# Patient Record
Sex: MALE | Race: WHITE | NOT HISPANIC OR LATINO | Employment: OTHER | URBAN - METROPOLITAN AREA
[De-identification: names, ages, dates, MRNs, and addresses within clinical notes are randomized per-mention and may not be internally consistent; named-entity substitution may affect disease eponyms.]

---

## 2018-03-28 ENCOUNTER — TELEPHONE (OUTPATIENT)
Dept: FAMILY MEDICINE | Facility: CLINIC | Age: 68
End: 2018-03-28

## 2018-03-28 ENCOUNTER — OFFICE VISIT (OUTPATIENT)
Dept: INTERNAL MEDICINE | Facility: CLINIC | Age: 68
End: 2018-03-28
Payer: COMMERCIAL

## 2018-03-28 VITALS
DIASTOLIC BLOOD PRESSURE: 78 MMHG | BODY MASS INDEX: 28.14 KG/M2 | HEART RATE: 74 BPM | RESPIRATION RATE: 16 BRPM | TEMPERATURE: 97.2 F | WEIGHT: 201 LBS | HEIGHT: 71 IN | OXYGEN SATURATION: 100 % | SYSTOLIC BLOOD PRESSURE: 130 MMHG

## 2018-03-28 DIAGNOSIS — Z13.6 CARDIOVASCULAR SCREENING; LDL GOAL LESS THAN 130: ICD-10-CM

## 2018-03-28 DIAGNOSIS — Z00.00 ENCOUNTER FOR ROUTINE ADULT HEALTH EXAMINATION WITHOUT ABNORMAL FINDINGS: Primary | ICD-10-CM

## 2018-03-28 DIAGNOSIS — Z80.42 FAMILY HISTORY OF MALIGNANT NEOPLASM OF PROSTATE: ICD-10-CM

## 2018-03-28 LAB
ALBUMIN SERPL-MCNC: 3.9 G/DL (ref 3.4–5)
ALP SERPL-CCNC: 77 U/L (ref 40–150)
ALT SERPL W P-5'-P-CCNC: 16 U/L (ref 0–70)
ANION GAP SERPL CALCULATED.3IONS-SCNC: 10 MMOL/L (ref 3–14)
AST SERPL W P-5'-P-CCNC: 11 U/L (ref 0–45)
BILIRUB SERPL-MCNC: 0.5 MG/DL (ref 0.2–1.3)
BUN SERPL-MCNC: 15 MG/DL (ref 7–30)
CALCIUM SERPL-MCNC: 8.6 MG/DL (ref 8.5–10.1)
CHLORIDE SERPL-SCNC: 106 MMOL/L (ref 94–109)
CHOLEST SERPL-MCNC: 135 MG/DL
CO2 SERPL-SCNC: 26 MMOL/L (ref 20–32)
CREAT SERPL-MCNC: 0.66 MG/DL (ref 0.66–1.25)
GFR SERPL CREATININE-BSD FRML MDRD: >90 ML/MIN/1.7M2
GLUCOSE SERPL-MCNC: 97 MG/DL (ref 70–99)
HDLC SERPL-MCNC: 43 MG/DL
LDLC SERPL CALC-MCNC: 80 MG/DL
NONHDLC SERPL-MCNC: 92 MG/DL
POTASSIUM SERPL-SCNC: 4.3 MMOL/L (ref 3.4–5.3)
PROT SERPL-MCNC: 8.6 G/DL (ref 6.8–8.8)
PSA SERPL-ACNC: 4.14 UG/L (ref 0–4)
SODIUM SERPL-SCNC: 142 MMOL/L (ref 133–144)
TRIGL SERPL-MCNC: 62 MG/DL

## 2018-03-28 PROCEDURE — 80053 COMPREHEN METABOLIC PANEL: CPT | Performed by: INTERNAL MEDICINE

## 2018-03-28 PROCEDURE — 36415 COLL VENOUS BLD VENIPUNCTURE: CPT | Performed by: INTERNAL MEDICINE

## 2018-03-28 PROCEDURE — 80061 LIPID PANEL: CPT | Performed by: INTERNAL MEDICINE

## 2018-03-28 PROCEDURE — 99397 PER PM REEVAL EST PAT 65+ YR: CPT | Performed by: INTERNAL MEDICINE

## 2018-03-28 PROCEDURE — G0103 PSA SCREENING: HCPCS | Performed by: INTERNAL MEDICINE

## 2018-03-28 RX ORDER — CETIRIZINE HYDROCHLORIDE 10 MG/1
10 TABLET ORAL DAILY
COMMUNITY
End: 2019-04-17

## 2018-03-28 RX ORDER — AMPICILLIN TRIHYDRATE 250 MG
CAPSULE ORAL DAILY
COMMUNITY
End: 2019-04-17

## 2018-03-28 RX ORDER — FLUTICASONE PROPIONATE 50 MCG
1 SPRAY, SUSPENSION (ML) NASAL DAILY PRN
COMMUNITY
End: 2022-03-21

## 2018-03-28 RX ORDER — MAGNESIUM OXIDE/MAG AA CHELATE 300 MG
CAPSULE ORAL DAILY
COMMUNITY
End: 2021-04-13

## 2018-03-28 RX ORDER — ASCORBIC ACID 1000 MG
TABLET ORAL DAILY
COMMUNITY
End: 2019-11-12

## 2018-03-28 ASSESSMENT — ACTIVITIES OF DAILY LIVING (ADL)
CURRENT_FUNCTION: NO ASSISTANCE NEEDED
I_NEED_ASSISTANCE_FOR_THE_FOLLOWING_DAILY_ACTIVITIES:: NO ASSISTANCE IS NEEDED

## 2018-03-28 ASSESSMENT — PAIN SCALES - GENERAL: PAINLEVEL: NO PAIN (0)

## 2018-03-28 NOTE — PROGRESS NOTES
SUBJECTIVE:   Ag Evans is a 67 year old male who presents for Preventive Visit.    Are you in the first 12 months of your Medicare coverage?  No    Physical   Annual:     Getting at least 3 servings of Calcium per day::  Yes    Bi-annual eye exam::  Yes    Dental care twice a year::  Yes    Sleep apnea or symptoms of sleep apnea::  None    Taking medications regularly::  Yes    Medication side effects::  None    Additional concerns today::  No    Ability to successfully perform activities of daily living: no assistance needed  Home Safety:  No safety concerns identified  Hearing Impairment: difficulty following a conversation in a noisy restaurant or crowded room    , retired.  Takes some supplement.  Volunteers with his [employer].  30-40 minutes riding bike outside, some exercise bands.     Fall risk:  Fallen 2 or more times in the past year?: No  Any fall with injury in the past year?: No    COGNITIVE SCREEN  1) Repeat 3 items (Banana, Sunrise, Chair)    2) Clock draw: NORMAL  3) 3 item recall: Recalls 1 object   Results: NORMAL clock, 1-2 items recalled: COGNITIVE IMPAIRMENT LESS LIKELY    Mini-CogTM Copyright HARIS Woo. Licensed by the author for use in [facility]; reprinted with permission ([email or web address]). All rights reserved.        Reviewed and updated as needed this visit by clinical staff  Allergies         Reviewed and updated as needed this visit by Provider        Social History   Substance Use Topics     Smoking status: Never Smoker     Smokeless tobacco: Never Used     Alcohol use Yes      Comment: Moderate       Alcohol Use 3/28/2018   If you drink alcohol do you typically have greater than 3 drinks per day OR greater than 7 drinks per week? No               Today's PHQ-2 Score:   PHQ-2 ( 1999 [employer]izer) 3/28/2018   Q1: Little interest or pleasure in doing things 0   Q2: Feeling down, depressed or hopeless 0   PHQ-2 Score 0   Q1: Little interest or pleasure in doing things Not at all  "  Q2: Feeling down, depressed or hopeless Not at all   PHQ-2 Score 0       Do you feel safe in your environment - Yes    Do you have a Health Care Directive?: Yes: Patient states has Advance Directive and will bring in a copy to clinic.    Current providers sharing in care for this patient include:   Patient Care Team:  No Ref-Primary, Physician as PCP - General    The following health maintenance items are reviewed in Epic and correct as of today:  Health Maintenance   Topic Date Due     HEPATITIS C SCREENING  08/02/1967     AORTIC ANEURYSM SCREENING (SYSTEM ASSIGNED)  08/02/2014     FALL RISK ASSESSMENT  07/30/2016     LIPID MONITORING Q1 YEAR  08/13/2016     INFLUENZA VACCINE (SYSTEM ASSIGNED)  09/01/2018     COLON CANCER SCREEN (SYSTEM ASSIGNED)  06/07/2020     ADVANCE DIRECTIVE PLANNING Q5 YRS  07/30/2020     TETANUS IMMUNIZATION (SYSTEM ASSIGNED)  01/27/2021     PNEUMOCOCCAL  Completed     Pneumonia Vaccine:up to date    Review of Systems  CONSTITUTIONAL: NEGATIVE for fever, chills, change in weight  INTEGUMENTARY/SKIN: NEGATIVE for worrisome rashes, moles or lesions  EYES: NEGATIVE for vision changes or irritation  ENT/MOUTH: NEGATIVE for ear, mouth and throat problems  RESP: NEGATIVE for significant cough or SOB  CV: NEGATIVE for chest pain, palpitations or peripheral edema  GI: NEGATIVE for nausea, abdominal pain, heartburn, or change in bowel habits  : NEGATIVE for frequency, dysuria, or hematuria  MUSCULOSKELETAL: NEGATIVE for significant arthralgias or myalgia  NEURO: NEGATIVE for weakness, dizziness or paresthesias  ENDOCRINE: NEGATIVE for temperature intolerance, skin/hair changes  HEME: NEGATIVE for bleeding problems  PSYCHIATRIC: NEGATIVE for changes in mood or affect    OBJECTIVE:   /78  Pulse 74  Temp 97.2  F (36.2  C) (Temporal)  Resp 16  Ht 5' 10.75\" (1.797 m)  Wt 201 lb (91.2 kg)  SpO2 100%  BMI 28.23 kg/m2 Estimated body mass index is 27.16 kg/(m^2) as calculated from the " "following:    Height as of 5/6/16: 5' 10.87\" (1.8 m).    Weight as of 7/12/16: 194 lb (88 kg).  Physical Exam  GENERAL: healthy, alert and no distress  EYES: Eyes grossly normal to inspection, PERRL and conjunctivae and sclerae normal  HENT: ear canals and TM's normal, nose and mouth without ulcers or lesions  NECK: no adenopathy, no asymmetry, masses, or scars and thyroid normal to palpation  RESP: lungs clear to auscultation - no rales, rhonchi or wheezes  CV: regular rate and rhythm, normal S1 S2, no S3 or S4, no murmur, click or rub, no peripheral edema and peripheral pulses strong  ABDOMEN: soft, nontender, no hepatosplenomegaly, no masses and bowel sounds normal  RECTAL: normal sphincter tone, no rectal masses, prostate normal size, smooth, nontender without nodules or masses  MS: no gross musculoskeletal defects noted, no edema  SKIN: no suspicious lesions or rashes  NEURO: Normal strength and tone, mentation intact and speech normal  PSYCH: mentation appears normal, affect normal/bright    ASSESSMENT / PLAN:       ICD-10-CM    1. Encounter for routine adult health examination without abnormal findings Z00.00 Comprehensive metabolic panel   2. Family history of malignant neoplasm of prostate Z80.42 PSA, screen   3. CARDIOVASCULAR SCREENING; LDL GOAL LESS THAN 130 Z13.6 Lipid Profile     Comprehensive metabolic panel       End of Life Planning:  Patient currently has an advanced directive: Yes.  Practitioner is supportive of decision.    COUNSELING:  Reviewed preventive health counseling, as reflected in patient instructions       Regular exercise       Healthy diet/nutrition       Prostate cancer screening        Estimated body mass index is 27.16 kg/(m^2) as calculated from the following:    Height as of 5/6/16: 5' 10.87\" (1.8 m).    Weight as of 7/12/16: 194 lb (88 kg).     reports that he has never smoked. He has never used smokeless tobacco.      Appropriate preventive services were discussed with this " patient, including applicable screening as appropriate for cardiovascular disease, diabetes, osteopenia/osteoporosis, and glaucoma.  As appropriate for age/gender, discussed screening for colorectal cancer, prostate cancer, breast cancer, and cervical cancer. Checklist reviewing preventive services available has been given to the patient.    Reviewed patients plan of care and provided an AVS. The Basic Care Plan (routine screening as documented in Health Maintenance) for Ag meets the Care Plan requirement. This Care Plan has been established and reviewed with the Patient.    Counseling Resources:  ATP IV Guidelines  Pooled Cohorts Equation Calculator  Breast Cancer Risk Calculator  FRAX Risk Assessment  ICSI Preventive Guidelines  Dietary Guidelines for Americans, 2010  USDA's MyPlate  ASA Prophylaxis  Lung CA Screening    Jose Antonio MD  [facility]

## 2018-03-28 NOTE — TELEPHONE ENCOUNTER
----- Message from Jose Antonio MD sent at 3/28/2018  1:38 PM CDT -----  Labs are good but his psa is up slightly. I would recommend a repeat psa in 2 months

## 2018-03-28 NOTE — NURSING NOTE
"Chief Complaint   Patient presents with     Wellness Visit       Initial /78  Pulse 74  Temp 97.2  F (36.2  C) (Temporal)  Resp 16  Ht 5' 10.75\" (1.797 m)  Wt 201 lb (91.2 kg)  SpO2 100%  BMI 28.23 kg/m2 Estimated body mass index is 28.23 kg/(m^2) as calculated from the following:    Height as of this encounter: 5' 10.75\" (1.797 m).    Weight as of this encounter: 201 lb (91.2 kg).  Medication Reconciliation: complete    "

## 2018-03-28 NOTE — MR AVS SNAPSHOT
"              After Visit Summary   3/28/2018    Ag Evans    MRN: [id number]           Patient Information     Date Of Birth          1950        Visit Information        Provider Department      3/28/2018 11:00 AM Jose Antonio MD [facility]         Follow-ups after your visit        Who to contact     If you have questions or need follow up information about today's clinic visit or your schedule please contact [facility] directly at [phone].  Normal or non-critical lab and imaging results will be communicated to you by MyChart, letter or phone within 4 business days after the clinic has received the results. If you do not hear from us within 7 days, please contact the clinic through [facility] or phone. If you have a critical or abnormal lab result, we will notify you by phone as soon as possible.  Submit refill requests through [employer] or call your pharmacy and they will forward the refill request to us. Please allow 3 business days for your refill to be completed.          Additional Information About Your Visit        MyChart Information     [employer] gives you secure access to your electronic health record. If you see a primary care provider, you can also send messages to your care team and make appointments. If you have questions, please call your primary care clinic.  If you do not have a primary care provider, please call [phone] and they will assist you.        Care EveryWhere ID     This is your Care EveryWhere ID. This could be used by other organizations to access your [facility] medical records  [id number]        Your Vitals Were     Pulse Temperature Respirations Height Pulse Oximetry BMI (Body Mass Index)    74 97.2  F (36.2  C) (Temporal) 16 5' 10.75\" (1.797 m) 100% 28.23 kg/m2       Blood Pressure from Last 3 Encounters:   03/28/18 130/78   07/12/16 120/62   05/06/16 122/72    Weight from Last 3 Encounters:   03/28/18 201 lb (91.2 kg)   07/12/16 194 lb " (88 kg)   05/06/16 192 lb 9.6 oz (87.4 kg)              Today, you had the following     No orders found for display       Primary Care Provider Fax #    Physician No Ref-Primary [phone]       No address on file        Equal Access to Services     SALOMÓN JIM : Galina palma hannah kwan [place], watimurda luqadaha, qaybta kaalmada adeanneliese, sinan osullivan larusselmilagros . So [facility] [phone].    ATENCIÓN: Si habla español, tiene a cho disposición servicios gratuitos de asistencia lingüística. Llame al [phone].    We comply with applicable federal civil rights laws and [place] laws. We do not discriminate on the basis of race, color, national origin, age, disability, sex, sexual orientation, or gender identity.            Thank you!     Thank you for choosing [facility]  for your care. Our goal is always to provide you with excellent care. Hearing back from our patients is one way we can continue to improve our services. Please take a few minutes to complete the written survey that you may receive in the mail after your visit with us. Thank you!             Your Updated Medication List - Protect others around you: Learn how to safely use, store and throw away your medicines at www.disposemymeds.org.          This list is accurate as of 3/28/18 11:18 AM.  Always use your most recent med list.                   Brand Name Dispense Instructions for use Diagnosis    aspirin 81 MG EC tablet      Take 81 mg by mouth daily        cetirizine 10 MG tablet    zyrTEC     Take 10 mg by mouth daily        CoQ10 200 MG Caps      Take by mouth daily        fluticasone 50 MCG/ACT spray    FLONASE     Spray 1 spray into both nostrils daily        Ginkgo Biloba 40 MG Tabs      Take by mouth daily        Magnesium 300 MG Caps      Take by mouth daily        MULTIVITAMIN PO           Saw Palmetto (Serenoa repens) 540 MG Caps      Take by mouth daily

## 2018-06-15 ENCOUNTER — MYC MEDICAL ADVICE (OUTPATIENT)
Dept: INTERNAL MEDICINE | Facility: CLINIC | Age: 68
End: 2018-06-15

## 2018-06-15 DIAGNOSIS — Z12.5 ENCOUNTER FOR SCREENING FOR MALIGNANT NEOPLASM OF PROSTATE: ICD-10-CM

## 2018-06-15 DIAGNOSIS — R97.20 ELEVATED PSA: Primary | ICD-10-CM

## 2018-06-20 DIAGNOSIS — R97.20 ELEVATED PSA: ICD-10-CM

## 2018-06-20 DIAGNOSIS — Z12.5 ENCOUNTER FOR SCREENING FOR MALIGNANT NEOPLASM OF PROSTATE: ICD-10-CM

## 2018-06-20 LAB — PSA SERPL-ACNC: 2.97 UG/L (ref 0–4)

## 2018-06-20 PROCEDURE — G0103 PSA SCREENING: HCPCS | Performed by: INTERNAL MEDICINE

## 2019-04-14 ASSESSMENT — ENCOUNTER SYMPTOMS
WEAKNESS: 0
PALPITATIONS: 0
JOINT SWELLING: 0
CONSTIPATION: 0
HEMATURIA: 0
ARTHRALGIAS: 0
MYALGIAS: 0
DIZZINESS: 0
DYSURIA: 0
PARESTHESIAS: 0
SORE THROAT: 0
FREQUENCY: 1
ABDOMINAL PAIN: 0
COUGH: 0
SHORTNESS OF BREATH: 0
CHILLS: 0
HEARTBURN: 0
EYE PAIN: 0
NAUSEA: 0
HEADACHES: 0
HEMATOCHEZIA: 0
FEVER: 0
DIARRHEA: 0
NERVOUS/ANXIOUS: 0

## 2019-04-14 ASSESSMENT — ACTIVITIES OF DAILY LIVING (ADL): CURRENT_FUNCTION: NO ASSISTANCE NEEDED

## 2019-04-17 ENCOUNTER — TELEPHONE (OUTPATIENT)
Dept: INTERNAL MEDICINE | Facility: CLINIC | Age: 69
End: 2019-04-17

## 2019-04-17 ENCOUNTER — OFFICE VISIT (OUTPATIENT)
Dept: INTERNAL MEDICINE | Facility: CLINIC | Age: 69
End: 2019-04-17
Payer: COMMERCIAL

## 2019-04-17 VITALS
HEART RATE: 65 BPM | WEIGHT: 202.19 LBS | OXYGEN SATURATION: 100 % | DIASTOLIC BLOOD PRESSURE: 72 MMHG | TEMPERATURE: 97.2 F | RESPIRATION RATE: 12 BRPM | BODY MASS INDEX: 28.31 KG/M2 | SYSTOLIC BLOOD PRESSURE: 140 MMHG | HEIGHT: 71 IN

## 2019-04-17 DIAGNOSIS — Z12.5 PROSTATE CANCER SCREENING: ICD-10-CM

## 2019-04-17 DIAGNOSIS — Z11.59 NEED FOR HEPATITIS C SCREENING TEST: ICD-10-CM

## 2019-04-17 DIAGNOSIS — Z13.6 CARDIOVASCULAR SCREENING; LDL GOAL LESS THAN 130: ICD-10-CM

## 2019-04-17 DIAGNOSIS — Z00.00 ENCOUNTER FOR MEDICARE ANNUAL WELLNESS EXAM: Primary | ICD-10-CM

## 2019-04-17 LAB
ALBUMIN SERPL-MCNC: 3.9 G/DL (ref 3.4–5)
ALP SERPL-CCNC: 75 U/L (ref 40–150)
ALT SERPL W P-5'-P-CCNC: 16 U/L (ref 0–70)
ANION GAP SERPL CALCULATED.3IONS-SCNC: 7 MMOL/L (ref 3–14)
AST SERPL W P-5'-P-CCNC: 9 U/L (ref 0–45)
BILIRUB SERPL-MCNC: 0.5 MG/DL (ref 0.2–1.3)
BUN SERPL-MCNC: 16 MG/DL (ref 7–30)
CALCIUM SERPL-MCNC: 8.8 MG/DL (ref 8.5–10.1)
CHLORIDE SERPL-SCNC: 107 MMOL/L (ref 94–109)
CHOLEST SERPL-MCNC: 144 MG/DL
CO2 SERPL-SCNC: 26 MMOL/L (ref 20–32)
CREAT SERPL-MCNC: 0.74 MG/DL (ref 0.66–1.25)
GFR SERPL CREATININE-BSD FRML MDRD: >90 ML/MIN/{1.73_M2}
GLUCOSE SERPL-MCNC: 101 MG/DL (ref 70–99)
HDLC SERPL-MCNC: 43 MG/DL
LDLC SERPL CALC-MCNC: 85 MG/DL
NONHDLC SERPL-MCNC: 101 MG/DL
POTASSIUM SERPL-SCNC: 4.3 MMOL/L (ref 3.4–5.3)
PROT SERPL-MCNC: 8.2 G/DL (ref 6.8–8.8)
PSA SERPL-ACNC: 3.39 UG/L (ref 0–4)
SODIUM SERPL-SCNC: 140 MMOL/L (ref 133–144)
TRIGL SERPL-MCNC: 81 MG/DL

## 2019-04-17 PROCEDURE — 36415 COLL VENOUS BLD VENIPUNCTURE: CPT | Performed by: INTERNAL MEDICINE

## 2019-04-17 PROCEDURE — G0103 PSA SCREENING: HCPCS | Performed by: INTERNAL MEDICINE

## 2019-04-17 PROCEDURE — G0472 HEP C SCREEN HIGH RISK/OTHER: HCPCS | Performed by: INTERNAL MEDICINE

## 2019-04-17 PROCEDURE — G0439 PPPS, SUBSEQ VISIT: HCPCS | Performed by: INTERNAL MEDICINE

## 2019-04-17 PROCEDURE — 80053 COMPREHEN METABOLIC PANEL: CPT | Performed by: INTERNAL MEDICINE

## 2019-04-17 PROCEDURE — 80061 LIPID PANEL: CPT | Performed by: INTERNAL MEDICINE

## 2019-04-17 SDOH — HEALTH STABILITY: MENTAL HEALTH: HOW OFTEN DO YOU HAVE A DRINK CONTAINING ALCOHOL?: MONTHLY OR LESS

## 2019-04-17 ASSESSMENT — ENCOUNTER SYMPTOMS
HEMATOCHEZIA: 0
HEARTBURN: 0
HEMATURIA: 0
ARTHRALGIAS: 0
PALPITATIONS: 0
EYE PAIN: 0
NERVOUS/ANXIOUS: 0
HEADACHES: 0
SHORTNESS OF BREATH: 0
DIARRHEA: 0
NAUSEA: 0
DYSURIA: 0
JOINT SWELLING: 0
FEVER: 0
CONSTIPATION: 0
PARESTHESIAS: 0
FREQUENCY: 1
SORE THROAT: 0
COUGH: 0
WEAKNESS: 0
MYALGIAS: 0
CHILLS: 0
ABDOMINAL PAIN: 0
DIZZINESS: 0

## 2019-04-17 ASSESSMENT — MIFFLIN-ST. JEOR: SCORE: 1696.31

## 2019-04-17 ASSESSMENT — PAIN SCALES - GENERAL: PAINLEVEL: NO PAIN (0)

## 2019-04-17 ASSESSMENT — ACTIVITIES OF DAILY LIVING (ADL): CURRENT_FUNCTION: NO ASSISTANCE NEEDED

## 2019-04-17 NOTE — TELEPHONE ENCOUNTER
Per Dr. Antonio:  PSA okay at 3.39, Kidney/liver okay and cholesterol is good.     Left message to call back. Please relay the results to pt when calls back.

## 2019-04-17 NOTE — PROGRESS NOTES
"SUBJECTIVE:   Ag Evans is a 68 year old male who presents for Preventive Visit.    Are you in the first 12 months of your Medicare coverage?  No    Healthy Habits:     In general, how would you rate your overall health?  Excellent    Frequency of exercise:  6-7 days/week    Duration of exercise:  30-45 minutes    Do you usually eat at least 4 servings of fruit and vegetables a day, include whole grains    & fiber and avoid regularly eating high fat or \"junk\" foods?  Yes    Taking medications regularly:  Yes    Medication side effects:  None    Ability to successfully perform activities of daily living:  No assistance needed    Home Safety:  No safety concerns identified    Hearing Impairment:  Difficulty following a conversation in a noisy restaurant or crowded room, feel that people are mumbling or not speaking clearly and difficulty understanding soft or whispered speech    In the past 6 months, have you been bothered by leaking of urine?  No    In general, how would you rate your overall mental or emotional health?  Excellent      PHQ-2 Total Score: 0    Additional concerns today:  Yes    He is active and does exercise.  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 Done at [facility].      Feels good, exercising 6 times a week.      Do you feel safe in your environment? Yes    Do you have a Health Care Directive? Yes: Patient states has Advance Directive and will bring in a copy to clinic.      Fall risk  Fallen 2 or more times in the past year?: No  Any fall with injury in the past year?: No    Cognitive Screening   1) Repeat 3 items (Leader, Season, Table)      2) Clock draw:   NORMAL  3) 3 item recall:   Recalls 1 object   Results: NORMAL clock, 1-2 items recalled: COGNITIVE IMPAIRMENT LESS LIKELY    Mini-CogTM Copyright S Chilo. Licensed by the author for use in [facility]; reprinted with permission ([email or web address]). All rights reserved.  "     Do you have sleep apnea, excessive snoring or daytime drowsiness?: no    Reviewed and updated as needed this visit by clinical staff  Tobacco  Allergies  Meds  Med Hx  Surg Hx  Fam Hx  Soc Hx        Reviewed and updated as needed this visit by Provider        Social History     Tobacco Use     Smoking status: Never Smoker     Smokeless tobacco: Never Used   Substance Use Topics     Alcohol use: Yes     Frequency: Monthly or less         Alcohol Use 4/14/2019   Prescreen: >3 drinks/day or >7 drinks/week? No   Prescreen: >3 drinks/day or >7 drinks/week? -       Current providers sharing in care for this patient include:   Patient Care Team:  No Ref-Primary, Physician as PCP - Jose Carney MD as Assigned PCP    The following health maintenance items are reviewed in Epic and correct as of today:  Health Maintenance   Topic Date Due     HEPATITIS C SCREENING  08/02/1967     AORTIC ANEURYSM SCREENING (SYSTEM ASSIGNED)  08/02/2014     PHQ-2  01/01/2019     LIPID MONITORING Q1 YEAR  03/28/2019     MEDICARE ANNUAL WELLNESS VISIT  03/28/2019     FALL RISK ASSESSMENT  03/28/2019     COLON CANCER SCREEN (SYSTEM ASSIGNED)  06/07/2020     ADVANCE DIRECTIVE PLANNING Q5 YRS  07/30/2020     DTAP/TDAP/TD IMMUNIZATION (4 - Td) 01/27/2021     INFLUENZA VACCINE  Completed     PNEUMOCOCCAL IMMUNIZATION 65+ LOW/MEDIUM RISK  Completed     ZOSTER IMMUNIZATION  Completed     IPV IMMUNIZATION  Aged Out     MENINGITIS IMMUNIZATION  Aged Out     {[place] of Systems   Constitutional: Negative for chills and fever.   HENT: Negative for congestion, ear pain, hearing loss and sore throat.    Eyes: Negative for pain and visual disturbance.   Respiratory: Negative for cough and shortness of breath.    Cardiovascular: Negative for chest pain, palpitations and peripheral edema.   Gastrointestinal: Negative for abdominal pain, constipation, diarrhea, heartburn, hematochezia and nausea.   Genitourinary: Positive for frequency.  "Negative for discharge, dysuria, genital sores, hematuria, impotence and urgency.   Musculoskeletal: Negative for arthralgias, joint swelling and myalgias.   Skin: Negative for rash.   Neurological: Negative for dizziness, weakness, headaches and paresthesias.   Psychiatric/Behavioral: Negative for mood changes. The patient is not nervous/anxious.      OBJECTIVE:   /72   Pulse 65   Temp 97.2  F (36.2  C) (Tympanic)   Resp 12   Ht 1.791 m (5' 10.5\")   Wt 91.7 kg (202 lb 3 oz)   SpO2 100%   BMI 28.60 kg/m   Estimated body mass index is 28.6 kg/m  as calculated from the following:    Height as of this encounter: 1.791 m (5' 10.5\").    Weight as of this encounter: 91.7 kg (202 lb 3 oz).  Physical Exam  GENERAL: healthy, alert and no distress  EYES: Eyes grossly normal to inspection, PERRL and conjunctivae and sclerae normal  HENT: ear canals and TM's normal, nose and mouth without ulcers or lesions  NECK: no adenopathy, no asymmetry, masses, or scars and thyroid normal to palpation  RESP: lungs clear to auscultation - no rales, rhonchi or wheezes  CV: regular rate and rhythm, normal S1 S2, no S3 or S4, no murmur, click or rub, no peripheral edema and peripheral pulses strong  ABDOMEN: soft, nontender, no hepatosplenomegaly, no masses and bowel sounds normal   (male): rectal exam deferrred   MS: no gross musculoskeletal defects noted, no edema  SKIN: no suspicious lesions or rashes  NEURO: Normal strength and tone, mentation intact and speech normal  PSYCH: mentation appears normal, affect normal/bright    ASSESSMENT / PLAN:       ICD-10-CM    1. Encounter for Medicare annual wellness exam Z00.00 Lipid panel reflex to direct LDL Fasting     Comprehensive metabolic panel   2. Need for hepatitis C screening test Z11.59 Hepatitis C Screen Reflex to HCV RNA Quant and Genotype   3. Prostate cancer screening Z12.5 PSA, screen   4. CARDIOVASCULAR SCREENING; LDL GOAL LESS THAN 130 Z13.6      Doing very well, " "continue exercise, check labs. Discussed bp being a little high, needs to check at home if continue to be over 140/90 then should come back for medication, may happen just with age.         End of Life Planning:  Patient currently has an advanced directive: Yes.  Practitioner is supportive of decision.    COUNSELING:  Reviewed preventive health counseling, as reflected in patient instructions       Regular exercise       Healthy diet/nutrition       Hepatitis C screening       Prostate cancer screening    BP Readings from Last 1 Encounters:   04/17/19 140/72     Estimated body mass index is 28.6 kg/m  as calculated from the following:    Height as of this encounter: 1.791 m (5' 10.5\").    Weight as of this encounter: 91.7 kg (202 lb 3 oz).           reports that he has never smoked. He has never used smokeless tobacco.      Appropriate preventive services were discussed with this patient, including applicable screening as appropriate for cardiovascular disease, diabetes, osteopenia/osteoporosis, and glaucoma.  As appropriate for age/gender, discussed screening for colorectal cancer, prostate cancer, breast cancer, and cervical cancer. Checklist reviewing preventive services available has been given to the patient.    Reviewed patients plan of care and provided an AVS. The Basic Care Plan (routine screening as documented in Health Maintenance) for Ag meets the Care Plan requirement. This Care Plan has been established and reviewed with the Patient.    Counseling Resources:  ATP IV Guidelines  Pooled Cohorts Equation Calculator  Breast Cancer Risk Calculator  FRAX Risk Assessment  ICSI Preventive Guidelines  Dietary Guidelines for Americans, 2010  USDA's MyPlate  ASA Prophylaxis  Lung CA Screening    Jose Antonio MD  [facility]    Identified Health Risks:    The patient was provided with written information regarding signs of hearing loss.  "

## 2019-04-17 NOTE — LETTER
April 18, 2019      Ag Evans  [address] [address] [place] [id number]        Dear ,    We are writing to inform you of your test results.    PSA okay at 3.39, Kidney/liver okay and cholesterol is good.     If you have any questions or concerns, please call the clinic at the number listed above.       Sincerely,        Jose Antonio MD

## 2019-04-17 NOTE — PATIENT INSTRUCTIONS
Patient Education   Personalized Prevention Plan  You are due for the preventive services outlined below.  Your care team is available to assist you in scheduling these services.  If you have already completed any of these items, please share that information with your care team to update in your medical record.  Health Maintenance Due   Topic Date Due     Hepatitis C Screening  [birth year]02/1968     AORTIC ANEURYSM SCREENING (SYSTEM ASSIGNED)  08/02/2014     PHQ-2  01/01/2019     Cholesterol Lab - yearly  03/28/2019     Annual Wellness Visit  03/28/2019     FALL RISK ASSESSMENT  03/28/2019        Patient Education   Personalized Prevention Plan  You are due for the preventive services outlined below.  Your care team is available to assist you in scheduling these services.  If you have already completed any of these items, please share that information with your care team to update in your medical record.  Health Maintenance Due   Topic Date Due     Hepatitis C Screening  08/02/1968     AORTIC ANEURYSM SCREENING (SYSTEM ASSIGNED)  08/02/2014     PHQ-2  01/01/2019     Cholesterol Lab - yearly  03/28/2019     Annual Wellness Visit  03/28/2019     FALL RISK ASSESSMENT  03/28/2019       Signs of Hearing Loss     Hearing much better with one ear can be a sign of hearing loss.     Hearing loss is a problem shared by many people. In fact, it is one of the most common health conditions, particularly as people age. Most people over age 65 have some hearing loss, and by age 80, almost everyone does. Because hearing loss usually occurs slowly over the years, you may not realize your hearing ability has gotten worse.  Have your hearing checked  Contact your healthcare provider if you:    Have to strain to hear normal conversation    Have to watch other people s faces very carefully to follow what they re saying    Need to ask people to repeat what they ve said    Often misunderstand what people are saying    Turn the volume of the  television or radio up so high that others complain    Feel that people are mumbling when they re talking to you    Find that the effort to hear leaves you feeling tired and irritated    Notice, when using the phone, that you hear better with one ear than the other  Date Last Reviewed: 12/1/2016 2000-2018 [employer]. [address] [place] [address], [place] [address]. All rights reserved. This information is not intended as a substitute for professional medical care. Always follow your healthcare professional's instructions.

## 2019-04-18 LAB — HCV AB SERPL QL IA: NONREACTIVE

## 2019-04-24 ENCOUNTER — MYC MEDICAL ADVICE (OUTPATIENT)
Dept: INTERNAL MEDICINE | Facility: CLINIC | Age: 69
End: 2019-04-24

## 2019-08-08 ENCOUNTER — OFFICE VISIT (OUTPATIENT)
Dept: FAMILY MEDICINE | Facility: CLINIC | Age: 69
End: 2019-08-08
Payer: COMMERCIAL

## 2019-08-08 VITALS
RESPIRATION RATE: 14 BRPM | BODY MASS INDEX: 27.86 KG/M2 | TEMPERATURE: 98.4 F | WEIGHT: 199 LBS | DIASTOLIC BLOOD PRESSURE: 78 MMHG | SYSTOLIC BLOOD PRESSURE: 124 MMHG | HEART RATE: 58 BPM | OXYGEN SATURATION: 97 % | HEIGHT: 71 IN

## 2019-08-08 DIAGNOSIS — S20.221A BACK CONTUSION, RIGHT, INITIAL ENCOUNTER: Primary | ICD-10-CM

## 2019-08-08 PROCEDURE — 99213 OFFICE O/P EST LOW 20 MIN: CPT | Performed by: NURSE PRACTITIONER

## 2019-08-08 ASSESSMENT — PAIN SCALES - GENERAL: PAINLEVEL: EXTREME PAIN (8)

## 2019-08-08 ASSESSMENT — MIFFLIN-ST. JEOR: SCORE: 1676.85

## 2019-08-08 NOTE — PATIENT INSTRUCTIONS
Continue to alternate Tylenol and Motrin as needed for pain.  I recommend Aleve (Naproxen Sodium).  Ice, rest, and avoid any aggravating movements that may delay healing.    Please call or return to clinic for any questions, concerns, or symptoms that are not improving or becoming worse.    Klarissa Land, CNP    Patient Education     Soft Tissue Contusion  You have a contusion. This is also called a bruise. There is swelling and some bleeding under the skin. This injury generally takes a few days to a few weeks to heal.  During that time, the bruise will typically change in color from reddish, to purple-blue, to greenish-yellow, then to yellow-brown.  Home care    Elevate the injured area to reduce pain and swelling. As much as possible, sit or lie down with the injured area raised about the level of your heart. This is especially important during the first 48 hours.    Ice the injured area to help reduce pain and swelling. Wrap a cold source (ice pack or ice cubes in a plastic bag) in a thin towel. Apply to the bruised area for 20 minutes every 1 to 2 hours the first day. Continue this 3 to 4 times a day until the pain and swelling goes away.    Unless another medicine was prescribed, you can take acetaminophen, ibuprofen, or naproxen to control pain. (If you have chronic liver or kidney disease or ever had a stomach ulcer or gastrointestinal bleeding, talk with your doctor before using these medicines.)  Follow-up care  Follow up with your healthcare provider or our staff as advised. Call if you are not better in 1 to 2 weeks.  When to seek medical advice   Call your healthcare provider right away if you have any of the following:    Increased pain or swelling    Bruise is on an arm or leg and arm or leg becomes cold, blue, numb or tingly    Signs of infection: Warmth, drainage, or increased redness or pain around the contusion    Inability to move the injured area or body part     Bruise is near your eye and  you have problems with your eyesight or eye     Frequent bruising for unknown reasons  Date Last Reviewed: 5/1/2017 2000-2018 [employer]. [address] [place] [address], [place] [address]. All rights reserved. This information is not intended as a substitute for professional medical care. Always follow your healthcare professional's instructions.           Patient Education     Back Contusion  You have a contusion to your back. A contusion is also called a bruise. There is swelling and some bleeding under the skin. The skin may be purplish. You may have muscle aching and stiffness in the area of the bruise. There are no broken bones.  Contusions heal on their own, without further treatment. However, pain and skin discoloration may take weeks to months to go away.   Home care    Rest. Avoid heavy lifting, strenuous exertion, or any activity that causes pain.    Ice the area to reduce pain and swelling. Put ice cubes in a plastic bag or use a cold pack. (Wrap the cold source in a thin towel. Don't place it directly on your skin.) Ice the injured area for 20 minutes every 1 to 2 hours the first day. Continue with ice packs 3 to 4 times a day for the next 2 days, then as needed for the relief of pain and swelling.    Take any prescribed pain medicine. If none was prescribed, take acetaminophen, ibuprofen, or naproxen to control pain, unless you have other medical conditions that prevent taking these medicines. If you are unsure about medicines, ask your healthcare provider before you leave the hospital.  Follow-up care  Follow up with your healthcare provider, or as directed. Call if you are not better in 1 to 2 weeks.  When to seek medical advice  Call your healthcare provider for any of the following:    New or worsening pain    Increased swelling around the bruise    Pain spreads to one or both legs    Weakness or numbness in one or both legs     Loss of bowel or bladder control    Numbness in the groin or  genital area    Fever of 100.4 F (38 C) or higher, or as directed by your healthcare provider  Date Last Reviewed: 7/1/2017 2000-2018 [employer]. [address] [place] [address], [place] [address]. All rights reserved. This information is not intended as a substitute for professional medical care. Always follow your healthcare professional's instructions.

## 2019-08-08 NOTE — PROGRESS NOTES
Subjective     Ag Evans is a 69 year old male who presents to clinic today for the following health issues:    Shoulder Pain     History of Present Illness        He eats 2-3 servings of fruits and vegetables daily.He consumes 0 sweetened beverage(s) daily.  He is taking medications regularly.     Right Shoulder Pain    Onset: 2 weeks     Description: Patient was playing tennis and injured right shoulder. He fell back diving for the  tennis ball.   Location: Right Shoulder.   Character: Sharp pains while flexing or moving right arm.     Intensity: 8/10    Progression of Symptoms: same    Accompanying Signs & Symptoms:  Other symptoms: No numbness, No tingling and No swelling.     History:   Previous similar pain: no       Precipitating factors:   Trauma or overuse: YES.    Alleviating factors:  Improved by: Heat helps.     Therapies Tried and outcome: Heat, icing , and Ibuprofen.     Additional HPI:  Initial injury occurred while he was playing tennis 2 weeks ago.  He was reaching for a ball, and fell onto his right side and back.  He has been using heat and Ibuprofen which help relieve pain.  About 5 days ago, he was practicing his tennis serve and felt more discomfort.  He states he was able to play golf on Monday without problem, but since he is still experiencing intermittent pain with movement, decided to come in for evaluation.      Patient Active Problem List   Diagnosis     Family history of malignant neoplasm of prostate     Incomplete right bundle branch block (RBBB)     History of repair of hip joint     Sinus bradycardia     Hyperlipidemia LDL goal <130     CARDIOVASCULAR SCREENING; LDL GOAL LESS THAN 130     Advanced directives, counseling/discussion     DJD (degenerative joint disease), lumbar     Past Surgical History:   Procedure Laterality Date     APPENDECTOMY       COLONOSCOPY  2010    benign with hyperplastic polyps     SURGICAL HISTORY OF -       Removal of a benign soft tissue mass right  abdominal wall     SURGICAL HISTORY OF -   2006    Total hip replacement     SURGICAL HISTORY OF -   2015    Total hip replacement     TONSILLECTOMY         Social History     Tobacco Use     Smoking status: Never Smoker     Smokeless tobacco: Never Used   Substance Use Topics     Alcohol use: Not Currently     Frequency: Monthly or less     Family History   Problem Relation Age of Onset     Prostate Cancer Father      Other Cancer Mother      Other Cancer Sister         Pancreatic cancer         Current Outpatient Medications   Medication Sig Dispense Refill     diphenhydrAMINE HCl (BENADRYL ALLERGY PO)        fluticasone (FLONASE) 50 MCG/ACT spray Spray 1 spray into both nostrils daily       Ginkgo Biloba 40 MG TABS Take by mouth daily       Magnesium 300 MG CAPS Take by mouth daily       melatonin 5 MG tablet        Multiple Vitamins-Minerals (MULTIVITAMIN PO)        Saw Palmetto, Serenoa repens, 540 MG CAPS Take by mouth daily       Allergies   Allergen Reactions     Seasonal Allergies      Recent Labs   Lab Test 04/17/19  0913 03/28/18  1142 08/13/15  0855 03/04/14 04/03/12 04/02/12   A1C  --   --   --   --   --  5.3  --    LDL 85 80 95 119   < >  --   --    HDL 43 43 44 40   < >  --   --    TRIG 81 62 81 107   < >  --   --    ALT 16 16  --   --   --   --   --    CR 0.74 0.66 0.72 0.82   < >  --   --    GFRESTIMATED >90 >90 >90  Non African American GFR Calc   >60.0   < >  --   --    GFRESTBLACK >90 >90 >90  African American GFR Calc   >60.0   < >  --   --    POTASSIUM 4.3 4.3 4.5 4.9   < >  --   --    TSH  --   --   --  1.546  --   --  0.981    < > = values in this interval not displayed.      BP Readings from Last 3 Encounters:   08/08/19 124/78   04/17/19 140/72   03/28/18 130/78    Wt Readings from Last 3 Encounters:   08/08/19 90.3 kg (199 lb)   04/17/19 91.7 kg (202 lb 3 oz)   03/28/18 91.2 kg (201 lb)                    Reviewed and updated as needed this visit by Provider  Tobacco  Allergies  Meds   "Problems  Med Hx  Surg Hx  Fam Hx         Review of Systems   ROS COMP: Constitutional, HEENT, cardiovascular, pulmonary, gi and gu systems are negative, except as otherwise noted.      Objective    /78   Pulse 58   Temp 98.4  F (36.9  C) (Temporal)   Resp 14   Ht 1.791 m (5' 10.5\")   Wt 90.3 kg (199 lb)   SpO2 97%   BMI 28.15 kg/m    Body mass index is 28.15 kg/m .  Physical Exam   GENERAL: healthy, alert and no distress  NECK: no adenopathy, no asymmetry, masses, or scars and thyroid normal to palpation  RESP: lungs clear to auscultation - no rales, rhonchi or wheezes  CV: regular rate and rhythm, normal S1 S2, no S3 or S4, no murmur, click or rub, no peripheral edema and peripheral pulses strong  ABDOMEN: soft, nontender, no hepatosplenomegaly, no masses and bowel sounds normal  MS: no gross musculoskeletal defects noted, no edema  SKIN: no suspicious lesions or rashes, no ecchymosis   NEURO: Normal strength and tone, mentation intact and speech normal  BACK: no CVA tenderness, no paralumbar tenderness  Comprehensive back pain exam:  Tenderness of right mid-back with palpation, Range of motion not limited by pain, Lower extremity strength functional and equal on both sides, Lower extremity reflexes within normal limits bilaterally, Lower extremity sensation normal and equal on both sides and Straight leg raise negative bilaterally  PSYCH: mentation appears normal, affect normal/bright    Diagnostic Test Results:  Labs reviewed in Epic  none         Assessment & Plan     1. Back contusion, right, initial encounter  Patient has full range of motion without pain in office today.  On exam, he has back tenderness over right thoracic area with palpation.  No bruising or skin discoloration noted.  Tenderness consistent with a mild back contusion from his fall 2 weeks ago.  It is aggravated with certain shoulder movements.  I advised to try and avoid activities which aggravate pain, continue alternating " "Tylenol and Motrin as needed, and ice the area.  Also advised to follow-up in 1-2 weeks if pain is not improving or becoming worse.    BMI:   Estimated body mass index is 28.15 kg/m  as calculated from the following:    Height as of this encounter: 1.791 m (5' 10.5\").    Weight as of this encounter: 90.3 kg (199 lb).     The patient was instructed to contact clinic for non-improvement as expected/discussed, worsening symptoms, and for questions regarding medications or treatment plan. All questions were answered to the best of my ability and the patient's satisfaction.         Patient Instructions   Continue to alternate Tylenol and Motrin as needed for pain.  I recommend Aleve (Naproxen Sodium).  Ice, rest, and avoid any aggravating movements that may delay healing.    Please call or return to clinic for any questions, concerns, or symptoms that are not improving or becoming worse.    Klarissa Land, CNP    Patient Education     Soft Tissue Contusion  You have a contusion. This is also called a bruise. There is swelling and some bleeding under the skin. This injury generally takes a few days to a few weeks to heal.  During that time, the bruise will typically change in color from reddish, to purple-blue, to greenish-yellow, then to yellow-brown.  Home care    Elevate the injured area to reduce pain and swelling. As much as possible, sit or lie down with the injured area raised about the level of your heart. This is especially important during the first 48 hours.    Ice the injured area to help reduce pain and swelling. Wrap a cold source (ice pack or ice cubes in a plastic bag) in a thin towel. Apply to the bruised area for 20 minutes every 1 to 2 hours the first day. Continue this 3 to 4 times a day until the pain and swelling goes away.    Unless another medicine was prescribed, you can take acetaminophen, ibuprofen, or naproxen to control pain. (If you have chronic liver or kidney disease or ever had a " stomach ulcer or gastrointestinal bleeding, talk with your doctor before using these medicines.)  Follow-up care  Follow up with your healthcare provider or our staff as advised. Call if you are not better in 1 to 2 weeks.  When to seek medical advice   Call your healthcare provider right away if you have any of the following:    Increased pain or swelling    Bruise is on an arm or leg and arm or leg becomes cold, blue, numb or tingly    Signs of infection: Warmth, drainage, or increased redness or pain around the contusion    Inability to move the injured area or body part     Bruise is near your eye and you have problems with your eyesight or eye     Frequent bruising for unknown reasons  Date Last Reviewed: 5/1/2017 2000-2018 [employer]. [address] [place] [address], [place] [address]. All rights reserved. This information is not intended as a substitute for professional medical care. Always follow your healthcare professional's instructions.           Patient Education     Back Contusion  You have a contusion to your back. A contusion is also called a bruise. There is swelling and some bleeding under the skin. The skin may be purplish. You may have muscle aching and stiffness in the area of the bruise. There are no broken bones.  Contusions heal on their own, without further treatment. However, pain and skin discoloration may take weeks to months to go away.   Home care    Rest. Avoid heavy lifting, strenuous exertion, or any activity that causes pain.    Ice the area to reduce pain and swelling. Put ice cubes in a plastic bag or use a cold pack. (Wrap the cold source in a thin towel. Don't place it directly on your skin.) Ice the injured area for 20 minutes every 1 to 2 hours the first day. Continue with ice packs 3 to 4 times a day for the next 2 days, then as needed for the relief of pain and swelling.    Take any prescribed pain medicine. If none was prescribed, take acetaminophen, ibuprofen, or  naproxen to control pain, unless you have other medical conditions that prevent taking these medicines. If you are unsure about medicines, ask your healthcare provider before you leave the hospital.  Follow-up care  Follow up with your healthcare provider, or as directed. Call if you are not better in 1 to 2 weeks.  When to seek medical advice  Call your healthcare provider for any of the following:    New or worsening pain    Increased swelling around the bruise    Pain spreads to one or both legs    Weakness or numbness in one or both legs     Loss of bowel or bladder control    Numbness in the groin or genital area    Fever of 100.4 F (38 C) or higher, or as directed by your healthcare provider  Date Last Reviewed: 7/1/2017 2000-2018 [employer]. [address] [place] [address], [place] [address]. All rights reserved. This information is not intended as a substitute for professional medical care. Always follow your healthcare professional's instructions.               Return in 2 weeks (on 8/22/2019) for Symptoms Not Improving/Getting Worse.    Klarissa Land, CNP  [facility]

## 2019-11-12 ENCOUNTER — OFFICE VISIT (OUTPATIENT)
Dept: FAMILY MEDICINE | Facility: CLINIC | Age: 69
End: 2019-11-12
Payer: COMMERCIAL

## 2019-11-12 VITALS
OXYGEN SATURATION: 98 % | TEMPERATURE: 98.4 F | RESPIRATION RATE: 16 BRPM | BODY MASS INDEX: 27.95 KG/M2 | SYSTOLIC BLOOD PRESSURE: 126 MMHG | WEIGHT: 197.6 LBS | DIASTOLIC BLOOD PRESSURE: 74 MMHG | HEART RATE: 61 BPM

## 2019-11-12 DIAGNOSIS — H10.33 ACUTE CONJUNCTIVITIS OF BOTH EYES, UNSPECIFIED ACUTE CONJUNCTIVITIS TYPE: Primary | ICD-10-CM

## 2019-11-12 PROCEDURE — 99203 OFFICE O/P NEW LOW 30 MIN: CPT | Performed by: FAMILY MEDICINE

## 2019-11-12 RX ORDER — OFLOXACIN 3 MG/ML
2 SOLUTION/ DROPS OPHTHALMIC
Qty: 7 ML | Refills: 0 | Status: SHIPPED | OUTPATIENT
Start: 2019-11-12 | End: 2019-11-14

## 2019-11-12 ASSESSMENT — PAIN SCALES - GENERAL: PAINLEVEL: NO PAIN (0)

## 2019-11-12 NOTE — PROGRESS NOTES
Subjective     Ag Evans is a 69 year old male who presents to clinic today for the following health issues:    HPI   Chief Complaint   Patient presents with     Eye Problem     both eyes have watery goopy.  States that it feels like there is an eyelash in his eye.        Ag is here today with his wife for mattering eyes.  Stated that he is in [place] [facility] for couple weeks; on his last day there which was about a week ago, he started developed a cold symptoms with runny nose, nasal congestion and scratchy throat.  He was doing better in fact.  This morning, however he woke up with watery eye and mattering and since then, he has been having a lot thick milk drainage or matter coming out of his eyes bilaterally.  No pain with eyes movement but feels scratchy.  No change in his vision - no blurry or double vision.  Does not wear contact lenses.  No fever chills headache.  No coughing, CP or SOB. No nausea or vomiting.  Nasal congestion is minimal and no runny nose or sinus pain/pressure.  Never had this before.  No other concerns.    Current Outpatient Medications   Medication Sig Dispense Refill     melatonin 5 MG tablet        diphenhydrAMINE HCl (BENADRYL ALLERGY PO)        fluticasone (FLONASE) 50 MCG/ACT spray Spray 1 spray into both nostrils daily       Magnesium 300 MG CAPS Take by mouth daily       Multiple Vitamins-Minerals (MULTIVITAMIN PO)        ofloxacin (OCUFLOX) 0.3 % ophthalmic solution Place 2 drops into both eyes every 2 hours (while awake) 7 mL 0     Allergies   Allergen Reactions     Seasonal Allergies          Reviewed and updated as needed this visit by Provider         Review of Systems   ROS COMP: Constitutional, HEENT, cardiovascular, pulmonary, gi and gu systems are negative, except as otherwise noted.      Objective    /74   Pulse 61   Temp 98.4  F (36.9  C) (Temporal)   Resp 16   Wt 89.6 kg (197 lb 9.6 oz)   SpO2 98%   BMI 27.95 kg/m    Body mass index is 27.95  "kg/m .  Physical Exam   GENERAL: healthy, alert and no distress  EYES: Eyes grossly normal to inspection, PERRL.  Both eyes with injected conjunctiva and thin milky mattering.  No redness  No pain with eye movement and four visual fields are intact.  No nystagmus.  No eyelid swelling or periorbital redness/swelling.  HENT: ear canals and TM's normal, Nares are non-congested..  No tender with palpation to the sinuses.  Oropharynx is pink and moist.  No tonsillar hypertrophy, exudate or erythema.  NECK: no adenopathyand thyroid normal to palpation  RESP: lungs clear to auscultation - no rales, rhonchi or wheezes  CV: regular rate and rhythm, no murmur.      Diagnostic Test Results:  Labs reviewed in Epic  No results found for any visits on 11/12/19.        Assessment & Plan       ICD-10-CM    1. Acute conjunctivitis of both eyes, unspecified acute conjunctivitis type H10.33 DISCONTINUED: ofloxacin (OCUFLOX) 0.3 % ophthalmic solution     Bilateral conjunctivitis with mattering that came in abruptly this morning.  [place] - [place] home a week ago. Was having a cold in the last week and it is getting better.  Physical exam did not suggest or orbital shantelle-orbital cellulitis. Treated him with the Ofloxacin eye drops.  Recommended OTC med as needed for nasal congestion.  Avoid rubbing on his eyes. Warmth moist towel to clean the mattering as needed. Educate him symptoms to call in or be seen, especially if develop double vision, eye pain/pressure or pain with eye movement or if has any concern.  He feels comfortable with the plan.      BMI:   Estimated body mass index is 27.95 kg/m  as calculated from the following:    Height as of 8/8/19: 1.791 m (5' 10.5\").    Weight as of this encounter: 89.6 kg (197 lb 9.6 oz).   Weight management plan: Patient was referred to their PCP to discuss a diet and exercise plan.      Return if symptoms worsen or fail to improve.    Chris Lu Mai, MD  [facility]

## 2019-11-13 ENCOUNTER — TELEPHONE (OUTPATIENT)
Dept: FAMILY MEDICINE | Facility: CLINIC | Age: 69
End: 2019-11-13

## 2019-11-13 DIAGNOSIS — H10.33 ACUTE CONJUNCTIVITIS OF BOTH EYES, UNSPECIFIED ACUTE CONJUNCTIVITIS TYPE: ICD-10-CM

## 2019-11-13 NOTE — TELEPHONE ENCOUNTER
Reason for Call:  Other prescription    Detailed comments: wife calls stating that he wasted a lot of the eye drops getting used to putting them in and she is worried he won't have enough to do the full 7 days.  She would like a refill put in to the pharmacy in case he runs out.  [facility] in [place].    Phone Number Patient can be reached at: Home number on file [phone] (home)    Best Time: any    Can we leave a detailed message on this number? YES    Call taken on 11/13/2019 at 9:40 AM by Master Jacobo

## 2019-11-14 DIAGNOSIS — H10.33 ACUTE CONJUNCTIVITIS OF BOTH EYES, UNSPECIFIED ACUTE CONJUNCTIVITIS TYPE: ICD-10-CM

## 2019-11-14 RX ORDER — OFLOXACIN 3 MG/ML
SOLUTION/ DROPS OPHTHALMIC
Refills: 0 | OUTPATIENT
Start: 2019-11-14

## 2019-11-14 RX ORDER — OFLOXACIN 3 MG/ML
2 SOLUTION/ DROPS OPHTHALMIC
Qty: 7 ML | Refills: 0 | Status: SHIPPED | OUTPATIENT
Start: 2019-11-14 | End: 2020-10-02

## 2019-11-14 NOTE — TELEPHONE ENCOUNTER
Rx was sent 11/12/2019 for 7 ml and 0 refills. Patient should have medication available at pharmacy.   Pharmacy notified via E-prescribe refusal    Ofloxacin  Last Written Prescription Date:  11/12/2019  Last Fill Quantity: 7 ml,  # refills: 0   Last office visit: 11/12/2019 with prescribing provider:  Leyda   Future Office Visit:      Requested Prescriptions   Pending Prescriptions Disp Refills     ofloxacin (OCUFLOX) 0.3 % ophthalmic solution [Pharmacy Med Name: OFLOXACIN OP 0.3% JARAD]  0     Sig: INSTILL 2 DROPS INTO EACH EYE EVERY 2 HOURS WHILE AWAKE FOR 7 DAYS       There is no refill protocol information for this order        Sue Gutierrez RN on 11/14/2019 at 12:37 PM

## 2019-11-14 NOTE — TELEPHONE ENCOUNTER
Patient stated yes he is feeling better the first night was the worst but defiantly better.  Mercedes Go MA 11/14/2019

## 2019-12-30 ENCOUNTER — NURSE TRIAGE (OUTPATIENT)
Dept: INTERNAL MEDICINE | Facility: CLINIC | Age: 69
End: 2019-12-30

## 2019-12-30 NOTE — TELEPHONE ENCOUNTER
Rn tried calling pt, but NA. Left message to please call us back.[phone]  He may speak with any RN.......................MARSHALL Cano

## 2019-12-30 NOTE — TELEPHONE ENCOUNTER
"  Additional Information    Mild abdominal pain    Answer Assessment - Initial Assessment Questions  1. LOCATION: \"Where does it hurt?\"       Right abdomen aross from hip joint, groin  2. RADIATION: \"Does the pain shoot anywhere else?\" (e.g., chest, back)      no  3. ONSET: \"When did the pain begin?\" (Minutes, hours or days ago)       Couple weeks,   4. SUDDEN: \"Gradual or sudden onset?\"      gradual  5. PATTERN \"Does the pain come and go, or is it constant?\"     - If constant: \"Is it getting better, staying the same, or worsening?\"       (Note: Constant means the pain never goes away completely; most serious pain is constant and it progresses)      - If intermittent: \"How long does it last?\" \"Do you have pain now?\"      (Note: Intermittent means the pain goes away completely between bouts)      Intermittent morning hour or so  6. SEVERITY: \"How bad is the pain?\"  (e.g., Scale 1-10; mild, moderate, or severe)     - MILD (1-3): doesn't interfere with normal activities, abdomen soft and not tender to touch      - MODERATE (4-7): interferes with normal activities or awakens from sleep, tender to touch      - SEVERE (8-10): excruciating pain, doubled over, unable to do any normal activities        Mild nusience  7. RECURRENT SYMPTOM: \"Have you ever had this type of abdominal pain before?\" If so, ask: \"When was the last time?\" and \"What happened that time?\"       no  8. CAUSE: \"What do you think is causing the abdominal pain?\"      unsure  9. RELIEVING/AGGRAVATING FACTORS: \"What makes it better or worse?\" (e.g., movement, antacids, bowel movement)      walking  10. OTHER SYMPTOMS: \"Has there been any vomiting, diarrhea, constipation, or urine problems?\"        Constipation, BM this am regular    Protocols used: ABDOMINAL PAIN - MALE-A-OH    "

## 2019-12-30 NOTE — TELEPHONE ENCOUNTER
Triage RN see my chart message    Message     Appointment Request From: Ag Evans      With Provider: Jose Antonio MD [[facility]]      Preferred Date Range: Any      Preferred Times: Any time      Reason for visit: Request an Appointment      Comments:   Pain lower right side

## 2019-12-30 NOTE — TELEPHONE ENCOUNTER
Patient reports having a mild intermittent discomfort in lower right abdominal area for the past 4 weeks. He is regular with bowel movements and denies any urinary symptoms. He is afebrile. He was transferred to scheduling to make an appointment.    Brenda Chao RN BSN

## 2020-03-22 ENCOUNTER — MYC MEDICAL ADVICE (OUTPATIENT)
Dept: INTERNAL MEDICINE | Facility: CLINIC | Age: 70
End: 2020-03-22

## 2020-07-03 ENCOUNTER — TRANSFERRED RECORDS (OUTPATIENT)
Dept: HEALTH INFORMATION MANAGEMENT | Facility: CLINIC | Age: 70
End: 2020-07-03

## 2020-07-03 LAB — HEMOCCULT STL QL IA: NEGATIVE

## 2020-10-02 ENCOUNTER — OFFICE VISIT (OUTPATIENT)
Dept: INTERNAL MEDICINE | Facility: CLINIC | Age: 70
End: 2020-10-02
Payer: COMMERCIAL

## 2020-10-02 VITALS
DIASTOLIC BLOOD PRESSURE: 74 MMHG | HEIGHT: 71 IN | SYSTOLIC BLOOD PRESSURE: 138 MMHG | HEART RATE: 56 BPM | BODY MASS INDEX: 27.77 KG/M2 | RESPIRATION RATE: 22 BRPM | OXYGEN SATURATION: 98 % | TEMPERATURE: 97.7 F | WEIGHT: 198.4 LBS

## 2020-10-02 DIAGNOSIS — Z13.6 CARDIOVASCULAR SCREENING; LDL GOAL LESS THAN 130: ICD-10-CM

## 2020-10-02 DIAGNOSIS — Z00.00 ENCOUNTER FOR MEDICARE ANNUAL WELLNESS EXAM: Primary | ICD-10-CM

## 2020-10-02 DIAGNOSIS — Z12.5 PROSTATE CANCER SCREENING: ICD-10-CM

## 2020-10-02 DIAGNOSIS — G62.9 NEUROPATHY: ICD-10-CM

## 2020-10-02 DIAGNOSIS — Z96.643 HISTORY OF TOTAL REPLACEMENT OF BOTH HIP JOINTS: ICD-10-CM

## 2020-10-02 LAB
ALBUMIN SERPL-MCNC: 4.1 G/DL (ref 3.4–5)
ALP SERPL-CCNC: 75 U/L (ref 40–150)
ALT SERPL W P-5'-P-CCNC: 17 U/L (ref 0–70)
ANION GAP SERPL CALCULATED.3IONS-SCNC: 6 MMOL/L (ref 3–14)
AST SERPL W P-5'-P-CCNC: 10 U/L (ref 0–45)
BILIRUB SERPL-MCNC: 0.5 MG/DL (ref 0.2–1.3)
BUN SERPL-MCNC: 15 MG/DL (ref 7–30)
CALCIUM SERPL-MCNC: 9.5 MG/DL (ref 8.5–10.1)
CHLORIDE SERPL-SCNC: 108 MMOL/L (ref 94–109)
CHOLEST SERPL-MCNC: 154 MG/DL
CO2 SERPL-SCNC: 27 MMOL/L (ref 20–32)
CREAT SERPL-MCNC: 0.76 MG/DL (ref 0.66–1.25)
GFR SERPL CREATININE-BSD FRML MDRD: >90 ML/MIN/{1.73_M2}
GLUCOSE SERPL-MCNC: 98 MG/DL (ref 70–99)
HDLC SERPL-MCNC: 47 MG/DL
LDLC SERPL CALC-MCNC: 93 MG/DL
NONHDLC SERPL-MCNC: 107 MG/DL
POTASSIUM SERPL-SCNC: 4.3 MMOL/L (ref 3.4–5.3)
PROT SERPL-MCNC: 8.8 G/DL (ref 6.8–8.8)
PSA SERPL-ACNC: 3.74 UG/L (ref 0–4)
SODIUM SERPL-SCNC: 141 MMOL/L (ref 133–144)
TRIGL SERPL-MCNC: 70 MG/DL
VIT B12 SERPL-MCNC: 608 PG/ML (ref 193–986)

## 2020-10-02 PROCEDURE — G0103 PSA SCREENING: HCPCS | Performed by: INTERNAL MEDICINE

## 2020-10-02 PROCEDURE — 80061 LIPID PANEL: CPT | Performed by: INTERNAL MEDICINE

## 2020-10-02 PROCEDURE — 82607 VITAMIN B-12: CPT | Performed by: INTERNAL MEDICINE

## 2020-10-02 PROCEDURE — 36415 COLL VENOUS BLD VENIPUNCTURE: CPT | Performed by: INTERNAL MEDICINE

## 2020-10-02 PROCEDURE — 80053 COMPREHEN METABOLIC PANEL: CPT | Performed by: INTERNAL MEDICINE

## 2020-10-02 PROCEDURE — 99397 PER PM REEVAL EST PAT 65+ YR: CPT | Performed by: INTERNAL MEDICINE

## 2020-10-02 RX ORDER — VIT C/B6/B5/MAGNESIUM/HERB 173 50-5-6-5MG
CAPSULE ORAL
COMMUNITY
End: 2021-08-26

## 2020-10-02 RX ORDER — 1.1% SODIUM FLUORIDE PRESCRIPTION DENTAL CREAM 5 MG/G
CREAM DENTAL
COMMUNITY
Start: 2020-07-18 | End: 2022-05-25

## 2020-10-02 ASSESSMENT — PAIN SCALES - GENERAL: PAINLEVEL: NO PAIN (0)

## 2020-10-02 ASSESSMENT — MIFFLIN-ST. JEOR: SCORE: 1669.13

## 2020-10-02 NOTE — PATIENT INSTRUCTIONS
Patient Education   Personalized Prevention Plan  You are due for the preventive services outlined below.  Your care team is available to assist you in scheduling these services.  If you have already completed any of these items, please share that information with your care team to update in your medical record.  Health Maintenance Due   Topic Date Due     AORTIC ANEURYSM SCREENING (SYSTEM ASSIGNED)  08/02/2014     Pneumococcal Vaccine (1 of 1 - PPSV23) 10/15/2019     Cholesterol Lab  04/17/2020     FALL RISK ASSESSMENT  04/17/2020     Colorectal Cancer Screening  06/07/2020     Discuss Advance Care Planning  07/30/2020

## 2020-10-02 NOTE — PROGRESS NOTES
"  SUBJECTIVE:   Ag Evans is a 70 year old male who presents for Preventive Visit.      Patient has been advised of split billing requirements and indicates understanding: Yes  Are you in the first 12 months of your Medicare Part B coverage?  No    Physical Health:    In general, how would you rate your overall physical health? excellent    Outside of work, how many days during the week do you exercise? 6-7 days/week    Outside of work, approximately how many minutes a day do you exercise?30-45 minutes    If you drink alcohol do you typically have >3 drinks per day or >7 drinks per week? No    Do you usually eat at least 4 servings of fruit and vegetables a day, include whole grains & fiber and avoid regularly eating high fat or \"junk\" foods? Yes    Do you have any problems taking medications regularly?  No    Do you have any side effects from medications? not applicable    Needs assistance for the following daily activities: no assistance needed    Which of the following safety concerns are present in your home?  none identified     Hearing impairment: Yes, bilateral minor hearing loss, use of aide     In the past 6 months, have you been bothered by leaking of urine? no    Mental Health:    In general, how would you rate your overall mental or emotional health? excellent  PHQ-2 Score: 0      A couple moles on his back    Possible neuropathy in his toes, feels something there.    Hips were replaced, left 7 years ago, right 17 years ago.  Active and walks and golfs.  Uses some ibuprofen when golfs,doesn't wake him up at night.      Checks his bp on cuff at home, wrote them down for the last week. Good readings.     Exercising 6 days a week,     Do you feel safe in your environment? Yes    Have you ever done Advance Care Planning? (For example, a Health Directive, POLST, or a discussion with a medical provider or your loved ones about your wishes): Yes, patient states has an Advance Care Planning document and will " bring a copy to the clinic.    Additional concerns to address?  No    Fall risk:  Fallen 2 or more times in the past year?: No  Any fall with injury in the past year?: No  click delete button to remove this line now  Cognitive Screenin) Repeat 3 items (Leader, Season, Table)    2) Clock draw: NORMAL  3) 3 item recall: Recalls 2 objects   Results: NORMAL clock, 1-2 items recalled: COGNITIVE IMPAIRMENT LESS LIKELY    Mini-CogTM Copyright HARIS Woo. Licensed by the author for use in [facility]; reprinted with permission (jacey@[facility]). All rights reserved.      Do you have sleep apnea, excessive snoring or daytime drowsiness?: no    Stool sample collected through [employer]             Reviewed and updated as needed this visit by clinical staff  Tobacco  Allergies  Meds              Reviewed and updated as needed this visit by Provider                Social History     Tobacco Use     Smoking status: Never Smoker     Smokeless tobacco: Never Used   Substance Use Topics     Alcohol use: Not Currently     Frequency: Monthly or less                          Current providers sharing in care for this patient include:     Patient Care Team:  Jose Antonio MD as PCP - General (Internal Medicine)  Chris Crabtree MD as Assigned PCP    The following health maintenance items are reviewed in Epic and correct as of today:  Health Maintenance   Topic Date Due     AORTIC ANEURYSM SCREENING (SYSTEM ASSIGNED)  2014     Pneumococcal Vaccine: 65+ Years (1 of 1 - PPSV23) 10/15/2019     MEDICARE ANNUAL WELLNESS VISIT  2020     LIPID  2020     FALL RISK ASSESSMENT  2020     COLORECTAL CANCER SCREENING  2020     ADVANCE CARE PLANNING  2020     DTAP/TDAP/TD IMMUNIZATION (4 - Td) 2021     HEPATITIS C SCREENING  Completed     PHQ-2  Completed     INFLUENZA VACCINE  Completed     ZOSTER IMMUNIZATION  Completed     Pneumococcal Vaccine: Pediatrics (0 to 5 Years) and At-Risk Patients (6  "to 64 Years)  Aged Out     IPV IMMUNIZATION  Aged Out     MENINGITIS IMMUNIZATION  Aged Out     HEPATITIS B IMMUNIZATION  Aged Out     Lab work is in process  Pneumonia Vaccine:up to date     ROS:  CONSTITUTIONAL: NEGATIVE for fever, chills, change in weight  INTEGUMENTARY/SKIN: NEGATIVE for worrisome rashes, moles or lesions  EYES: NEGATIVE for vision changes or irritation  ENT/MOUTH: NEGATIVE for ear, mouth and throat problems  RESP: NEGATIVE for significant cough or SOB  BREAST: NEGATIVE for masses, tenderness or discharge  CV: NEGATIVE for chest pain, palpitations or peripheral edema  GI: NEGATIVE for nausea, abdominal pain, heartburn, or change in bowel habits   male :nocturia x 1  MUSCULOSKELETAL: NEGATIVE for significant arthralgias or myalgia  NEURO: POSITIVE for numbness or tingling feet  ENDOCRINE: NEGATIVE for temperature intolerance, skin/hair changes  HEME: NEGATIVE for bleeding problems  PSYCHIATRIC: NEGATIVE for changes in mood or affect    OBJECTIVE:   BP (!) 150/80   Pulse 56   Temp 97.7  F (36.5  C) (Tympanic)   Resp 22   Ht 1.791 m (5' 10.5\")   Wt 90 kg (198 lb 6.4 oz)   SpO2 98%   BMI 28.07 kg/m   Estimated body mass index is 28.07 kg/m  as calculated from the following:    Height as of this encounter: 1.791 m (5' 10.5\").    Weight as of this encounter: 90 kg (198 lb 6.4 oz).  EXAM:   GENERAL: healthy, alert and no distress  EYES: Eyes grossly normal to inspection, PERRL and conjunctivae and sclerae normal  HENT: ear canals and TM's normal, nose and mouth without ulcers or lesions  NECK: no adenopathy, no asymmetry, masses, or scars and thyroid normal to palpation  RESP: lungs clear to auscultation - no rales, rhonchi or wheezes  CV: regular rate and rhythm, normal S1 S2, no S3 or S4, no murmur, click or rub, no peripheral edema and peripheral pulses strong  ABDOMEN: soft, nontender, no hepatosplenomegaly, no masses and bowel sounds normal  MS: no gross musculoskeletal defects noted, no " "edema  SKIN: no suspicious lesions or rashes  NEURO: Normal strength and tone, mentation intact and speech normal  PSYCH: mentation appears normal, affect normal/bright    Diagnostic Test Results:  Labs reviewed in Epic    ASSESSMENT / PLAN:       ICD-10-CM    1. Encounter for Medicare annual wellness exam  Z00.00    2. Prostate cancer screening  Z12.5 PSA, screen   3. CARDIOVASCULAR SCREENING; LDL GOAL LESS THAN 130  Z13.6 Lipid Profile     Comprehensive metabolic panel   4. History of total replacement of both hip joints  Z96.643 Orthopedic & Spine  Referral   5. Neuropathy  G62.9 Vitamin B12     Overall he is quite healthy and active.  He is exercising.  He is got a history of both hips being replaced with the right one many years ago.  He would like to see orthopedics to consider knee replacement and we will make a referral today.    Some neuropathy symptoms we will check his labs including glucose and B12 today.  No pain discussed that this may progress but he will follow-up for now.    We will do screening PSA and cholesterol.      Patient has been advised of split billing requirements and indicates understanding: Yes    COUNSELING:  Reviewed preventive health counseling, as reflected in patient instructions       Regular exercise       Healthy diet/nutrition       Prostate cancer screening    Estimated body mass index is 28.07 kg/m  as calculated from the following:    Height as of this encounter: 1.791 m (5' 10.5\").    Weight as of this encounter: 90 kg (198 lb 6.4 oz).        He reports that he has never smoked. He has never used smokeless tobacco.    Appropriate preventive services were discussed with this patient, including applicable screening as appropriate for cardiovascular disease, diabetes, osteopenia/osteoporosis, and glaucoma.  As appropriate for age/gender, discussed screening for colorectal cancer, prostate cancer, breast cancer, and cervical cancer. Checklist reviewing preventive " services available has been given to the patient.    Reviewed patients plan of care and provided an AVS. The Basic Care Plan (routine screening as documented in Health Maintenance) for Ag meets the Care Plan requirement. This Care Plan has been established and reviewed with the Patient.    Counseling Resources:  ATP IV Guidelines  Pooled Cohorts Equation Calculator  Breast Cancer Risk Calculator  BRCA-Related Cancer Risk Assessment: FHS-7 Tool  FRAX Risk Assessment  ICSI Preventive Guidelines  Dietary Guidelines for Americans, 2010  USDA's MyPlate  ASA Prophylaxis  Lung CA Screening    Jose Antonio MD  [facility]

## 2020-10-08 ENCOUNTER — ANCILLARY PROCEDURE (OUTPATIENT)
Dept: GENERAL RADIOLOGY | Facility: CLINIC | Age: 70
End: 2020-10-08
Attending: PHYSICIAN ASSISTANT
Payer: COMMERCIAL

## 2020-10-08 ENCOUNTER — OFFICE VISIT (OUTPATIENT)
Dept: ORTHOPEDICS | Facility: CLINIC | Age: 70
End: 2020-10-08
Attending: INTERNAL MEDICINE
Payer: COMMERCIAL

## 2020-10-08 DIAGNOSIS — Z96.649: Primary | ICD-10-CM

## 2020-10-08 DIAGNOSIS — T84.069A: Primary | ICD-10-CM

## 2020-10-08 DIAGNOSIS — Z96.643 HISTORY OF TOTAL REPLACEMENT OF BOTH HIP JOINTS: ICD-10-CM

## 2020-10-08 PROCEDURE — 73522 X-RAY EXAM HIPS BI 3-4 VIEWS: CPT | Performed by: RADIOLOGY

## 2020-10-08 PROCEDURE — 99203 OFFICE O/P NEW LOW 30 MIN: CPT | Performed by: ORTHOPAEDIC SURGERY

## 2020-10-08 NOTE — LETTER
10/8/2020         RE: Ag Evans  [address]   [place] [address]        Dear Colleague,    Thank you for referring your patient, Ag Evans, to the [facility]. Please see a copy of my visit note below.    Ag Evans is a 70 year old male who is seen in consultation at the request of Dr. Jose Antonio  for history of bilateral total hip arthroplasty.  He had right total hip arthroplasty by Dr. Jose Martinez on 4/15/2014.  He had left total hip arthroplasty by Dr. Roderick Santos on 4/2/2012.  He has no problem either hip.  He does report a dull pain in the right buttock down the back of his thigh associated with numbness.  He describes a dull pain rated 1 out of 10.  He is quite active with exercises, golf, pickleball.  He takes occasional ibuprofen.  He uses ice at times.    X-rays of the pelvis and lateral both hips were obtained today.  Images were reviewed with the patient and his wife.  He has equal leg lengths.  The right is a 28 mm head, and the left has a 36 mm head.  Left total hip arthroplasty is in good position with no sign of loosening or wear.  Right total hip arthroplasty shows asymmetric femoral ball in the socket consistent with polyethylene wear superiorly.  There may be early lysis at the lateral shoulder of the right stem, but most of the stem is quite solid.    Past Medical History:   Diagnosis Date     DJD (degenerative joint disease), lumbar      Sinus bradycardia        Past Surgical History:   Procedure Laterality Date     APPENDECTOMY       COLONOSCOPY  2010    benign with hyperplastic polyps     SURGICAL HISTORY OF -       Removal of a benign soft tissue mass right abdominal wall     SURGICAL HISTORY OF -   2006    Total hip replacement     SURGICAL HISTORY OF -   2015    Total hip replacement     TONSILLECTOMY         Family History   Problem Relation Age of Onset     Prostate Cancer Father      Other Cancer Mother      Other Cancer Sister          Pancreatic cancer       Social History     Socioeconomic History     Marital status:      Spouse name: Not on file     Number of children: Not on file     Years of education: Not on file     Highest education level: Not on file   Occupational History     Not on file   Social Needs     Financial resource strain: Not on file     Food insecurity     Worry: Not on file     Inability: Not on file     Transportation needs     Medical: Not on file     Non-medical: Not on file   Tobacco Use     Smoking status: Never Smoker     Smokeless tobacco: Never Used   Substance and Sexual Activity     Alcohol use: Not Currently     Frequency: Monthly or less     Drug use: No     Sexual activity: Yes     Partners: Female   Lifestyle     Physical activity     Days per week: Not on file     Minutes per session: Not on file     Stress: Not on file   Relationships     Social connections     Talks on phone: Not on file     Gets together: Not on file     Attends [employer] service: Not on file     Active member of club or organization: Not on file     Attends meetings of clubs or organizations: Not on file     Relationship status: Not on file     Intimate partner violence     Fear of current or ex partner: Not on file     Emotionally abused: Not on file     Physically abused: Not on file     Forced sexual activity: Not on file   Other Topics Concern     Parent/sibling w/ CABG, MI or angioplasty before 65F 55M? No   Social History Narrative     Not on file       Current Outpatient Medications   Medication Sig Dispense Refill     diphenhydrAMINE HCl (BENADRYL ALLERGY PO)        fluticasone (FLONASE) 50 MCG/ACT spray Spray 1 spray into both nostrils daily       Magnesium 300 MG CAPS Take by mouth daily       melatonin 5 MG tablet        Multiple Vitamins-Minerals (MULTIVITAMIN PO)        SF 5000 PLUS 1.1 % CREA USE TOOTH PASTE TWICE DAILY AS DIRECTED. NOTHING BY MOUTH FOR 30 MINUTES AFTER USE       Turmeric (QC TUMERIC COMPLEX) 500 MG  CAPS          Allergies   Allergen Reactions     Seasonal Allergies        REVIEW OF SYSTEMS:  CONSTITUTIONAL:  NEGATIVE for fever, chills, change in weight, not feeling tired  SKIN:  NEGATIVE for worrisome rashes, no skin lumps, no skin ulcers and no non-healing wounds  EYES:  NEGATIVE for vision changes or irritation.  ENT/MOUTH:  NEGATIVE.  No hearing loss, no hoarseness, no difficulty swallowing.  RESP:  NEGATIVE. No cough or shortness of breath.  CV:  NEGATIVE for chest pain, palpitations or peripheral edema  GI:  NEGATIVE for nausea, abdominal pain, heartburn, or change in bowel habits  :  Negative. No dysuria, no hematuria  MUSCULOSKELETAL:  See HPI above  NEURO:  NEGATIVE . No headaches, no dizziness,  no numbness  ENDOCRINE:  NEGATIVE for temperature intolerance, skin/hair changes  HEME/ALLERGY/IMMUNE:  NEGATIVE for bleeding problems  PSYCHIATRIC:  NEGATIVE. no anxiety, no depression.     Exam:  Vitals: There were no vitals taken for this visit.  BMI= There is no height or weight on file to calculate BMI.  Constitutional:  healthy, alert and no distress  Neuro: Alert and Oriented x 3, Sensation grossly WNL.  Psych: Affect normal   Respiratory: Breathing not labored.  Cardiovascular: normal peripheral pulses  Lymph: no adenopathy  Skin: No rashes,worrisome lesions or skin problems  He has some tenderness in the right buttock consistent with sciatica.  No tenderness on the left.  He has about 50 degrees external rotation of each hip.  On the right his internal rotation is about 5 degrees, on the left it is about 20 to 30 degrees.  He walks without a limp.  He has good mobility of his knees.  Sensation, motor and circulation are intact.    Assessment:  1. Left total hip arthroplasty in good position with no sign of loosening or wear.  2. Right total hip arthroplasty is in good position but showing evidence of polyethylene wear with asymmetric positioning of the femoral head.  3.  Right sciatica    Plan:   Continue activity as tolerated.  Return to clinic if symptoms occur in right hip and repeat x-ray.  We would consider polyethylene exchange at that point.  If he has no symptoms we should see him back in approximately 2 years with x-ray of low AP pelvis and bilateral lateral hips.      Again, thank you for allowing me to participate in the care of your patient.        Sincerely,        Adam Riggs MD

## 2020-10-08 NOTE — PATIENT INSTRUCTIONS
Left total hip arthroplasty looks great.  Right total hip arthroplasty is starting to have poly wear.  If this starts to hurt, we should consider poly exchange on right.  Otherwise Return to clinic 2-4 years for xray.

## 2020-10-09 NOTE — PROGRESS NOTES
Ag Evans is a 70 year old male who is seen in consultation at the request of Dr. Jose Antonio  for history of bilateral total hip arthroplasty.  He had right total hip arthroplasty by Dr. Jose Martinez on 4/15/2014.  He had left total hip arthroplasty by Dr. Roderick Santos on 4/2/2012.  He has no problem either hip.  He does report a dull pain in the right buttock down the back of his thigh associated with numbness.  He describes a dull pain rated 1 out of 10.  He is quite active with exercises, golf, pickleball.  He takes occasional ibuprofen.  He uses ice at times.    X-rays of the pelvis and lateral both hips were obtained today.  Images were reviewed with the patient and his wife.  He has equal leg lengths.  The right is a 28 mm head, and the left has a 36 mm head.  Left total hip arthroplasty is in good position with no sign of loosening or wear.  Right total hip arthroplasty shows asymmetric femoral ball in the socket consistent with polyethylene wear superiorly.  There may be early lysis at the lateral shoulder of the right stem, but most of the stem is quite solid.    Past Medical History:   Diagnosis Date     DJD (degenerative joint disease), lumbar      Sinus bradycardia        Past Surgical History:   Procedure Laterality Date     APPENDECTOMY       COLONOSCOPY  2010    benign with hyperplastic polyps     SURGICAL HISTORY OF -       Removal of a benign soft tissue mass right abdominal wall     SURGICAL HISTORY OF -   2006    Total hip replacement     SURGICAL HISTORY OF -   2015    Total hip replacement     TONSILLECTOMY         Family History   Problem Relation Age of Onset     Prostate Cancer Father      Other Cancer Mother      Other Cancer Sister         Pancreatic cancer       Social History     Socioeconomic History     Marital status:      Spouse name: Not on file     Number of children: Not on file     Years of education: Not on file     Highest education level: Not on file   Occupational  History     Not on file   Social Needs     Financial resource strain: Not on file     Food insecurity     Worry: Not on file     Inability: Not on file     Transportation needs     Medical: Not on file     Non-medical: Not on file   Tobacco Use     Smoking status: Never Smoker     Smokeless tobacco: Never Used   Substance and Sexual Activity     Alcohol use: Not Currently     Frequency: Monthly or less     Drug use: No     Sexual activity: Yes     Partners: Female   Lifestyle     Physical activity     Days per week: Not on file     Minutes per session: Not on file     Stress: Not on file   Relationships     Social connections     Talks on phone: Not on file     Gets together: Not on file     Attends Synagogue service: Not on file     Active member of club or organization: Not on file     Attends meetings of clubs or organizations: Not on file     Relationship status: Not on file     Intimate partner violence     Fear of current or ex partner: Not on file     Emotionally abused: Not on file     Physically abused: Not on file     Forced sexual activity: Not on file   Other Topics Concern     Parent/sibling w/ CABG, MI or angioplasty before 65F 55M? No   Social History Narrative     Not on file       Current Outpatient Medications   Medication Sig Dispense Refill     diphenhydrAMINE HCl (BENADRYL ALLERGY PO)        fluticasone (FLONASE) 50 MCG/ACT spray Spray 1 spray into both nostrils daily       Magnesium 300 MG CAPS Take by mouth daily       melatonin 5 MG tablet        Multiple Vitamins-Minerals (MULTIVITAMIN PO)        SF 5000 PLUS 1.1 % CREA USE TOOTH PASTE TWICE DAILY AS DIRECTED. NOTHING BY MOUTH FOR 30 MINUTES AFTER USE       Turmeric (QC TUMERIC COMPLEX) 500 MG CAPS          Allergies   Allergen Reactions     Seasonal Allergies        REVIEW OF SYSTEMS:  CONSTITUTIONAL:  NEGATIVE for fever, chills, change in weight, not feeling tired  SKIN:  NEGATIVE for worrisome rashes, no skin lumps, no skin ulcers and no  non-healing wounds  EYES:  NEGATIVE for vision changes or irritation.  ENT/MOUTH:  NEGATIVE.  No hearing loss, no hoarseness, no difficulty swallowing.  RESP:  NEGATIVE. No cough or shortness of breath.  CV:  NEGATIVE for chest pain, palpitations or peripheral edema  GI:  NEGATIVE for nausea, abdominal pain, heartburn, or change in bowel habits  :  Negative. No dysuria, no hematuria  MUSCULOSKELETAL:  See HPI above  NEURO:  NEGATIVE . No headaches, no dizziness,  no numbness  ENDOCRINE:  NEGATIVE for temperature intolerance, skin/hair changes  HEME/ALLERGY/IMMUNE:  NEGATIVE for bleeding problems  PSYCHIATRIC:  NEGATIVE. no anxiety, no depression.     Exam:  Vitals: There were no vitals taken for this visit.  BMI= There is no height or weight on file to calculate BMI.  Constitutional:  healthy, alert and no distress  Neuro: Alert and Oriented x 3, Sensation grossly WNL.  Psych: Affect normal   Respiratory: Breathing not labored.  Cardiovascular: normal peripheral pulses  Lymph: no adenopathy  Skin: No rashes,worrisome lesions or skin problems  He has some tenderness in the right buttock consistent with sciatica.  No tenderness on the left.  He has about 50 degrees external rotation of each hip.  On the right his internal rotation is about 5 degrees, on the left it is about 20 to 30 degrees.  He walks without a limp.  He has good mobility of his knees.  Sensation, motor and circulation are intact.    Assessment:  1. Left total hip arthroplasty in good position with no sign of loosening or wear.  2. Right total hip arthroplasty is in good position but showing evidence of polyethylene wear with asymmetric positioning of the femoral head.  3.  Right sciatica    Plan:  Continue activity as tolerated.  Return to clinic if symptoms occur in right hip and repeat x-ray.  We would consider polyethylene exchange at that point.  If he has no symptoms we should see him back in approximately 2 years with x-ray of low AP pelvis  and bilateral lateral hips.

## 2020-11-30 ENCOUNTER — MYC MEDICAL ADVICE (OUTPATIENT)
Dept: INTERNAL MEDICINE | Facility: CLINIC | Age: 70
End: 2020-11-30

## 2020-12-01 NOTE — TELEPHONE ENCOUNTER
A Triage nurse did respond to Ag's My Chart message regarding COVID SX. Therefore will close this encounter..............MARSHALL Cano

## 2021-01-20 ENCOUNTER — OFFICE VISIT (OUTPATIENT)
Dept: FAMILY MEDICINE | Facility: CLINIC | Age: 71
End: 2021-01-20
Payer: COMMERCIAL

## 2021-01-20 VITALS
OXYGEN SATURATION: 99 % | BODY MASS INDEX: 27.92 KG/M2 | WEIGHT: 199.4 LBS | HEART RATE: 56 BPM | SYSTOLIC BLOOD PRESSURE: 158 MMHG | HEIGHT: 71 IN | DIASTOLIC BLOOD PRESSURE: 80 MMHG | RESPIRATION RATE: 12 BRPM | TEMPERATURE: 95.7 F

## 2021-01-20 DIAGNOSIS — M54.50 ACUTE BILATERAL LOW BACK PAIN WITHOUT SCIATICA: ICD-10-CM

## 2021-01-20 DIAGNOSIS — R03.0 ELEVATED BP WITHOUT DIAGNOSIS OF HYPERTENSION: ICD-10-CM
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
## 2021-01-20 PROCEDURE — 99213 OFFICE O/P EST LOW 20 MIN: CPT | Performed by: FAMILY MEDICINE

## 2021-01-20 ASSESSMENT — MIFFLIN-ST. JEOR: SCORE: 1681.6

## 2021-01-20 ASSESSMENT — PAIN SCALES - GENERAL: PAINLEVEL: NO PAIN (0)

## 2021-01-20 NOTE — PROGRESS NOTES
Assessment & Plan       ICD-10-CM    1. Fall due to slipping on ice or snow, initial encounter  W00.9XXA    2. Acute bilateral low back pain without sciatica  M54.5    3. Elevated BP without diagnosis of hypertension  R03.0      He fell on the ice about 4 days ago.  He was having little headache with a small lump on the back of his head - both were resolved.  He also had mild right shoulder pain which also resolved.  Physical exams on these areas today were normal.  He continues have the pain in tailbone area which most likely due to contusion.  Did not seem to bothered him much today.  I discussed about option of getting x-ray versus monitoring.  He thinks he doing okay with the Tylenol and wanted to hold off on the x-ray which I agreed.  Recommend ibuprofen or Aleve as needed as well.  Donut seat as needed.  Call in or follow-up if it persist or gets worse.    His blood pressure is slightly high today.  He has no history of high blood.  Been monitoring his blood pressure at home and they ahve been running about 130/80s.  Encouraged to continue monitoring his blood pressure, call in or follow-up if it is persistently above 150/90         Return if symptoms worsen or fail to improve.    Chris Lu Mai, MD  [facility]     Ag Evans is a 70 year old who presents to clinic today for the following health issues     HPI       Musculoskeletal problem/pain  Onset/Duration: 1/16/2021  Description  Location: tailbone- shooting pains that go down leg  Joint Swelling: no  Redness: no  Pain: YES  Warmth: no  Intensity:  moderate  Progression of Symptoms:  improving and waxing and waning  Accompanying signs and symptoms:   Fevers: no  Numbness/tingling/weakness: no  History  Trauma to the area: YES- fell on ice  Recent illness:  no  Previous similar problem: no  Previous evaluation:  no  Precipitating or alleviating factors:  Aggravating factors include: climbing stairs and  "lifting  Therapies tried and outcome: acetaminophen 650- taking 2 every 8 hours but not more than 3 times daily since Saturday      Ag is here today for the pain in his tailbone area that is precipitated after the fall.  Stated that 4 days ago, he slipped and fell on the ice, landed on his tailbone and the right shoulder.  It went very fas so he did not remember much of what was happening.  After the fall, he noted a small lump on the back of his head with minimal headache.  The headache lasted for a day the lump is resolving.  He has no concern about it.  No neck pain or stiffness.  The right shoulder was sore for couple days and also has resolved.  However the sore in the tailbone remained and overall is about the same.  It is worse with walking up and down the stair or being active as well as prolonged sitting.  Sitting still for short period of time seems to help.  Tylenol has helped.  No numbness or tingling sensation station.  No hip pain.  No problem with bowel movement or urination.  No other concern.    Review of Systems   Constitutional, HEENT, cardiovascular, pulmonary, gi and gu systems are negative, except as otherwise noted.      Objective    BP (!) 158/80   Pulse 56   Temp 95.7  F (35.4  C)   Resp 12   Ht 1.803 m (5' 11\")   Wt 90.4 kg (199 lb 6.4 oz)   SpO2 99%   BMI 27.81 kg/m    Body mass index is 27.81 kg/m .  Physical Exam   GENERAL: healthy, alert and no distress  HENT: Head is normocephalic atraumatic.  No tender with palpation the scalp.  The lump that he discribed was not appreciated today.  NECK: Supple, no lymphadenopathy or thyromegaly.  No tender with palpation to the cervical spine.  RESP: lungs clear to auscultation - no rales, rhonchi or wheezes  CV: regular rate and rhythm  ABDOMEN: soft, nontender, no palpable masses organomegaly with normal bowel sound.  No tender with palpation to the suprapelvic area.  No CVA tenderness.  MS: no gross musculoskeletal defects noted, no " edema.  Walk without limping.  Right shoulder exam was normal.  Both legs are equal in strength.  Hip exam was normal.  Minimal tenderness palpation to the coccyx area with no guarding or rebound.  SKIN: no suspicious lesions or rashes.  NEURO: Normal strength and tone, mentation intact and speech normal.  No focal neurological deficit.    No results found for any visits on 01/20/21.

## 2021-04-13 ENCOUNTER — OFFICE VISIT (OUTPATIENT)
Dept: INTERNAL MEDICINE | Facility: CLINIC | Age: 71
End: 2021-04-13
Payer: COMMERCIAL

## 2021-04-13 VITALS
HEART RATE: 64 BPM | DIASTOLIC BLOOD PRESSURE: 72 MMHG | TEMPERATURE: 97.7 F | OXYGEN SATURATION: 98 % | WEIGHT: 199 LBS | RESPIRATION RATE: 16 BRPM | SYSTOLIC BLOOD PRESSURE: 136 MMHG | BODY MASS INDEX: 27.75 KG/M2

## 2021-04-13 DIAGNOSIS — G62.9 NEUROPATHY: Primary | ICD-10-CM

## 2021-04-13 DIAGNOSIS — Z12.11 COLON CANCER SCREENING: ICD-10-CM

## 2021-04-13 DIAGNOSIS — M54.6 ACUTE BILATERAL THORACIC BACK PAIN: ICD-10-CM

## 2021-04-13 DIAGNOSIS — G56.01 CARPAL TUNNEL SYNDROME OF RIGHT WRIST: ICD-10-CM

## 2021-04-13 DIAGNOSIS — I10 WHITE COAT SYNDROME WITH DIAGNOSIS OF HYPERTENSION: ICD-10-CM

## 2021-04-13 DIAGNOSIS — R77.8 ABNORMAL SPEP: ICD-10-CM

## 2021-04-13 DIAGNOSIS — I10 ESSENTIAL HYPERTENSION: ICD-10-CM

## 2021-04-13 LAB
TSH SERPL DL<=0.005 MIU/L-ACNC: 2.01 MU/L (ref 0.4–4)
VIT B12 SERPL-MCNC: 678 PG/ML (ref 193–986)

## 2021-04-13 PROCEDURE — 82607 VITAMIN B-12: CPT | Performed by: INTERNAL MEDICINE

## 2021-04-13 PROCEDURE — 99N1036 PR STATISTIC TOTAL PROTEIN: Performed by: INTERNAL MEDICINE

## 2021-04-13 PROCEDURE — 99214 OFFICE O/P EST MOD 30 MIN: CPT | Performed by: INTERNAL MEDICINE

## 2021-04-13 PROCEDURE — 84165 PROTEIN E-PHORESIS SERUM: CPT | Performed by: PATHOLOGY

## 2021-04-13 PROCEDURE — 84443 ASSAY THYROID STIM HORMONE: CPT | Performed by: INTERNAL MEDICINE

## 2021-04-13 PROCEDURE — 36415 COLL VENOUS BLD VENIPUNCTURE: CPT | Performed by: INTERNAL MEDICINE

## 2021-04-13 RX ORDER — AMOXICILLIN 250 MG
1 CAPSULE ORAL DAILY
COMMUNITY
End: 2021-08-26

## 2021-04-13 RX ORDER — GARLIC 180 MG
30 TABLET, DELAYED RELEASE (ENTERIC COATED) ORAL DAILY
COMMUNITY
End: 2021-11-30

## 2021-04-13 RX ORDER — MAG HYDROX/ALUMINUM HYD/SIMETH 400-400-40
450 SUSPENSION, ORAL (FINAL DOSE FORM) ORAL DAILY
COMMUNITY
End: 2021-11-30

## 2021-04-13 RX ORDER — CYANOCOBALAMIN (VITAMIN B-12) 500 MCG
400 LOZENGE ORAL DAILY
COMMUNITY
End: 2021-11-10

## 2021-04-13 ASSESSMENT — PAIN SCALES - GENERAL: PAINLEVEL: NO PAIN (0)

## 2021-04-13 NOTE — PROGRESS NOTES
"    Assessment & Plan     Neuropathy  Neuropathy in his feet and now in his hands, no clear reason we will check his TSH B12 protein electrophoresis, unfortunately Medicare will not cover vitamin D recommended he take vitamin D 2000 international units daily.  Offered him an EMG if is not getting better.  Discussed gabapentin if he is having pain.  - TSH with free T4 reflex  - Vitamin B12  - Protein electrophoresis    Colon cancer screening  Colon cancer screening with a family history of cancers mostly pancreatic we would recommend he do a full colonoscopy not just a fit test.  - GASTROENTEROLOGY ADULT REF PROCEDURE ONLY; Future    Acute bilateral thoracic back pain  Back pain appears to be more muscular.  Nothing on the spine    Carpal tunnel syndrome of right wrist  Carpal tunnel he can continue to wear wrist braces and splints at night and do more activity and light movement during the day.    Essential hypertension  Blood pressure is controlled on his home readings at 136/72.  - TSH with free T4 reflex    White coat syndrome with diagnosis of hypertension  Whitecoat hypertension with blood pressure today at 168 but home readings are good.               BMI:   Estimated body mass index is 27.75 kg/m  as calculated from the following:    Height as of 1/20/21: 1.803 m (5' 11\").    Weight as of this encounter: 90.3 kg (199 lb).           No follow-ups on file.    Jose Antonio MD  [facility]   Ag Evans is a 70 year old who presents for the following health issues     HPI     Chief Complaint   Patient presents with     NEUROPATHY     follow up neuropathy, getting worse     Wrist Pain     right wrist pain, ? carpal tunnel     Back Pain     left mid back pain for about a month but is starting to improve     Pain in his lower thoracic on left side, gotten better over the last month. Seems to be in the muscle.     Neuropathy in his feet, started a year ago and progressing from " toes to his heels . Some in the tips of his fingers, some carpel tunnel symptoms, exercises and wrist splints at night. Asks about pain medications, can use nsaids and tylenol. Hold off on Gabapentin for now.     Was not a smoker, no other cancer screening needed    Past Medical History:   Diagnosis Date     DJD (degenerative joint disease), lumbar      Sinus bradycardia      Current Outpatient Medications   Medication     fluticasone (FLONASE) 50 MCG/ACT spray     ginkgo biloba 60 MG CAPS capsule     melatonin 5 MG tablet     Multiple Vitamins-Minerals (MULTIVITAMIN PO)     NEW MED     saw palmetto 450 MG CAPS capsule     selenium 50 MCG TABS tablet     senna-docusate (SENOKOT-S/PERICOLACE) 8.6-50 MG tablet     SF 5000 PLUS 1.1 % CREA     Turmeric (QC TUMERIC COMPLEX) 500 MG CAPS     vitamin E 400 units TABS     No current facility-administered medications for this visit.      Social History     Tobacco Use     Smoking status: Never Smoker     Smokeless tobacco: Never Used   Substance Use Topics     Alcohol use: Not Currently     Frequency: Monthly or less     Drug use: No         Review of Systems         Objective    BP (!) 168/94   Pulse 64   Temp 97.7  F (36.5  C) (Temporal)   Resp 16   Wt 90.3 kg (199 lb)   SpO2 98%   BMI 27.75 kg/m    Body mass index is 27.75 kg/m .  Physical Exam   No acute distress  Heart is regular  Lungs are clear  Spine is nontender, mild tenderness on the left paraspinous muscles at the T10-T12 level  Strength is 5 out of 5 throughout the hands and feet, mild tingling in the carpal tunnel pattern on the right

## 2021-04-14 ENCOUNTER — TELEPHONE (OUTPATIENT)
Dept: INTERNAL MEDICINE | Facility: CLINIC | Age: 71
End: 2021-04-14

## 2021-04-14 ENCOUNTER — TELEPHONE (OUTPATIENT)
Dept: SURGERY | Facility: CLINIC | Age: 71
End: 2021-04-14

## 2021-04-14 ENCOUNTER — HOSPITAL ENCOUNTER (OUTPATIENT)
Facility: AMBULATORY SURGERY CENTER | Age: 71
End: 2021-04-14
Attending: SURGERY | Admitting: SURGERY
Payer: COMMERCIAL

## 2021-04-14 DIAGNOSIS — Z12.11 SCREEN FOR COLON CANCER: Primary | ICD-10-CM

## 2021-04-14 LAB
ALBUMIN SERPL ELPH-MCNC: 4.4 G/DL (ref 3.7–5.1)
ALPHA1 GLOB SERPL ELPH-MCNC: 0.3 G/DL (ref 0.2–0.4)
ALPHA2 GLOB SERPL ELPH-MCNC: 0.8 G/DL (ref 0.5–0.9)
B-GLOBULIN SERPL ELPH-MCNC: 0.7 G/DL (ref 0.6–1)
GAMMA GLOB SERPL ELPH-MCNC: 2 G/DL (ref 0.7–1.6)
M PROTEIN SERPL ELPH-MCNC: 1.1 G/DL
PROT PATTERN SERPL ELPH-IMP: ABNORMAL

## 2021-04-14 NOTE — TELEPHONE ENCOUNTER
Please give Dr. Antonio's message per note and assist with scheduling a lab appt.     Monet Fraire MA 4/14/2021  5:16 PM

## 2021-04-14 NOTE — TELEPHONE ENCOUNTER
----- Message from Jose Antonio MD sent at 4/14/2021  4:48 PM CDT -----  Labs did show some abnormal proteins that could affect his nerves, needs to do additional blood and urine testing, orders placed.     Please let them know.

## 2021-04-14 NOTE — TELEPHONE ENCOUNTER
Location: [facility]  Is this a cancellation or reschedule?  no  The name of the procedure: Colonoscopy  Provider scheduled with: John Barajas  Date 6/4/21, Time 7:30A

## 2021-04-14 NOTE — TELEPHONE ENCOUNTER
Left message for patient to return call to schedule EGD/colonoscopy. If Rebecca or Ramona are not available, please transfer to same day surgery

## 2021-04-15 NOTE — TELEPHONE ENCOUNTER
I called pt and informed him of the results and below msg. I made him an apt for lab on Monday in [place].  Kkii Shrestha MA

## 2021-04-19 DIAGNOSIS — G62.9 NEUROPATHY: ICD-10-CM

## 2021-04-19 DIAGNOSIS — R77.8 ABNORMAL SPEP: ICD-10-CM

## 2021-04-19 PROCEDURE — 36415 COLL VENOUS BLD VENIPUNCTURE: CPT | Performed by: INTERNAL MEDICINE

## 2021-04-19 PROCEDURE — 86334 IMMUNOFIX E-PHORESIS SERUM: CPT | Performed by: PATHOLOGY

## 2021-04-19 PROCEDURE — 82784 ASSAY IGA/IGD/IGG/IGM EACH: CPT | Performed by: INTERNAL MEDICINE

## 2021-04-19 PROCEDURE — 86335 IMMUNFIX E-PHORSIS/URINE/CSF: CPT | Performed by: PATHOLOGY

## 2021-04-20 ENCOUNTER — TELEPHONE (OUTPATIENT)
Dept: INTERNAL MEDICINE | Facility: CLINIC | Age: 71
End: 2021-04-20

## 2021-04-20 LAB
IGA SERPL-MCNC: 69 MG/DL (ref 84–499)
IGG SERPL-MCNC: 764 MG/DL (ref 610–1616)
IGM SERPL-MCNC: 1785 MG/DL (ref 35–242)
PROT ELPH PNL UR ELPH: NORMAL
PROT PATTERN SERPL IFE-IMP: ABNORMAL

## 2021-04-21 ENCOUNTER — TELEPHONE (OUTPATIENT)
Dept: ONCOLOGY | Facility: CLINIC | Age: 71
End: 2021-04-21

## 2021-04-21 NOTE — TELEPHONE ENCOUNTER
Patient informed of results and that he will receive a phone call to schedule oncology appointment.

## 2021-04-23 NOTE — TELEPHONE ENCOUNTER
Patient is calling back as he stated that he did not receive a vm message to return call and there is no number for staff to give patient to call back.    Please call [phone] again and leave detailed message and place number to return call in chart so we can give patient number to call back.     Virgen DILL

## 2021-04-23 NOTE — TELEPHONE ENCOUNTER
Patient is calling back as he stated that he did not receive a vm message to return call and there is no number for staff to give patient to call back.    Please call [phone] again and leave detailed message and place number to return call in chart so we can give patient number to call back.     Virgen DILL

## 2021-04-26 ENCOUNTER — PRE VISIT (OUTPATIENT)
Dept: ONCOLOGY | Facility: CLINIC | Age: 71
End: 2021-04-26

## 2021-04-26 NOTE — TELEPHONE ENCOUNTER
RECORDS STATUS - ALL OTHER DIAGNOSIS      RECORDS RECEIVED FROM: [facility]   DATE RECEIVED: 6/3/2021   NOTES STATUS DETAILS   OFFICE NOTE from referring provider Complete Jose Antonio MD   OFFICE NOTE from medical oncologist N/A    DISCHARGE SUMMARY from hospital N/A    DISCHARGE REPORT from the ER     OPERATIVE REPORT N/A    MEDICATION LIST Complete [facility]   CLINICAL TRIAL TREATMENTS TO DATE     LABS     PATHOLOGY REPORTS N/A    ANYTHING RELATED TO DIAGNOSIS Complete Labs last updated on 4/19/2021   GENONOMIC TESTING     TYPE:     IMAGING (NEED IMAGES & REPORT)     CT SCANS     MRI     MAMMO     ULTRASOUND     PET

## 2021-05-06 ENCOUNTER — MYC MEDICAL ADVICE (OUTPATIENT)
Dept: INTERNAL MEDICINE | Facility: CLINIC | Age: 71
End: 2021-05-06

## 2021-05-08 ENCOUNTER — MYC MEDICAL ADVICE (OUTPATIENT)
Dept: INTERNAL MEDICINE | Facility: CLINIC | Age: 71
End: 2021-05-08

## 2021-06-03 ENCOUNTER — VIRTUAL VISIT (OUTPATIENT)
Dept: ONCOLOGY | Facility: CLINIC | Age: 71
End: 2021-06-03
Attending: INTERNAL MEDICINE
Payer: COMMERCIAL

## 2021-06-03 VITALS — HEIGHT: 71 IN | WEIGHT: 199 LBS | BODY MASS INDEX: 27.86 KG/M2

## 2021-06-03 DIAGNOSIS — R77.8 ABNORMAL SPEP: ICD-10-CM

## 2021-06-03 DIAGNOSIS — G62.9 PERIPHERAL POLYNEUROPATHY: ICD-10-CM

## 2021-06-03 DIAGNOSIS — Z80.3 FHX: BREAST CANCER IN FIRST DEGREE RELATIVE: ICD-10-CM

## 2021-06-03 DIAGNOSIS — G62.9 PERIPHERAL POLYNEUROPATHY: Primary | ICD-10-CM

## 2021-06-03 DIAGNOSIS — G62.9 NEUROPATHY: ICD-10-CM

## 2021-06-03 DIAGNOSIS — Z80.0 FHX: STOMACH CANCER: ICD-10-CM

## 2021-06-03 DIAGNOSIS — Z80.0 FHX: PANCREATIC CANCER: ICD-10-CM

## 2021-06-03 LAB
ALBUMIN SERPL-MCNC: 4.1 G/DL (ref 3.4–5)
ALP SERPL-CCNC: 77 U/L (ref 40–150)
ALT SERPL W P-5'-P-CCNC: 21 U/L (ref 0–70)
ANION GAP SERPL CALCULATED.3IONS-SCNC: 5 MMOL/L (ref 3–14)
AST SERPL W P-5'-P-CCNC: 18 U/L (ref 0–45)
BASOPHILS # BLD AUTO: 0.1 10E9/L (ref 0–0.2)
BASOPHILS NFR BLD AUTO: 0.6 %
BILIRUB SERPL-MCNC: 0.5 MG/DL (ref 0.2–1.3)
BUN SERPL-MCNC: 24 MG/DL (ref 7–30)
CALCIUM SERPL-MCNC: 9.4 MG/DL (ref 8.5–10.1)
CHLORIDE SERPL-SCNC: 106 MMOL/L (ref 94–109)
CO2 SERPL-SCNC: 29 MMOL/L (ref 20–32)
CREAT SERPL-MCNC: 0.9 MG/DL (ref 0.66–1.25)
DIFFERENTIAL METHOD BLD: ABNORMAL
EOSINOPHIL # BLD AUTO: 0.1 10E9/L (ref 0–0.7)
EOSINOPHIL NFR BLD AUTO: 0.7 %
ERYTHROCYTE [DISTWIDTH] IN BLOOD BY AUTOMATED COUNT: 12.7 % (ref 10–15)
GFR SERPL CREATININE-BSD FRML MDRD: 85 ML/MIN/{1.73_M2}
GLUCOSE SERPL-MCNC: 107 MG/DL (ref 70–99)
HCT VFR BLD AUTO: 39.7 % (ref 40–53)
HGB BLD-MCNC: 13.3 G/DL (ref 13.3–17.7)
LYMPHOCYTES # BLD AUTO: 1.8 10E9/L (ref 0.8–5.3)
LYMPHOCYTES NFR BLD AUTO: 17.3 %
MCH RBC QN AUTO: 33 PG (ref 26.5–33)
MCHC RBC AUTO-ENTMCNC: 33.5 G/DL (ref 31.5–36.5)
MCV RBC AUTO: 99 FL (ref 78–100)
MONOCYTES # BLD AUTO: 1.2 10E9/L (ref 0–1.3)
MONOCYTES NFR BLD AUTO: 11.9 %
NEUTROPHILS # BLD AUTO: 7.2 10E9/L (ref 1.6–8.3)
NEUTROPHILS NFR BLD AUTO: 69.5 %
PLATELET # BLD AUTO: 321 10E9/L (ref 150–450)
POTASSIUM SERPL-SCNC: 4.5 MMOL/L (ref 3.4–5.3)
PROT SERPL-MCNC: 8.3 G/DL (ref 6.8–8.8)
RBC # BLD AUTO: 4.03 10E12/L (ref 4.4–5.9)
SODIUM SERPL-SCNC: 140 MMOL/L (ref 133–144)
WBC # BLD AUTO: 10.4 10E9/L (ref 4–11)

## 2021-06-03 PROCEDURE — 36415 COLL VENOUS BLD VENIPUNCTURE: CPT | Performed by: INTERNAL MEDICINE

## 2021-06-03 PROCEDURE — 82784 ASSAY IGA/IGD/IGG/IGM EACH: CPT | Performed by: INTERNAL MEDICINE

## 2021-06-03 PROCEDURE — 84165 PROTEIN E-PHORESIS SERUM: CPT | Performed by: PATHOLOGY

## 2021-06-03 PROCEDURE — 86334 IMMUNOFIX E-PHORESIS SERUM: CPT | Performed by: PATHOLOGY

## 2021-06-03 PROCEDURE — 99N1036 PR STATISTIC TOTAL PROTEIN: Performed by: INTERNAL MEDICINE

## 2021-06-03 PROCEDURE — 83883 ASSAY NEPHELOMETRY NOT SPEC: CPT | Mod: 59 | Performed by: INTERNAL MEDICINE

## 2021-06-03 PROCEDURE — 82232 ASSAY OF BETA-2 PROTEIN: CPT | Performed by: INTERNAL MEDICINE

## 2021-06-03 PROCEDURE — 85810 BLOOD VISCOSITY EXAMINATION: CPT | Performed by: INTERNAL MEDICINE

## 2021-06-03 PROCEDURE — 85025 COMPLETE CBC W/AUTO DIFF WBC: CPT | Performed by: INTERNAL MEDICINE

## 2021-06-03 PROCEDURE — 99204 OFFICE O/P NEW MOD 45 MIN: CPT | Mod: 95 | Performed by: INTERNAL MEDICINE

## 2021-06-03 PROCEDURE — 83883 ASSAY NEPHELOMETRY NOT SPEC: CPT | Performed by: INTERNAL MEDICINE

## 2021-06-03 PROCEDURE — 80053 COMPREHEN METABOLIC PANEL: CPT | Performed by: INTERNAL MEDICINE

## 2021-06-03 ASSESSMENT — MIFFLIN-ST. JEOR: SCORE: 1679.79

## 2021-06-03 NOTE — PROGRESS NOTES
"This patient  is being evaluated via a billable video visit.      The patient has been notified of following:     \"This video visit will be conducted via a call between you and your physician/provider. We have found that certain health care needs can be provided without the need for an in-person physical exam.  This service lets us provide the care you need with a video conversation.  If a prescription is necessary we can send it directly to your pharmacy.  If lab work is needed we can place an order for that and you can then stop by our lab to have the test done at a later time.    Video visits are billed at different rates depending on your insurance coverage.  Please reach out to your insurance provider with any questions.    If during the course of the call the physician/provider feels a video visit is not appropriate, you will not be charged for this service.\"    Patient has given verbal consent for Video visit? yes  Video-Visit Details    Type of service:  Video Visit    Video visit duration:  18 min  Originating Location (pt. Location): home    Distant Location (provider location):  [facility]     Platform used for Video Visit: Candido Hernandez MD          Hematology Consultation:  Date on this visit: 6/3/2021    Ag Evans  is referred by Dr.Paul JASMEET Antonio for a hematology consultation. He requires evaluation for new diagnosis of     Primary Physician: Jose Antonio     History Of Present Illness:  Mr. Evans is a 70 year old male who presents with new diagnosis of monoclonal gammopathy.  He was found to have an M spike of 1.1 g/dL, and serum immunofixation electrophoresis this was IgM kappa.  Serum IgM level was elevated at 1075 mg/dL.  He started to feel numbness in the toes 2 years ago, worsening in the last two months in his feet, and pins and needle in his heels. Fingertips also feeling numb. Also tingling in the right palm.  He has had no new lumps or bumps, " no constitutional symptoms and no bone pain.  He has had no vision changes.  He will be seeing an ophthalmologist in the near future.  He also has a colonoscopy scheduled in a couple of weeks.   Exercises daily on his bike. He has been keeping a healthy diet.   There is a FHX of malignancy- metastatic breast cancer at the age of 56. Sister had pancreatic cancer at 52, . Father  of stomach cancer at the age of 71. Another sister had breast cancer at the age of 81. He is of [place] descent on dad's side.  He is seen with his wife for this visit.  In addition, a complete 12 point  review of systems is negative.    Past Medical/Surgical History:  Past Medical History:   Diagnosis Date     DJD (degenerative joint disease), lumbar      Sinus bradycardia      Past Surgical History:   Procedure Laterality Date     APPENDECTOMY       COLONOSCOPY  2010    benign with hyperplastic polyps     TX TOTAL HIP ARTHROPLASTY Left 2012    Dr Roderick Santos     TX TOTAL HIP ARTHROPLASTY Right 04/15/2004    Dr. Jose Martinez     SURGICAL HISTORY OF -       Removal of a benign soft tissue mass right abdominal wall     TONSILLECTOMY       Allergies:  Allergies as of 2021 - Reviewed 2021   Allergen Reaction Noted     Seasonal allergies  2015     Current Medications:  Current Outpatient Medications   Medication Sig Dispense Refill     fluticasone (FLONASE) 50 MCG/ACT spray Spray 1 spray into both nostrils daily       ginkgo biloba 60 MG CAPS capsule Take 30 mg by mouth daily       melatonin 5 MG tablet        Multiple Vitamins-Minerals (MULTIVITAMIN PO)        NEW MED NAC       saw palmetto 450 MG CAPS capsule Take 450 mg by mouth daily       selenium 50 MCG TABS tablet Take 50 mcg by mouth daily       senna-docusate (SENOKOT-S/PERICOLACE) 8.6-50 MG tablet Take 1 tablet by mouth daily       SF 5000 PLUS 1.1 % CREA USE TOOTH PASTE TWICE DAILY AS DIRECTED. NOTHING BY MOUTH FOR 30 MINUTES AFTER USE       Turmeric  "(QC TUMERIC COMPLEX) 500 MG CAPS        vitamin E 400 units TABS Take 400 Units by mouth daily        Family History:  Family History   Problem Relation Age of Onset     Prostate Cancer Father      Other Cancer Mother      Other Cancer Sister         Pancreatic cancer     No family history of bleeding or clotting disorder.  Social History:  Social History     Socioeconomic History     Marital status:    \" S 14 tobacco Use     Smoking status: Never Smoker     Smokeless tobacco: Never Used   Substance and Sexual Activity     Alcohol use: Not Currently     Frequency: Monthly or less     Drug use: No     Sexual activity: Yes     Partners: Female     Physical Exam:  Constitutional: alert and in no distress  Eyes: No redness or discharge  Respiratory: No cough or labored breathing.  Musculoskeletal: Full range of motion in extremities.  Skin: no visible skin lesions or discoloration  Neurological: No tremors and denies headache.  Psychiatric: Mentation appears normal and affect is normal as well.  Alert and oriented x3.  The rest the comprehensive physical examination is deferred due to public health emergency video visit restrictions.      Laboratory/Imaging Studies  Vitamin B12 level normal.  Labs reviewed and documented in the EMR.    ASSESSMENT/PLAN:  Ag is a very pleasant 70-year-old gentleman with progressive peripheral neuropathy and finding of IgM kappa on serum protein immunofixation of the pheresis, with elevated serum IgM level and serum M spike of 1.1 g/dL.     1.  IgM kappa monoclonal gammopathy- at this time, I recommended we will proceed with further evaluation and obtain labs-CBC differential, CMP, serum viscosity, beta-2 microglobulin, repeat serum protein electrophoresis, free kappa lambda light chain analysis, and serum immunoglobulin levels.  I would also recommend proceeding with a bone marrow biopsy at this time to evaluate for possible multiple myeloma or lymphoid malignancy associated " with elevated serum IgM level/monoclonal gammopathy and peripheral neuropathy.  We have discussed the clinical significance of findings of monoclonal gammopathy and the differential diagnosis of monoclonal gammopathy ranging from monoclonal gammopathy of unknown significance (MGUS) to a hematologic malignancy, such as multiple myeloma or lymphoma.  We have discussed the spectrum of disorders that are associated with IgM monoclonal gammopathy from MGUS to smoldering Waldenstrom macroglobulinemia/multiple myeloma to lymphocytic lymphoma/multiple myeloma.  We'll inform the patient of the results of his testing and follow-up plans based on the results.  He lives in [place] and prefers labs to be done there and bone marrow biopsy to be done at [place].    2.  Peripheral neuropathy-proceed with evaluation as above.  Also, at this time will refer to neurology for further evaluation.    3.  Family history of breast cancer and pancreatic cancer, as above -we have discussed recommendations to meet with genetic counselor in the future and consider genetic testing.  Referral will be provided.  At the end of our visit patient verbalized understanding and concurred with the plan.

## 2021-06-03 NOTE — NURSING NOTE
Ag Evans is a 70 year old who is being evaluated via a billable video visit.      How would you like to obtain your AVS? MyChart  If the video visit is dropped, the invitation should be resent by: Text to cell phone: [phone]  Will anyone else be joining your video visit? No      Video-Visit Details    Type of service:  Video Visit      Originating Location (pt. Location): Home in [place]    Distant Location (provider location):  [facility]     Platform used for Video Visit: Shadi     Concerns:  Discuss diagnosis due to IgM being elevated, neuropathy has been worse the last 2 months, pins/needles/numbness in feet and fingers.  Taking Naproxen BID to help dull the pain.    Shala Mckenzie CMA

## 2021-06-03 NOTE — LETTER
"    6/3/2021         RE: Ag Evans  [address] [address] [place] [id number]        Dear Colleague,    Thank you for referring your patient, Ag Evans, to the [facility]. Please see a copy of my visit note below.    This patient  is being evaluated via a billable video visit.      The patient has been notified of following:     \"This video visit will be conducted via a call between you and your physician/provider. We have found that certain health care needs can be provided without the need for an in-person physical exam.  This service lets us provide the care you need with a video conversation.  If a prescription is necessary we can send it directly to your pharmacy.  If lab work is needed we can place an order for that and you can then stop by our lab to have the test done at a later time.    Video visits are billed at different rates depending on your insurance coverage.  Please reach out to your insurance provider with any questions.    If during the course of the call the physician/provider feels a video visit is not appropriate, you will not be charged for this service.\"    Patient has given verbal consent for Video visit? yes  Video-Visit Details    Type of service:  Video Visit    Video visit duration:  18 min  Originating Location (pt. Location): home    Distant Location (provider location):  [facility]     Platform used for Video Visit: Candido Hernandez MD          Hematology Consultation:  Date on this visit: 6/3/2021    Ag Evans  is referred by Dr.Paul JASMEET Antonio for a hematology consultation. He requires evaluation for new diagnosis of     Primary Physician: Jose Antonio     History Of Present Illness:  Mr. Evans is a 70 year old male who presents with new diagnosis of monoclonal gammopathy.  He was found to have an M spike of 1.1 g/dL, and serum immunofixation electrophoresis this was IgM kappa.  Serum IgM level was " elevated at 1075 mg/dL.  He started to feel numbness in the toes 2 years ago, worsening in the last two months in his feet, and pins and needle in his heels. Fingertips also feeling numb. Also tingling in the right palm.  He has had no new lumps or bumps, no constitutional symptoms and no bone pain.  He has had no vision changes.  He will be seeing an ophthalmologist in the near future.  He also has a colonoscopy scheduled in a couple of weeks.   Exercises daily on his bike. He has been keeping a healthy diet.   There is a FHX of malignancy- metastatic breast cancer at the age of 55. Sister had pancreatic cancer at 52, . Father  of stomach cancer at the age of 71. Another sister had breast cancer at the age of 81. He is of [place] descent on dad's side.  He is seen with his wife for this visit.  In addition, a complete 12 point  review of systems is negative.    Past Medical/Surgical History:  Past Medical History:   Diagnosis Date     DJD (degenerative joint disease), lumbar      Sinus bradycardia      Past Surgical History:   Procedure Laterality Date     APPENDECTOMY       COLONOSCOPY  2010    benign with hyperplastic polyps     AR TOTAL HIP ARTHROPLASTY Left 2012    Dr Roderick Santos     AR TOTAL HIP ARTHROPLASTY Right 04/15/2004    Dr. Jose Martinez     SURGICAL HISTORY OF -       Removal of a benign soft tissue mass right abdominal wall     TONSILLECTOMY       Allergies:  Allergies as of 2021 - Reviewed 2021   Allergen Reaction Noted     Seasonal allergies  2015     Current Medications:  Current Outpatient Medications   Medication Sig Dispense Refill     fluticasone (FLONASE) 50 MCG/ACT spray Spray 1 spray into both nostrils daily       ginkgo biloba 60 MG CAPS capsule Take 30 mg by mouth daily       melatonin 5 MG tablet        Multiple Vitamins-Minerals (MULTIVITAMIN PO)        NEW MED NAC       saw palmetto 450 MG CAPS capsule Take 450 mg by mouth daily       selenium 50 MCG  "TABS tablet Take 50 mcg by mouth daily       senna-docusate (SENOKOT-S/PERICOLACE) 8.6-50 MG tablet Take 1 tablet by mouth daily       SF 5000 PLUS 1.1 % CREA USE TOOTH PASTE TWICE DAILY AS DIRECTED. NOTHING BY MOUTH FOR 30 MINUTES AFTER USE       Turmeric (QC TUMERIC COMPLEX) 500 MG CAPS        vitamin E 400 units TABS Take 400 Units by mouth daily        Family History:  Family History   Problem Relation Age of Onset     Prostate Cancer Father      Other Cancer Mother      Other Cancer Sister         Pancreatic cancer     No family history of bleeding or clotting disorder.  Social History:  Social History     Socioeconomic History     Marital status:    \" S 14 tobacco Use     Smoking status: Never Smoker     Smokeless tobacco: Never Used   Substance and Sexual Activity     Alcohol use: Not Currently     Frequency: Monthly or less     Drug use: No     Sexual activity: Yes     Partners: Female     Physical Exam:  Constitutional: alert and in no distress  Eyes: No redness or discharge  Respiratory: No cough or labored breathing.  Musculoskeletal: Full range of motion in extremities.  Skin: no visible skin lesions or discoloration  Neurological: No tremors and denies headache.  Psychiatric: Mentation appears normal and affect is normal as well.  Alert and oriented x3.  The rest the comprehensive physical examination is deferred due to public health emergency video visit restrictions.      Laboratory/Imaging Studies  Vitamin B12 level normal.  Labs reviewed and documented in the EMR.    ASSESSMENT/PLAN:  Ag is a very pleasant 70-year-old gentleman with progressive peripheral neuropathy and finding of IgM kappa on serum protein immunofixation of the pheresis, with elevated serum IgM level and serum M spike of 1.1 g/dL.     1.  IgM kappa monoclonal gammopathy- at this time, I recommended we will proceed with further evaluation and obtain labs-CBC differential, CMP, serum viscosity, beta-2 microglobulin, repeat " serum protein electrophoresis, free kappa lambda light chain analysis, and serum immunoglobulin levels.  I would also recommend proceeding with a bone marrow biopsy at this time to evaluate for possible multiple myeloma or lymphoid malignancy associated with elevated serum IgM level/monoclonal gammopathy and peripheral neuropathy.  We have discussed the clinical significance of findings of monoclonal gammopathy and the differential diagnosis of monoclonal gammopathy ranging from monoclonal gammopathy of unknown significance (MGUS) to a hematologic malignancy, such as multiple myeloma or lymphoma.  We have discussed the spectrum of disorders that are associated with IgM monoclonal gammopathy from MGUS to smoldering Waldenstrom macroglobulinemia/multiple myeloma to lymphocytic lymphoma/multiple myeloma.  We'll inform the patient of the results of his testing and follow-up plans based on the results.  He lives in [place] and prefers labs to be done there and bone marrow biopsy to be done at [place].    2.  Peripheral neuropathy-proceed with evaluation as above.  Also, at this time will refer to neurology for further evaluation.    3.  Family history of breast cancer and pancreatic cancer, as above -we have discussed recommendations to meet with genetic counselor in the future and consider genetic testing.  Referral will be provided.  At the end of our visit patient verbalized understanding and concurred with the plan.          Again, thank you for allowing me to participate in the care of your patient.        Sincerely,        Keerthi Hernandez MD, MD

## 2021-06-04 ENCOUNTER — TELEPHONE (OUTPATIENT)
Dept: ONCOLOGY | Facility: CLINIC | Age: 71
End: 2021-06-04

## 2021-06-04 LAB
ALBUMIN SERPL ELPH-MCNC: 4.4 G/DL (ref 3.7–5.1)
ALPHA1 GLOB SERPL ELPH-MCNC: 0.4 G/DL (ref 0.2–0.4)
ALPHA2 GLOB SERPL ELPH-MCNC: 0.8 G/DL (ref 0.5–0.9)
B-GLOBULIN SERPL ELPH-MCNC: 0.7 G/DL (ref 0.6–1)
B2 MICROGLOB SERPL-MCNC: 2.2 MG/L
GAMMA GLOB SERPL ELPH-MCNC: 1.9 G/DL (ref 0.7–1.6)
IGA SERPL-MCNC: 67 MG/DL (ref 84–499)
IGG SERPL-MCNC: 780 MG/DL (ref 610–1616)
IGM SERPL-MCNC: 1979 MG/DL (ref 35–242)
KAPPA LC UR-MCNC: 1.42 MG/DL (ref 0.33–1.94)
KAPPA LC/LAMBDA SER: 1.41 {RATIO} (ref 0.26–1.65)
LAMBDA LC SERPL-MCNC: 1.01 MG/DL (ref 0.57–2.63)
M PROTEIN SERPL ELPH-MCNC: 1.1 G/DL
PROT PATTERN SERPL ELPH-IMP: ABNORMAL
PROT PATTERN SERPL IFE-IMP: ABNORMAL
VISC SER: 2.1 CP (ref 1.4–1.8)

## 2021-06-04 NOTE — TELEPHONE ENCOUNTER
Writer attempted to reach the patient and assist with scheduling the BMBX. The first available appointment here in [place] on 6/28/2021.    Can offer the CSC if availability sooner.     Writer emir.    [facility]   [phone]

## 2021-06-06 ENCOUNTER — MYC MEDICAL ADVICE (OUTPATIENT)
Dept: INTERNAL MEDICINE | Facility: CLINIC | Age: 71
End: 2021-06-06

## 2021-06-06 DIAGNOSIS — G62.9 NEUROPATHY: Primary | ICD-10-CM

## 2021-06-07 DIAGNOSIS — Z11.59 ENCOUNTER FOR SCREENING FOR OTHER VIRAL DISEASES: ICD-10-CM

## 2021-06-07 RX ORDER — GABAPENTIN 100 MG/1
CAPSULE ORAL
Qty: 60 CAPSULE | Refills: 3 | Status: SHIPPED | OUTPATIENT
Start: 2021-06-07 | End: 2021-08-26

## 2021-06-08 DIAGNOSIS — Z11.59 ENCOUNTER FOR SCREENING FOR OTHER VIRAL DISEASES: Primary | ICD-10-CM

## 2021-06-08 NOTE — TELEPHONE ENCOUNTER
RECORDS STATUS - BREAST    RECORDS REQUESTED FROM: FHx: breast cancer in first degree relative [Z80.3]  FHx: pancreatic cancer [Z80.0]  FHx: stomach cancer [Z80.0   DATE REQUESTED: 7.15.21   NOTES DETAILS STATUS   OFFICE NOTE from referring provider Internal 6.3.21 Dr Keerthi Hernandez, [facility] On[facility]   OFFICE NOTE from medical oncologist     OFFICE NOTE from surgeon     OFFICE NOTE from radiation oncologist     DISCHARGE SUMMARY from hospital     DISCHARGE REPORT from the ER     OPERATIVE REPORT     MEDICATION LIST Internal    CLINICAL TRIAL TREATMENTS TO DATE     LABS     PATHOLOGY REPORTS  (Tissue diagnosis, Stage, ER/UT percentage positive and intensity of staining, HER2 IHC, FISH, and all biopsies from breast and any distant metastasis)                 Labs internal    GENONOMIC TESTING     TYPE:   (Next Generation Sequencing, including Foundation One testing, and Oncotype score)     IMAGING (NEED IMAGES & REPORT)     CT SCANS     MRI     MAMMO     ULTRASOUND     PET     BONE SCAN     BRAIN MRI

## 2021-06-09 ENCOUNTER — OFFICE VISIT (OUTPATIENT)
Dept: INTERNAL MEDICINE | Facility: CLINIC | Age: 71
End: 2021-06-09
Payer: COMMERCIAL

## 2021-06-09 ENCOUNTER — ANESTHESIA EVENT (OUTPATIENT)
Dept: GASTROENTEROLOGY | Facility: CLINIC | Age: 71
End: 2021-06-09
Payer: COMMERCIAL

## 2021-06-09 VITALS
BODY MASS INDEX: 27.2 KG/M2 | TEMPERATURE: 97.5 F | OXYGEN SATURATION: 97 % | HEART RATE: 56 BPM | RESPIRATION RATE: 18 BRPM | WEIGHT: 195 LBS | SYSTOLIC BLOOD PRESSURE: 138 MMHG | DIASTOLIC BLOOD PRESSURE: 66 MMHG

## 2021-06-09 DIAGNOSIS — G60.3 IDIOPATHIC PROGRESSIVE NEUROPATHY: ICD-10-CM

## 2021-06-09 DIAGNOSIS — E78.5 HYPERLIPIDEMIA LDL GOAL <130: ICD-10-CM

## 2021-06-09 DIAGNOSIS — Z96.643 HISTORY OF TOTAL REPLACEMENT OF BOTH HIP JOINTS: ICD-10-CM

## 2021-06-09 DIAGNOSIS — Z11.59 ENCOUNTER FOR SCREENING FOR OTHER VIRAL DISEASES: ICD-10-CM

## 2021-06-09 DIAGNOSIS — D47.2 MONOCLONAL GAMMOPATHY: ICD-10-CM

## 2021-06-09 DIAGNOSIS — Z01.818 PREOP GENERAL PHYSICAL EXAM: Primary | ICD-10-CM

## 2021-06-09 PROCEDURE — 99214 OFFICE O/P EST MOD 30 MIN: CPT | Performed by: INTERNAL MEDICINE

## 2021-06-09 PROCEDURE — 87635 SARS-COV-2 COVID-19 AMP PRB: CPT | Performed by: SPECIALIST

## 2021-06-09 ASSESSMENT — PAIN SCALES - GENERAL: PAINLEVEL: NO PAIN (0)

## 2021-06-09 NOTE — H&P (VIEW-ONLY)
[address] [address]  Phone: [phone]  Primary Provider: Jose Antonio  Pre-op Performing Provider: CHARLOTTE ARRIETA      PREOPERATIVE EVALUATION:  Today's date: 6/9/2021    Ag Evans is a 70 year old male who presents for a preoperative evaluation.    Surgical Information:  Surgery/Procedure: bone marrow biopsy   Surgery Location:    Surgeon: Rosalio  Surgery Date: 6/10/21  Time of Surgery: 9 AM  Where patient plans to recover: At home with family  Fax number for surgical facility: Note does not need to be faxed, will be available electronically in Epic.    Type of Anesthesia Anticipated: Local with MAC    Assessment & Plan     The proposed surgical procedure is considered LOW risk.    Preop general physical exam    Monoclonal gammopathy    Idiopathic progressive neuropathy    Hyperlipidemia LDL goal <130    History of total replacement of both hip joints           Risks and Recommendations:  The patient has the following additional risks and recommendations for perioperative complications:   - No identified additional risk factors other than previously addressed    Medication Instructions:   Patient not taking aspirin or anticoagulants.  We will hold all medications prior to the procedure.    RECOMMENDATION:  APPROVAL GIVEN to proceed with proposed procedure, without further diagnostic evaluation.    Review of external notes as documented above       Subjective     HPI related to upcoming procedure: Neuropathy secondary to monoclonal gammopathy.  Requires bone biopsy to evaluate possible Waldenstrom's  Review of Systems  CONSTITUTIONAL: NEGATIVE for fever, chills, change in weight  INTEGUMENTARY/SKIN: NEGATIVE for worrisome rashes, moles or lesions  EYES: NEGATIVE for vision changes or irritation  ENT/MOUTH: NEGATIVE for ear, mouth and throat problems  RESP: NEGATIVE for significant cough or SOB  BREAST: NEGATIVE for masses, tenderness or  discharge  CV: NEGATIVE for chest pain, palpitations or peripheral edema  GI: NEGATIVE for nausea, abdominal pain, heartburn, or change in bowel habits  : NEGATIVE for frequency, dysuria, or hematuria  MUSCULOSKELETAL: NEGATIVE for significant arthralgias or myalgia  NEURO: NEGATIVE for weakness, dizziness.  Does have paresthesias in hands and feet.  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 Has a current advanced directive completed and will bring a  to the clinic.  Rafat Moss MD         DJD (degenerative joint disease), lumbar      Priority: Medium     Incomplete RBBB 03/06/2012     Priority: Medium     History of total hip replacement 02/12/2006     Priority: Medium     Overview:   bilat   ; Hip joint replacement by other means       Family history of malignant neoplasm of prostate 09/11/2003     Priority: Medium      Past Medical History:   Diagnosis Date     DJD (degenerative joint disease), lumbar      Sinus bradycardia      Past Surgical History:   Procedure Laterality Date     APPENDECTOMY       BIOPSY  mass right side     COLONOSCOPY  01/01/2010    benign with hyperplastic polyps     [place] TOTAL HIP ARTHROPLASTY Left 04/02/2012    Dr Roderick Santos     [place] TOTAL HIP ARTHROPLASTY Right 04/15/2004    Dr. Jose Martinez     SURGICAL HISTORY OF -       Removal of a benign soft tissue mass right abdominal wall     TONSILLECTOMY       Current Outpatient  Medications   Medication Sig Dispense Refill     fluticasone (FLONASE) 50 MCG/ACT spray Spray 1 spray into both nostrils daily       ginkgo biloba 60 MG CAPS capsule Take 30 mg by mouth daily       melatonin 5 MG tablet        Multiple Vitamins-Minerals (MULTIVITAMIN PO)        NEW MED NAC       saw palmetto 450 MG CAPS capsule Take 450 mg by mouth daily       selenium 50 MCG TABS tablet Take 50 mcg by mouth daily       senna-docusate (SENOKOT-S/PERICOLACE) 8.6-50 MG tablet Take 1 tablet by mouth daily       SF 5000 PLUS 1.1 % CREA USE TOOTH PASTE TWICE DAILY AS DIRECTED. NOTHING BY MOUTH FOR 30 MINUTES AFTER USE       Turmeric (QC TUMERIC COMPLEX) 500 MG CAPS        vitamin E 400 units TABS Take 400 Units by mouth daily       gabapentin (NEURONTIN) 100 MG capsule 1 a day at night for a week then 2 a day at night. (Patient not taking: Reported on 6/9/2021) 60 capsule 3       Allergies   Allergen Reactions     Seasonal Allergies         Social History     Tobacco Use     Smoking status: Never Smoker     Smokeless tobacco: Never Used   Substance Use Topics     Alcohol use: Yes     Frequency: Monthly or less     Family History   Problem Relation Age of Onset     Prostate Cancer Father      Other Cancer Father         stomach     Other Cancer Mother      Breast Cancer Mother      Other Cancer Sister         Pancreatic cancer     Breast Cancer Sister      Osteoporosis Sister         from cancer drug?     Other Cancer Sister         pancreatic     History   Drug Use No         Objective     /66 (BP Location: Right arm, Patient Position: Sitting, Cuff Size: Adult Regular)   Pulse 56   Temp 97.5  F (36.4  C) (Temporal)   Resp 18   Wt 88.5 kg (195 lb)   SpO2 97%   BMI 27.20 kg/m      Physical Exam    GENERAL APPEARANCE: healthy, alert and no distress     EYES: EOMI,  PERRL     HENT: ear canals and TM's normal and nose and mouth without ulcers or lesions     NECK: no adenopathy, no asymmetry, masses, or scars and  thyroid normal to palpation     RESP: lungs clear to auscultation - no rales, rhonchi or wheezes     CV: regular rates and rhythm, normal S1 S2, no S3 or S4 and no murmur, click or rub     ABDOMEN:  soft, nontender, no HSM or masses and bowel sounds normal     MS: extremities normal- no gross deformities noted, no evidence of inflammation in joints, FROM in all extremities.     SKIN: no suspicious lesions or rashes     NEURO: Peripheral neuropathy in the lower extremities and hands is noted.     PSYCH: mentation appears normal. and affect normal/bright     LYMPHATICS: No cervical adenopathy    Recent Labs   Lab Test 06/03/21  1408 10/02/20  1007   HGB 13.3  --      --     141   POTASSIUM 4.5 4.3   CR 0.90 0.76        Diagnostics:  No labs were ordered during this visit.   No EKG required for low risk surgery (cataract, skin procedure, breast biopsy, etc).    Revised Cardiac Risk Index (RCRI):  The patient has the following serious cardiovascular risks for perioperative complications:   - No serious cardiac risks = 0 points     RCRI Interpretation: 0 points: Class I (very low risk - 0.4% complication rate)           Signed Electronically by: Jef Torres DO  Copy of this evaluation report is provided to requesting physician.

## 2021-06-09 NOTE — PROGRESS NOTES
[address] [address]  Phone: [phone]  Primary Provider: Jose Antonio  Pre-op Performing Provider: CHARLOTTE ARRIETA      PREOPERATIVE EVALUATION:  Today's date: 6/9/2021    Ag Evans is a 70 year old male who presents for a preoperative evaluation.    Surgical Information:  Surgery/Procedure: bone marrow biopsy   Surgery Location:    Surgeon: Rosalio  Surgery Date: 6/10/21  Time of Surgery: 9 AM  Where patient plans to recover: At home with family  Fax number for surgical facility: Note does not need to be faxed, will be available electronically in Epic.    Type of Anesthesia Anticipated: Local with MAC    Assessment & Plan     The proposed surgical procedure is considered LOW risk.    Preop general physical exam    Monoclonal gammopathy    Idiopathic progressive neuropathy    Hyperlipidemia LDL goal <130    History of total replacement of both hip joints           Risks and Recommendations:  The patient has the following additional risks and recommendations for perioperative complications:   - No identified additional risk factors other than previously addressed    Medication Instructions:   Patient not taking aspirin or anticoagulants.  We will hold all medications prior to the procedure.    RECOMMENDATION:  APPROVAL GIVEN to proceed with proposed procedure, without further diagnostic evaluation.    Review of external notes as documented above       Subjective     HPI related to upcoming procedure: Neuropathy secondary to monoclonal gammopathy.  Requires bone biopsy to evaluate possible Waldenstrom's  Review of Systems  CONSTITUTIONAL: NEGATIVE for fever, chills, change in weight  INTEGUMENTARY/SKIN: NEGATIVE for worrisome rashes, moles or lesions  EYES: NEGATIVE for vision changes or irritation  ENT/MOUTH: NEGATIVE for ear, mouth and throat problems  RESP: NEGATIVE for significant cough or SOB  BREAST: NEGATIVE for masses, tenderness or  discharge  CV: NEGATIVE for chest pain, palpitations or peripheral edema  GI: NEGATIVE for nausea, abdominal pain, heartburn, or change in bowel habits  : NEGATIVE for frequency, dysuria, or hematuria  MUSCULOSKELETAL: NEGATIVE for significant arthralgias or myalgia  NEURO: NEGATIVE for weakness, dizziness.  Does have paresthesias in hands and feet.  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 Has a current advanced directive completed and will bring a  to the clinic.  Rafat Moss MD         DJD (degenerative joint disease), lumbar      Priority: Medium     Incomplete RBBB 03/06/2012     Priority: Medium     History of total hip replacement 02/12/2006     Priority: Medium     Overview:   bilat   ; Hip joint replacement by other means       Family history of malignant neoplasm of prostate 09/11/2003     Priority: Medium      Past Medical History:   Diagnosis Date     DJD (degenerative joint disease), lumbar      Sinus bradycardia      Past Surgical History:   Procedure Laterality Date     APPENDECTOMY       BIOPSY  mass right side     COLONOSCOPY  01/01/2010    benign with hyperplastic polyps     OH TOTAL HIP ARTHROPLASTY Left 04/02/2012    Dr Roderick Santos     OH TOTAL HIP ARTHROPLASTY Right 04/15/2004    Dr. Jose Martinez     SURGICAL HISTORY OF -       Removal of a benign soft tissue mass right abdominal wall     TONSILLECTOMY       Current Outpatient  Medications   Medication Sig Dispense Refill     fluticasone (FLONASE) 50 MCG/ACT spray Spray 1 spray into both nostrils daily       ginkgo biloba 60 MG CAPS capsule Take 30 mg by mouth daily       melatonin 5 MG tablet        Multiple Vitamins-Minerals (MULTIVITAMIN PO)        NEW MED NAC       saw palmetto 450 MG CAPS capsule Take 450 mg by mouth daily       selenium 50 MCG TABS tablet Take 50 mcg by mouth daily       senna-docusate (SENOKOT-S/PERICOLACE) 8.6-50 MG tablet Take 1 tablet by mouth daily       SF 5000 PLUS 1.1 % CREA USE TOOTH PASTE TWICE DAILY AS DIRECTED. NOTHING BY MOUTH FOR 30 MINUTES AFTER USE       Turmeric (QC TUMERIC COMPLEX) 500 MG CAPS        vitamin E 400 units TABS Take 400 Units by mouth daily       gabapentin (NEURONTIN) 100 MG capsule 1 a day at night for a week then 2 a day at night. (Patient not taking: Reported on 6/9/2021) 60 capsule 3       Allergies   Allergen Reactions     Seasonal Allergies         Social History     Tobacco Use     Smoking status: Never Smoker     Smokeless tobacco: Never Used   Substance Use Topics     Alcohol use: Yes     Frequency: Monthly or less     Family History   Problem Relation Age of Onset     Prostate Cancer Father      Other Cancer Father         stomach     Other Cancer Mother      Breast Cancer Mother      Other Cancer Sister         Pancreatic cancer     Breast Cancer Sister      Osteoporosis Sister         from cancer drug?     Other Cancer Sister         pancreatic     History   Drug Use No         Objective     /66 (BP Location: Right arm, Patient Position: Sitting, Cuff Size: Adult Regular)   Pulse 56   Temp 97.5  F (36.4  C) (Temporal)   Resp 18   Wt 88.5 kg (195 lb)   SpO2 97%   BMI 27.20 kg/m      Physical Exam    GENERAL APPEARANCE: healthy, alert and no distress     EYES: EOMI,  PERRL     HENT: ear canals and TM's normal and nose and mouth without ulcers or lesions     NECK: no adenopathy, no asymmetry, masses, or scars and  thyroid normal to palpation     RESP: lungs clear to auscultation - no rales, rhonchi or wheezes     CV: regular rates and rhythm, normal S1 S2, no S3 or S4 and no murmur, click or rub     ABDOMEN:  soft, nontender, no HSM or masses and bowel sounds normal     MS: extremities normal- no gross deformities noted, no evidence of inflammation in joints, FROM in all extremities.     SKIN: no suspicious lesions or rashes     NEURO: Peripheral neuropathy in the lower extremities and hands is noted.     PSYCH: mentation appears normal. and affect normal/bright     LYMPHATICS: No cervical adenopathy    Recent Labs   Lab Test 06/03/21  1408 10/02/20  1007   HGB 13.3  --      --     141   POTASSIUM 4.5 4.3   CR 0.90 0.76        Diagnostics:  No labs were ordered during this visit.   No EKG required for low risk surgery (cataract, skin procedure, breast biopsy, etc).    Revised Cardiac Risk Index (RCRI):  The patient has the following serious cardiovascular risks for perioperative complications:   - No serious cardiac risks = 0 points     RCRI Interpretation: 0 points: Class I (very low risk - 0.4% complication rate)           Signed Electronically by: Jef Torres DO  Copy of this evaluation report is provided to requesting physician.

## 2021-06-09 NOTE — PATIENT INSTRUCTIONS

## 2021-06-10 ENCOUNTER — ANESTHESIA (OUTPATIENT)
Dept: GASTROENTEROLOGY | Facility: CLINIC | Age: 71
End: 2021-06-10
Payer: COMMERCIAL

## 2021-06-10 ENCOUNTER — HOSPITAL ENCOUNTER (OUTPATIENT)
Facility: CLINIC | Age: 71
Discharge: HOME | End: 2021-06-10
Attending: PATHOLOGY | Admitting: PATHOLOGY
Payer: COMMERCIAL

## 2021-06-10 VITALS
SYSTOLIC BLOOD PRESSURE: 116 MMHG | HEART RATE: 40 BPM | OXYGEN SATURATION: 97 % | DIASTOLIC BLOOD PRESSURE: 61 MMHG | RESPIRATION RATE: 17 BRPM

## 2021-06-10 DIAGNOSIS — D47.2 MONOCLONAL GAMMOPATHY: Primary | ICD-10-CM

## 2021-06-10 LAB
BASOPHILS # BLD AUTO: 0.1 10E9/L (ref 0–0.2)
BASOPHILS NFR BLD AUTO: 0.8 %
DIFFERENTIAL METHOD BLD: ABNORMAL
EOSINOPHIL # BLD AUTO: 0.1 10E9/L (ref 0–0.7)
EOSINOPHIL NFR BLD AUTO: 0.8 %
ERYTHROCYTE [DISTWIDTH] IN BLOOD BY AUTOMATED COUNT: 12.5 % (ref 10–15)
HCT VFR BLD AUTO: 38.4 % (ref 40–53)
HGB BLD-MCNC: 12.7 G/DL (ref 13.3–17.7)
IMM GRANULOCYTES # BLD: 0 10E9/L (ref 0–0.4)
IMM GRANULOCYTES NFR BLD: 0.3 %
LABORATORY COMMENT REPORT: NORMAL
LYMPHOCYTES # BLD AUTO: 1.2 10E9/L (ref 0.8–5.3)
LYMPHOCYTES NFR BLD AUTO: 15.9 %
MCH RBC QN AUTO: 32.3 PG (ref 26.5–33)
MCHC RBC AUTO-ENTMCNC: 33.1 G/DL (ref 31.5–36.5)
MCV RBC AUTO: 98 FL (ref 78–100)
MONOCYTES # BLD AUTO: 1 10E9/L (ref 0–1.3)
MONOCYTES NFR BLD AUTO: 13.5 %
NEUTROPHILS # BLD AUTO: 5.2 10E9/L (ref 1.6–8.3)
NEUTROPHILS NFR BLD AUTO: 68.7 %
NRBC # BLD AUTO: 0 10*3/UL
NRBC BLD AUTO-RTO: 0 /100
PLATELET # BLD AUTO: 280 10E9/L (ref 150–450)
RBC # BLD AUTO: 3.93 10E12/L (ref 4.4–5.9)
RETICS # AUTO: 39.3 10E9/L (ref 25–95)
RETICS/RBC NFR AUTO: 1 % (ref 0.5–2)
SARS-COV-2 RNA RESP QL NAA+PROBE: NEGATIVE
SPECIMEN SOURCE: NORMAL
WBC # BLD AUTO: 7.5 10E9/L (ref 4–11)

## 2021-06-10 PROCEDURE — 88311 DECALCIFY TISSUE: CPT | Mod: TC

## 2021-06-10 PROCEDURE — 999N001124 HC STATISTIC BONE MARROW ASP TC 85097

## 2021-06-10 PROCEDURE — 999N001068 HC STATISTIC BONE MARROW CORE PERF TC 38221

## 2021-06-10 PROCEDURE — 88264 CHROMOSOME ANALYSIS 20-25: CPT

## 2021-06-10 PROCEDURE — 88313 SPECIAL STAINS GROUP 2: CPT | Mod: TC,59

## 2021-06-10 PROCEDURE — 250N000011 HC RX IP 250 OP 636: Performed by: NURSE ANESTHETIST, CERTIFIED REGISTERED

## 2021-06-10 PROCEDURE — 88342 IMHCHEM/IMCYTCHM 1ST ANTB: CPT | Mod: TC

## 2021-06-10 PROCEDURE — 370N000017 HC ANESTHESIA TECHNICAL FEE, PER MIN: Performed by: PATHOLOGY

## 2021-06-10 PROCEDURE — 81305 MYD88 GENE P.LEU265PRO VRNT: CPT | Mod: GZ | Performed by: PATHOLOGY

## 2021-06-10 PROCEDURE — 38221 DX BONE MARROW BIOPSIES: CPT | Performed by: PATHOLOGY

## 2021-06-10 PROCEDURE — 250N000009 HC RX 250: Performed by: PATHOLOGY

## 2021-06-10 PROCEDURE — 999N001136 HC STATISTIC FLOW INT 9-15 ABY TC 88188

## 2021-06-10 PROCEDURE — 88280 CHROMOSOME KARYOTYPE STUDY: CPT

## 2021-06-10 PROCEDURE — 999N001010 HC STATISTIC BONE MARROW ASP PERF TC 38220

## 2021-06-10 PROCEDURE — 85097 BONE MARROW INTERPRETATION: CPT | Performed by: PATHOLOGY

## 2021-06-10 PROCEDURE — 999N000010 HC STATISTIC ANES STAT CODE-CRNA PER MINUTE: Performed by: PATHOLOGY

## 2021-06-10 PROCEDURE — 999N001109 HC STATISTIC MORPHOLOGY W/INTERP HISTOLOGY TC 85060

## 2021-06-10 PROCEDURE — 88305 TISSUE EXAM BY PATHOLOGIST: CPT | Mod: TC

## 2021-06-10 PROCEDURE — 250N000009 HC RX 250: Performed by: NURSE ANESTHETIST, CERTIFIED REGISTERED

## 2021-06-10 PROCEDURE — 88185 FLOWCYTOMETRY/TC ADD-ON: CPT

## 2021-06-10 PROCEDURE — 88341 IMHCHEM/IMCYTCHM EA ADD ANTB: CPT | Mod: 26 | Performed by: PATHOLOGY

## 2021-06-10 PROCEDURE — 85025 COMPLETE CBC W/AUTO DIFF WBC: CPT | Performed by: PATHOLOGY

## 2021-06-10 PROCEDURE — 88341 IMHCHEM/IMCYTCHM EA ADD ANTB: CPT | Mod: TC

## 2021-06-10 PROCEDURE — 88305 TISSUE EXAM BY PATHOLOGIST: CPT | Mod: 26 | Performed by: PATHOLOGY

## 2021-06-10 PROCEDURE — 88188 FLOWCYTOMETRY/READ 9-15: CPT | Performed by: PATHOLOGY

## 2021-06-10 PROCEDURE — 88313 SPECIAL STAINS GROUP 2: CPT | Mod: 26 | Performed by: PATHOLOGY

## 2021-06-10 PROCEDURE — 85045 AUTOMATED RETICULOCYTE COUNT: CPT | Performed by: PATHOLOGY

## 2021-06-10 PROCEDURE — 88237 TISSUE CULTURE BONE MARROW: CPT

## 2021-06-10 PROCEDURE — 258N000003 HC RX IP 258 OP 636: Performed by: NURSE ANESTHETIST, CERTIFIED REGISTERED

## 2021-06-10 PROCEDURE — 38220 DX BONE MARROW ASPIRATIONS: CPT | Mod: 59 | Performed by: PATHOLOGY

## 2021-06-10 PROCEDURE — 88184 FLOWCYTOMETRY/ TC 1 MARKER: CPT

## 2021-06-10 PROCEDURE — 38222 DX BONE MARROW BX & ASPIR: CPT | Performed by: PATHOLOGY

## 2021-06-10 PROCEDURE — 88342 IMHCHEM/IMCYTCHM 1ST ANTB: CPT | Mod: 26 | Performed by: PATHOLOGY

## 2021-06-10 PROCEDURE — 999N001102 HC STATISTIC DNA PROCESS AND HOLD: Performed by: PATHOLOGY

## 2021-06-10 PROCEDURE — G0452 MOLECULAR PATHOLOGY INTERPR: HCPCS | Mod: 26 | Performed by: PATHOLOGY

## 2021-06-10 PROCEDURE — 36415 COLL VENOUS BLD VENIPUNCTURE: CPT | Performed by: PATHOLOGY

## 2021-06-10 PROCEDURE — 999N001002 HC STATISTIC ADDL BM ASP PERF PF 38220

## 2021-06-10 PROCEDURE — 88291 CYTO/MOLECULAR REPORT: CPT | Performed by: MEDICAL GENETICS

## 2021-06-10 PROCEDURE — 85060 BLOOD SMEAR INTERPRETATION: CPT | Performed by: PATHOLOGY

## 2021-06-10 PROCEDURE — 88311 DECALCIFY TISSUE: CPT | Mod: 26 | Performed by: PATHOLOGY

## 2021-06-10 PROCEDURE — 87635 SARS-COV-2 COVID-19 AMP PRB: CPT | Performed by: PATHOLOGY

## 2021-06-10 RX ORDER — FLUMAZENIL 0.1 MG/ML
0.2 INJECTION, SOLUTION INTRAVENOUS
Status: CANCELLED | OUTPATIENT
Start: 2021-06-10 | End: 2021-06-10

## 2021-06-10 RX ORDER — ONDANSETRON 2 MG/ML
4 INJECTION INTRAMUSCULAR; INTRAVENOUS EVERY 30 MIN PRN
Status: DISCONTINUED | OUTPATIENT
Start: 2021-06-10 | End: 2021-06-10 | Stop reason: HOSPADM

## 2021-06-10 RX ORDER — FENTANYL CITRATE 50 UG/ML
25-50 INJECTION, SOLUTION INTRAMUSCULAR; INTRAVENOUS
Status: DISCONTINUED | OUTPATIENT
Start: 2021-06-10 | End: 2021-06-10 | Stop reason: HOSPADM

## 2021-06-10 RX ORDER — HYDROMORPHONE HYDROCHLORIDE 1 MG/ML
.3-.5 INJECTION, SOLUTION INTRAMUSCULAR; INTRAVENOUS; SUBCUTANEOUS EVERY 10 MIN PRN
Status: DISCONTINUED | OUTPATIENT
Start: 2021-06-10 | End: 2021-06-10 | Stop reason: HOSPADM

## 2021-06-10 RX ORDER — NALOXONE HYDROCHLORIDE 0.4 MG/ML
0.2 INJECTION, SOLUTION INTRAMUSCULAR; INTRAVENOUS; SUBCUTANEOUS
Status: DISCONTINUED | OUTPATIENT
Start: 2021-06-10 | End: 2021-06-10 | Stop reason: HOSPADM

## 2021-06-10 RX ORDER — NALOXONE HYDROCHLORIDE 0.4 MG/ML
0.4 INJECTION, SOLUTION INTRAMUSCULAR; INTRAVENOUS; SUBCUTANEOUS
Status: DISCONTINUED | OUTPATIENT
Start: 2021-06-10 | End: 2021-06-10 | Stop reason: HOSPADM

## 2021-06-10 RX ORDER — ONDANSETRON 4 MG/1
4 TABLET, ORALLY DISINTEGRATING ORAL EVERY 30 MIN PRN
Status: DISCONTINUED | OUTPATIENT
Start: 2021-06-10 | End: 2021-06-10 | Stop reason: HOSPADM

## 2021-06-10 RX ORDER — LIDOCAINE HYDROCHLORIDE 20 MG/ML
INJECTION, SOLUTION INFILTRATION; PERINEURAL PRN
Status: DISCONTINUED | OUTPATIENT
Start: 2021-06-10 | End: 2021-06-10

## 2021-06-10 RX ORDER — LIDOCAINE HYDROCHLORIDE 10 MG/ML
8-10 INJECTION, SOLUTION EPIDURAL; INFILTRATION; INTRACAUDAL; PERINEURAL
Status: CANCELLED | OUTPATIENT
Start: 2021-06-10

## 2021-06-10 RX ORDER — SODIUM CHLORIDE, SODIUM LACTATE, POTASSIUM CHLORIDE, CALCIUM CHLORIDE 600; 310; 30; 20 MG/100ML; MG/100ML; MG/100ML; MG/100ML
INJECTION, SOLUTION INTRAVENOUS CONTINUOUS PRN
Status: DISCONTINUED | OUTPATIENT
Start: 2021-06-10 | End: 2021-06-10

## 2021-06-10 RX ORDER — PROPOFOL 10 MG/ML
INJECTION, EMULSION INTRAVENOUS PRN
Status: DISCONTINUED | OUTPATIENT
Start: 2021-06-10 | End: 2021-06-10

## 2021-06-10 RX ORDER — MEPERIDINE HYDROCHLORIDE 25 MG/ML
12.5 INJECTION INTRAMUSCULAR; INTRAVENOUS; SUBCUTANEOUS
Status: DISCONTINUED | OUTPATIENT
Start: 2021-06-10 | End: 2021-06-10 | Stop reason: HOSPADM

## 2021-06-10 RX ORDER — SODIUM CHLORIDE, SODIUM LACTATE, POTASSIUM CHLORIDE, CALCIUM CHLORIDE 600; 310; 30; 20 MG/100ML; MG/100ML; MG/100ML; MG/100ML
INJECTION, SOLUTION INTRAVENOUS CONTINUOUS
Status: DISCONTINUED | OUTPATIENT
Start: 2021-06-10 | End: 2021-06-10 | Stop reason: HOSPADM

## 2021-06-10 RX ORDER — ONDANSETRON 2 MG/ML
INJECTION INTRAMUSCULAR; INTRAVENOUS PRN
Status: DISCONTINUED | OUTPATIENT
Start: 2021-06-10 | End: 2021-06-10

## 2021-06-10 RX ORDER — PROPOFOL 10 MG/ML
INJECTION, EMULSION INTRAVENOUS CONTINUOUS PRN
Status: DISCONTINUED | OUTPATIENT
Start: 2021-06-10 | End: 2021-06-10

## 2021-06-10 RX ADMIN — ONDANSETRON 4 MG: 2 INJECTION INTRAMUSCULAR; INTRAVENOUS at 09:21

## 2021-06-10 RX ADMIN — PROPOFOL 125 MCG/KG/MIN: 10 INJECTION, EMULSION INTRAVENOUS at 09:20

## 2021-06-10 RX ADMIN — DEXMEDETOMIDINE HYDROCHLORIDE 12 MCG: 100 INJECTION, SOLUTION INTRAVENOUS at 09:20

## 2021-06-10 RX ADMIN — SODIUM CHLORIDE, POTASSIUM CHLORIDE, SODIUM LACTATE AND CALCIUM CHLORIDE: 600; 310; 30; 20 INJECTION, SOLUTION INTRAVENOUS at 09:20

## 2021-06-10 RX ADMIN — LIDOCAINE HYDROCHLORIDE 60 MG: 20 INJECTION, SOLUTION INFILTRATION; PERINEURAL at 09:21

## 2021-06-10 RX ADMIN — PROPOFOL 20 MG: 10 INJECTION, EMULSION INTRAVENOUS at 09:25

## 2021-06-10 RX ADMIN — DEXMEDETOMIDINE HYDROCHLORIDE 8 MCG: 100 INJECTION, SOLUTION INTRAVENOUS at 09:27

## 2021-06-10 ASSESSMENT — ENCOUNTER SYMPTOMS
ORTHOPNEA: 0
SEIZURES: 0
DYSRHYTHMIAS: 0

## 2021-06-10 ASSESSMENT — COPD QUESTIONNAIRES: COPD: 0

## 2021-06-10 ASSESSMENT — LIFESTYLE VARIABLES: TOBACCO_USE: 0

## 2021-06-10 NOTE — ANESTHESIA POSTPROCEDURE EVALUATION
Patient: Ag Evans    Procedure(s):  BIOPSY, BONE MARROW    Diagnosis:Peripheral nerve disorder [G62.9]  Diagnosis Additional Information: No value filed.    Anesthesia Type:  MAC    Note:  Disposition: Outpatient   Postop Pain Control: Uneventful            Sign Out: Well controlled pain   PONV: No   Neuro/Psych: Uneventful            Sign Out: Acceptable/Baseline neuro status   Airway/Respiratory: Uneventful            Sign Out: Acceptable/Baseline resp. status   CV/Hemodynamics: Uneventful            Sign Out: Acceptable CV status   Other NRE: NONE   DID A NON-ROUTINE EVENT OCCUR? No    Event details/Postop Comments:  Pt doing well prior to discharge home.             Last vitals:  Vitals:    06/10/21 1010 06/10/21 1020 06/10/21 1021   BP: 107/65 116/61    Pulse: (!) 42 (!) 43 (!) 40   Resp: 13  17   SpO2: 95%  97%       Last vitals prior to Anesthesia Care Transfer:  CRNA VITALS  6/10/2021 0914 - 6/10/2021 1014      6/10/2021             Ht Rate:  57    Resp Rate (set):  10          Electronically Signed By: Siddharth Soler MD  Yudi 10, 2021  6:08 PM

## 2021-06-10 NOTE — ANESTHESIA PREPROCEDURE EVALUATION
Anesthesia Pre-Procedure Evaluation    Patient: Ag Evans   MRN: [id number] : 1950        Preoperative Diagnosis: Peripheral nerve disorder [G62.9]   Procedure : Procedure(s):  BIOPSY, BONE MARROW     Past Medical History:   Diagnosis Date     DJD (degenerative joint disease), lumbar      Sinus bradycardia       Past Surgical History:   Procedure Laterality Date     APPENDECTOMY       BIOPSY  mass right side     COLONOSCOPY  2010    benign with hyperplastic polyps     [place] TOTAL HIP ARTHROPLASTY Left 2012    Dr Roderick Santos     [place] TOTAL HIP ARTHROPLASTY Right 04/15/2004    Dr. Jose Martinez     SURGICAL HISTORY OF -       Removal of a benign soft tissue mass right abdominal wall     TONSILLECTOMY        Allergies   Allergen Reactions     Seasonal Allergies       Social History     Tobacco Use     Smoking status: Never Smoker     Smokeless tobacco: Never Used   Substance Use Topics     Alcohol use: Yes     Frequency: Monthly or less      Wt Readings from Last 1 Encounters:   21 88.5 kg (195 lb)        Prior to Admission medications    Medication Sig Start Date End Date Taking? Authorizing Provider   fluticasone (FLONASE) 50 MCG/ACT spray Spray 1 spray into both nostrils daily   Yes Reported, Patient   gabapentin (NEURONTIN) 100 MG capsule 1 a day at night for a week then 2 a day at night. 21  Yes Jose Antonio MD   ginkgo biloba 60 MG CAPS capsule Take 30 mg by mouth daily   Yes Reported, Patient   melatonin 5 MG tablet  3/13/19  Yes Reported, Patient   Multiple Vitamins-Minerals (MULTIVITAMIN PO)    Yes Reported, Patient   saw palmetto 450 MG CAPS capsule Take 450 mg by mouth daily   Yes Reported, Patient   selenium 50 MCG TABS tablet Take 50 mcg by mouth daily   Yes Reported, Patient   senna-docusate (SENOKOT-S/PERICOLACE) 8.6-50 MG tablet Take 1 tablet by mouth daily   Yes Reported, Patient   Turmeric (QC TUMERIC COMPLEX) 500 MG CAPS    Yes Reported, Patient   vitamin E 400 units TABS  Take 400 Units by mouth daily   Yes Reported, Patient   NEW MED NAC    Reported, Patient   SF 5000 PLUS 1.1 % CREA USE TOOTH PASTE TWICE DAILY AS DIRECTED. NOTHING BY MOUTH FOR 30 MINUTES AFTER USE 7/18/20   Reported, Patient     Anesthesia Evaluation   Pt has had prior anesthetic. Type: General and MAC.    No history of anesthetic complications       ROS/MED HX  ENT/Pulmonary:     (+) allergic rhinitis,  (-) tobacco use, asthma, COPD, sleep apnea and recent URI   Neurologic:     (+) peripheral neuropathy,  (-) no seizures and no CVA   Cardiovascular: Comment: Marked Sinus Saman    (+) -----Previous cardiac testing   Echo: Date: 2012 Results:   1) LVEF 55%. Normal LV size and systolic function.Normal LV     diastolic function. No regional wall motion abnormalities.     2) Normal RV size and function.     3) Mild biatrial enlargement.    4) No significant valvular abnormalities other than mild sclerosis of     the aortic valve.    5) Normal IVC. IVC is responsive to inspiration indicating normal     RA pressure.     6) No prior studies for comparison.   Stress Test: Date: Results:    ECG Reviewed: Date: Results:    Cath: Date: Results:   (-) angina, hypertension, CAD, CHF, TRAYLOR, orthopnea/PND, syncope, arrhythmias, irregular heartbeat/palpitations, valvular problems/murmurs, angina, murmur and wheezes   METS/Exercise Tolerance: >4 METS    Hematologic: Comments: Monoclonal Gammopathy      Musculoskeletal:   (+) arthritis,     GI/Hepatic:    (-) GERD and liver disease   Renal/Genitourinary:    (-) renal disease   Endo:    (-) Type II DM, thyroid disease and chronic steroid usage   Psychiatric/Substance Use:    (-) alcohol abuse history   Infectious Disease:       Malignancy:       Other:            Physical Exam    Airway        Mallampati: II   TM distance: > 3 FB   Neck ROM: full   Mouth opening: > 3 cm    Respiratory Devices and Support         Dental       (+) caps      Cardiovascular          Rhythm and rate:  regular and normal (-) no murmur    Pulmonary           breath sounds clear to auscultation   (-) no rhonchi and no wheezes        OUTSIDE LABS:  CBC:   Lab Results   Component Value Date    WBC 10.4 06/03/2021    WBC 8.2 08/13/2015    HGB 13.3 06/03/2021    HGB 13.5 08/13/2015    HCT 39.7 (L) 06/03/2021    HCT 40.3 08/13/2015     06/03/2021     08/13/2015     BMP:   Lab Results   Component Value Date     06/03/2021     10/02/2020    POTASSIUM 4.5 06/03/2021    POTASSIUM 4.3 10/02/2020    CHLORIDE 106 06/03/2021    CHLORIDE 108 10/02/2020    CO2 29 06/03/2021    CO2 27 10/02/2020    BUN 24 06/03/2021    BUN 15 10/02/2020    CR 0.90 06/03/2021    CR 0.76 10/02/2020     (H) 06/03/2021    GLC 98 10/02/2020     COAGS:   Lab Results   Component Value Date    INR 2.1 04/16/2012     HEPATIC:   Lab Results   Component Value Date    ALBUMIN 4.1 06/03/2021    PROTTOTAL 8.3 06/03/2021    ALT 21 06/03/2021    AST 18 06/03/2021    ALKPHOS 77 06/03/2021    BILITOTAL 0.5 06/03/2021     OTHER:   Lab Results   Component Value Date    A1C 5.3 04/03/2012    TANNA 9.4 06/03/2021    TSH 2.01 04/13/2021       Anesthesia Plan    ASA Status:  2   NPO Status:  NPO Appropriate    Anesthesia Type: MAC.     - Reason for MAC: straight local not clinically adequate              Consents    Anesthesia Plan(s) and associated risks, benefits, and realistic alternatives discussed. Questions answered and patient/representative(s) expressed understanding.     - Discussed with:  Patient         Postoperative Care    Pain management: Multi-modal analgesia.   PONV prophylaxis: Ondansetron (or other 5HT-3), Background Propofol Infusion     Comments:                Siddharth Soler MD

## 2021-06-10 NOTE — ANESTHESIA CARE TRANSFER NOTE
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    Anesthesia Type:   MAC     Note:    Oropharynx: oropharynx clear of all foreign objects and spontaneously breathing  Level of Consciousness: drowsy  Oxygen Supplementation: nasal cannula  Level of Supplemental Oxygen (L/min / FiO2): 2  Independent Airway: airway patency satisfactory and stable  Dentition: dentition unchanged  Vital Signs Stable: post-procedure vital signs reviewed and stable  Report to RN Given: handoff report given  Patient transferred to: PACU (Endoscopy PACU)  Comments: At end of procedure, spontaneous respirations, patient alert to voice, able to follow commands. Oxygen via nasal cannula at 2 liters per minute to PACU. Oxygen tubing connected to wall O2 in PACU, SpO2, NiBP, and EKG monitors and alarms on and functioning, report on patient's clinical status given to PACU RN, RN questions answered.  Handoff Report: Identifed the Patient, Identified the Reponsible Provider, Reviewed the pertinent medical history, Discussed the surgical course, Reviewed Intra-OP anesthesia mangement and issues during anesthesia, Set expectations for post-procedure period and Allowed opportunity for questions and acknowledgement of understanding      Vitals: (Last set prior to Anesthesia Care Transfer)  CRNA VITALS  6/10/2021 0914 - 6/10/2021 0948      6/10/2021             Ht Rate:  57    Resp Rate (set):  10        Electronically Signed By: KALEB Guillen CRNA  Yudi 10, 2021  9:48 AM

## 2021-06-10 NOTE — PROCEDURES
The patient was positively identified and informed consent was obtained (see the completed Affirmation of Consent for Bone Marrow Aspiration and/or Biopsy Procedure(s) form in the patient's chart). The patient was placed in the prone position and the bony landmarks of the pelvis were identified. Medical staff reconfirmed the patient's name, date of birth and procedure. The skin over the posterior iliac crest was scrubbed and draped in a sterile fashion. The local area of the procedure was anesthetized with a total of 5 mL of 1% Lidocaine and a small incision was made.  The patient did receive conscious sedation.    Trephine bone marrow core(s) was/were obtained from the right posterior iliac crest. Bone marrow aspirate was obtained from the right posterior iliac crest for: morphology with possible immunophenotyping and/or cytogenetics and molecular diagnostics    Direct pressure was applied to the biopsy site with sterile gauze. The biopsy site was cleaned with alcohol and a sterile dressing was placed over the biopsy incision using a pressure bandage. The patient was then placed in the supine position to maintain pressure on the biopsy site. Post-procedure wound care instructions, including routine dressing instructions and analgesia, were given to the patient. The procedure was completed without complication.

## 2021-06-11 LAB
LABORATORY COMMENT REPORT: NORMAL
SARS-COV-2 RNA RESP QL NAA+PROBE: NEGATIVE
SARS-COV-2 RNA RESP QL NAA+PROBE: NORMAL
SPECIMEN SOURCE: NORMAL
SPECIMEN SOURCE: NORMAL

## 2021-06-14 LAB — COPATH REPORT: NORMAL

## 2021-06-15 LAB
COPATH REPORT: NORMAL
COPATH REPORT: NORMAL

## 2021-06-21 LAB — COPATH REPORT: NORMAL

## 2021-06-29 LAB — COPATH REPORT: NORMAL

## 2021-07-01 ENCOUNTER — VIRTUAL VISIT (OUTPATIENT)
Dept: ONCOLOGY | Facility: CLINIC | Age: 71
End: 2021-07-01
Payer: COMMERCIAL

## 2021-07-01 VITALS — BODY MASS INDEX: 27.86 KG/M2 | HEIGHT: 71 IN | WEIGHT: 199 LBS

## 2021-07-01 DIAGNOSIS — D70.1 CHEMOTHERAPY-INDUCED NEUTROPENIA (H): ICD-10-CM

## 2021-07-01 DIAGNOSIS — C83.00 MALIGNANT LYMPHOMA, LYMPHOPLASMACYTIC (H): Primary | ICD-10-CM

## 2021-07-01 DIAGNOSIS — T45.1X5A CHEMOTHERAPY-INDUCED NEUTROPENIA (H): ICD-10-CM

## 2021-07-01 PROCEDURE — 99215 OFFICE O/P EST HI 40 MIN: CPT | Mod: 95 | Performed by: INTERNAL MEDICINE

## 2021-07-01 RX ORDER — NALOXONE HYDROCHLORIDE 0.4 MG/ML
0.2 INJECTION, SOLUTION INTRAMUSCULAR; INTRAVENOUS; SUBCUTANEOUS
Status: CANCELLED | OUTPATIENT
Start: 2021-07-19

## 2021-07-01 RX ORDER — HEPARIN SODIUM (PORCINE) LOCK FLUSH IV SOLN 100 UNIT/ML 100 UNIT/ML
5 SOLUTION INTRAVENOUS
Status: CANCELLED | OUTPATIENT
Start: 2021-07-20

## 2021-07-01 RX ORDER — ALBUTEROL SULFATE 0.83 MG/ML
2.5 SOLUTION RESPIRATORY (INHALATION)
Status: CANCELLED | OUTPATIENT
Start: 2021-07-20

## 2021-07-01 RX ORDER — HEPARIN SODIUM,PORCINE 10 UNIT/ML
5 VIAL (ML) INTRAVENOUS
Status: CANCELLED | OUTPATIENT
Start: 2021-07-20

## 2021-07-01 RX ORDER — EPINEPHRINE 1 MG/ML
0.3 INJECTION, SOLUTION INTRAMUSCULAR; SUBCUTANEOUS EVERY 5 MIN PRN
Status: CANCELLED | OUTPATIENT
Start: 2021-07-20

## 2021-07-01 RX ORDER — EPINEPHRINE 1 MG/ML
0.3 INJECTION, SOLUTION INTRAMUSCULAR; SUBCUTANEOUS EVERY 5 MIN PRN
Status: CANCELLED | OUTPATIENT
Start: 2021-07-19

## 2021-07-01 RX ORDER — HEPARIN SODIUM (PORCINE) LOCK FLUSH IV SOLN 100 UNIT/ML 100 UNIT/ML
5 SOLUTION INTRAVENOUS
Status: CANCELLED | OUTPATIENT
Start: 2021-07-19

## 2021-07-01 RX ORDER — ALBUTEROL SULFATE 90 UG/1
1-2 AEROSOL, METERED RESPIRATORY (INHALATION)
Status: CANCELLED
Start: 2021-07-19

## 2021-07-01 RX ORDER — NALOXONE HYDROCHLORIDE 0.4 MG/ML
0.2 INJECTION, SOLUTION INTRAMUSCULAR; INTRAVENOUS; SUBCUTANEOUS
Status: CANCELLED | OUTPATIENT
Start: 2021-07-20

## 2021-07-01 RX ORDER — METHYLPREDNISOLONE SODIUM SUCCINATE 125 MG/2ML
125 INJECTION, POWDER, LYOPHILIZED, FOR SOLUTION INTRAMUSCULAR; INTRAVENOUS
Status: CANCELLED
Start: 2021-07-20

## 2021-07-01 RX ORDER — MEPERIDINE HYDROCHLORIDE 25 MG/ML
25 INJECTION INTRAMUSCULAR; INTRAVENOUS; SUBCUTANEOUS EVERY 30 MIN PRN
Status: CANCELLED | OUTPATIENT
Start: 2021-07-19

## 2021-07-01 RX ORDER — HEPARIN SODIUM,PORCINE 10 UNIT/ML
5 VIAL (ML) INTRAVENOUS
Status: CANCELLED | OUTPATIENT
Start: 2021-07-19

## 2021-07-01 RX ORDER — ALBUTEROL SULFATE 0.83 MG/ML
2.5 SOLUTION RESPIRATORY (INHALATION)
Status: CANCELLED | OUTPATIENT
Start: 2021-07-19

## 2021-07-01 RX ORDER — ACETAMINOPHEN 325 MG/1
650 TABLET ORAL ONCE
Status: CANCELLED
Start: 2021-07-19

## 2021-07-01 RX ORDER — DIPHENHYDRAMINE HYDROCHLORIDE 50 MG/ML
50 INJECTION INTRAMUSCULAR; INTRAVENOUS
Status: CANCELLED
Start: 2021-07-20

## 2021-07-01 RX ORDER — ALBUTEROL SULFATE 90 UG/1
1-2 AEROSOL, METERED RESPIRATORY (INHALATION)
Status: CANCELLED
Start: 2021-07-20

## 2021-07-01 RX ORDER — METHYLPREDNISOLONE SODIUM SUCCINATE 125 MG/2ML
125 INJECTION, POWDER, LYOPHILIZED, FOR SOLUTION INTRAMUSCULAR; INTRAVENOUS
Status: CANCELLED
Start: 2021-07-19

## 2021-07-01 RX ORDER — DIPHENHYDRAMINE HCL 25 MG
50 CAPSULE ORAL ONCE
Status: CANCELLED
Start: 2021-07-19

## 2021-07-01 RX ORDER — MEPERIDINE HYDROCHLORIDE 25 MG/ML
25 INJECTION INTRAMUSCULAR; INTRAVENOUS; SUBCUTANEOUS EVERY 30 MIN PRN
Status: CANCELLED | OUTPATIENT
Start: 2021-07-20

## 2021-07-01 RX ORDER — DIPHENHYDRAMINE HYDROCHLORIDE 50 MG/ML
50 INJECTION INTRAMUSCULAR; INTRAVENOUS
Status: CANCELLED
Start: 2021-07-19

## 2021-07-01 RX ORDER — MEPERIDINE HYDROCHLORIDE 25 MG/ML
25 INJECTION INTRAMUSCULAR; INTRAVENOUS; SUBCUTANEOUS
Status: CANCELLED
Start: 2021-07-19

## 2021-07-01 ASSESSMENT — MIFFLIN-ST. JEOR: SCORE: 1679.79

## 2021-07-01 ASSESSMENT — PAIN SCALES - GENERAL: PAINLEVEL: MODERATE PAIN (4)

## 2021-07-01 NOTE — NURSING NOTE
Ag is a 70 year old who is being evaluated via a billable video visit.      How would you like to obtain your AVS? MyChart  If the video visit is dropped, the invitation should be resent by: Text to cell phone: [phone]  Will anyone else be joining your video visit? No      Video-Visit Details    Type of service:  Video Visit    Originating Location (pt. Location): Home in [place]    Distant Location (provider location):  [facility]     Platform used for Video Visit: Shadi     Concerns:  Lab results/plan    Shala Mckenzie CMA   This is a chronic health problem that is uncontrolled with current medications and lifestyle measures.  The patient denies chest pain, shortness of breath or dyspnea on exertion.

## 2021-07-01 NOTE — PROGRESS NOTES
"This patient  is being evaluated via a billable video visit.      The patient has been notified of following:     \"This video visit will be conducted via a call between you and your physician/provider. We have found that certain health care needs can be provided without the need for an in-person physical exam.  This service lets us provide the care you need with a video conversation.  If a prescription is necessary we can send it directly to your pharmacy.  If lab work is needed we can place an order for that and you can then stop by our lab to have the test done at a later time.    Video visits are billed at different rates depending on your insurance coverage.  Please reach out to your insurance provider with any questions.    If during the course of the call the physician/provider feels a video visit is not appropriate, you will not be charged for this service.\"    Patient has given verbal consent for Video visit? yes  Video-Visit Details    Type of service:  Video Visit    Video visit duration:  16 min  Originating Location (pt. Location): home    Distant Location (provider location):  [facility]     Platform used for Video Visit: Candido Hernandez MD      Hematology/Oncology follow-up visit:  Date on this visit: Jul 1, 2021  Primary Care Physician: Jose Antonio     Diagnosis: Lymphoplasmacytic lymphoma  Oncologic history:   1. Lymphoplasmacytic lymphoma- He was evaluated for worsening symptoms of peripheral neuropathy with worsening numbness and pins-and-needles in his feet for the preceding 2 years, and in April 2021 was noted to have a monoclonal protein-IgM kappa on serum immunofixation electrophoresis.  M spike measured 1.1 g/dL in April 2021.  Serum IgM level was elevated at 1075 mg/dL.    Since our last visit he proceeded with additional evaluation.  Serum free kappa lambda light chain ratio was normal on Yudi 3, 2021.  Serum protein electrophoresis confirmed " the presence of monoclonal IgM kappa protein.  M spike remained at 1.1 g/dL on Yudi 3, 2021.  Beta-2 microglobulin was normal.  CMP was unremarkable.  Serum viscosity was slightly elevated at 2.1.  CBC with differential counts was unremarkable.  Further evaluation as below.    History Of Present Illness:  Mr. Evans is a 70 year old male who presents with new diagnosis of lymphoplasmacytic lymphoma.  He presented with 2 years complaints of worsening peripheral neuropathy in the feet and hands.  He proceeded to undergo bone marrow biopsy on Yudi 10, 2021.  At that time he was mildly anemic with hemoglobin of 12.7 g/dL.  MCV was normal.  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  Flow cytometry showed findings suggestion of kappa skewing on B cells and rare to absent plasma cells. Otherwise bone marrow was hypercellular with normal trilineage hematopoiesis.  Cytogenetics was normal 46 XY.  An activating mutation (L265P) in MYD88 is present in up to 90% of cases   of lymphoplasmacytic lymphoma (LPL).   [1] In addition to LPL, the MYD88 L265P mutation is reportedly found in   4-21% of splenic marginal zone   lymphomas, up to 30% of activated B-cell type diffuse large B-cell   lymphomas, and rarely in chronic   lymphocytic leukemia/small lymphocytic lymphoma.   NGS was positive for the MYD88 p.L265P mutation which favors a diagnosis of lymphoplasmacytic   lymphoma, consistent with the morphologic   impression ([id number]).  We have reviewed the above findings with the patient.    He has had no new lumps or bumps, no constitutional symptoms and no bone pain.  He has had no vision changes.   Exercises daily on his bike. He has been keeping a healthy diet.   In addition, a complete 12 point  review of systems is negative.    Past Medical/Surgical History:  Past Medical History:   Diagnosis  Date     DJD (degenerative joint disease), lumbar      Sinus bradycardia      Past Surgical History:   Procedure Laterality Date     APPENDECTOMY       BIOPSY  mass right side     BONE MARROW BIOPSY, BONE SPECIMEN, NEEDLE/TROCAR N/A 6/10/2021    Procedure: BIOPSY, BONE MARROW;  Surgeon: Josephine Santamaria MD;  Location:  GI     COLONOSCOPY  2010    benign with hyperplastic polyps     [place] TOTAL HIP ARTHROPLASTY Left 2012    Dr Roderick Santos     [place] TOTAL HIP ARTHROPLASTY Right 04/15/2004    Dr. Jose Martinez     SURGICAL HISTORY OF -       Removal of a benign soft tissue mass right abdominal wall     TONSILLECTOMY       Allergies:  Allergies as of 2021 - Reviewed 2021   Allergen Reaction Noted     Seasonal allergies  2015     Current Medications:  Current Outpatient Medications   Medication Sig Dispense Refill     fluticasone (FLONASE) 50 MCG/ACT spray Spray 1 spray into both nostrils daily       gabapentin (NEURONTIN) 100 MG capsule 1 a day at night for a week then 2 a day at night. 60 capsule 3     ginkgo biloba 60 MG CAPS capsule Take 30 mg by mouth daily       melatonin 5 MG tablet        Multiple Vitamins-Minerals (MULTIVITAMIN PO)        NEW MED NAC       saw palmetto 450 MG CAPS capsule Take 450 mg by mouth daily       selenium 50 MCG TABS tablet Take 50 mcg by mouth daily       senna-docusate (SENOKOT-S/PERICOLACE) 8.6-50 MG tablet Take 1 tablet by mouth daily       SF 5000 PLUS 1.1 % CREA USE TOOTH PASTE TWICE DAILY AS DIRECTED. NOTHING BY MOUTH FOR 30 MINUTES AFTER USE       Turmeric (QC TUMERIC COMPLEX) 500 MG CAPS        vitamin E 400 units TABS Take 400 Units by mouth daily        Family History  There is a FHX of malignancy- metastatic breast cancer at the age of 56. Sister had pancreatic cancer at 52, . Father  of stomach cancer at the age of 71. Another sister had breast cancer at the age of 81. He is of [place] descent on dad's side.   Social History:  Social  "History     Socioeconomic History     Marital status:    \" S 14 tobacco Use     Smoking status: Never Smoker     Smokeless tobacco: Never Used   Substance and Sexual Activity     Alcohol use: Not Currently     Frequency: Monthly or less     Drug use: No     Sexual activity: Yes     Partners: Female     Physical Exam:  Constitutional: alert and in no distress  Eyes: No redness or discharge  Respiratory: No cough or labored breathing.  Musculoskeletal: Full range of motion in extremities.  Skin: no visible skin lesions or discoloration  Neurological: No tremors and denies headache.  Psychiatric: Mentation appears normal and affect is normal as well.  Alert and oriented x3.  The rest the comprehensive physical examination is deferred due to public health emergency video visit restrictions.      Laboratory/Imaging Studies  Labs reviewed and documented in the EMR.    ASSESSMENT/PLAN:  Ag is a very pleasant 70-year-old gentleman with progressive peripheral neuropathy and finding of IgM kappa on serum protein immunofixation of the pheresis, with elevated serum IgM level and serum M spike of 1.1 g/dL, and on bone marrow biopsy confirmed diagnosis of lymphoplasmacytic lymphoma.     1.  New diagnosis of lymphoplasmacytic lymphoma-we have discussed diagnosis, natural history, incurable nature, also indolent nature and treatment options for lymphoplasmacytic lymphoma.  At this time, recommend that he proceed with CT chest, abdomen and pelvis to complete the staging of his lymphoplasmacytic lymphoma.  Given that he is symptomatic with significant symptoms of progressive peripheral neuropathy, recommend that he proceed with treatment.  I would recommend that he proceed with bendamustine and Rituxan for 4 cycles.  He has at baseline increased serum viscosity, and we will be omitting Rituxan from the first cycle as this can worsen serum viscosity and cause a flare.  Rationale behind using in the bendamustine and Rituxan " and potential side effects of this regimen, were reviewed with the patient in detail, including and not limited to mild toxicity, risk of a life-threatening infection, infusion reaction, nausea, GI toxicity, fatigue, among others.  Patient was emailed information about bendamustine and Rituxan.  He is agreeable to proceed.  We will proceed after the results of his CT chest abdomen and pelvis become available.  Chemotherapy orders were placed.  We will check serum IgM level, serum protein electrophoresis and free kappa lambda light chain analysis with cycle 2-day 1.  Also check CBC differential, CMP with cycle.    2.  Prevention of drug-induced neutropenia-we will use Neulasta with his chemotherapy regimen.  3.  Peripheral neuropathy-proceed with bendamustine and Rituxan as above.  He has an appointment to see neurologist in August.  4.  Family history of breast cancer and pancreatic cancer, as above -he will be seen genetic counseling in the next couple of weeks.  5. Colon cancer screening-he is scheduled to have a screening colonoscopy on July 16.   At the end of our visit patient verbalized understanding and concurred with the plan.    >40 minutes spent on the date of the encounter doing chart review, review of test results, interpretation of tests, patient visit and documentation.    Addendum: Patient canceled colonoscopy scheduled for July 16.  He will be proceeding with bendamustine and Rituxan on July 19.

## 2021-07-01 NOTE — LETTER
"    7/1/2021         RE: Ag Evans  [address] [address] [place] [id number]        Dear Colleague,    Thank you for referring your patient, Ag Evans, to the [facility]. Please see a copy of my visit note below.    This patient  is being evaluated via a billable video visit.      The patient has been notified of following:     \"This video visit will be conducted via a call between you and your physician/provider. We have found that certain health care needs can be provided without the need for an in-person physical exam.  This service lets us provide the care you need with a video conversation.  If a prescription is necessary we can send it directly to your pharmacy.  If lab work is needed we can place an order for that and you can then stop by our lab to have the test done at a later time.    Video visits are billed at different rates depending on your insurance coverage.  Please reach out to your insurance provider with any questions.    If during the course of the call the physician/provider feels a video visit is not appropriate, you will not be charged for this service.\"    Patient has given verbal consent for Video visit? yes  Video-Visit Details    Type of service:  Video Visit    Video visit duration:  16 min  Originating Location (pt. Location): home    Distant Location (provider location):  [facility]     Platform used for Video Visit: Candido Hernandez MD      Hematology/Oncology follow-up visit:  Date on this visit: Jul 1, 2021  Primary Care Physician: Jose Antonio     Diagnosis: Lymphoplasmacytic lymphoma  Oncologic history:   1. Lymphoplasmacytic lymphoma- He was evaluated for worsening symptoms of peripheral neuropathy with worsening numbness and pins-and-needles in his feet for the preceding 2 years, and in April 2021 was noted to have a monoclonal protein-IgM kappa on serum immunofixation electrophoresis.  M spike " measured 1.1 g/dL in April 2021.  Serum IgM level was elevated at 1075 mg/dL.    Since our last visit he proceeded with additional evaluation.  Serum free kappa lambda light chain ratio was normal on Yudi 3, 2021.  Serum protein electrophoresis confirmed the presence of monoclonal IgM kappa protein.  M spike remained at 1.1 g/dL on Yudi 3, 2021.  Beta-2 microglobulin was normal.  CMP was unremarkable.  Serum viscosity was slightly elevated at 2.1.  CBC with differential counts was unremarkable.  Further evaluation as below.    History Of Present Illness:  Mr. Evans is a 70 year old male who presents with new diagnosis of lymphoplasmacytic lymphoma.  He presented with 2 years complaints of worsening peripheral neuropathy in the feet and hands.  He proceeded to undergo bone marrow biopsy on Yudi 10, 2021.  At that time he was mildly anemic with hemoglobin of 12.7 g/dL.  MCV was normal.  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  Flow cytometry showed findings suggestion of kappa skewing on B cells and rare to absent plasma cells. Otherwise bone marrow was hypercellular with normal trilineage hematopoiesis.  Cytogenetics was normal 46 XY.  An activating mutation (L265P) in MYD88 is present in up to 90% of cases   of lymphoplasmacytic lymphoma (LPL).   [1] In addition to LPL, the MYD88 L265P mutation is reportedly found in   4-21% of splenic marginal zone   lymphomas, up to 30% of activated B-cell type diffuse large B-cell   lymphomas, and rarely in chronic   lymphocytic leukemia/small lymphocytic lymphoma.   NGS was positive for the MYD88 p.L265P mutation which favors a diagnosis of lymphoplasmacytic   lymphoma, consistent with the morphologic   impression ([id number]).  We have reviewed the above findings with the patient.    He has had no new lumps or bumps, no constitutional  symptoms and no bone pain.  He has had no vision changes.   Exercises daily on his bike. He has been keeping a healthy diet.   In addition, a complete 12 point  review of systems is negative.    Past Medical/Surgical History:  Past Medical History:   Diagnosis Date     DJD (degenerative joint disease), lumbar      Sinus bradycardia      Past Surgical History:   Procedure Laterality Date     APPENDECTOMY       BIOPSY  mass right side     BONE MARROW BIOPSY, BONE SPECIMEN, NEEDLE/TROCAR N/A 6/10/2021    Procedure: BIOPSY, BONE MARROW;  Surgeon: Josephine Santamaria MD;  Location:  GI     COLONOSCOPY  01/01/2010    benign with hyperplastic polyps     VT TOTAL HIP ARTHROPLASTY Left 04/02/2012    Dr Roderick Santos     VT TOTAL HIP ARTHROPLASTY Right 04/15/2004    Dr. Jose Martinez     SURGICAL HISTORY OF -       Removal of a benign soft tissue mass right abdominal wall     TONSILLECTOMY       Allergies:  Allergies as of 07/01/2021 - Reviewed 07/01/2021   Allergen Reaction Noted     Seasonal allergies  07/30/2015     Current Medications:  Current Outpatient Medications   Medication Sig Dispense Refill     fluticasone (FLONASE) 50 MCG/ACT spray Spray 1 spray into both nostrils daily       gabapentin (NEURONTIN) 100 MG capsule 1 a day at night for a week then 2 a day at night. 60 capsule 3     ginkgo biloba 60 MG CAPS capsule Take 30 mg by mouth daily       melatonin 5 MG tablet        Multiple Vitamins-Minerals (MULTIVITAMIN PO)        NEW MED NAC       saw palmetto 450 MG CAPS capsule Take 450 mg by mouth daily       selenium 50 MCG TABS tablet Take 50 mcg by mouth daily       senna-docusate (SENOKOT-S/PERICOLACE) 8.6-50 MG tablet Take 1 tablet by mouth daily       SF 5000 PLUS 1.1 % CREA USE TOOTH PASTE TWICE DAILY AS DIRECTED. NOTHING BY MOUTH FOR 30 MINUTES AFTER USE       Turmeric (QC TUMERIC COMPLEX) 500 MG CAPS        vitamin E 400 units TABS Take 400 Units by mouth daily        Family History  There is a FHX of  "malignancy- metastatic breast cancer at the age of 56. Sister had pancreatic cancer at 52, . Father  of stomach cancer at the age of 71. Another sister had breast cancer at the age of 81. He is of [place]ino descent on dad's side.   Social History:  Social History     Socioeconomic History     Marital status:    \" S 14 tobacco Use     Smoking status: Never Smoker     Smokeless tobacco: Never Used   Substance and Sexual Activity     Alcohol use: Not Currently     Frequency: Monthly or less     Drug use: No     Sexual activity: Yes     Partners: Female     Physical Exam:  Constitutional: alert and in no distress  Eyes: No redness or discharge  Respiratory: No cough or labored breathing.  Musculoskeletal: Full range of motion in extremities.  Skin: no visible skin lesions or discoloration  Neurological: No tremors and denies headache.  Psychiatric: Mentation appears normal and affect is normal as well.  Alert and oriented x3.  The rest the comprehensive physical examination is deferred due to public health emergency video visit restrictions.      Laboratory/Imaging Studies  Labs reviewed and documented in the EMR.    ASSESSMENT/PLAN:  Ag is a very pleasant 70-year-old gentleman with progressive peripheral neuropathy and finding of IgM kappa on serum protein immunofixation of the pheresis, with elevated serum IgM level and serum M spike of 1.1 g/dL, and on bone marrow biopsy confirmed diagnosis of lymphoplasmacytic lymphoma.     1.  New diagnosis of lymphoplasmacytic lymphoma-we have discussed diagnosis, natural history, incurable nature, also indolent nature and treatment options for lymphoplasmacytic lymphoma.  At this time, recommend that he proceed with CT chest, abdomen and pelvis to complete the staging of his lymphoplasmacytic lymphoma.  Given that he is symptomatic with significant symptoms of progressive peripheral neuropathy, recommend that he proceed with treatment.  I would recommend that he " proceed with bendamustine and Rituxan for 4 cycles.  He has at baseline increased serum viscosity, and we will be omitting Rituxan from the first cycle as this can worsen serum viscosity and cause a flare.  Rationale behind using in the bendamustine and Rituxan and potential side effects of this regimen, were reviewed with the patient in detail, including and not limited to mild toxicity, risk of a life-threatening infection, infusion reaction, nausea, GI toxicity, fatigue, among others.  Patient was emailed information about bendamustine and Rituxan.  He is agreeable to proceed.  We will proceed after the results of his CT chest abdomen and pelvis become available.  Chemotherapy orders were placed.  We will check serum IgM level, serum protein electrophoresis and free kappa lambda light chain analysis with cycle 2-day 1.  Also check CBC differential, CMP with cycle.    2.  Prevention of drug-induced neutropenia-we will use Neulasta with his chemotherapy regimen.  3.  Peripheral neuropathy-proceed with bendamustine and Rituxan as above.  He has an appointment to see neurologist in August.  4.  Family history of breast cancer and pancreatic cancer, as above -he will be seen genetic counseling in the next couple of weeks.  5. Colon cancer screening-she is scheduled to have a screening colonoscopy on July 16.   At the end of our visit patient verbalized understanding and concurred with the plan.    >40 minutes spent on the date of the encounter doing chart review, review of test results, interpretation of tests, patient visit and documentation.          Again, thank you for allowing me to participate in the care of your patient.        Sincerely,        Keerthi Hernandez MD, MD

## 2021-07-07 ENCOUNTER — HOSPITAL ENCOUNTER (OUTPATIENT)
Dept: CT IMAGING | Facility: CLINIC | Age: 71
Discharge: HOME | End: 2021-07-07
Attending: INTERNAL MEDICINE | Admitting: INTERNAL MEDICINE
Payer: COMMERCIAL

## 2021-07-07 DIAGNOSIS — C83.00 MALIGNANT LYMPHOMA, LYMPHOPLASMACYTIC (H): ICD-10-CM

## 2021-07-07 LAB
CREAT BLD-MCNC: 0.8 MG/DL (ref 0.66–1.25)
GFR SERPL CREATININE-BSD FRML MDRD: >90 ML/MIN/{1.73_M2}

## 2021-07-07 PROCEDURE — 82565 ASSAY OF CREATININE: CPT

## 2021-07-07 PROCEDURE — 250N000009 HC RX 250: Performed by: INTERNAL MEDICINE

## 2021-07-07 PROCEDURE — 71260 CT THORAX DX C+: CPT

## 2021-07-07 PROCEDURE — 250N000011 HC RX IP 250 OP 636: Performed by: INTERNAL MEDICINE

## 2021-07-07 RX ORDER — IOPAMIDOL 755 MG/ML
500 INJECTION, SOLUTION INTRAVASCULAR ONCE
Status: COMPLETED | OUTPATIENT
Start: 2021-07-07 | End: 2021-07-07

## 2021-07-07 RX ADMIN — SODIUM CHLORIDE 60 ML: 9 INJECTION, SOLUTION INTRAVENOUS at 08:17

## 2021-07-07 RX ADMIN — IOPAMIDOL 95 ML: 755 INJECTION, SOLUTION INTRAVENOUS at 08:17

## 2021-07-09 RX ORDER — BISACODYL 5 MG
5 TABLET, DELAYED RELEASE (ENTERIC COATED) ORAL SEE ADMIN INSTRUCTIONS
Qty: 1 TABLET | Refills: 0 | Status: SHIPPED | OUTPATIENT
Start: 2021-07-09 | End: 2021-07-09 | Stop reason: HOSPADM

## 2021-07-09 RX ORDER — SODIUM, POTASSIUM,MAG SULFATES 17.5-3.13G
2 SOLUTION, RECONSTITUTED, ORAL ORAL SEE ADMIN INSTRUCTIONS
Qty: 354 ML | Refills: 0 | Status: SHIPPED | OUTPATIENT
Start: 2021-07-09 | End: 2021-07-09 | Stop reason: HOSPADM

## 2021-07-15 ENCOUNTER — PRE VISIT (OUTPATIENT)
Dept: ONCOLOGY | Facility: CLINIC | Age: 71
End: 2021-07-15

## 2021-07-15 ENCOUNTER — VIRTUAL VISIT (OUTPATIENT)
Dept: ONCOLOGY | Facility: CLINIC | Age: 71
End: 2021-07-15
Attending: INTERNAL MEDICINE
Payer: COMMERCIAL

## 2021-07-15 DIAGNOSIS — Z80.3 FHX: BREAST CANCER IN FIRST DEGREE RELATIVE: ICD-10-CM

## 2021-07-15 DIAGNOSIS — C83.00 MALIGNANT LYMPHOMA, LYMPHOPLASMACYTIC (H): ICD-10-CM

## 2021-07-15 DIAGNOSIS — Z80.0 FHX: PANCREATIC CANCER: Primary | ICD-10-CM

## 2021-07-15 DIAGNOSIS — Z80.0 FHX: STOMACH CANCER: ICD-10-CM

## 2021-07-15 PROCEDURE — 96040 HC GENETIC COUNSELING, EACH 30 MINUTES: CPT | Mod: GT | Performed by: GENETIC COUNSELOR, MS

## 2021-07-15 NOTE — LETTER
Cancer Risk Management  Program Locations    [facility]  [facility]  [facility]  [facility]  Mailing Address  Cancer Risk Management Program  [address]  [place] [address]    New patient appointments  [phone]  July 15, 2021    Ag Evans  [address] [address] [place] [id number]      Dear Ag,    It was a pleasure speaking with you over video on 7/15/2021. Here is a copy of the progress note from our discussion. If you have any additional questions, please feel free to call.    Referring Provider: Keerthi Hernandez MD    Presenting Information:   I met with Ag for his video genetic counseling visit, through the Cancer Risk Management Program, to discuss his personal history of lymphoplasmacytic lymphoma and family history of breast, pancreatic, and stomach cancer. Today we reviewed this history, cancer screening recommendations, and available genetic testing options.    Personal History:  Ag is a 70 year old year old male. He was diagnosed with lymphoplasmacytic lymphoma at age 70; treatment includes chemotherapy. Ag began having colonoscopies at the age of 49. His most recent colonoscopy was reported to have been normal in 2015 and follow-up was recommended in 5 years. He does not regularly do any other cancer screening at this time.     Family History: (Please see scanned pedigree for detailed family history information)    Ag's sister was diagnosed with pancreatic cancer at age 49 and  at age 42.    Ag's sister was diagnosed with breast cancer in her late 70's or early 80's, and  in her late 80's.    Ag's mother was diagnosed with breast cancer at age 50 and  at age 56 from lung cancer. She is reported to not have a history of smoking.    Ag's father was diagnosed with stomach cancer in  his late 60's and  at age 71.    Ag reported that he had very limited information about extended relatives history of cancer.    His maternal ethnicity is Bah[place]ian. His paternal ethnicity is [place]ian and [place]ian. There is no known Ashkenazi Yarsani ancestry on either side of his family. There is no reported consanguinity.    Discussion:    Ag's family history of breast, pancreatic, and stomach cancer is suggestive of a hereditary cancer syndrome.    We reviewed the features of sporadic, familial, and hereditary cancers. We discussed that mutations in either BRCA1 or BRCA2 could be possible hereditary explanations for his family history of cancer. Mutations in the BRCA1 or BRCA2 gene are known to cause Hereditary Breast and Ovarian Cancer Syndrome (HBOC). HBOC typically presents with multiple family members diagnosed with breast cancer before age 50 and/or ovarian cancer. Other cancer risks associated with HBOC include male breast cancer, prostate cancer, pancreatic cancer, and melanoma.   We also discussed that Jiang syndrome could be a possible hereditary explanation for her family history. Jiang syndrome can be caused by a mutation in one of five genes: MLH1, MSH2, MSH6, PMS2, and EPCAM. Jiang syndrome may present with polyps, but typically a small number. The highest cancer risks associated with Jiang syndrome include colon cancer, endometrial/uterine cancer, gastric cancer, bladder cancer, and ovarian cancer. Other cancers have also been reported with Jiang syndrome, such as urinary tract, pancreas, bile duct, kidney, and other cancers. Breast cancer has also been reported in individuals with Jiang syndrome.    We discussed the natural history and genetics of hereditary cancer. A detailed handout regarding hereditary cancer, along with the other information we discussed, will be mailed to Ag at the end of our appointment today and can be found in the after visit summary. Topics included: inheritance  pattern, cancer risks, cancer screening recommendations, and also risks, benefits and limitations of testing.    Based on his personal and family history, Ag meets current National Comprehensive Cancer Network (NCCN) criteria for genetic testing of BRCA1/2 along with other high-penetrance breast and/or ovarian cancer susceptibility genes.    We discussed that there are additional genes that could cause increased risk for breast, pancreatic, and/or stomach cancer. As many of these genes present with overlapping features in a family and accurate cancer risk cannot always be established based upon the pedigree analysis alone, it would be reasonable for Ag to consider panel genetic testing to analyze multiple genes at once.    We discussed that lymphoma is rarely hereditary and that we currently do not do genetic testing for lymphoma unless there is a significant family history of the disease. We discussed that insurance coverage is poor for these genes due to there being no changes to medical management if someone were found to have a hereditary cause to their lymphoma at this time.    Genetic testing is available for 36 genes associated with hereditary cancer: CancerNext (APC, BRE, AXIN2, BARD1, BMPR1A, BRCA1, BRCA2, BRIP1, CDH1, CDK4, CDKN2A, CHEK2, DICER1, EPCAM, GREM1, HOXB13, MLH1, MSH2, MSH3, MSH6, MUTYH, NBN, NF1, NTHL1, PALB2, PMS2, POLD1, POLE, PTEN, RAD51C, RAD51D, RECQL, SMAD4, SMARCA4, STK11, and TP53).    We discussed that many of the genes in the CancerNext panel are associated with specific hereditary cancer syndromes and published management guidelines: Hereditary Breast and Ovarian Cancer syndrome (BRCA1, BRCA2), Jiang syndrome (MLH1, MSH2, MSH6, PMS2, EPCAM), Familial Adenomatous Polyposis (APC), Hereditary Diffuse Gastric Cancer (CDH1), Familial Atypical Multiple Mole Melanoma syndrome (CDK4, CDKN2A), Juvenile Polyposis syndrome (BMPR1A, SMAD4), Cowden syndrome (PTEN), Li Fraumeni syndrome  (TP53), Peutz-Jeghers syndrome (STK11), MUTYH Associated Polyposis (MUTYH), and Neurofibromatosis type 1 (NF1).       The BRE, AXIN2, BRIP1, CHEK2, GREM1, MSH3, NBN, NTHL1, PALB2, POLD1, POLE, RAD51C, and RAD51D genes are associated with increased cancer risk and have published management guidelines for certain cancers.      The remaining genes (BARD1, DICER1, HOXB13, RECQL, and SMARCA4) are associated with increased cancer risk and may allow us to make medical recommendations when mutations are identified.      We discussed that since he as active lymphoma, Ag needs to submit a skin biopsy sample to the lab for his genetic testing. We discussed that if we were to use his blood and if we were to find a mutation in one of these genes, we would need to confirm it with a skin biopsy anyway as we would not know if this mutation is hereditary or caused due to his lymphoma. I will place a referral for Ag to meet with our dermatology department to arrange for his skin biopsy to be done. Once this is completed, the sample will be sent to our cytogenetics lab where the tissue will need to be cultured. Once the tissue has been cultured, it will be sent to the lab for genetic testing.    Verbal consent was given over the phone and written on the consent form due to COVID-19 restrictions.     Medical Management: For Ag, we reviewed that the information from genetic testing may determine:    additional cancer screening for which Ag may qualify (i.e. more frequent colonoscopies, more frequent dermatologic exams, pancreatic cancer screening etc.),    and targeted chemotherapies for Ag if he were to develop certain cancers in the future (i.e. immunotherapy for individuals with Jiang syndrome, PARP inhibitors, etc.).     These recommendations and possible targeted chemotherapies will be discussed in detail once genetic testing is completed.     Plan:  1) Today Ag elected to proceed with [facility]'s CancerNext  panel.  2) A copy of the consent form and the after visit summary will be mailed to Ag.  3) I will call Ag with the results once they become available.    Time spent on video: 35 minutes    Tai Ayala MS, [facility]  Licensed, Certified Genetic Counselor  (P): [phone]  (F): [phone]

## 2021-07-15 NOTE — PROGRESS NOTES
7/15/2021    Referring Provider: Keerthi Hernandez MD    Presenting Information:   I met with Ag for his video genetic counseling visit, through the Cancer Risk Management Program, to discuss his personal history of lymphoplasmacytic lymphoma and family history of breast, pancreatic, and stomach cancer. Today we reviewed this history, cancer screening recommendations, and available genetic testing options.    Personal History:  Ag is a 70 year old year old male. He was diagnosed with lymphoplasmacytic lymphoma at age 70; treatment includes chemotherapy. Ag began having colonoscopies at the age of 49. His most recent colonoscopy was reported to have been normal in  and follow-up was recommended in 5 years. He does not regularly do any other cancer screening at this time.     Family History: (Please see scanned pedigree for detailed family history information)    Ag's sister was diagnosed with pancreatic cancer at age 49 and  at age 42.    Ag's sister was diagnosed with breast cancer in her late 70's or early 80's, and  in her late 80's.    Ag's mother was diagnosed with breast cancer at age 50 and  at age 56 from lung cancer. She is reported to not have a history of smoking.    Ag's father was diagnosed with stomach cancer in his late 60's and  at age 71.    Ag reported that he had very limited information about extended relatives history of cancer.    His maternal ethnicity is [place]. His paternal ethnicity is [place] and [place]. There is no known Ashkenazi Congregational ancestry on either side of his family. There is no reported consanguinity.    Discussion:    Ag's family history of breast, pancreatic, and stomach cancer is suggestive of a hereditary cancer syndrome.    We reviewed the features of sporadic, familial, and hereditary cancers. We discussed that mutations in either BRCA1 or BRCA2 could be possible hereditary explanations for his family history of cancer.  Mutations in the BRCA1 or BRCA2 gene are known to cause Hereditary Breast and Ovarian Cancer Syndrome (HBOC). HBOC typically presents with multiple family members diagnosed with breast cancer before age 50 and/or ovarian cancer. Other cancer risks associated with HBOC include male breast cancer, prostate cancer, pancreatic cancer, and melanoma.   We also discussed that Jinag syndrome could be a possible hereditary explanation for her family history. Jiang syndrome can be caused by a mutation in one of five genes: MLH1, MSH2, MSH6, PMS2, and EPCAM. Jiang syndrome may present with polyps, but typically a small number. The highest cancer risks associated with Jiang syndrome include colon cancer, endometrial/uterine cancer, gastric cancer, bladder cancer, and ovarian cancer. Other cancers have also been reported with Jiang syndrome, such as urinary tract, pancreas, bile duct, kidney, and other cancers. Breast cancer has also been reported in individuals with Jiang syndrome.    We discussed the natural history and genetics of hereditary cancer. A detailed handout regarding hereditary cancer, along with the other information we discussed, will be mailed to Ag at the end of our appointment today and can be found in the after visit summary. Topics included: inheritance pattern, cancer risks, cancer screening recommendations, and also risks, benefits and limitations of testing.    Based on his personal and family history, Ag meets current National Comprehensive Cancer Network (NCCN) criteria for genetic testing of BRCA1/2 along with other high-penetrance breast and/or ovarian cancer susceptibility genes.    We discussed that there are additional genes that could cause increased risk for breast, pancreatic, and/or stomach cancer. As many of these genes present with overlapping features in a family and accurate cancer risk cannot always be established based upon the pedigree analysis alone, it would be reasonable for  Ag to consider panel genetic testing to analyze multiple genes at once.    We discussed that lymphoma is rarely hereditary and that we currently do not do genetic testing for lymphoma unless there is a significant family history of the disease. We discussed that insurance coverage is poor for these genes due to there being no changes to medical management if someone were found to have a hereditary cause to their lymphoma at this time.    Genetic testing is available for 36 genes associated with hereditary cancer: CancerNext (APC, BRE, AXIN2, BARD1, BMPR1A, BRCA1, BRCA2, BRIP1, CDH1, CDK4, CDKN2A, CHEK2, DICER1, EPCAM, GREM1, HOXB13, MLH1, MSH2, MSH3, MSH6, MUTYH, NBN, NF1, NTHL1, PALB2, PMS2, POLD1, POLE, PTEN, RAD51C, RAD51D, RECQL, SMAD4, SMARCA4, STK11, and TP53).    We discussed that many of the genes in the CancerNext panel are associated with specific hereditary cancer syndromes and published management guidelines: Hereditary Breast and Ovarian Cancer syndrome (BRCA1, BRCA2), Jiang syndrome (MLH1, MSH2, MSH6, PMS2, EPCAM), Familial Adenomatous Polyposis (APC), Hereditary Diffuse Gastric Cancer (CDH1), Familial Atypical Multiple Mole Melanoma syndrome (CDK4, CDKN2A), Juvenile Polyposis syndrome (BMPR1A, SMAD4), Cowden syndrome (PTEN), Li Fraumeni syndrome (TP53), Peutz-Jeghers syndrome (STK11), MUTYH Associated Polyposis (MUTYH), and Neurofibromatosis type 1 (NF1).     The BRE, AXIN2, BRIP1, CHEK2, GREM1, MSH3, NBN, NTHL1, PALB2, POLD1, POLE, RAD51C, and RAD51D genes are associated with increased cancer risk and have published management guidelines for certain cancers.      The remaining genes (BARD1, DICER1, HOXB13, RECQL, and SMARCA4) are associated with increased cancer risk and may allow us to make medical recommendations when mutations are identified.      We discussed that since he as active lymphoma, Ag needs to submit a skin biopsy sample to the lab for his genetic testing. We discussed that if we  were to use his blood and if we were to find a mutation in one of these genes, we would need to confirm it with a skin biopsy anyway as we would not know if this mutation is hereditary or caused due to his lymphoma. I will place a referral for Ag to meet with our dermatology department to arrange for his skin biopsy to be done. Once this is completed, the sample will be sent to our cytogenetics lab where the tissue will need to be cultured. Once the tissue has been cultured, it will be sent to the lab for genetic testing.    Verbal consent was given over the phone and written on the consent form due to COVID-19 restrictions.     Medical Management: For Ag, we reviewed that the information from genetic testing may determine:    additional cancer screening for which Ag may qualify (i.e. more frequent colonoscopies, more frequent dermatologic exams, pancreatic cancer screening etc.),    and targeted chemotherapies for Ag if he were to develop certain cancers in the future (i.e. immunotherapy for individuals with Jiang syndrome, PARP inhibitors, etc.).     These recommendations and possible targeted chemotherapies will be discussed in detail once genetic testing is completed.     Plan:  1) Today Ag elected to proceed with [facility]'s CancerNext panel.  2) A copy of the consent form and the after visit summary will be mailed to Ag.  3) I will call Ag with the results once they become available.    Time spent on video: 35 minutes    Tai Ayala MS, [facility]  Licensed, Certified Genetic Counselor  (P): [phone]  (F): [phone]

## 2021-07-15 NOTE — LETTER
Cancer Risk Management  Program Locations    [facility]  [facility]  Mailing Address  Cancer Risk Management Program  [facility]  [address]  [place] [address]    New patient appointments  [phone]  July 28, 2021    CYTOGENETICS LABORATORY  [facility]  FAX: [phone]    Cytogenetics Laboratory:    This letter is regarding patient Ag Evans (1950; MRN: [id number]) and a skin biopsy sample that you received today. Sarthak Thompson MD is requesting that cultured fibroblast be sent to [facility] for genetic testing for CancerNext.      Please culture and send 2 T-25 culture flasks to overnight to:    Attn: Accessioning  [employer]  [address] [place] [id number]  Phone: [phone]  Fax: [phone]    The accompanying [employer] test requisition should accompany the sample when sent. Please hold a frozen culture for the standard 90 days after send-out.    Should you have any questions or concerns regarding this request, feel free to contact me directly by calling [phone]. Thank you for your assistance.    Sincerely,    Tai Ayala MS, [facility] [place]  Licensed, Certified Genetic Counselor

## 2021-07-19 ENCOUNTER — LAB (OUTPATIENT)
Dept: LAB | Facility: CLINIC | Age: 71
End: 2021-07-19
Payer: COMMERCIAL

## 2021-07-19 ENCOUNTER — ONCOLOGY VISIT (OUTPATIENT)
Dept: ONCOLOGY | Facility: CLINIC | Age: 71
End: 2021-07-19
Payer: COMMERCIAL

## 2021-07-19 ENCOUNTER — INFUSION THERAPY VISIT (OUTPATIENT)
Dept: INFUSION THERAPY | Facility: CLINIC | Age: 71
End: 2021-07-19
Payer: COMMERCIAL

## 2021-07-19 VITALS
HEIGHT: 71 IN | BODY MASS INDEX: 27.45 KG/M2 | SYSTOLIC BLOOD PRESSURE: 133 MMHG | OXYGEN SATURATION: 100 % | HEART RATE: 50 BPM | DIASTOLIC BLOOD PRESSURE: 79 MMHG | WEIGHT: 196.1 LBS | TEMPERATURE: 97.8 F

## 2021-07-19 DIAGNOSIS — D70.1 CHEMOTHERAPY-INDUCED NEUTROPENIA (H): ICD-10-CM

## 2021-07-19 DIAGNOSIS — C83.00 MALIGNANT LYMPHOMA, LYMPHOPLASMACYTIC (H): Primary | ICD-10-CM

## 2021-07-19 DIAGNOSIS — Z11.59 ENCOUNTER FOR SCREENING FOR OTHER VIRAL DISEASES: ICD-10-CM

## 2021-07-19 DIAGNOSIS — T45.1X5A CHEMOTHERAPY-INDUCED NEUTROPENIA (H): ICD-10-CM

## 2021-07-19 DIAGNOSIS — I51.5 CARDIAC CALCIFICATION (H): ICD-10-CM

## 2021-07-19 DIAGNOSIS — G62.9 PERIPHERAL POLYNEUROPATHY: ICD-10-CM

## 2021-07-19 LAB
ALBUMIN SERPL-MCNC: 3.8 G/DL (ref 3.4–5)
ALP SERPL-CCNC: 81 U/L (ref 40–150)
ALT SERPL W P-5'-P-CCNC: 20 U/L (ref 0–70)
ANION GAP SERPL CALCULATED.3IONS-SCNC: 2 MMOL/L (ref 3–14)
AST SERPL W P-5'-P-CCNC: 15 U/L (ref 0–45)
BASOPHILS # BLD AUTO: 0 10E3/UL (ref 0–0.2)
BASOPHILS NFR BLD AUTO: 1 %
BILIRUB SERPL-MCNC: 0.6 MG/DL (ref 0.2–1.3)
BUN SERPL-MCNC: 17 MG/DL (ref 7–30)
CALCIUM SERPL-MCNC: 9 MG/DL (ref 8.5–10.1)
CHLORIDE BLD-SCNC: 109 MMOL/L (ref 94–109)
CO2 SERPL-SCNC: 29 MMOL/L (ref 20–32)
CREAT SERPL-MCNC: 0.71 MG/DL (ref 0.66–1.25)
EOSINOPHIL # BLD AUTO: 0.1 10E3/UL (ref 0–0.7)
EOSINOPHIL NFR BLD AUTO: 1 %
ERYTHROCYTE [DISTWIDTH] IN BLOOD BY AUTOMATED COUNT: 12.3 % (ref 10–15)
GFR SERPL CREATININE-BSD FRML MDRD: >90 ML/MIN/1.73M2
GLUCOSE BLD-MCNC: 94 MG/DL (ref 70–99)
HCT VFR BLD AUTO: 39.2 % (ref 40–53)
HGB BLD-MCNC: 13.3 G/DL (ref 13.3–17.7)
IMM GRANULOCYTES # BLD: 0 10E3/UL
IMM GRANULOCYTES NFR BLD: 1 %
LYMPHOCYTES # BLD AUTO: 1.5 10E3/UL (ref 0.8–5.3)
LYMPHOCYTES NFR BLD AUTO: 19 %
MCH RBC QN AUTO: 32.8 PG (ref 26.5–33)
MCHC RBC AUTO-ENTMCNC: 33.9 G/DL (ref 31.5–36.5)
MCV RBC AUTO: 97 FL (ref 78–100)
MONOCYTES # BLD AUTO: 1.2 10E3/UL (ref 0–1.3)
MONOCYTES NFR BLD AUTO: 15 %
NEUTROPHILS # BLD AUTO: 5.2 10E3/UL (ref 1.6–8.3)
NEUTROPHILS NFR BLD AUTO: 63 %
NRBC # BLD AUTO: 0 10E3/UL
NRBC BLD AUTO-RTO: 0 /100
PLATELET # BLD AUTO: 296 10E3/UL (ref 150–450)
POTASSIUM BLD-SCNC: 4.4 MMOL/L (ref 3.4–5.3)
PROT SERPL-MCNC: 8.3 G/DL (ref 6.8–8.8)
RBC # BLD AUTO: 4.06 10E6/UL (ref 4.4–5.9)
SODIUM SERPL-SCNC: 140 MMOL/L (ref 133–144)
WBC # BLD AUTO: 8 10E3/UL (ref 4–11)

## 2021-07-19 PROCEDURE — 86706 HEP B SURFACE ANTIBODY: CPT | Performed by: INTERNAL MEDICINE

## 2021-07-19 PROCEDURE — 96367 TX/PROPH/DG ADDL SEQ IV INF: CPT | Performed by: NURSE PRACTITIONER

## 2021-07-19 PROCEDURE — 99214 OFFICE O/P EST MOD 30 MIN: CPT | Mod: 25 | Performed by: NURSE PRACTITIONER

## 2021-07-19 PROCEDURE — 86803 HEPATITIS C AB TEST: CPT | Performed by: INTERNAL MEDICINE

## 2021-07-19 PROCEDURE — 87340 HEPATITIS B SURFACE AG IA: CPT | Performed by: INTERNAL MEDICINE

## 2021-07-19 PROCEDURE — 86704 HEP B CORE ANTIBODY TOTAL: CPT | Performed by: INTERNAL MEDICINE

## 2021-07-19 PROCEDURE — 96413 CHEMO IV INFUSION 1 HR: CPT | Performed by: NURSE PRACTITIONER

## 2021-07-19 PROCEDURE — 36415 COLL VENOUS BLD VENIPUNCTURE: CPT | Performed by: INTERNAL MEDICINE

## 2021-07-19 PROCEDURE — U0005 INFEC AGEN DETEC AMPLI PROBE: HCPCS

## 2021-07-19 PROCEDURE — U0003 INFECTIOUS AGENT DETECTION BY NUCLEIC ACID (DNA OR RNA); SEVERE ACUTE RESPIRATORY SYNDROME CORONAVIRUS 2 (SARS-COV-2) (CORONAVIRUS DISEASE [COVID-19]), AMPLIFIED PROBE TECHNIQUE, MAKING USE OF HIGH THROUGHPUT TECHNOLOGIES AS DESCRIBED BY CMS-2020-01-R: HCPCS

## 2021-07-19 PROCEDURE — 85025 COMPLETE CBC W/AUTO DIFF WBC: CPT | Performed by: INTERNAL MEDICINE

## 2021-07-19 PROCEDURE — 99207 PR NO CHARGE LOS: CPT

## 2021-07-19 PROCEDURE — 80053 COMPREHEN METABOLIC PANEL: CPT | Performed by: INTERNAL MEDICINE

## 2021-07-19 RX ORDER — LORAZEPAM 0.5 MG/1
0.5 TABLET ORAL EVERY 4 HOURS PRN
Qty: 30 TABLET | Refills: 3 | Status: SHIPPED | OUTPATIENT
Start: 2021-07-19 | End: 2021-11-30

## 2021-07-19 RX ORDER — PROCHLORPERAZINE MALEATE 10 MG
5 TABLET ORAL EVERY 8 HOURS PRN
Qty: 30 TABLET | Refills: 3 | Status: SHIPPED | OUTPATIENT
Start: 2021-07-19 | End: 2021-11-30

## 2021-07-19 RX ORDER — ONDANSETRON 8 MG/1
8 TABLET, FILM COATED ORAL EVERY 8 HOURS PRN
Qty: 10 TABLET | Refills: 3 | Status: SHIPPED | OUTPATIENT
Start: 2021-07-19 | End: 2021-11-30

## 2021-07-19 RX ADMIN — Medication 250 ML: at 10:03

## 2021-07-19 ASSESSMENT — MIFFLIN-ST. JEOR: SCORE: 1666.63

## 2021-07-19 ASSESSMENT — PAIN SCALES - GENERAL: PAINLEVEL: NO PAIN (0)

## 2021-07-19 NOTE — PROGRESS NOTES
Infusion Nursing Note:  Ag Evans presents today for C1 D1  Benadmustine  Patient seen by provider today: Maggy Trent NP    present during visit today: Not Applicable.    Note: Patient oriented to infusion center and routines. Patient met with pharmacist & RN care coordinator to review take home meds and chemo regimen. Patient verbalized understanding & all questions were answered.     Intravenous Access:  Peripheral IV placed.    Treatment Conditions:  Lab Results   Component Value Date    HGB 13.3 07/19/2021    HGB 12.7 06/10/2021     Lab Results   Component Value Date    WBC 8.0 07/19/2021    WBC 7.5 06/10/2021      Lab Results   Component Value Date    ANEU 5.2 06/10/2021     Lab Results   Component Value Date     07/19/2021     06/10/2021      Lab Results   Component Value Date     06/03/2021                   Lab Results   Component Value Date    POTASSIUM 4.5 06/03/2021           No results found for: MAG         Lab Results   Component Value Date    CR 0.90 06/03/2021                   Lab Results   Component Value Date    TANNA 9.4 06/03/2021                Lab Results   Component Value Date    BILITOTAL 0.5 06/03/2021           Lab Results   Component Value Date    ALBUMIN 4.1 06/03/2021                    Lab Results   Component Value Date    ALT 21 06/03/2021           Lab Results   Component Value Date    AST 18 06/03/2021       Results reviewed, labs MET treatment parameters, ok to proceed with treatment.      Post Infusion Assessment:  Patient tolerated infusion without incident.  Site patent and intact, free from redness, edema or discomfort.  No evidence of extravasations.  Access discontinued per protocol.       Discharge Plan:   Discharge instructions reviewed with: Patient.  Patient and/or family verbalized understanding of discharge instructions and all questions answered.  Patient discharged in stable condition accompanied by: self.  Departure Mode:  Ambulatory.      Macy Quiroz RN

## 2021-07-19 NOTE — NURSING NOTE
"Oncology Rooming Note    July 19, 2021 9:03 AM   Ag Evans is a 70 year old male who presents for:    Chief Complaint   Patient presents with     Oncology Clinic Visit     prior to tx     Initial Vitals: /79 (BP Location: Right arm, Patient Position: Chair, Cuff Size: Adult Regular)   Pulse 50   Temp 97.8  F (36.6  C)   Ht 1.803 m (5' 11\")   Wt 89 kg (196 lb 1.6 oz)   SpO2 100%   BMI 27.35 kg/m   Estimated body mass index is 27.35 kg/m  as calculated from the following:    Height as of this encounter: 1.803 m (5' 11\").    Weight as of this encounter: 89 kg (196 lb 1.6 oz). Body surface area is 2.11 meters squared.  No Pain (0) Comment: Data Unavailable   No LMP for male patient.  Allergies reviewed: Yes  Medications reviewed: Yes    Medications: Medication refills not needed today.  Pharmacy name entered into [facility]: [facility] [facility] [address] [facility]    Clinical concerns: NO Maggy was notified.     Neuropathy hands and feet    Discuss chemo    Shae Fritz CMA              "

## 2021-07-19 NOTE — LETTER
7/19/2021         RE: Ag Evans  [id number] [address] [place] [id number]        Dear Colleague,    Thank you for referring your patient, Ag Evans, to the [facility]. Please see a copy of my visit note below.    Oncology Follow Up Visit: July 19, 2021    Oncologist: Dr Keerthi Hernandez  PCP: Jose Antonio    Diagnosis: Malignant lymphoma, lymphoplasmacytic  Ag Evans is a 69 yo male who presented with 2 year complaint of worsening peripheral neuropathy to feet and hands. In April 2021 was noted to have a monoclonal protein-IgM kappa on serum immunofixation electrophoresis.  M spike measured 1.1 g/dL in April 2021.  Serum IgM level was elevated at 1075 mg/dL.    Serum free kappa lambda light chain ratio was normal on Yudi 3, 2021.  Serum protein electrophoresis confirmed the presence of monoclonal IgM kappa protein.  M spike remained at 1.1 g/dL on Yudi 3, 2021.  Beta-2 microglobulin was normal.  CMP was unremarkable.  Serum viscosity was slightly elevated at 2.1.  CBC with differential counts was unremarkable.  Bone marrow biopsy on Yudi 10, 2021 with current anemia - hemoglobin of 12.7 g/dL and MCV was normal.  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  Flow cytometry showed findings suggestion of kappa skewing on B cells and rare to absent plasma cells. Otherwise bone marrow was hypercellular with normal trilineage hematopoiesis.  Cytogenetics was normal 46 XY.  Treatment:   7/19/2021 to begin plan with Bendamustine and will add Rituxan cycle 2.     Interval History: Mr. Evans is seen today for start of chemotherapy for treatment of his Lymphoblastic Lymphoma. Pt states he is feeling well with continuation of neuropathy to fingertips and to feet up to the ankle though walking or finger manipulation is not seen as problematic for  Received fax from [facility] stating nystatin only ordered for 3 days.  Order placed for 4 more days.   "walking or functioning. He is active with many sports and feels he is eating healthy with occasional constipation with need for stool softener.He denies fatigue,night sweats or significant weight loss.     Did see genetic counselor 7/15/2021 and will be having some testing completed in near future as insurance approves.   He will be on vacation -10 with next visit to be 2021 with Dr Hernandez.   Rest of comprehensive and complete ROS is reviewed and is negative.   Past Medical History:   Diagnosis Date     DJD (degenerative joint disease), lumbar      Malignant lymphoma, lymphoplasmacytic (H) 2021     Sinus bradycardia      Current Outpatient Medications   Medication     fluticasone (FLONASE) 50 MCG/ACT spray     gabapentin (NEURONTIN) 100 MG capsule     ginkgo biloba 60 MG CAPS capsule     melatonin 5 MG tablet     Multiple Vitamins-Minerals (MULTIVITAMIN PO)     NEW MED     saw palmetto 450 MG CAPS capsule     selenium 50 MCG TABS tablet     senna-docusate (SENOKOT-S/PERICOLACE) 8.6-50 MG tablet     SF 5000 PLUS 1.1 % CREA     Turmeric (QC TUMERIC COMPLEX) 500 MG CAPS     vitamin E 400 units TABS     No current facility-administered medications for this visit.     Allergies   Allergen Reactions     Seasonal Allergies    FHX of malignancy- metastatic breast cancer at the age of 55. Sister had pancreatic cancer at 52, . Father  of stomach cancer at the age of 71. Another sister had breast cancer at the age of 81. He is of [place] descent on dad's side.  He is seen with his wife for this visit.    Physical Exam:/79 (BP Location: Right arm, Patient Position: Chair, Cuff Size: Adult Regular)   Pulse 50   Temp 97.8  F (36.6  C)   Ht 1.803 m (5' 11\")   Wt 89 kg (196 lb 1.6 oz)   SpO2 100%   BMI 27.35 kg/m     ECOG PS- 0  Constitutional: Alert, healthy, and in no distress.   ENT: Eyes bright , No mouth sores  Neck: Supple, No adenopathy.  Cardiac: Heart rate and rhythm is regular and strong " without murmur  Respiratory: Breathing easy. Lung sounds clear to auscultation  No port  GI: Abdomen is soft, non-tender, BS normal. No masses or organomegaly  MS: Muscle tone normal, extremities normal with no edema.   Skin: No suspicious lesions or rashes  Neuro: Sensory grossly WNL. Neuropathy to fingertips and feet to ankle but gait is normal.   Lymph: Normal ant/post cervical, axillary, supraclavicular nodes  Psych: Mentation appears normal and affect normal/bright and smiling    Laboratory Results:   Results for orders placed or performed in visit on 07/19/21   Asymptomatic COVID-19 Virus (Coronavirus) by PCR Nasopharyngeal     Status: None (In process)    Specimen: Nasopharyngeal; Swab    Narrative    The following orders were created for panel order Asymptomatic COVID-19 Virus (Coronavirus) by PCR Nasopharyngeal.  Procedure                               Abnormality         Status                     ---------                               -----------         ------                     SARS-COV2 (COVID-19) Vir...[[id number]]                      In process                   Please view results for these tests on the individual orders.   Results for orders placed or performed in visit on 07/19/21   CBC with platelets and differential     Status: Abnormal   Result Value Ref Range    WBC Count 8.0 4.0 - 11.0 10e3/uL    RBC Count 4.06 (L) 4.40 - 5.90 10e6/uL    Hemoglobin 13.3 13.3 - 17.7 g/dL    Hematocrit 39.2 (L) 40.0 - 53.0 %    MCV 97 78 - 100 fL    MCH 32.8 26.5 - 33.0 pg    MCHC 33.9 31.5 - 36.5 g/dL    RDW 12.3 10.0 - 15.0 %    Platelet Count 296 150 - 450 10e3/uL    % Neutrophils 63 %    % Lymphocytes 19 %    % Monocytes 15 %    % Eosinophils 1 %    % Basophils 1 %    % Immature Granulocytes 1 %    NRBCs per 100 WBC 0 <1 /100    Absolute Neutrophils 5.2 1.6 - 8.3 10e3/uL    Absolute Lymphocytes 1.5 0.8 - 5.3 10e3/uL    Absolute Monocytes 1.2 0.0 - 1.3 10e3/uL    Absolute Eosinophils 0.1 0.0 - 0.7 10e3/uL     Absolute Basophils 0.0 0.0 - 0.2 10e3/uL    Absolute Immature Granulocytes 0.0 <=0.0 10e3/uL    Absolute NRBCs 0.0 10e3/uL   CBC with platelets differential     Status: Abnormal    Narrative    The following orders were created for panel order CBC with platelets differential.  Procedure                               Abnormality         Status                     ---------                               -----------         ------                     CBC with platelets and d...[[id number]]  Abnormal            Final result                 Please view results for these tests on the individual orders.       CT CHEST/ABDOMEN/PELVIS WITH CONTRAST 7/7/2021 8:23 AM     CLINICAL HISTORY: Malignant lymphoma, lymphoplasmacytic (H). History  of appendectomy, removal of benign soft tissue mass from the abdominal  wall and bilateral hip replacements.     TECHNIQUE: CT scan of the chest, abdomen, and pelvis was performed  following injection of IV contrast. Multiplanar reformats were  obtained. Dose reduction techniques were used.   CONTRAST: 95 mL Isovue 370      COMPARISON: Pelvis and right hip x-rays dated 10/8/2020.     FINDINGS:   LUNGS AND PLEURA: There is mild atelectasis in the dependent aspects  of the lungs. Calcified granuloma in the medial right costophrenic  angle (image 301 series 5) measures up to almost 1 cm in diameter. The  lungs are otherwise grossly clear without nodule, mass, infiltrate,  effusion, or pneumothorax.     MEDIASTINUM/AXILLAE: Mildly prominent right hilar lymph node measures  up to 1.1 cm in short axis. No other evidence for mediastinal, hilar,  or axillary lymphadenopathy is identified. The heart is normal in  size. No filling defects in the central pulmonary arteries to suggest  pulmonary artery embolism. This study was not optimized for pulmonary  artery evaluation. Thoracic aorta is of normal caliber and appearance  and demonstrates no evidence for dissection. Visualized portions of  the  thyroid are unremarkable. There are coronary artery  calcifications.     HEPATOBILIARY: Normal.     PANCREAS: Normal.     SPLEEN: Normal.     ADRENAL GLANDS: Normal.     KIDNEYS/BLADDER: Tiny hypoattenuating lesion lateral cortex inferior  left kidney (image 211 series 3) is too small to characterize at  approximately 0.8 cm. This likely represents a cyst. The kidneys  otherwise enhance normally. Ureters are mildly prominent but otherwise  unremarkable. No cholelithiasis or ureteral calculus is identified.  Urinary bladder is unremarkable but is partially obscured by streak  artifact from bilateral hip arthroplasties.     BOWEL: The bowel in the pelvis is partially obscured by streak  artifact from bilateral hip arthroplasties. There is a moderate amount  of stool in the colon. No pericolonic inflammatory change to suggest  acute diverticulitis. Appendix is not seen, consistent with surgical  history. Small bowel is of normal caliber and appearance. Stomach  contains a moderate to large amount of fluid and air but is otherwise  unremarkable.     PELVIC ORGANS: Obscured by streak artifact from hip arthroplasties and  not completely evaluated.     ADDITIONAL FINDINGS: No adenopathy, free fluid, or free air is seen in  the peritoneal cavity. There is mild nonaneurysmal atherosclerosis.  Small fat-containing left inguinal hernia is noted.     MUSCULOSKELETAL: Bilateral hip arthroplasties cause streak artifact,  limiting evaluation of the pelvis. There is lucency in the right  supra-acetabular ilium adjacent to the arthroplasty which is  concerning for granulomatous reaction of the right ilium secondary to  small particle disease. Focal lucency with single sclerotic focus in  the medial ilium adjacent to the sacroiliac joint appears benign and  could represent a hemangioma. No definite aggressive osseous lesions  or acute osseous fractures are seen. There are mild degenerative  changes of the spine..                                                                       IMPRESSION:  1.  Mildly prominent right hilar lymph node measures up to 1.1 cm and  could be reactive. No other evidence for lymphadenopathy is seen in  the chest, abdomen or pelvis.  2.  Expansile lucency in the supra-acetabular right ilium has a  pattern which is concerning for granulomatous reaction from small  particle disease, especially given the asymmetric wear of the lining  of the right acetabular prosthesis. This lucent area surrounds the  fixation screw for the right acetabular prosthetic component and could  be associated with loosening of the prosthesis. Bilateral hip  arthroplasties do cause streak artifact limiting evaluation of the  pelvis.  3.  Lucency in the right ilium adjacent to the SI joint has a benign  appearance and could represent a benign hemangioma.  4.  Evidence of chronic granulomatous of the right lung.  5.  Coronary artery calcifications.  6.  Small fat-containing left inguinal hernia.  7.  No definite findings of lymphoma are otherwise seen.     TAMAR ORONA MD     Assessment and Plan:   Malignant lymphoma, lymphoplasmacytic- Review of treatment and potential side effects were discussed with pt today on treatment of Bendamustine and Rituxan- start with Bendamustine and will add the Rituxan with cycle 2 due to serum viscosity.  Plan is for bendamustine and Rituxan for 4 cycles.    Reviewed CT results taken 7/7/2021- noted no new active lymphoma but has other intended reviews necessary - no sign of active lymphoma except for the borderline enlarged nod ly right lung which is non specific. Also noted was reaction by right hip ( recommended return to orthopedic provider who placed hip) and calcium deposits in cardiac vessels- (will refer to cardiology)  We reviewed administration and potential side effects of the drugs and reasons for premedications.   He gives permission to start treatment today. Will use Neulasta for prevention of drug  induced neutropenia with plan.   We will check serum IgM level, serum protein electrophoresis and free kappa lambda light chain analysis with cycle 2-day 1.  Peripheral neuropathy-noted to feet to ankle and fingertips. We will continue with plan but do agree with appointment to see neurologist in August.  Family history of breast cancer and pancreatic cancer- saw Genetic counselor 7/15 and would like to proceed with genetic testing if covered by insurance.   Colon cancer screening-just scheduled for July 16 procedure however patient canceled procedure and will wait until chemotherapy treatment is completed.  At the end of our visit patient verbalized understanding and concurred with the plan  The total time of this encounter amounted to 3535 minutes. This time included face to face time spent with the patient, prep work, ordering tests, and performing post visit documentation.  Maggy Trent Cnp        Again, thank you for allowing me to participate in the care of your patient.        Sincerely,        Maggy Trent, SELWYN, APRN CNP

## 2021-07-19 NOTE — PROGRESS NOTES
Oncology Follow Up Visit: July 19, 2021    Oncologist: Dr Keerthi Hernandez  PCP: Jose Antonio    Diagnosis: Malignant lymphoma, lymphoplasmacytic  Ag Evans is a 71 yo male who presented with 2 year complaint of worsening peripheral neuropathy to feet and hands. In April 2021 was noted to have a monoclonal protein-IgM kappa on serum immunofixation electrophoresis.  M spike measured 1.1 g/dL in April 2021.  Serum IgM level was elevated at 1075 mg/dL.    Serum free kappa lambda light chain ratio was normal on Yudi 3, 2021.  Serum protein electrophoresis confirmed the presence of monoclonal IgM kappa protein.  M spike remained at 1.1 g/dL on Yudi 3, 2021.  Beta-2 microglobulin was normal.  CMP was unremarkable.  Serum viscosity was slightly elevated at 2.1.  CBC with differential counts was unremarkable.  Bone marrow biopsy on Yudi 10, 2021 with current anemia - hemoglobin of 12.7 g/dL and MCV was normal.  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  Flow cytometry showed findings suggestion of kappa skewing on B cells and rare to absent plasma cells. Otherwise bone marrow was hypercellular with normal trilineage hematopoiesis.  Cytogenetics was normal 46 XY.  Treatment:   7/19/2021 to begin plan with Bendamustine and will add Rituxan cycle 2.     Interval History: Mr. Evans is seen today for start of chemotherapy for treatment of his Lymphoblastic Lymphoma. Pt states he is feeling well with continuation of neuropathy to fingertips and to feet up to the ankle though walking or finger manipulation is not seen as problematic for walking or functioning. He is active with many sports and feels he is eating healthy with occasional constipation with need for stool softener.He denies fatigue,night sweats or significant weight loss.     Did see genetic counselor 7/15/2021 and will be  "having some testing completed in near future as insurance approves.   He will be on vacation -10 with next visit to be 2021 with Dr Hernandez.   Rest of comprehensive and complete ROS is reviewed and is negative.   Past Medical History:   Diagnosis Date     DJD (degenerative joint disease), lumbar      Malignant lymphoma, lymphoplasmacytic (H) 2021     Sinus bradycardia      Current Outpatient Medications   Medication     fluticasone (FLONASE) 50 MCG/ACT spray     gabapentin (NEURONTIN) 100 MG capsule     ginkgo biloba 60 MG CAPS capsule     melatonin 5 MG tablet     Multiple Vitamins-Minerals (MULTIVITAMIN PO)     NEW MED     saw palmetto 450 MG CAPS capsule     selenium 50 MCG TABS tablet     senna-docusate (SENOKOT-S/PERICOLACE) 8.6-50 MG tablet     SF 5000 PLUS 1.1 % CREA     Turmeric (QC TUMERIC COMPLEX) 500 MG CAPS     vitamin E 400 units TABS     No current facility-administered medications for this visit.     Allergies   Allergen Reactions     Seasonal Allergies    FHX of malignancy- metastatic breast cancer at the age of 55. Sister had pancreatic cancer at 52, . Father  of stomach cancer at the age of 71. Another sister had breast cancer at the age of 81. He is of [place] descent on dad's side.  He is seen with his wife for this visit.    Physical Exam:/79 (BP Location: Right arm, Patient Position: Chair, Cuff Size: Adult Regular)   Pulse 50   Temp 97.8  F (36.6  C)   Ht 1.803 m (5' 11\")   Wt 89 kg (196 lb 1.6 oz)   SpO2 100%   BMI 27.35 kg/m     ECOG PS- 0  Constitutional: Alert, healthy, and in no distress.   ENT: Eyes bright , No mouth sores  Neck: Supple, No adenopathy.  Cardiac: Heart rate and rhythm is regular and strong without murmur  Respiratory: Breathing easy. Lung sounds clear to auscultation  No port  GI: Abdomen is soft, non-tender, BS normal. No masses or organomegaly  MS: Muscle tone normal, extremities normal with no edema.   Skin: No suspicious lesions or " rashes  Neuro: Sensory grossly WNL. Neuropathy to fingertips and feet to ankle but gait is normal.   Lymph: Normal ant/post cervical, axillary, supraclavicular nodes  Psych: Mentation appears normal and affect normal/bright and smiling    Laboratory Results:   Results for orders placed or performed in visit on 07/19/21   Asymptomatic COVID-19 Virus (Coronavirus) by PCR Nasopharyngeal     Status: None (In process)    Specimen: Nasopharyngeal; Swab    Narrative    The following orders were created for panel order Asymptomatic COVID-19 Virus (Coronavirus) by PCR Nasopharyngeal.  Procedure                               Abnormality         Status                     ---------                               -----------         ------                     SARS-COV2 (COVID-19) Vir...[[id number]]                      In process                   Please view results for these tests on the individual orders.   Results for orders placed or performed in visit on 07/19/21   CBC with platelets and differential     Status: Abnormal   Result Value Ref Range    WBC Count 8.0 4.0 - 11.0 10e3/uL    RBC Count 4.06 (L) 4.40 - 5.90 10e6/uL    Hemoglobin 13.3 13.3 - 17.7 g/dL    Hematocrit 39.2 (L) 40.0 - 53.0 %    MCV 97 78 - 100 fL    MCH 32.8 26.5 - 33.0 pg    MCHC 33.9 31.5 - 36.5 g/dL    RDW 12.3 10.0 - 15.0 %    Platelet Count 296 150 - 450 10e3/uL    % Neutrophils 63 %    % Lymphocytes 19 %    % Monocytes 15 %    % Eosinophils 1 %    % Basophils 1 %    % Immature Granulocytes 1 %    NRBCs per 100 WBC 0 <1 /100    Absolute Neutrophils 5.2 1.6 - 8.3 10e3/uL    Absolute Lymphocytes 1.5 0.8 - 5.3 10e3/uL    Absolute Monocytes 1.2 0.0 - 1.3 10e3/uL    Absolute Eosinophils 0.1 0.0 - 0.7 10e3/uL    Absolute Basophils 0.0 0.0 - 0.2 10e3/uL    Absolute Immature Granulocytes 0.0 <=0.0 10e3/uL    Absolute NRBCs 0.0 10e3/uL   CBC with platelets differential     Status: Abnormal    Narrative    The following orders were created for panel order CBC  with platelets differential.  Procedure                               Abnormality         Status                     ---------                               -----------         ------                     CBC with platelets and d...[[id number]]  Abnormal            Final result                 Please view results for these tests on the individual orders.       CT CHEST/ABDOMEN/PELVIS WITH CONTRAST 7/7/2021 8:23 AM     CLINICAL HISTORY: Malignant lymphoma, lymphoplasmacytic (H). History  of appendectomy, removal of benign soft tissue mass from the abdominal  wall and bilateral hip replacements.     TECHNIQUE: CT scan of the chest, abdomen, and pelvis was performed  following injection of IV contrast. Multiplanar reformats were  obtained. Dose reduction techniques were used.   CONTRAST: 95 mL Isovue 370      COMPARISON: Pelvis and right hip x-rays dated 10/8/2020.     FINDINGS:   LUNGS AND PLEURA: There is mild atelectasis in the dependent aspects  of the lungs. Calcified granuloma in the medial right costophrenic  angle (image 301 series 5) measures up to almost 1 cm in diameter. The  lungs are otherwise grossly clear without nodule, mass, infiltrate,  effusion, or pneumothorax.     MEDIASTINUM/AXILLAE: Mildly prominent right hilar lymph node measures  up to 1.1 cm in short axis. No other evidence for mediastinal, hilar,  or axillary lymphadenopathy is identified. The heart is normal in  size. No filling defects in the central pulmonary arteries to suggest  pulmonary artery embolism. This study was not optimized for pulmonary  artery evaluation. Thoracic aorta is of normal caliber and appearance  and demonstrates no evidence for dissection. Visualized portions of  the thyroid are unremarkable. There are coronary artery  calcifications.     HEPATOBILIARY: Normal.     PANCREAS: Normal.     SPLEEN: Normal.     ADRENAL GLANDS: Normal.     KIDNEYS/BLADDER: Tiny hypoattenuating lesion lateral cortex inferior  left kidney  (image 211 series 3) is too small to characterize at  approximately 0.8 cm. This likely represents a cyst. The kidneys  otherwise enhance normally. Ureters are mildly prominent but otherwise  unremarkable. No cholelithiasis or ureteral calculus is identified.  Urinary bladder is unremarkable but is partially obscured by streak  artifact from bilateral hip arthroplasties.     BOWEL: The bowel in the pelvis is partially obscured by streak  artifact from bilateral hip arthroplasties. There is a moderate amount  of stool in the colon. No pericolonic inflammatory change to suggest  acute diverticulitis. Appendix is not seen, consistent with surgical  history. Small bowel is of normal caliber and appearance. Stomach  contains a moderate to large amount of fluid and air but is otherwise  unremarkable.     PELVIC ORGANS: Obscured by streak artifact from hip arthroplasties and  not completely evaluated.     ADDITIONAL FINDINGS: No adenopathy, free fluid, or free air is seen in  the peritoneal cavity. There is mild nonaneurysmal atherosclerosis.  Small fat-containing left inguinal hernia is noted.     MUSCULOSKELETAL: Bilateral hip arthroplasties cause streak artifact,  limiting evaluation of the pelvis. There is lucency in the right  supra-acetabular ilium adjacent to the arthroplasty which is  concerning for granulomatous reaction of the right ilium secondary to  small particle disease. Focal lucency with single sclerotic focus in  the medial ilium adjacent to the sacroiliac joint appears benign and  could represent a hemangioma. No definite aggressive osseous lesions  or acute osseous fractures are seen. There are mild degenerative  changes of the spine..                                                                      IMPRESSION:  1.  Mildly prominent right hilar lymph node measures up to 1.1 cm and  could be reactive. No other evidence for lymphadenopathy is seen in  the chest, abdomen or pelvis.  2.  Expansile  lucency in the supra-acetabular right ilium has a  pattern which is concerning for granulomatous reaction from small  particle disease, especially given the asymmetric wear of the lining  of the right acetabular prosthesis. This lucent area surrounds the  fixation screw for the right acetabular prosthetic component and could  be associated with loosening of the prosthesis. Bilateral hip  arthroplasties do cause streak artifact limiting evaluation of the  pelvis.  3.  Lucency in the right ilium adjacent to the SI joint has a benign  appearance and could represent a benign hemangioma.  4.  Evidence of chronic granulomatous of the right lung.  5.  Coronary artery calcifications.  6.  Small fat-containing left inguinal hernia.  7.  No definite findings of lymphoma are otherwise seen.     TAMRA ORONA MD     Assessment and Plan:   Malignant lymphoma, lymphoplasmacytic- Review of treatment and potential side effects were discussed with pt today on treatment of Bendamustine and Rituxan- start with Bendamustine and will add the Rituxan with cycle 2 due to serum viscosity.  Plan is for bendamustine and Rituxan for 4 cycles.    Reviewed CT results taken 7/7/2021- noted no new active lymphoma but has other intended reviews necessary - no sign of active lymphoma except for the borderline enlarged nod ly right lung which is non specific. Also noted was reaction by right hip ( recommended return to orthopedic provider who placed hip) and calcium deposits in cardiac vessels- (will refer to cardiology)  We reviewed administration and potential side effects of the drugs and reasons for premedications.   He gives permission to start treatment today. Will use Neulasta for prevention of drug induced neutropenia with plan.   We will check serum IgM level, serum protein electrophoresis and free kappa lambda light chain analysis with cycle 2-day 1.  Peripheral neuropathy-noted to feet to ankle and fingertips. We will continue with plan  but do agree with appointment to see neurologist in August.  Family history of breast cancer and pancreatic cancer- saw Genetic counselor 7/15 and would like to proceed with genetic testing if covered by insurance.   Colon cancer screening-just scheduled for July 16 procedure however patient canceled procedure and will wait until chemotherapy treatment is completed.  At the end of our visit patient verbalized understanding and concurred with the plan  The total time of this encounter amounted to 3535 minutes. This time included face to face time spent with the patient, prep work, ordering tests, and performing post visit documentation.  Maggy Trent,Cnp

## 2021-07-20 ENCOUNTER — INFUSION THERAPY VISIT (OUTPATIENT)
Dept: INFUSION THERAPY | Facility: CLINIC | Age: 71
End: 2021-07-20
Payer: COMMERCIAL

## 2021-07-20 VITALS
RESPIRATION RATE: 16 BRPM | HEART RATE: 50 BPM | OXYGEN SATURATION: 99 % | DIASTOLIC BLOOD PRESSURE: 77 MMHG | SYSTOLIC BLOOD PRESSURE: 154 MMHG | TEMPERATURE: 97.7 F

## 2021-07-20 DIAGNOSIS — T45.1X5A CHEMOTHERAPY-INDUCED NEUTROPENIA (H): ICD-10-CM

## 2021-07-20 DIAGNOSIS — D70.1 CHEMOTHERAPY-INDUCED NEUTROPENIA (H): ICD-10-CM

## 2021-07-20 DIAGNOSIS — C83.00 MALIGNANT LYMPHOMA, LYMPHOPLASMACYTIC (H): Primary | ICD-10-CM

## 2021-07-20 LAB — SARS-COV-2 RNA RESP QL NAA+PROBE: NEGATIVE

## 2021-07-20 PROCEDURE — 99207 PR NO CHARGE LOS: CPT

## 2021-07-20 PROCEDURE — 96377 APPLICATON ON-BODY INJECTOR: CPT | Mod: 59 | Performed by: NURSE PRACTITIONER

## 2021-07-20 PROCEDURE — 96409 CHEMO IV PUSH SNGL DRUG: CPT | Performed by: NURSE PRACTITIONER

## 2021-07-20 PROCEDURE — 96367 TX/PROPH/DG ADDL SEQ IV INF: CPT | Performed by: NURSE PRACTITIONER

## 2021-07-20 RX ADMIN — Medication 250 ML: at 09:45

## 2021-07-20 ASSESSMENT — PAIN SCALES - GENERAL: PAINLEVEL: NO PAIN (0)

## 2021-07-20 NOTE — PROGRESS NOTES
Infusion Nursing Note:  Ag Evans presents today for C1D2 Bendamustine.    Patient seen by provider today: No   present during visit today: Not Applicable.    Note: Patient reports no major changes since yesterday. Does report some constipation.    Intravenous Access:  Peripheral IV placed.    Treatment Conditions:  Not Applicable.      Post Infusion Assessment:  Patient tolerated infusion without incident.  Site patent and intact, free from redness, edema or discomfort.  No evidence of extravasations.  Access discontinued per protocol.    ONPRO  Was placed on patient's: back of right arm.    Was placed at 1030 AM    ONPRO injector device Lot number: [id number]    Patient education included: what patient can expect after application, what colored lights mean on the device, when to remove device, when and where to call with questions or issues, all patients questions answered and that Neulasta administration will occur at 1330 on 7/21.       Discharge Plan:   Patient discharged in stable condition accompanied by: self.  Departure Mode: Ambulatory.      Nannette Alan RN

## 2021-07-20 NOTE — PROGRESS NOTES
SPIRITUAL HEALTH SERVICES Progress Note  [facility]    Visited Ag Evans  in the infusion center to introduce Spiritual Health support here at [place].   Offered reflective conversation, support and affirmation as he shared his journey. Prayed with him for his concerns.       Illness Narrative - Patient describes his diagnosis to me.  This is his second treatment, having received the first yesterday.  He plans to schedule most of his visits at [place] as treatments will be ongoing.        Concerns - Patient is dealing with a recent diagnosis and all of the thoughts/emotions that entails. He lost his parents at a young age and has experienced other loss over the years.       Coping - His isabelle is important to him, attends [employer].  Others have been role models for him as he witnessed their loss and how they dealt with it.  He states that he gets his strength from his isabelle and his family.  He has been  48 years and has two adult sons and two teenage grandsons.  He has been aware of the love and concern coming his way.  He reports he is on many prayer lists.        Meaning-Making - Isabelle and family give his life meaning.       Plan -  I gave the patient my contact information and he knows that he can request a Spiritual Health encounter as needed.     Renetta Hill  Staff

## 2021-07-21 ENCOUNTER — PATIENT OUTREACH (OUTPATIENT)
Dept: CARE COORDINATION | Facility: CLINIC | Age: 71
End: 2021-07-21

## 2021-07-21 ENCOUNTER — MYC MEDICAL ADVICE (OUTPATIENT)
Dept: INTERNAL MEDICINE | Facility: CLINIC | Age: 71
End: 2021-07-21

## 2021-07-21 LAB
HBV CORE AB SERPL QL IA: NONREACTIVE
HBV SURFACE AB SERPL IA-ACNC: 0 M[IU]/ML
HBV SURFACE AG SERPL QL IA: NONREACTIVE
HCV AB SERPL QL IA: NONREACTIVE

## 2021-07-21 NOTE — PROGRESS NOTES
"Social Work Note: Telephone Call  Oncology Clinic    Data/Intervention:  Patient Name:  Ag Evans  /Age:  1950 (70 year old)    Call From:  KATHRYN  Reason for Call:  Psychosocial introduction and outreach    Assessment:  KATHRYN called and spoke to Ag this morning. Ag was in the infusion center yesterday for C1D2 Bendamustine, being treated for malignant lymphoma. KATHRYN introduced self and briefly spoke about  services. Ag was pleasant and open to discussion. Expressed his appreciation for SW introduction and for all the care he has received from the system thus far.     Ag reports a strong support system. He and his wife are retired and she is able to get him to all of his appointments. He states he is feeling well and hopes he continue to be able to keep up with his activities as he has, \"I have already got 7000 steps in today.\" KATHRYN agreed that it was a huge asset to do be able to stay active.     Ag does acknowledge some concerns about upcoming medical bills, but nothing specific at this time. They are comfortable financially, but he does endorse worry about what the bills will look like and the cost of medications. KATHRYN agreed that these are very typical concerns, but assured him that there were members of the team that would be looking at treatment costs and try to connect him to resources if there are coverage issues. KATHRYN also encouraged Ag to keep sw's contact information and reach out at any time if bills come in that start to impact there ability to cover their expenses. KATHRYN will provide information on LLS. KATHRYN also advised that additional grants may be available depending on need and eligibility.      Ag denies any support needs at this time. Appreciates knowing resources are available.     KATHRYN offered to send information via [employer] so Ag can reach out at any point moving forward. Ag agreed that would be helpful.     Plan:  [employer] message sent with KATHRYN contact information and some " resource follow up suggestions. SW will remain available as needed and appropriate.       BANDAR Rodriguez, [facility]  Clinical , Adult Oncology  Phone: [phone]

## 2021-07-23 ENCOUNTER — OFFICE VISIT (OUTPATIENT)
Dept: CARDIOLOGY | Facility: CLINIC | Age: 71
End: 2021-07-23
Payer: COMMERCIAL

## 2021-07-23 VITALS
DIASTOLIC BLOOD PRESSURE: 72 MMHG | SYSTOLIC BLOOD PRESSURE: 126 MMHG | OXYGEN SATURATION: 96 % | BODY MASS INDEX: 27.63 KG/M2 | HEART RATE: 58 BPM | WEIGHT: 198.1 LBS

## 2021-07-23 DIAGNOSIS — I51.5 CARDIAC CALCIFICATION (H): ICD-10-CM

## 2021-07-23 PROCEDURE — 93005 ELECTROCARDIOGRAM TRACING: CPT | Performed by: INTERNAL MEDICINE

## 2021-07-23 PROCEDURE — 99204 OFFICE O/P NEW MOD 45 MIN: CPT | Performed by: INTERNAL MEDICINE

## 2021-07-23 RX ORDER — ATORVASTATIN CALCIUM 20 MG/1
20 TABLET, FILM COATED ORAL DAILY
Qty: 90 TABLET | Refills: 3 | Status: SHIPPED | OUTPATIENT
Start: 2021-07-23 | End: 2022-09-01

## 2021-07-23 NOTE — PROGRESS NOTES
[facility] Cardiology Consultation:    Assessment and Plan:     1.  Subclinical CAD with moderate CAC on chest CT: Check echo to assess LV function.  Start Lipitor 20 mg.  2.  Malignant lymphoma, receiving chemotherapy with bendamustine and Rituxan.    Follow-up as needed.      Derek Stanford MD    Cardiac Imaging and Prevention  [facility]  [email or web address] Pager: [phone]    HPI: Referred by his oncologist for evaluation of incidental coronary calcifications noted on chest CT.  I reviewed his chest CT and it shows moderate diffuse coronary artery calcifications.  He has no known cardiac history.  He has a history of Malignant lymphoma, receiving chemotherapy with bendamustine and Rituxan.  His basic labs are normal.  Lipid profile checked last year shows a total cholesterol of 154, LDL cholesterol of 93, HDL cholesterol of 47, and triglycerides of 70.  His office blood pressure has been normal.  He is not on any relevant cardiac medications.  EKG done in clinic today was reviewed by me.  It shows sinus bradycardia at 57 bpm, with no concerning changes.  He is quite active and has no symptoms.Denies any chest pain or pressure, shortness of breath/dyspnea, orthopnea, pnd, palpitations, syncope/presyncope or edema.    EXAM:  /72 (BP Location: Left arm, Patient Position: Sitting, Cuff Size: Adult Regular)   Pulse 58   Wt 89.9 kg (198 lb 1.6 oz)   SpO2 96%   BMI 27.63 kg/m    GEN/CONSTITUIONAL: Appears comfortable, in no apparent distress   EYES: No icterus  ENT/MOUTH: Normal  JVP:  Not visible  RESPIRATORY: Clear to auscultation bilaterally   CARDIOVASCULAR: Regular S1 and S2, no murmurs, rubs, or gallops.   ABDOMEN: Soft, non-tender, positive bowel sounds   NEUROLOGIC: Grossly non-focal   PSYCHIATRIC: Normal affect  EXT: No cyanosis, clubbing, edema. Normal pedal pulses.  Skin: No petechiae, purpura or rash    PAST MEDICAL HISTORY:  Past Medical History:    Diagnosis Date     DJD (degenerative joint disease), lumbar      Malignant lymphoma, lymphoplasmacytic (H) 7/1/2021     Sinus bradycardia        CURRENT MEDICATIONS:  Current Outpatient Medications   Medication     fluticasone (FLONASE) 50 MCG/ACT spray     gabapentin (NEURONTIN) 100 MG capsule     ginkgo biloba 60 MG CAPS capsule     LORazepam (ATIVAN) 0.5 MG tablet     melatonin 5 MG tablet     Multiple Vitamins-Minerals (MULTIVITAMIN PO)     NEW MED     ondansetron (ZOFRAN) 8 MG tablet     prochlorperazine (COMPAZINE) 10 MG tablet     saw palmetto 450 MG CAPS capsule     selenium 50 MCG TABS tablet     senna-docusate (SENOKOT-S/PERICOLACE) 8.6-50 MG tablet     SF 5000 PLUS 1.1 % CREA     Turmeric (QC TUMERIC COMPLEX) 500 MG CAPS     vitamin E 400 units TABS     No current facility-administered medications for this visit.       PAST SURGICAL HISTORY:  Past Surgical History:   Procedure Laterality Date     APPENDECTOMY       BIOPSY  mass right side     BONE MARROW BIOPSY, BONE SPECIMEN, NEEDLE/TROCAR N/A 6/10/2021    Procedure: BIOPSY, BONE MARROW;  Surgeon: Josephine Santamaria MD;  Location:  GI     COLONOSCOPY  01/01/2010    benign with hyperplastic polyps     NV TOTAL HIP ARTHROPLASTY Left 04/02/2012    Dr Roderick Santos     NV TOTAL HIP ARTHROPLASTY Right 04/15/2004    Dr. Jose Martinez     SURGICAL HISTORY OF -       Removal of a benign soft tissue mass right abdominal wall     TONSILLECTOMY         ALLERGIES     Allergies   Allergen Reactions     Seasonal Allergies        FAMILY HISTORY:  Family History   Problem Relation Age of Onset     Prostate Cancer Father      Other Cancer Father         stomach     Other Cancer Mother      Breast Cancer Mother      Other Cancer Sister         Pancreatic cancer     Breast Cancer Sister      Osteoporosis Sister         from cancer drug?     Other Cancer Sister         pancreatic       SOCIAL HISTORY:  Social History     Socioeconomic History     Marital status:       Spouse name: Not on file     Number of children: Not on file     Years of education: Not on file     Highest education level: Not on file   Occupational History     Not on file   Tobacco Use     Smoking status: Never Smoker     Smokeless tobacco: Never Used   Substance and Sexual Activity     Alcohol use: Yes     Drug use: No     Sexual activity: Yes     Partners: Female     Birth control/protection: None   Other Topics Concern     Parent/sibling w/ CABG, MI or angioplasty before 65F 55M? No   Social History Narrative     Not on file     Social Determinants of Health     Financial Resource Strain:      Difficulty of Paying Living Expenses:    Food Insecurity:      Worried About Running Out of Food in the Last Year:      Ran Out of Food in the Last Year:    Transportation Needs:      Lack of Transportation (Medical):      Lack of Transportation (Non-Medical):    Physical Activity:      Days of Exercise per Week:      Minutes of Exercise per Session:    Stress:      Feeling of Stress :    Social Connections:      Frequency of Communication with Friends and Family:      Frequency of Social Gatherings with Friends and Family:      Attends Jainism Services:      Active Member of Clubs or Organizations:      Attends Club or Organization Meetings:      Marital Status:    Intimate Partner Violence:      Fear of Current or Ex-Partner:      Emotionally Abused:      Physically Abused:      Sexually Abused:        ROS:   Constitutional: No fever, chills, or sweats. No weight gain/loss   ENT: No visual disturbance, ear ache, epistaxis, sore throat  Allergies/Immunologic: Negative.   Respiratory: No cough, hemoptysia  Cardiovascular: As per HPI  GI: No nausea, vomiting, hematemesis, melena, or hematochezia  : No urinary frequency, dysuria, or hematuria  Integument: Negative  Psychiatric: Negative  Neuro: Negative  Endocrinology: Negative   Musculoskeletal: Negative    ADDITIONAL COMMENTS:     I reviewed the  patient's medications:     I reviewed the patient's pertinent clinical laboratory studies:     I reviewed the patient's pertinent imaging studies:   I reviewed the patient's ECG:

## 2021-07-26 ENCOUNTER — HOSPITAL ENCOUNTER (OUTPATIENT)
Dept: CARDIOLOGY | Facility: CLINIC | Age: 71
Discharge: HOME | End: 2021-07-26
Attending: INTERNAL MEDICINE | Admitting: INTERNAL MEDICINE
Payer: COMMERCIAL

## 2021-07-26 DIAGNOSIS — I51.5 CARDIAC CALCIFICATION (H): ICD-10-CM

## 2021-07-26 LAB — LVEF ECHO: NORMAL

## 2021-07-26 PROCEDURE — 93306 TTE W/DOPPLER COMPLETE: CPT

## 2021-07-26 PROCEDURE — 93306 TTE W/DOPPLER COMPLETE: CPT | Mod: 26 | Performed by: INTERNAL MEDICINE

## 2021-07-28 ENCOUNTER — LAB (OUTPATIENT)
Dept: LAB | Facility: CLINIC | Age: 71
End: 2021-07-28
Payer: COMMERCIAL

## 2021-07-28 ENCOUNTER — TELEPHONE (OUTPATIENT)
Dept: DERMATOLOGY | Facility: CLINIC | Age: 71
End: 2021-07-28

## 2021-07-28 DIAGNOSIS — Z80.0 FHX: PANCREATIC CANCER: ICD-10-CM

## 2021-07-28 DIAGNOSIS — Z80.0 FHX: STOMACH CANCER: ICD-10-CM

## 2021-07-28 DIAGNOSIS — Z80.3 FHX: BREAST CANCER IN FIRST DEGREE RELATIVE: ICD-10-CM

## 2021-07-28 DIAGNOSIS — C83.00 MALIGNANT LYMPHOMA, LYMPHOPLASMACYTIC (H): ICD-10-CM

## 2021-07-28 NOTE — TELEPHONE ENCOUNTER
Ayala, Tai L, GC  [facility] [place]  Hello,     I just placed the referral to the  dermatology clinic for this patient to have a skin biopsy. I will be sending all of the required paperwork that needs to accompany the skin biopsy sample to [facility]. The instructions for the skin biopsy are in the referral information as well as listed on the cytogenetics form that I am sending to [facility]. Can you please call this patient to schedule an appointment for the skin biopsy? Please let me know if you have any questions.     Thank you,   Tai Ayala MS, [facility]   Licensed, Certified Genetic Counselor       Patient scheduled next available 11/10/21.  Korina Arriola, [facility] on 7/28/2021 at 11:31 AM

## 2021-08-04 NOTE — PATIENT INSTRUCTIONS
Assessing Cancer Risk  Only about 5-10% of cancers are thought to be due to an inherited cancer susceptibility gene.    These families often have:    Several people with the same or related types of cancer    Cancers diagnosed at a young age (before age 50)    Individuals with more than one primary cancer    Multiple generations of the family affected with cancer    Some people may be candidates for genetic testing of more than one gene.  For these families, genetic testing using a cancer panel may be offered.  These panels will test different genes known to increase the risk for breast, ovarian, uterine, and/or other cancers. All of the genes discussed below have published clinical management guidelines for individuals who are found to carry a mutation. The purpose of this handout is to serve as a brief summary of the genes analyzed by the panels used to inquire about hereditary breast and gynecologic cancer:  BRE, BRCA1, BRCA2, BRIP1, CDH1, CHEK2, MLH1, MSH2, MSH6, PMS2, EPCAM, PTEN, PALB2, RAD51C, RAD51D, and TP53.  ______________________________________________________________________________  Hereditary Breast and Ovarian Cancer Syndrome   (BRCA1 and BRCA2)  A single mutation in one of the copies of BRCA1 or BRCA2 increases the risk for breast and ovarian cancer, among others.  The risk for pancreatic cancer and melanoma may also be slightly increased in some families.  The chart below shows the chance that someone with a BRCA mutation would develop cancer in his or her lifetime1,2,3,4.        A person s ethnic background is also important to consider, as individuals of Ashkenazi Rastafarian ancestry have a higher chance of having a BRCA gene mutation.  There are three BRCA mutations that occur more frequently in this population.    Jiang Syndrome   (MLH1, MSH2, MSH6, PMS2, and EPCAM)  Currently five genes are known to cause Jiang Syndrome: MLH1, MSH2, MSH6, PMS2, and EPCAM.  A single mutation in one of the  Jiang Syndrome genes increases the risk for colon, endometrial, ovarian, and stomach cancers.  Other cancers that occur less commonly in Jiang Syndrome include urinary tract, skin, and brain cancers.  The chart below shows the chance that a person with Jiang syndrome would develop cancer in his or her lifetime5.      *Cancer risk varies depending on Jiang syndrome gene found    Cowden Syndrome   (PTEN)  Cowden syndrome is a hereditary condition that increases the risk for breast, thyroid, endometrial, colon, and kidney cancer.  Cowden syndrome is caused by a mutation in the PTEN gene.  A single mutation in one of the copies of PTEN causes Cowden syndrome and increases cancer risk.  The chart below shows the chance that someone with a PTEN mutation would develop cancer in their lifetime6,7.  Other benign features seen in some individuals with Cowden syndrome include benign skin lesions (facial papules, keratoses, lipomas), learning disability, autism, thyroid nodules, colon polyps, and larger head size.      *One recent study found breast cancer risk to be increased to 85%    Li-Fraumeni Syndrome   (TP53)  Li-Fraumeni Syndrome (LFS) is a cancer predisposition syndrome caused by a mutation in the TP53 gene. A single mutation in one of the copies of TP53 increases the risk for multiple cancers. Individuals with LFS are at an increased risk for developing cancer at a young age. The lifetime risk for development of a LFS-associated cancer is 50% by age 30 and 90% by age 60.   Core Cancers: Sarcomas, Breast, Brain, Lung, Leukemias/Lymphomas, Adrenocortical carcinomas  Other Cancers: Gastrointestinal, Thyroid, Skin, Genitourinary    Hereditary Diffuse Gastric Cancer   (CDH1)  Currently, one gene is known to cause hereditary diffuse gastric cancer (HDGC): CDH1.  Individuals with HDGC are at increased risk for diffuse gastric cancer and lobular breast cancer. Of people diagnosed with HDGC, 30-50% have a mutation in the CDH1  gene.  This suggests there are likely other genes that may cause HDGC that have not been identified yet.      Lifetime Cancer Risks    General Population HDGC    Diffuse Gastric  <1% ~80%   Breast 12% 39-52%         Additional Genes  BRE  BRE is a moderate-risk breast cancer gene. Women who have a mutation in BRE can have between a 2-4 fold increased risk for breast cancer compared to the general population8. BRE mutations have also been associated with increased risk for pancreatic cancer, however an estimate of this cancer risk is not well understood9. Individuals who inherit two BRE mutations have a condition called ataxia-telangiectasia (AT).  This rare autosomal recessive condition affects the nervous system and immune system, and is associated with progressive cerebellar ataxia beginning in childhood.  Individuals with ataxia-telangiectasia often have a weakened immune system and have an increased risk for childhood cancers.    PALB2  Mutations in PALB2 have been shown to increase the risk of breast cancer up to 33-58% in some families; where individuals fall within this risk range is dependent upon family [id number]. PALB2 mutations have also been associated with increased risk for pancreatic cancer, although this risk has not been quantified yet.  Individuals who inherit two PALB2 mutations--one from their mother and one from their father--have a condition called Fanconi Anemia.  This rare autosomal recessive condition is associated with short stature, developmental delay, bone marrow failure, and increased risk for childhood cancers.    CHEK2   CHEK2 is a moderate-risk breast cancer gene.  Women who have a mutation in CHEK2 have around a 2-fold increased risk for breast cancer compared to the general population, and this risk may be higher depending upon family history.11,12,13 Mutations in CHEK2 have also been shown to increase the risk of a number of other cancers, including colon and prostate, however  these cancer risks are currently not well understood.    BRIP1, RAD51C and RAD51D  Mutations in BRIP1, RAD51C, and RAD51D have been shown to increase the risk of ovarian cancer and possibly female breast cancer as well14,15 .       Lifetime Cancer Risk    General Population BRIP1 RAD51C RAD51D   Ovarian 1-2% ~5-8% ~5-9% ~7-15%           Inheritance  All of the cancer syndromes reviewed above are inherited in an autosomal dominant pattern.  This means that if a parent has a mutation, each of his or her children will have a 50% chance of inheriting that same mutation.  Therefore, each child--male or female--would have a 50% chance of being at increased risk for developing cancer.      Image obtained from Genetics Home Reference, 2013     Mutations in some genes can occur de ibrahima, which means that a person s mutation occurred for the first time in them and was not inherited from a parent.  Now that they have the mutation, however, it can be passed on to future generations.    Genetic Testing  Genetic testing involves a blood test and will look at the genetic information in the BRE, BRCA1, BRCA2, BRIP1, CDH1, CHEK2, MLH1, MSH2, MSH6, PMS2, EPCAM, PTEN, PALB2, RAD51C, RAD51D, and TP53 genes for any harmful mutations that are associated with increased cancer risk.  If possible, it is recommended that the person(s) who has had cancer be tested before other family members.  That person will give us the most useful information about whether or not a specific gene is associated with the cancer in the family.    Results  There are three possible results of genetic testing:    Positive--a harmful mutation was identified in one or more of the genes    Negative--no mutation was identified in any of the genes on this panel    Variant of unknown significance--a variation in one of the genes was identified, but it is unclear how this impacts cancer risk in the family    Advantages and Disadvantages   There are advantages and  disadvantages to genetic testing.    Advantages    May clarify your cancer risk    Can help you make medical decisions    May explain the cancers in your family    May give useful information to your family members (if you share your results)    Disadvantages    Possible negative emotional impact of learning about inherited cancer risk    Uncertainty in interpreting a negative test result in some situations    Possible genetic discrimination concerns (see below)    Genetic Information Nondiscrimination Act (LIVE)  LIVE is a federal law that protects individuals from health insurance or employment discrimination based on a genetic test result alone.  Although rare, there are currently no legal discrimination protections in terms of life insurance, long term care, or disability insurances.  Visit the [employer] website to learn more.    Reducing Cancer Risk  All of the genes described above have nationally recognized cancer screening guidelines that would be recommended for individuals who test positive.  In addition to increased cancer screening, surgeries may be offered or recommended to reduce cancer risk.  Recommendations are based upon an individual s genetic test result as well as their personal and family history of cancer.    Questions to Think About Regarding Genetic Testing:    What effect will the test result have on me and my relationship with my family members if I have an inherited gene mutation?  If I don t have a gene mutation?    Should I share my test results, and how will my family react to this news, which may also affect them?    Are my children ready to learn new information that may one day affect their own health?    Hereditary Cancer Resources    FORCE: Facing Our Risk of Cancer Empowered facingourrisk.org   Bright Pink bebrightpink.org   Li-Fraumeni Syndrome Association lfsassociation.org   PTEN World PTENworld.com   No stomach for cancer, Inc.  nostomachforcancer.[email or web address]   Stomach cancer relief network Scrnet.org   Collaborative Group of the Americas on Inherited Colorectal Cancer (CGA) cgaicc.com    Cancer Care cancercare.org   American Cancer Society (ACS) cancer.org   National Cancer [place] (NCI) cancer.gov     Please call us if you have any questions or concerns.   Cancer Risk Management Program [phone] ([phone])  ? Tai Ayala, MS, [facility] [phone]  ? Renetta Castellanos, MS, [facility]  [phone]  ? Keyonna Meadows, MS, [facility]  [phone]  ? Iris Moctezuma, MS, [facility] [phone]  ? Nannette Ju, MS, [facility] [phone]  ? Jazmine Fraire, MS, [facility]  [phone]    References  1. Flaquita A, Nomi PDP, Eulalio S, Maritza RICE, Steven JE, Stephanie JL, Reece N, Shaquille H, July O, Alessandra A, Janay B, Esteban P, Manalejandra S, Marissa DM, Ellison N, Macy E, Isidro H, Mando E, Steph J, Gronanibal J, Lamine B, Edne H, Thorlacius S, Eerola H, Martha H, Merlyn K, Ryan OP. Average risks of breast and ovarian cancer associated with BRCA1 or BRCA2 mutations detected in case series unselected for family history: a combined analysis of 222 studies. Am J Hum Dena. 2003;72:1117-30.  2. Yanira SHELBY, Madeleine M, Angelica G.  BRCA1 and BRCA2 Hereditary Breast and Ovarian Cancer. Gene Reviews online. 2013.  3. Hasmukh YC, Ama S, Alida G, Villa S. Breast cancer risk among male BRCA1 and BRCA2 mutation carriers. J Natl Cancer Inst. 2007;99:1811-4.  4. Johnnie GAY, Clifford I, Nino J, Tucker E, Chester ER, Yousuf F. Risk of breast cancer in male BRCA2 carriers. J Med Dena. 2010;47:710-1.  5. National Comprehensive Cancer Network. Clinical practice guidelines in oncology, colorectal cancer screening. Available online (registration required). 2015.  6. Last KERR, Jorge Luis J, Jamari J, Duong LA, Lucia LOPEZ, Nicole C. Lifetime cancer risks in individuals with germline PTEN mutations. Clin Cancer Res. 2012;18:400-7.  7. Annamarie RAMOS. Cowden Syndrome: A Critical Review of the  Clinical Literature. J Dena . 2009:18:13-27.  8. Alise A, Ketan D, Tommy S, Ramona P, Magaly T, Gordo M, Kevin B, Christopher H, Pablo R, Claire K, Duyen L, Johnnie DG, Marissa D, Asif DF, Americo MR, The Breast Cancer Susceptibility Collaboration () & Hodan SHELBY. BRE mutations that cause ataxia-telangiectasia are breast cancer susceptibility alleles. Nature Genetics. 2006;38:873-875  9. Alexandr N , Harvinder Y, Shilpa J, Zain L, Margaret GM , Dominique ML, Gallinger S, Ibrahim AG, Syngal S, Arleth ML, Parker J , Kathleen R, Murali SZ, Trang JR, Alverto VE, Nancy M, Voharman B, Halie N, Stacy RH, Suad KW, and Cira AP. BRE mutations in patients with hereditary pancreatic cancer. Cancer Discover. 2012;2:41-46  10. Flaquita VU, et al. Breast-Cancer Risk in Families with Mutations in PALB2. NEJM. 2014; 371(6):497-506.  11. CHEK2 Breast Cancer Case-Control Consortium. CHEK2*1100delC and susceptibility to breast cancer: A collaborative analysis involving 10,860 breast cancer cases and 9,065 controls from 10 studies. Am J Hum Dena, 74 (2004), pp. 0055-8072  12. Lenard T, Kate S, Chaya K, et al. Spectrum of Mutations in BRCA1, BRCA2, CHEK2, and TP53 in Families at High Risk of Breast Cancer. ROSEMARY. 2006;295(12):1012-1576.   13. Schuyler C, Janes D, Jacy A, et al. Risk of breast cancer in women with a CHEK2 mutation with and without a family history of breast cancer. J Clin Oncol. 2011;29:5236-0694.  14. Elvin H, Renetta E, Kumar SJ, et al. Contribution of germline mutations in the RAD51B, RAD51C, and RAD51D genes to ovarian cancer in the population. J Clin Oncol. 2015;33(26):1249-7895. Doi:10.1200/JCO.2015.61.2408.  15. Navi T, Li WILSON, Maico P, et al. Mutations in BRIP1 confer high risk of ovarian cancer. Neelima Dena. 2011;43(11):4651-4881. doi:10.1038/ng.955.

## 2021-08-05 NOTE — PROGRESS NOTES
"This patient  is being evaluated via a billable video visit.      The patient has been notified of following:     \"This video visit will be conducted via a call between you and your physician/provider. We have found that certain health care needs can be provided without the need for an in-person physical exam.  This service lets us provide the care you need with a video conversation.  If a prescription is necessary we can send it directly to your pharmacy.  If lab work is needed we can place an order for that and you can then stop by our lab to have the test done at a later time.    Video visits are billed at different rates depending on your insurance coverage.  Please reach out to your insurance provider with any questions.    If during the course of the call the physician/provider feels a video visit is not appropriate, you will not be charged for this service.\"    Patient has given verbal consent for Video visit? yes  Video-Visit Details    Type of service:  Video Visit    Video visit duration:  19 min  Originating Location (pt. Location): home    Distant Location (provider location):  [facility]     Platform used for Video Visit: Candido Hernandez MD      Hematology/Oncology follow-up visit:  Date on this visit: Aug 12, 2021  Primary Care Physician: Jose Antonio     Diagnosis: Lymphoplasmacytic lymphoma  Oncologic history:   1. Lymphoplasmacytic lymphoma- He was evaluated for worsening symptoms of peripheral neuropathy with worsening numbness and pins-and-needles in his feet for the preceding 2 years, and in April 2021 was noted to have a monoclonal protein-IgM kappa on serum immunofixation electrophoresis.  M spike measured 1.1 g/dL in April 2021.  Serum IgM level was elevated at 1075 mg/dL.    Since our last visit he proceeded with additional evaluation.  Serum free kappa lambda light chain ratio was normal on Yudi 3, 2021.  Serum protein electrophoresis confirmed " the presence of monoclonal IgM kappa protein.  M spike remained at 1.1 g/dL on Yudi 3, 2021.  Beta-2 microglobulin was normal.  CMP was unremarkable.  Serum viscosity was slightly elevated at 2.1.  CBC with differential counts was unremarkable except  he was mildly anemic with hemoglobin of 12.7 g/dL. .  He proceeded to undergo bone marrow biopsy on Yudi 10, 2021.   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  Flow cytometry showed findings suggestion of kappa skewing on B cells and rare to absent plasma cells. Otherwise bone marrow was hypercellular with normal trilineage hematopoiesis.  Cytogenetics was normal 46 XY.  An activating mutation (L265P) in MYD88 is present in up to 90% of cases   of lymphoplasmacytic lymphoma (LPL).   [1] In addition to LPL, the MYD88 L265P mutation is reportedly found in   4-21% of splenic marginal zone   lymphomas, up to 30% of activated B-cell type diffuse large B-cell   lymphomas, and rarely in chronic   lymphocytic leukemia/small lymphocytic lymphoma.   NGS was positive for the MYD88 p.L265P mutation which favors a diagnosis of lymphoplasmacytic   lymphoma, consistent with the morphologic   impression (ST73-390).   CT of the chest, abdomen and pelvis on 7/7/2021 showed mildly prominent right hilar lymph node measures up to 1.1 cm. No other evidence for lymphadenopathy is seen in the chest, abdomen or pelvis.         History Of Present Illness:  Mr. Evans is a 71 year old male who presents with new diagnosis of lymphoplasmacytic lymphoma. He is s/p bendamustine cycle 1 on 7/19/2021., tolerated well. rituxan was held with cycle 1 due to concerns of worsening hyperviscosity.  He continues to have s/o peripheral neuropathy in the feet and hands- numbness in feet at the bottom and in the toes and hands, bottom of the feet, tips of the fingers, stable.   He is on gabapentin 100 mg BID, not sure if helping. He was seen by Dr. Stanford for evaluation of CACs on CT.  Echocardiogram on 7/26/2021 showed normal LVEF and no valvular pathology. He was started on Lipitor.    He has had no new lumps or bumps, no constitutional symptoms and no bone pain.  He has had no vision changes.   Exercises daily on his bike. He has been keeping a healthy diet.  Fatigue is mild, stable    Wife present for visit.  In addition, a complete 12 point  review of systems is negative.    Past Medical/Surgical History:  Past Medical History:   Diagnosis Date     DJD (degenerative joint disease), lumbar      Malignant lymphoma, lymphoplasmacytic (H) 7/1/2021     Sinus bradycardia      Past Surgical History:   Procedure Laterality Date     APPENDECTOMY       BIOPSY  mass right side     BONE MARROW BIOPSY, BONE SPECIMEN, NEEDLE/TROCAR N/A 6/10/2021    Procedure: BIOPSY, BONE MARROW;  Surgeon: Josephine Santamaria MD;  Location:  GI     COLONOSCOPY  01/01/2010    benign with hyperplastic polyps     TX TOTAL HIP ARTHROPLASTY Left 04/02/2012    Dr Roderick Santos     TX TOTAL HIP ARTHROPLASTY Right 04/15/2004    Dr. Jose Martinez     SURGICAL HISTORY OF -       Removal of a benign soft tissue mass right abdominal wall     TONSILLECTOMY       Allergies:  Allergies as of 08/12/2021 - Reviewed 07/23/2021   Allergen Reaction Noted     Seasonal allergies  07/30/2015     Current Medications:  Current Outpatient Medications   Medication Sig Dispense Refill     atorvastatin (LIPITOR) 20 MG tablet Take 1 tablet (20 mg) by mouth daily 90 tablet 3     fluticasone (FLONASE) 50 MCG/ACT spray Spray 1 spray into both nostrils daily       gabapentin (NEURONTIN) 100 MG capsule 1 a day at night for a week then 2 a day at night. 60 capsule 3     ginkgo biloba 60 MG CAPS capsule Take 30 mg by mouth daily       LORazepam (ATIVAN) 0.5 MG tablet Take 1 tablet (0.5 mg) by mouth every 4 hours as needed (Anxiety, Nausea/Vomiting  "or Sleep) 30 tablet 3     melatonin 5 MG tablet        Multiple Vitamins-Minerals (MULTIVITAMIN PO)        NEW MED NAC       ondansetron (ZOFRAN) 8 MG tablet Take 1 tablet (8 mg) by mouth every 8 hours as needed (Nausea/Vomiting) 10 tablet 3     prochlorperazine (COMPAZINE) 10 MG tablet Take 0.5 tablets (5 mg) by mouth every 8 hours as needed (Nausea/Vomiting) 30 tablet 3     saw palmetto 450 MG CAPS capsule Take 450 mg by mouth daily       selenium 50 MCG TABS tablet Take 50 mcg by mouth daily       senna-docusate (SENOKOT-S/PERICOLACE) 8.6-50 MG tablet Take 1 tablet by mouth daily       SF 5000 PLUS 1.1 % CREA USE TOOTH PASTE TWICE DAILY AS DIRECTED. NOTHING BY MOUTH FOR 30 MINUTES AFTER USE       Turmeric (QC TUMERIC COMPLEX) 500 MG CAPS        vitamin E 400 units TABS Take 400 Units by mouth daily        Family History  There is a FHX of malignancy- metastatic breast cancer at the age of 56. Sister had pancreatic cancer at 52, . Father  of stomach cancer at the age of 71. Another sister had breast cancer at the age of 81. He is of [place] descent on dad's side.   Social History:  Social History     Socioeconomic History     Marital status:    \" S 14 tobacco Use     Smoking status: Never Smoker     Smokeless tobacco: Never Used   Substance and Sexual Activity     Alcohol use: Not Currently     Frequency: Monthly or less     Drug use: No     Sexual activity: Yes     Partners: Female     Physical Exam:  Wt Readings from Last 5 Encounters:   21 90.3 kg (199 lb)   21 90.6 kg (199 lb 11.2 oz)   21 88.6 kg (195 lb 4.8 oz)   21 89.8 kg (198 lb)   21 89.9 kg (198 lb 1.6 oz)       Constitutional: alert and in no distress  Eyes: No redness or discharge  Respiratory: No cough or labored breathing.  Musculoskeletal: Full range of motion in extremities.  Skin: no visible skin lesions or discoloration  Neurological: No tremors and denies headache.  Psychiatric: Mentation appears " normal and affect is normal as well.  Alert and oriented x3.  The rest the comprehensive physical examination is deferred due to public health emergency video visit restrictions.      Laboratory/Imaging Studies  Labs reviewed and documented in the EMR.  Results for AG GONZALEZ (MRN [id number]) as of 8/26/2021 14:23  Labs from cycle 2 day 1 will be reviewed  ASSESSMENT/PLAN:  Ag is a very pleasant 71-year-old gentleman with progressive peripheral neuropathy and finding of IgM kappa on serum protein immunofixation of the pheresis, with elevated serum IgM level and serum M spike of 1.1 g/dL, and on bone marrow biopsy confirmed diagnosis of lymphoplasmacytic lymphoma.     1.  Lymphoplasmacytic lymphoma-f/up on labs with cycle 2 day 1. Proceed with Rituxan/bendamustine cycle 2 day 1. We will check serum IgM level, serum protein electrophoresis and free kappa lambda light chain analysis with cycle 3-day 1.  Also check CBC differential, CMP with cycle. Plan total of 4 cycles.    2.  Prevention of drug-induced neutropenia-we will continue to use Neulasta with his chemotherapy regimen.  3.  Peripheral neuropathy-proceed with bendamustine and Rituxan as above.  He has an appointment to see neurologist in August. Continue gabapentin in the interim.  4.  Family history of breast cancer and pancreatic cancer, as above -he was seen by our genetic counselor in early August and proceeded with [facility] genetic testing panel.  5. Colon cancer screening-he was scheduled to have a screening colonoscopy in July, but postponed it due to chemotherapy  At the end of our visit patient verbalized understanding and concurred with the plan.    >45 minutes spent on the date of the encounter doing chart review, review of test results, interpretation of tests,ordering tests, care coordination,  patient visit and documentation.    Addendum: seen by Dr. Ruiz on 8/25. He is on gabapentin 100 mg BID-increased to 300 mg PO TID.  EMG, HbA1C,  MAG/SGPG Abs testing planned.  .cbcd normal on 8/18, with the exception of mild anemia and Hb of 13.1 g/dl. Cmp normal  M spike and IgM level improving.  Results for ALANA GONZALEZ (MRN [id number]) as of 8/26/2021 14:23   Ref. Range 6/3/2021 14:08 6/10/2021 09:37 8/18/2021 08:04 8/18/2021 08:10   IGM Latest Ref Range: 35 - 242 mg/dL 1,979 (H)  1,184 (H)    Immunofixation ELP Unknown (Note)      Kappa Free Lt Chain Latest Ref Range: 0.33 - 1.94 mg/dL 1.42      Kappa Lambda Ratio Latest Ref Range: 0.26 - 1.65  1.41      Lambda Free Lt Chain Latest Ref Range: 0.57 - 2.63 mg/dL 1.01      MYD88 MUTATION DETECTION NGS Unknown  Rpt     LEUKEMIA LYMPHOMA EVALUATION NON CSF Unknown  Rpt     Monoclonal Peak Latest Ref Range: <=0.0 g/dL 1.1 (H)   0.7 (H)

## 2021-08-12 ENCOUNTER — VIRTUAL VISIT (OUTPATIENT)
Dept: ONCOLOGY | Facility: CLINIC | Age: 71
End: 2021-08-12
Payer: COMMERCIAL

## 2021-08-12 VITALS — BODY MASS INDEX: 27.72 KG/M2 | WEIGHT: 198 LBS | HEIGHT: 71 IN

## 2021-08-12 DIAGNOSIS — T45.1X5A CHEMOTHERAPY-INDUCED NEUTROPENIA (H): ICD-10-CM

## 2021-08-12 DIAGNOSIS — D70.1 CHEMOTHERAPY-INDUCED NEUTROPENIA (H): ICD-10-CM

## 2021-08-12 DIAGNOSIS — C83.00 MALIGNANT LYMPHOMA, LYMPHOPLASMACYTIC (H): Primary | ICD-10-CM

## 2021-08-12 PROCEDURE — 99215 OFFICE O/P EST HI 40 MIN: CPT | Mod: 95 | Performed by: INTERNAL MEDICINE

## 2021-08-12 RX ORDER — ALBUTEROL SULFATE 0.83 MG/ML
2.5 SOLUTION RESPIRATORY (INHALATION)
Status: CANCELLED | OUTPATIENT
Start: 2021-08-19

## 2021-08-12 RX ORDER — DIPHENHYDRAMINE HYDROCHLORIDE 50 MG/ML
50 INJECTION INTRAMUSCULAR; INTRAVENOUS
Status: CANCELLED
Start: 2021-08-16

## 2021-08-12 RX ORDER — METHYLPREDNISOLONE SODIUM SUCCINATE 125 MG/2ML
125 INJECTION, POWDER, LYOPHILIZED, FOR SOLUTION INTRAMUSCULAR; INTRAVENOUS
Status: CANCELLED
Start: 2021-08-19

## 2021-08-12 RX ORDER — EPINEPHRINE 1 MG/ML
0.3 INJECTION, SOLUTION INTRAMUSCULAR; SUBCUTANEOUS EVERY 5 MIN PRN
Status: CANCELLED | OUTPATIENT
Start: 2021-08-16

## 2021-08-12 RX ORDER — ALBUTEROL SULFATE 90 UG/1
1-2 AEROSOL, METERED RESPIRATORY (INHALATION)
Status: CANCELLED
Start: 2021-08-16

## 2021-08-12 RX ORDER — HEPARIN SODIUM,PORCINE 10 UNIT/ML
5 VIAL (ML) INTRAVENOUS
Status: CANCELLED | OUTPATIENT
Start: 2021-08-19

## 2021-08-12 RX ORDER — DIPHENHYDRAMINE HYDROCHLORIDE 50 MG/ML
50 INJECTION INTRAMUSCULAR; INTRAVENOUS
Status: CANCELLED
Start: 2021-08-19

## 2021-08-12 RX ORDER — ACETAMINOPHEN 325 MG/1
650 TABLET ORAL ONCE
Status: CANCELLED
Start: 2021-08-16

## 2021-08-12 RX ORDER — METHYLPREDNISOLONE SODIUM SUCCINATE 125 MG/2ML
125 INJECTION, POWDER, LYOPHILIZED, FOR SOLUTION INTRAMUSCULAR; INTRAVENOUS
Status: CANCELLED
Start: 2021-08-16

## 2021-08-12 RX ORDER — DIPHENHYDRAMINE HCL 25 MG
50 CAPSULE ORAL ONCE
Status: CANCELLED
Start: 2021-08-16

## 2021-08-12 RX ORDER — HEPARIN SODIUM (PORCINE) LOCK FLUSH IV SOLN 100 UNIT/ML 100 UNIT/ML
5 SOLUTION INTRAVENOUS
Status: CANCELLED | OUTPATIENT
Start: 2021-08-19

## 2021-08-12 RX ORDER — NALOXONE HYDROCHLORIDE 0.4 MG/ML
0.2 INJECTION, SOLUTION INTRAMUSCULAR; INTRAVENOUS; SUBCUTANEOUS
Status: CANCELLED | OUTPATIENT
Start: 2021-08-19

## 2021-08-12 RX ORDER — MEPERIDINE HYDROCHLORIDE 25 MG/ML
25 INJECTION INTRAMUSCULAR; INTRAVENOUS; SUBCUTANEOUS EVERY 30 MIN PRN
Status: CANCELLED | OUTPATIENT
Start: 2021-08-16

## 2021-08-12 RX ORDER — HEPARIN SODIUM (PORCINE) LOCK FLUSH IV SOLN 100 UNIT/ML 100 UNIT/ML
5 SOLUTION INTRAVENOUS
Status: CANCELLED | OUTPATIENT
Start: 2021-08-16

## 2021-08-12 RX ORDER — MEPERIDINE HYDROCHLORIDE 25 MG/ML
25 INJECTION INTRAMUSCULAR; INTRAVENOUS; SUBCUTANEOUS
Status: CANCELLED
Start: 2021-08-16

## 2021-08-12 RX ORDER — MEPERIDINE HYDROCHLORIDE 25 MG/ML
25 INJECTION INTRAMUSCULAR; INTRAVENOUS; SUBCUTANEOUS EVERY 30 MIN PRN
Status: CANCELLED | OUTPATIENT
Start: 2021-08-19

## 2021-08-12 RX ORDER — ALBUTEROL SULFATE 0.83 MG/ML
2.5 SOLUTION RESPIRATORY (INHALATION)
Status: CANCELLED | OUTPATIENT
Start: 2021-08-16

## 2021-08-12 RX ORDER — NALOXONE HYDROCHLORIDE 0.4 MG/ML
0.2 INJECTION, SOLUTION INTRAMUSCULAR; INTRAVENOUS; SUBCUTANEOUS
Status: CANCELLED | OUTPATIENT
Start: 2021-08-16

## 2021-08-12 RX ORDER — HEPARIN SODIUM,PORCINE 10 UNIT/ML
5 VIAL (ML) INTRAVENOUS
Status: CANCELLED | OUTPATIENT
Start: 2021-08-16

## 2021-08-12 RX ORDER — ALBUTEROL SULFATE 90 UG/1
1-2 AEROSOL, METERED RESPIRATORY (INHALATION)
Status: CANCELLED
Start: 2021-08-19

## 2021-08-12 RX ORDER — EPINEPHRINE 1 MG/ML
0.3 INJECTION, SOLUTION INTRAMUSCULAR; SUBCUTANEOUS EVERY 5 MIN PRN
Status: CANCELLED | OUTPATIENT
Start: 2021-08-19

## 2021-08-12 ASSESSMENT — MIFFLIN-ST. JEOR: SCORE: 1670.25

## 2021-08-12 NOTE — LETTER
"    8/12/2021         RE: Ag Evans  [id number] [address] [place] [id number]        Dear Colleague,    Thank you for referring your patient, Ag Evans, to the [facility]. Please see a copy of my visit note below.    This patient  is being evaluated via a billable video visit.      The patient has been notified of following:     \"This video visit will be conducted via a call between you and your physician/provider. We have found that certain health care needs can be provided without the need for an in-person physical exam.  This service lets us provide the care you need with a video conversation.  If a prescription is necessary we can send it directly to your pharmacy.  If lab work is needed we can place an order for that and you can then stop by our lab to have the test done at a later time.    Video visits are billed at different rates depending on your insurance coverage.  Please reach out to your insurance provider with any questions.    If during the course of the call the physician/provider feels a video visit is not appropriate, you will not be charged for this service.\"    Patient has given verbal consent for Video visit? yes  Video-Visit Details    Type of service:  Video Visit    Video visit duration:  19 min  Originating Location (pt. Location): home    Distant Location (provider location):  [facility]     Platform used for Video Visit: Candido Hernandez MD      Hematology/Oncology follow-up visit:  Date on this visit: Aug 12, 2021  Primary Care Physician: Jose Antonio     Diagnosis: Lymphoplasmacytic lymphoma  Oncologic history:   1. Lymphoplasmacytic lymphoma- He was evaluated for worsening symptoms of peripheral neuropathy with worsening numbness and pins-and-needles in his feet for the preceding 2 years, and in April 2021 was noted to have a monoclonal protein-IgM kappa on serum immunofixation electrophoresis.  M spike " measured 1.1 g/dL in April 2021.  Serum IgM level was elevated at 1075 mg/dL.    Since our last visit he proceeded with additional evaluation.  Serum free kappa lambda light chain ratio was normal on Yudi 3, 2021.  Serum protein electrophoresis confirmed the presence of monoclonal IgM kappa protein.  M spike remained at 1.1 g/dL on Yudi 3, 2021.  Beta-2 microglobulin was normal.  CMP was unremarkable.  Serum viscosity was slightly elevated at 2.1.  CBC with differential counts was unremarkable except  he was mildly anemic with hemoglobin of 12.7 g/dL. .  He proceeded to undergo bone marrow biopsy on Yudi 10, 2021.   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  Flow cytometry showed findings suggestion of kappa skewing on B cells and rare to absent plasma cells. Otherwise bone marrow was hypercellular with normal trilineage hematopoiesis.  Cytogenetics was normal 46 XY.  An activating mutation (L265P) in MYD88 is present in up to 90% of cases   of lymphoplasmacytic lymphoma (LPL).   [1] In addition to LPL, the MYD88 L265P mutation is reportedly found in   4-21% of splenic marginal zone   lymphomas, up to 30% of activated B-cell type diffuse large B-cell   lymphomas, and rarely in chronic   lymphocytic leukemia/small lymphocytic lymphoma.   NGS was positive for the MYD88 p.L265P mutation which favors a diagnosis of lymphoplasmacytic   lymphoma, consistent with the morphologic   impression ([id number]).   CT of the chest, abdomen and pelvis on 7/7/2021 showed mildly prominent right hilar lymph node measures up to 1.1 cm. No other evidence for lymphadenopathy is seen in the chest, abdomen or pelvis.         History Of Present Illness:  Mr. Evans is a 71 year old male who presents with new diagnosis of lymphoplasmacytic lymphoma. He is s/p bendamustine cycle 1 on 7/19/2021., tolerated  well. rituxan was held with cycle 1 due to concerns of worsening hyperviscosity.  He continues to have s/o peripheral neuropathy in the feet and hands- numbness in feet at the bottom and in the toes and hands, bottom of the feet, tips of the fingers, stable.  He is on gabapentin 100 mg BID, not sure if helping. He was seen by Dr. Stanford for evaluation of CACs on CT.  Echocardiogram on 7/26/2021 showed normal LVEF and no valvular pathology. He was started on Lipitor.    He has had no new lumps or bumps, no constitutional symptoms and no bone pain.  He has had no vision changes.   Exercises daily on his bike. He has been keeping a healthy diet.  Fatigue is mild, stable    Wife present for visit.  In addition, a complete 12 point  review of systems is negative.    Past Medical/Surgical History:  Past Medical History:   Diagnosis Date     DJD (degenerative joint disease), lumbar      Malignant lymphoma, lymphoplasmacytic (H) 7/1/2021     Sinus bradycardia      Past Surgical History:   Procedure Laterality Date     APPENDECTOMY       BIOPSY  mass right side     BONE MARROW BIOPSY, BONE SPECIMEN, NEEDLE/TROCAR N/A 6/10/2021    Procedure: BIOPSY, BONE MARROW;  Surgeon: Josephine Santamaria MD;  Location:  GI     COLONOSCOPY  01/01/2010    benign with hyperplastic polyps     MS TOTAL HIP ARTHROPLASTY Left 04/02/2012    Dr Roderick Santos     MS TOTAL HIP ARTHROPLASTY Right 04/15/2004    Dr. Jose Martinez     SURGICAL HISTORY OF -       Removal of a benign soft tissue mass right abdominal wall     TONSILLECTOMY       Allergies:  Allergies as of 08/12/2021 - Reviewed 07/23/2021   Allergen Reaction Noted     Seasonal allergies  07/30/2015     Current Medications:  Current Outpatient Medications   Medication Sig Dispense Refill     atorvastatin (LIPITOR) 20 MG tablet Take 1 tablet (20 mg) by mouth daily 90 tablet 3     fluticasone (FLONASE) 50 MCG/ACT spray Spray 1 spray into both nostrils daily       gabapentin (NEURONTIN) 100  "MG capsule 1 a day at night for a week then 2 a day at night. 60 capsule 3     ginkgo biloba 60 MG CAPS capsule Take 30 mg by mouth daily       LORazepam (ATIVAN) 0.5 MG tablet Take 1 tablet (0.5 mg) by mouth every 4 hours as needed (Anxiety, Nausea/Vomiting or Sleep) 30 tablet 3     melatonin 5 MG tablet        Multiple Vitamins-Minerals (MULTIVITAMIN PO)        NEW MED NAC       ondansetron (ZOFRAN) 8 MG tablet Take 1 tablet (8 mg) by mouth every 8 hours as needed (Nausea/Vomiting) 10 tablet 3     prochlorperazine (COMPAZINE) 10 MG tablet Take 0.5 tablets (5 mg) by mouth every 8 hours as needed (Nausea/Vomiting) 30 tablet 3     saw palmetto 450 MG CAPS capsule Take 450 mg by mouth daily       selenium 50 MCG TABS tablet Take 50 mcg by mouth daily       senna-docusate (SENOKOT-S/PERICOLACE) 8.6-50 MG tablet Take 1 tablet by mouth daily       SF 5000 PLUS 1.1 % CREA USE TOOTH PASTE TWICE DAILY AS DIRECTED. NOTHING BY MOUTH FOR 30 MINUTES AFTER USE       Turmeric (QC TUMERIC COMPLEX) 500 MG CAPS        vitamin E 400 units TABS Take 400 Units by mouth daily        Family History  There is a FHX of malignancy- metastatic breast cancer at the age of 55. Sister had pancreatic cancer at 52, . Father  of stomach cancer at the age of 71. Another sister had breast cancer at the age of 81. He is of [place] descent on dad's side.   Social History:  Social History     Socioeconomic History     Marital status:    \" S 14 tobacco Use     Smoking status: Never Smoker     Smokeless tobacco: Never Used   Substance and Sexual Activity     Alcohol use: Not Currently     Frequency: Monthly or less     Drug use: No     Sexual activity: Yes     Partners: Female     Physical Exam:  Wt Readings from Last 5 Encounters:   21 90.3 kg (199 lb)   21 90.6 kg (199 lb 11.2 oz)   21 88.6 kg (195 lb 4.8 oz)   21 89.8 kg (198 lb)   21 89.9 kg (198 lb 1.6 oz)       Constitutional: alert and in no " distress  Eyes: No redness or discharge  Respiratory: No cough or labored breathing.  Musculoskeletal: Full range of motion in extremities.  Skin: no visible skin lesions or discoloration  Neurological: No tremors and denies headache.  Psychiatric: Mentation appears normal and affect is normal as well.  Alert and oriented x3.  The rest the comprehensive physical examination is deferred due to public health emergency video visit restrictions.      Laboratory/Imaging Studies  Labs reviewed and documented in the EMR.  Results for AG GONZALEZ (MRN [id number]) as of 8/26/2021 14:23  Labs from cycle 2 day 1 will be reviewed  ASSESSMENT/PLAN:  Ag is a very pleasant 71-year-old gentleman with progressive peripheral neuropathy and finding of IgM kappa on serum protein immunofixation of the pheresis, with elevated serum IgM level and serum M spike of 1.1 g/dL, and on bone marrow biopsy confirmed diagnosis of lymphoplasmacytic lymphoma.     1.  Lymphoplasmacytic lymphoma-f/up on labs with cycle 2 day 1. Proceed with Rituxan/bendamustine cycle 2 day 1. We will check serum IgM level, serum protein electrophoresis and free kappa lambda light chain analysis with cycle 3-day 1.  Also check CBC differential, CMP with cycle. Plan total of 4 cycles.    2.  Prevention of drug-induced neutropenia-we will continue to use Neulasta with his chemotherapy regimen.  3.  Peripheral neuropathy-proceed with bendamustine and Rituxan as above.  He has an appointment to see neurologist in August. Continue gabapentin in the interim.  4.  Family history of breast cancer and pancreatic cancer, as above -he was seen by our genetic counselor in early August and proceeded with [facility] genetic testing panel.  5. Colon cancer screening-he was scheduled to have a screening colonoscopy in July, but postponed it due to chemotherapy  At the end of our visit patient verbalized understanding and concurred with the plan.    >45 minutes spent on the date of the  encounter doing chart review, review of test results, interpretation of tests,ordering tests, care coordination,  patient visit and documentation.    Addendum: seen by Dr. Ruiz on 8/25. He is on gabapentin 100 mg BID-increased to 300 mg PO TID.  EMG, HbA1C, MAG/SGPG Abs testing planned.  .cbcd normal on 8/18, with the exception of mild anemia and Hb of 13.1 g/dl. Cmp normal  M spike and IgM level improving.  Results for ALANA GONZALEZ (MRN [id number]) as of 8/26/2021 14:23   Ref. Range 6/3/2021 14:08 6/10/2021 09:37 8/18/2021 08:04 8/18/2021 08:10   IGM Latest Ref Range: 35 - 242 mg/dL 1,979 (H)  1,184 (H)    Immunofixation ELP Unknown (Note)      Kappa Free Lt Chain Latest Ref Range: 0.33 - 1.94 mg/dL 1.42      Kappa Lambda Ratio Latest Ref Range: 0.26 - 1.65  1.41      Lambda Free Lt Chain Latest Ref Range: 0.57 - 2.63 mg/dL 1.01      MYD88 MUTATION DETECTION NGS Unknown  Rpt     LEUKEMIA LYMPHOMA EVALUATION NON CSF Unknown  Rpt     Monoclonal Peak Latest Ref Range: <=0.0 g/dL 1.1 (H)   0.7 (H)       Again, thank you for allowing me to participate in the care of your patient.        Sincerely,        Keerthi Hernandez MD, MD

## 2021-08-12 NOTE — NURSING NOTE
Patient was told his immune system is suppressed for 14 days after infusions- what does he have to do to be safe during this time?    Is Covid booster needed?

## 2021-08-18 ENCOUNTER — APPOINTMENT (OUTPATIENT)
Dept: LAB | Facility: CLINIC | Age: 71
End: 2021-08-18
Payer: COMMERCIAL

## 2021-08-18 ENCOUNTER — INFUSION THERAPY VISIT (OUTPATIENT)
Dept: INFUSION THERAPY | Facility: CLINIC | Age: 71
End: 2021-08-18
Attending: INTERNAL MEDICINE
Payer: COMMERCIAL

## 2021-08-18 VITALS
BODY MASS INDEX: 27.24 KG/M2 | HEART RATE: 51 BPM | OXYGEN SATURATION: 96 % | TEMPERATURE: 97.6 F | WEIGHT: 195.3 LBS | DIASTOLIC BLOOD PRESSURE: 62 MMHG | SYSTOLIC BLOOD PRESSURE: 124 MMHG | RESPIRATION RATE: 20 BRPM

## 2021-08-18 DIAGNOSIS — C83.00 MALIGNANT LYMPHOMA, LYMPHOPLASMACYTIC (H): Primary | ICD-10-CM

## 2021-08-18 DIAGNOSIS — T45.1X5A CHEMOTHERAPY-INDUCED NEUTROPENIA (H): ICD-10-CM

## 2021-08-18 DIAGNOSIS — D70.1 CHEMOTHERAPY-INDUCED NEUTROPENIA (H): ICD-10-CM

## 2021-08-18 LAB
ALBUMIN SERPL-MCNC: 4 G/DL (ref 3.4–5)
ALP SERPL-CCNC: 87 U/L (ref 40–150)
ALT SERPL W P-5'-P-CCNC: 22 U/L (ref 0–70)
ANION GAP SERPL CALCULATED.3IONS-SCNC: 7 MMOL/L (ref 3–14)
AST SERPL W P-5'-P-CCNC: 12 U/L (ref 0–45)
BASOPHILS # BLD AUTO: 0.1 10E3/UL (ref 0–0.2)
BASOPHILS NFR BLD AUTO: 1 %
BILIRUB SERPL-MCNC: 0.6 MG/DL (ref 0.2–1.3)
BUN SERPL-MCNC: 18 MG/DL (ref 7–30)
CALCIUM SERPL-MCNC: 9 MG/DL (ref 8.5–10.1)
CHLORIDE BLD-SCNC: 107 MMOL/L (ref 94–109)
CO2 SERPL-SCNC: 27 MMOL/L (ref 20–32)
CREAT SERPL-MCNC: 0.71 MG/DL (ref 0.66–1.25)
EOSINOPHIL # BLD AUTO: 0.1 10E3/UL (ref 0–0.7)
EOSINOPHIL NFR BLD AUTO: 2 %
ERYTHROCYTE [DISTWIDTH] IN BLOOD BY AUTOMATED COUNT: 12.9 % (ref 10–15)
GFR SERPL CREATININE-BSD FRML MDRD: >90 ML/MIN/1.73M2
GLUCOSE BLD-MCNC: 98 MG/DL (ref 70–99)
HCT VFR BLD AUTO: 38.9 % (ref 40–53)
HGB BLD-MCNC: 13.1 G/DL (ref 13.3–17.7)
IMM GRANULOCYTES # BLD: 0 10E3/UL
IMM GRANULOCYTES NFR BLD: 0 %
LYMPHOCYTES # BLD AUTO: 0.5 10E3/UL (ref 0.8–5.3)
LYMPHOCYTES NFR BLD AUTO: 8 %
MCH RBC QN AUTO: 33.2 PG (ref 26.5–33)
MCHC RBC AUTO-ENTMCNC: 33.7 G/DL (ref 31.5–36.5)
MCV RBC AUTO: 99 FL (ref 78–100)
MONOCYTES # BLD AUTO: 0.9 10E3/UL (ref 0–1.3)
MONOCYTES NFR BLD AUTO: 14 %
NEUTROPHILS # BLD AUTO: 4.8 10E3/UL (ref 1.6–8.3)
NEUTROPHILS NFR BLD AUTO: 75 %
NRBC # BLD AUTO: 0 10E3/UL
NRBC BLD AUTO-RTO: 0 /100
PLATELET # BLD AUTO: 203 10E3/UL (ref 150–450)
POTASSIUM BLD-SCNC: 4.5 MMOL/L (ref 3.4–5.3)
PROT SERPL-MCNC: 7.7 G/DL (ref 6.8–8.8)
RBC # BLD AUTO: 3.95 10E6/UL (ref 4.4–5.9)
SODIUM SERPL-SCNC: 141 MMOL/L (ref 133–144)
TOTAL PROTEIN SERUM FOR ELP: 7.4 G/DL (ref 6.8–8.8)
WBC # BLD AUTO: 6.5 10E3/UL (ref 4–11)

## 2021-08-18 PROCEDURE — 250N000013 HC RX MED GY IP 250 OP 250 PS 637: Performed by: INTERNAL MEDICINE

## 2021-08-18 PROCEDURE — 96376 TX/PRO/DX INJ SAME DRUG ADON: CPT

## 2021-08-18 PROCEDURE — 250N000011 HC RX IP 250 OP 636: Performed by: INTERNAL MEDICINE

## 2021-08-18 PROCEDURE — 85025 COMPLETE CBC W/AUTO DIFF WBC: CPT | Performed by: INTERNAL MEDICINE

## 2021-08-18 PROCEDURE — 96409 CHEMO IV PUSH SNGL DRUG: CPT

## 2021-08-18 PROCEDURE — 82040 ASSAY OF SERUM ALBUMIN: CPT | Performed by: INTERNAL MEDICINE

## 2021-08-18 PROCEDURE — 96366 THER/PROPH/DIAG IV INF ADDON: CPT

## 2021-08-18 PROCEDURE — 36415 COLL VENOUS BLD VENIPUNCTURE: CPT | Performed by: INTERNAL MEDICINE

## 2021-08-18 PROCEDURE — 250N000011 HC RX IP 250 OP 636: Performed by: NURSE PRACTITIONER

## 2021-08-18 PROCEDURE — 84165 PROTEIN E-PHORESIS SERUM: CPT | Mod: 26 | Performed by: STUDENT IN AN ORGANIZED HEALTH CARE EDUCATION/TRAINING PROGRAM

## 2021-08-18 PROCEDURE — 258N000003 HC RX IP 258 OP 636: Performed by: INTERNAL MEDICINE

## 2021-08-18 PROCEDURE — 250N000009 HC RX 250: Performed by: INTERNAL MEDICINE

## 2021-08-18 PROCEDURE — 84165 PROTEIN E-PHORESIS SERUM: CPT | Mod: TC | Performed by: STUDENT IN AN ORGANIZED HEALTH CARE EDUCATION/TRAINING PROGRAM

## 2021-08-18 PROCEDURE — 82784 ASSAY IGA/IGD/IGG/IGM EACH: CPT | Performed by: INTERNAL MEDICINE

## 2021-08-18 PROCEDURE — 96375 TX/PRO/DX INJ NEW DRUG ADDON: CPT

## 2021-08-18 PROCEDURE — 85810 BLOOD VISCOSITY EXAMINATION: CPT | Performed by: INTERNAL MEDICINE

## 2021-08-18 PROCEDURE — 84155 ASSAY OF PROTEIN SERUM: CPT | Performed by: INTERNAL MEDICINE

## 2021-08-18 PROCEDURE — 96367 TX/PROPH/DG ADDL SEQ IV INF: CPT

## 2021-08-18 PROCEDURE — 83883 ASSAY NEPHELOMETRY NOT SPEC: CPT | Performed by: INTERNAL MEDICINE

## 2021-08-18 RX ORDER — ONDANSETRON 2 MG/ML
8 INJECTION INTRAMUSCULAR; INTRAVENOUS ONCE
Status: COMPLETED | OUTPATIENT
Start: 2021-08-18 | End: 2021-08-18

## 2021-08-18 RX ORDER — ACETAMINOPHEN 325 MG/1
650 TABLET ORAL ONCE
Status: COMPLETED | OUTPATIENT
Start: 2021-08-18 | End: 2021-08-18

## 2021-08-18 RX ORDER — DIPHENHYDRAMINE HYDROCHLORIDE 50 MG/ML
50 INJECTION INTRAMUSCULAR; INTRAVENOUS ONCE
Status: COMPLETED | OUTPATIENT
Start: 2021-08-18 | End: 2021-08-18

## 2021-08-18 RX ORDER — DIPHENHYDRAMINE HYDROCHLORIDE 50 MG/ML
50 INJECTION INTRAMUSCULAR; INTRAVENOUS
Status: COMPLETED | OUTPATIENT
Start: 2021-08-18 | End: 2021-08-18

## 2021-08-18 RX ORDER — METHYLPREDNISOLONE SODIUM SUCCINATE 125 MG/2ML
125 INJECTION, POWDER, LYOPHILIZED, FOR SOLUTION INTRAMUSCULAR; INTRAVENOUS
Status: DISCONTINUED | OUTPATIENT
Start: 2021-08-18 | End: 2021-08-18 | Stop reason: HOSPADM

## 2021-08-18 RX ORDER — ONDANSETRON 2 MG/ML
4 INJECTION INTRAMUSCULAR; INTRAVENOUS ONCE
Status: DISCONTINUED | OUTPATIENT
Start: 2021-08-18 | End: 2021-08-18 | Stop reason: HOSPADM

## 2021-08-18 RX ADMIN — DEXAMETHASONE SODIUM PHOSPHATE: 10 INJECTION, SOLUTION INTRAMUSCULAR; INTRAVENOUS at 09:05

## 2021-08-18 RX ADMIN — BENDAMUSTINE HYDROCHLORIDE 200 MG: 25 INJECTION, SOLUTION INTRAVENOUS at 16:41

## 2021-08-18 RX ADMIN — DIPHENHYDRAMINE HYDROCHLORIDE 50 MG: 50 INJECTION, SOLUTION INTRAMUSCULAR; INTRAVENOUS at 12:09

## 2021-08-18 RX ADMIN — METHYLPREDNISOLONE SODIUM SUCCINATE 125 MG: 125 INJECTION, POWDER, FOR SOLUTION INTRAMUSCULAR; INTRAVENOUS at 12:05

## 2021-08-18 RX ADMIN — RITUXIMAB-ABBS 800 MG: 10 INJECTION, SOLUTION INTRAVENOUS at 09:53

## 2021-08-18 RX ADMIN — DIPHENHYDRAMINE HYDROCHLORIDE 50 MG: 50 INJECTION, SOLUTION INTRAMUSCULAR; INTRAVENOUS at 09:23

## 2021-08-18 RX ADMIN — ONDANSETRON 8 MG: 2 INJECTION INTRAMUSCULAR; INTRAVENOUS at 16:38

## 2021-08-18 RX ADMIN — SODIUM CHLORIDE 250 ML: 9 INJECTION, SOLUTION INTRAVENOUS at 09:02

## 2021-08-18 RX ADMIN — SODIUM CHLORIDE 250 ML: 9 INJECTION, SOLUTION INTRAVENOUS at 12:01

## 2021-08-18 RX ADMIN — ACETAMINOPHEN 650 MG: 325 TABLET, FILM COATED ORAL at 09:07

## 2021-08-18 RX ADMIN — FAMOTIDINE 20 MG: 10 INJECTION, SOLUTION INTRAVENOUS at 12:21

## 2021-08-18 ASSESSMENT — PAIN SCALES - GENERAL: PAINLEVEL: NO PAIN (0)

## 2021-08-18 NOTE — PROGRESS NOTES
Infusion Nursing Note:  Ag Evans presents today for C2D1 riTUXimab-abbs 1st dose, Bendamustine.    Patient seen by provider today: No   present during visit today: Not Applicable.    Note: Patient oriented to room, bathroom and call light. Reviewed plan of care for today and premeds. Questions answered. Verbalizes understanding. .    1157-Truxima stopped. Rate was at 200 ml/hr and 412 ml volume infused.  NS started. Patient c/o itching, chest tightness, ears pulsating. Hives noted on chest/ upper back with redness.Emergency meds given and VS monitored. No trouble swallowing. Tightness with deep breathes.   1237- [place] [facility] [place] and DR. Hernandez out. Talked with April who spoke with Maggy MERINO CNP- She asked for someone is Special[facility] clinic to see him. 1246-I talked with Brendon, she will send someone up.1312-Radha Flores CNP here to see patient.     1300-Patient hives, redness disappeared. Denies chest tightness. Breathing well. VSS. No complaints.   Plan to rechallenge at a very slow rate. Restarted at 50ml/hr at 1319. Talked with pharmacy- Plan to increase by 25 ml/hr every 30 min. Radha stated she did not want him up to 200 ml/hr, so will titrate to patient tolerance and monitor. Wife called and patient updated her.     1557-c/o little itching on neck. No hives or rash noted. No other complaints.   Patient only able to receive 675 ml of Truxima due to time and needing to give Bendamustine.   Order given to give Zofran this afternoon since given 7 hours ago.   Intravenous Access:  Peripheral IV placed.    Treatment Conditions:  Lab Results   Component Value Date    HGB 13.1 08/18/2021    HGB 12.7 06/10/2021     Lab Results   Component Value Date    WBC 6.5 08/18/2021    WBC 7.5 06/10/2021      Lab Results   Component Value Date    ANEU 5.2 06/10/2021     Lab Results   Component Value Date     08/18/2021     06/10/2021      Lab Results   Component Value Date      08/18/2021     06/03/2021                   Lab Results   Component Value Date    POTASSIUM 4.5 08/18/2021    POTASSIUM 4.5 06/03/2021           No results found for: MAG         Lab Results   Component Value Date    CR 0.71 08/18/2021    CR 0.90 06/03/2021                   Lab Results   Component Value Date    TANNA 9.0 08/18/2021    TANNA 9.4 06/03/2021                Lab Results   Component Value Date    BILITOTAL 0.6 08/18/2021    BILITOTAL 0.5 06/03/2021           Lab Results   Component Value Date    ALBUMIN 4.0 08/18/2021    ALBUMIN 4.1 06/03/2021                    Lab Results   Component Value Date    ALT 22 08/18/2021    ALT 21 06/03/2021           Lab Results   Component Value Date    AST 12 08/18/2021    AST 18 06/03/2021       Results reviewed, labs MET treatment parameters, ok to proceed with treatment.      Post Infusion Assessment:  Patient tolerated Bendamuatine  infusion without incident.  Site patent and intact, free from redness, edema or discomfort.  No evidence of extravasations.  Access discontinued per protocol.   Instructed patient to not take zofran at home until after 12:30 am if needed. He can take either compazine or ativan.  Patient had no complaints upon discharge.     Discharge Plan:   Discharge instructions reviewed with: Patient and Family.  Patient and/or family verbalized understanding of discharge instructions and all questions answered.  Patient discharged in stable condition accompanied by: self and wife.  Departure Mode: Ambulatory.      Mare Gupta RN

## 2021-08-19 ENCOUNTER — INFUSION THERAPY VISIT (OUTPATIENT)
Dept: INFUSION THERAPY | Facility: CLINIC | Age: 71
End: 2021-08-19
Attending: INTERNAL MEDICINE
Payer: COMMERCIAL

## 2021-08-19 VITALS
TEMPERATURE: 97.2 F | SYSTOLIC BLOOD PRESSURE: 122 MMHG | BODY MASS INDEX: 27.96 KG/M2 | WEIGHT: 199.7 LBS | RESPIRATION RATE: 16 BRPM | DIASTOLIC BLOOD PRESSURE: 64 MMHG | HEIGHT: 71 IN | OXYGEN SATURATION: 98 % | HEART RATE: 49 BPM

## 2021-08-19 DIAGNOSIS — T45.1X5A CHEMOTHERAPY-INDUCED NEUTROPENIA (H): ICD-10-CM

## 2021-08-19 DIAGNOSIS — D70.1 CHEMOTHERAPY-INDUCED NEUTROPENIA (H): ICD-10-CM

## 2021-08-19 DIAGNOSIS — C83.00 MALIGNANT LYMPHOMA, LYMPHOPLASMACYTIC (H): Primary | ICD-10-CM

## 2021-08-19 LAB
ALBUMIN SERPL ELPH-MCNC: 4.3 G/DL (ref 3.7–5.1)
ALPHA1 GLOB SERPL ELPH-MCNC: 0.3 G/DL (ref 0.2–0.4)
ALPHA2 GLOB SERPL ELPH-MCNC: 0.7 G/DL (ref 0.5–0.9)
B-GLOBULIN SERPL ELPH-MCNC: 0.7 G/DL (ref 0.6–1)
GAMMA GLOB SERPL ELPH-MCNC: 1.5 G/DL (ref 0.7–1.6)
IGM SERPL-MCNC: 1184 MG/DL (ref 35–242)
KAPPA LC FREE SER-MCNC: 1.54 MG/DL (ref 0.33–1.94)
KAPPA LC FREE/LAMBDA FREE SER NEPH: 1.18 {RATIO} (ref 0.26–1.65)
LAMBDA LC FREE SERPL-MCNC: 1.31 MG/DL (ref 0.57–2.63)
M PROTEIN SERPL ELPH-MCNC: 0.7 G/DL
PROT PATTERN SERPL ELPH-IMP: ABNORMAL
VISC SER: 1.8 CP (ref 1.4–1.8)

## 2021-08-19 PROCEDURE — 96372 THER/PROPH/DIAG INJ SC/IM: CPT | Performed by: INTERNAL MEDICINE

## 2021-08-19 PROCEDURE — 258N000003 HC RX IP 258 OP 636: Performed by: INTERNAL MEDICINE

## 2021-08-19 PROCEDURE — 96367 TX/PROPH/DG ADDL SEQ IV INF: CPT

## 2021-08-19 PROCEDURE — 96377 APPLICATON ON-BODY INJECTOR: CPT | Mod: XS | Performed by: INTERNAL MEDICINE

## 2021-08-19 PROCEDURE — 96409 CHEMO IV PUSH SNGL DRUG: CPT

## 2021-08-19 PROCEDURE — 250N000011 HC RX IP 250 OP 636: Performed by: INTERNAL MEDICINE

## 2021-08-19 RX ADMIN — PEGFILGRASTIM 6 MG: KIT SUBCUTANEOUS at 14:23

## 2021-08-19 RX ADMIN — BENDAMUSTINE HYDROCHLORIDE 200 MG: 25 INJECTION, SOLUTION INTRAVENOUS at 14:05

## 2021-08-19 RX ADMIN — DEXAMETHASONE SODIUM PHOSPHATE: 10 INJECTION, SOLUTION INTRAMUSCULAR; INTRAVENOUS at 13:30

## 2021-08-19 RX ADMIN — SODIUM CHLORIDE 250 ML: 9 INJECTION, SOLUTION INTRAVENOUS at 13:29

## 2021-08-19 ASSESSMENT — PAIN SCALES - GENERAL: PAINLEVEL: NO PAIN (0)

## 2021-08-19 ASSESSMENT — MIFFLIN-ST. JEOR: SCORE: 1677.96

## 2021-08-19 NOTE — PROGRESS NOTES
Infusion Nursing Note:  Ag Evans presents today for C2 D2 Bendamustine.    Patient seen by provider today: No   present during visit today: Not Applicable.    Note: Patient states he feels really good today. Slept good, has good appetite still and no other symptoms at this time.      Intravenous Access:  Peripheral IV placed.    Treatment Conditions:  Lab Results   Component Value Date    HGB 13.1 08/18/2021    HGB 12.7 06/10/2021     Lab Results   Component Value Date    WBC 6.5 08/18/2021    WBC 7.5 06/10/2021      Lab Results   Component Value Date    ANEU 5.2 06/10/2021     Lab Results   Component Value Date     08/18/2021     06/10/2021      Lab Results   Component Value Date     08/18/2021     06/03/2021                   Lab Results   Component Value Date    POTASSIUM 4.5 08/18/2021    POTASSIUM 4.5 06/03/2021           No results found for: MAG         Lab Results   Component Value Date    CR 0.71 08/18/2021    CR 0.90 06/03/2021                   Lab Results   Component Value Date    TANNA 9.0 08/18/2021    TANNA 9.4 06/03/2021                Lab Results   Component Value Date    BILITOTAL 0.6 08/18/2021    BILITOTAL 0.5 06/03/2021           Lab Results   Component Value Date    ALBUMIN 4.0 08/18/2021    ALBUMIN 4.1 06/03/2021                    Lab Results   Component Value Date    ALT 22 08/18/2021    ALT 21 06/03/2021           Lab Results   Component Value Date    AST 12 08/18/2021    AST 18 06/03/2021           Post Infusion Assessment:  Patient tolerated infusion without incident.  Patient observed for 15 minutes post infusion per protocol.  Blood return noted pre and post infusion.  Site patent and intact, free from redness, edema or discomfort.  No evidence of extravasations.  Access discontinued per protocol.    Your On-body Neulasta injector was applied today at 1430.  The injection will start 27 hours after application, at 1730 tomorrow.  Note: the medication  will be delivered over 45 minutes.  Before removing the injector, check to see that the light is either solid green or off and that the indicator line is on empty.  If there is a red light on the injector at any time or wetness on the dressing or under the injector after removal, you must report this information.   Call [place]: [phone] during business hours.  Please refer to the written information given to you for additional information on the injector. .       Discharge Plan:   Discharge instructions reviewed with: Patient.  Patient and/or family verbalized understanding of discharge instructions and all questions answered.  Patient discharged in stable condition accompanied by: self.  Departure Mode: Ambulatory.      Audrey Levi RN

## 2021-08-26 ENCOUNTER — OFFICE VISIT (OUTPATIENT)
Dept: NEUROLOGY | Facility: CLINIC | Age: 71
End: 2021-08-26
Payer: COMMERCIAL

## 2021-08-26 ENCOUNTER — TELEPHONE (OUTPATIENT)
Dept: ONCOLOGY | Facility: CLINIC | Age: 71
End: 2021-08-26

## 2021-08-26 VITALS
WEIGHT: 199 LBS | HEART RATE: 58 BPM | BODY MASS INDEX: 27.75 KG/M2 | DIASTOLIC BLOOD PRESSURE: 68 MMHG | SYSTOLIC BLOOD PRESSURE: 136 MMHG

## 2021-08-26 DIAGNOSIS — G62.9 PERIPHERAL POLYNEUROPATHY: ICD-10-CM

## 2021-08-26 DIAGNOSIS — G62.9 NEUROPATHY: ICD-10-CM

## 2021-08-26 DIAGNOSIS — R79.9 ABNORMAL FINDING OF BLOOD CHEMISTRY, UNSPECIFIED: ICD-10-CM

## 2021-08-26 LAB — HBA1C MFR BLD: 5.8 % (ref 0–5.6)

## 2021-08-26 PROCEDURE — 99204 OFFICE O/P NEW MOD 45 MIN: CPT | Performed by: INTERNAL MEDICINE

## 2021-08-26 PROCEDURE — 83036 HEMOGLOBIN GLYCOSYLATED A1C: CPT | Performed by: INTERNAL MEDICINE

## 2021-08-26 PROCEDURE — 99000 SPECIMEN HANDLING OFFICE-LAB: CPT | Performed by: INTERNAL MEDICINE

## 2021-08-26 PROCEDURE — 83516 IMMUNOASSAY NONANTIBODY: CPT | Mod: 90 | Performed by: INTERNAL MEDICINE

## 2021-08-26 PROCEDURE — 36415 COLL VENOUS BLD VENIPUNCTURE: CPT | Performed by: INTERNAL MEDICINE

## 2021-08-26 RX ORDER — ALBUTEROL SULFATE 90 UG/1
1-2 AEROSOL, METERED RESPIRATORY (INHALATION)
Status: CANCELLED
Start: 2021-09-16

## 2021-08-26 RX ORDER — METHYLPREDNISOLONE SODIUM SUCCINATE 125 MG/2ML
125 INJECTION, POWDER, LYOPHILIZED, FOR SOLUTION INTRAMUSCULAR; INTRAVENOUS
Status: CANCELLED
Start: 2021-09-15

## 2021-08-26 RX ORDER — ACETAMINOPHEN 325 MG/1
650 TABLET ORAL ONCE
Status: CANCELLED
Start: 2021-09-15

## 2021-08-26 RX ORDER — HEPARIN SODIUM (PORCINE) LOCK FLUSH IV SOLN 100 UNIT/ML 100 UNIT/ML
5 SOLUTION INTRAVENOUS
Status: CANCELLED | OUTPATIENT
Start: 2021-09-15

## 2021-08-26 RX ORDER — MEPERIDINE HYDROCHLORIDE 25 MG/ML
25 INJECTION INTRAMUSCULAR; INTRAVENOUS; SUBCUTANEOUS EVERY 30 MIN PRN
Status: CANCELLED | OUTPATIENT
Start: 2021-09-15

## 2021-08-26 RX ORDER — MEPERIDINE HYDROCHLORIDE 25 MG/ML
25 INJECTION INTRAMUSCULAR; INTRAVENOUS; SUBCUTANEOUS EVERY 30 MIN PRN
Status: CANCELLED | OUTPATIENT
Start: 2021-09-16

## 2021-08-26 RX ORDER — GABAPENTIN 300 MG/1
300 CAPSULE ORAL 3 TIMES DAILY
Qty: 90 CAPSULE | Refills: 3 | Status: SHIPPED | OUTPATIENT
Start: 2021-08-26 | End: 2021-11-30

## 2021-08-26 RX ORDER — HEPARIN SODIUM,PORCINE 10 UNIT/ML
5 VIAL (ML) INTRAVENOUS
Status: CANCELLED | OUTPATIENT
Start: 2021-09-16

## 2021-08-26 RX ORDER — MEPERIDINE HYDROCHLORIDE 25 MG/ML
25 INJECTION INTRAMUSCULAR; INTRAVENOUS; SUBCUTANEOUS
Status: CANCELLED
Start: 2021-09-15

## 2021-08-26 RX ORDER — ALBUTEROL SULFATE 0.83 MG/ML
2.5 SOLUTION RESPIRATORY (INHALATION)
Status: CANCELLED | OUTPATIENT
Start: 2021-09-15

## 2021-08-26 RX ORDER — ALBUTEROL SULFATE 0.83 MG/ML
2.5 SOLUTION RESPIRATORY (INHALATION)
Status: CANCELLED | OUTPATIENT
Start: 2021-09-16

## 2021-08-26 RX ORDER — DIPHENHYDRAMINE HYDROCHLORIDE 50 MG/ML
50 INJECTION INTRAMUSCULAR; INTRAVENOUS
Status: CANCELLED
Start: 2021-09-16

## 2021-08-26 RX ORDER — ALBUTEROL SULFATE 90 UG/1
1-2 AEROSOL, METERED RESPIRATORY (INHALATION)
Status: CANCELLED
Start: 2021-09-15

## 2021-08-26 RX ORDER — HEPARIN SODIUM (PORCINE) LOCK FLUSH IV SOLN 100 UNIT/ML 100 UNIT/ML
5 SOLUTION INTRAVENOUS
Status: CANCELLED | OUTPATIENT
Start: 2021-09-16

## 2021-08-26 RX ORDER — NALOXONE HYDROCHLORIDE 0.4 MG/ML
0.2 INJECTION, SOLUTION INTRAMUSCULAR; INTRAVENOUS; SUBCUTANEOUS
Status: CANCELLED | OUTPATIENT
Start: 2021-09-15

## 2021-08-26 RX ORDER — HEPARIN SODIUM,PORCINE 10 UNIT/ML
5 VIAL (ML) INTRAVENOUS
Status: CANCELLED | OUTPATIENT
Start: 2021-09-15

## 2021-08-26 RX ORDER — METHYLPREDNISOLONE SODIUM SUCCINATE 125 MG/2ML
125 INJECTION, POWDER, LYOPHILIZED, FOR SOLUTION INTRAMUSCULAR; INTRAVENOUS
Status: CANCELLED
Start: 2021-09-16

## 2021-08-26 RX ORDER — DIPHENHYDRAMINE HCL 25 MG
50 CAPSULE ORAL ONCE
Status: CANCELLED
Start: 2021-09-15

## 2021-08-26 RX ORDER — EPINEPHRINE 1 MG/ML
0.3 INJECTION, SOLUTION INTRAMUSCULAR; SUBCUTANEOUS EVERY 5 MIN PRN
Status: CANCELLED | OUTPATIENT
Start: 2021-09-16

## 2021-08-26 RX ORDER — DIPHENHYDRAMINE HYDROCHLORIDE 50 MG/ML
50 INJECTION INTRAMUSCULAR; INTRAVENOUS
Status: CANCELLED
Start: 2021-09-15

## 2021-08-26 RX ORDER — NALOXONE HYDROCHLORIDE 0.4 MG/ML
0.2 INJECTION, SOLUTION INTRAMUSCULAR; INTRAVENOUS; SUBCUTANEOUS
Status: CANCELLED | OUTPATIENT
Start: 2021-09-16

## 2021-08-26 RX ORDER — EPINEPHRINE 1 MG/ML
0.3 INJECTION, SOLUTION INTRAMUSCULAR; SUBCUTANEOUS EVERY 5 MIN PRN
Status: CANCELLED | OUTPATIENT
Start: 2021-09-15

## 2021-08-26 ASSESSMENT — PAIN SCALES - GENERAL: PAINLEVEL: MODERATE PAIN (4)

## 2021-08-26 NOTE — LETTER
8/26/2021         RE: Ag Evans  [address] [address] [place] [id number]        Dear Colleague,    Thank you for referring your patient, Ag Evans, to the [facility] [place]. Please see a copy of my visit note below.    [facility] Neurology Consultation    Ag Evans MRN# [id number]   Age: 71 year old YOB: 1950     Requesting physician: Jose Schmidt     Reason for Consultation: numbness in the extremities      History of Presenting Symptoms:   Ag Evans is a 71 year old male who presents today for evaluation of numbness in the extremities.    About 2 years he developed a numb feeling in the toes that spread into the feet. About 6 months ago he developed numbness in the tips of his fingers. Things continue to slowly get worse.He has some pins and needles sensation in the feet and hands, but it is more a lack of sensation. He denies significant changes with his balance. He still bikes, golfs, plays pickle balls. He hasn't noticed significant tremor. No low back pain that shoots down the legs.     He is on gabapentin 100 mg BID, which possibly mildly helps. He denies side effects.     He was found to have Waldenstrom's Lymphoma and was started on Rituximab/Bendamustine in July. There is plan for 4 infusions monthly. He hasn't noticed a significant change with numbness since starting therapy.        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)     Malignant lymphoma, lymphoplasmacytic (H)     Chemotherapy-induced neutropenia (H)     Past Medical History:   Diagnosis Date      DJD (degenerative joint disease), lumbar      Malignant lymphoma, lymphoplasmacytic (H) 7/1/2021     Sinus bradycardia         Past Surgical History:     Past Surgical History:   Procedure Laterality Date     APPENDECTOMY       BIOPSY  mass right side     BONE MARROW BIOPSY, BONE SPECIMEN, NEEDLE/TROCAR N/A 6/10/2021    Procedure: BIOPSY, BONE MARROW;  Surgeon: Josephine Santamaria MD;  Location:  GI     COLONOSCOPY  01/01/2010    benign with hyperplastic polyps     [place] TOTAL HIP ARTHROPLASTY Left 04/02/2012    Dr Roderick Santos     [place] TOTAL HIP ARTHROPLASTY Right 04/15/2004    Dr. Jose Martinez     SURGICAL HISTORY OF -       Removal of a benign soft tissue mass right abdominal wall     TONSILLECTOMY          Social History:     Social History     Tobacco Use     Smoking status: Never Smoker     Smokeless tobacco: Never Used   Substance Use Topics     Alcohol use: Yes     Drug use: No        Family History:     Family History   Problem Relation Age of Onset     Prostate Cancer Father      Other Cancer Father         stomach     Other Cancer Mother      Breast Cancer Mother      Other Cancer Sister         Pancreatic cancer     Breast Cancer Sister      Osteoporosis Sister         from cancer drug?     Other Cancer Sister         pancreatic        Medications:     Current Outpatient Medications   Medication Sig     atorvastatin (LIPITOR) 20 MG tablet Take 1 tablet (20 mg) by mouth daily     fluticasone (FLONASE) 50 MCG/ACT spray Spray 1 spray into both nostrils daily     gabapentin (NEURONTIN) 100 MG capsule 1 a day at night for a week then 2 a day at night.     ginkgo biloba 60 MG CAPS capsule Take 30 mg by mouth daily     LORazepam (ATIVAN) 0.5 MG tablet Take 1 tablet (0.5 mg) by mouth every 4 hours as needed (Anxiety, Nausea/Vomiting or Sleep)     melatonin 5 MG tablet      Multiple Vitamins-Minerals (MULTIVITAMIN PO)      NEW MED NAC     ondansetron (ZOFRAN) 8 MG tablet Take 1 tablet (8 mg) by mouth every 8  hours as needed (Nausea/Vomiting)     prochlorperazine (COMPAZINE) 10 MG tablet Take 0.5 tablets (5 mg) by mouth every 8 hours as needed (Nausea/Vomiting)     saw palmetto 450 MG CAPS capsule Take 450 mg by mouth daily     selenium 50 MCG TABS tablet Take 50 mcg by mouth daily     senna-docusate (SENOKOT-S/PERICOLACE) 8.6-50 MG tablet Take 1 tablet by mouth daily     SF 5000 PLUS 1.1 % CREA USE TOOTH PASTE TWICE DAILY AS DIRECTED. NOTHING BY MOUTH FOR 30 MINUTES AFTER USE     Turmeric (QC TUMERIC COMPLEX) 500 MG CAPS      vitamin E 400 units TABS Take 400 Units by mouth daily     No current facility-administered medications for this visit.        Allergies:     Allergies   Allergen Reactions     Seasonal Allergies         Review of Systems:   As above     Physical Exam:   Vitals: /68 (BP Location: Right arm, Patient Position: Sitting, Cuff Size: Adult Regular)   Pulse 58   Wt 90.3 kg (199 lb)   BMI 27.75 kg/m     General: Seated comfortably in no acute distress.  Lungs: breathing comfortably  Extremities: no edema  Skin: No rashes  Neurologic:     Mental Status: Fully alert, attentive. Normal memory and fund of knowledge. Language normal, speech clear and fluent, no paraphasic errors.      Cranial Nerves: Visual fields intact. PERRL. EOMI with normal smooth pursuit. Facial sensation intact/symmetric. Facial movements symmetric. Hearing not formally tested but intact to conversation. Palate elevation symmetric, uvula midline. No dysarthria. Shoulder shrug strong bilaterally. Tongue protrusion midline.     Motor: Very mild postural and action tremor in bilateral upper extremities, otherwise no tremor. Muscle tone normal throughout.  Normal/symmetric rapid finger tapping. Strength 5/5 throughout upper and lower extremities.     Deep Tendon Reflexes: 2+/symmetric throughout upper and lower extremities with exception of 1+ ankle jerks. Toes downgoing bilaterally.     Sensory: Absent vibration in bilateral great  toes, ~8 seconds in bilateral ankles, 11 seconds in right thumb and 15 seconds in left thumb. Decreased temperature sensation in left foot, otherwise intact/symmetric to light touch, pinprick, temperature, and proprioception throughout upper and lower extremities. Negative Romberg.      Coordination: Finger-nose-finger and heel-shin intact without dysmetria. Rapid alternating movements intact/symmetric with normal speed and rhythm.     Gait: Normal, steady casual gait. Able to walk on toes without difficulty. Mildly ataxic and heel and tandem gaits.          Data: Pertinent prior to visit   Imaging:  None    Procedures:  None    Laboratory:  CBC/CMP unremarkable, Monoclonal protein 0.7 IgM Kappa  B12 678 (4/2021)         Assessment and Plan:   Assessment:  Ag Evans is a 71 year old male who presents today for evaluation of numbness in the extremities. Symptoms started about 2 years ago. He was recently diagnosed with Waldenstrom's lymphoma and started on treatment in July. Examination shows mild sensory ataxia suggestive of peripheral neuropathy. He has an IgM Kappra monoclonal protein. I suspect neuropathy is likely directly or indirectly related to Waldenstrom's. B12 was previously normal. We discussed checking MAG and SGPG antibodies given association with IgM neuropathy. We will also check A1c to complete neuropathy work-up. EMG ordered to better define neuropathy subtype. Gabapentin dosage increased as below.      Plan:  - EMG (1 arm and leg)  - A1c, MAG/SGPG Abs  - Increase Gabapentin to 300 mg TID; call with side effects of lack of efficacy    Follow up in Neurology clinic in 3 months or earlier as needed should new concerns arise.    Sharad Ruiz MD   of Neurology  [facility]      The total time of this encounter today amounted to 52 minutes. This time included time spent with the patient, prep work, ordering tests, and performing post visit documentation.        Again,  thank you for allowing me to participate in the care of your patient.        Sincerely,        Sharad Ruiz MD

## 2021-08-26 NOTE — PROGRESS NOTES
[facility] Neurology Consultation    Ag Evans MRN# [id number]   Age: 71 year old YOB: 1950     Requesting physician: Jose Schmidt     Reason for Consultation: numbness in the extremities      History of Presenting Symptoms:   Ag Evans is a 71 year old male who presents today for evaluation of numbness in the extremities.    About 2 years he developed a numb feeling in the toes that spread into the feet. About 6 months ago he developed numbness in the tips of his fingers. Things continue to slowly get worse.He has some pins and needles sensation in the feet and hands, but it is more a lack of sensation. He denies significant changes with his balance. He still bikes, golfs, plays pickle balls. He hasn't noticed significant tremor. No low back pain that shoots down the legs.     He is on gabapentin 100 mg BID, which possibly mildly helps. He denies side effects.     He was found to have Waldenstrom's Lymphoma and was started on Rituximab/Bendamustine in July. There is plan for 4 infusions monthly. He hasn't noticed a significant change with numbness since starting therapy.        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)     Malignant lymphoma, lymphoplasmacytic (H)     Chemotherapy-induced neutropenia (H)     Past Medical History:   Diagnosis Date     DJD (degenerative joint disease), lumbar      Malignant lymphoma, lymphoplasmacytic (H) 7/1/2021     Sinus bradycardia         Past Surgical History:     Past Surgical History:   Procedure Laterality Date     APPENDECTOMY       BIOPSY  mass right side      BONE MARROW BIOPSY, BONE SPECIMEN, NEEDLE/TROCAR N/A 6/10/2021    Procedure: BIOPSY, BONE MARROW;  Surgeon: Josephine Santamaria MD;  Location:  GI     COLONOSCOPY  01/01/2010    benign with hyperplastic polyps     CT TOTAL HIP ARTHROPLASTY Left 04/02/2012    Dr Roderick Santos     CT TOTAL HIP ARTHROPLASTY Right 04/15/2004    Dr. Jose Martinez     SURGICAL HISTORY OF -       Removal of a benign soft tissue mass right abdominal wall     TONSILLECTOMY          Social History:     Social History     Tobacco Use     Smoking status: Never Smoker     Smokeless tobacco: Never Used   Substance Use Topics     Alcohol use: Yes     Drug use: No        Family History:     Family History   Problem Relation Age of Onset     Prostate Cancer Father      Other Cancer Father         stomach     Other Cancer Mother      Breast Cancer Mother      Other Cancer Sister         Pancreatic cancer     Breast Cancer Sister      Osteoporosis Sister         from cancer drug?     Other Cancer Sister         pancreatic        Medications:     Current Outpatient Medications   Medication Sig     atorvastatin (LIPITOR) 20 MG tablet Take 1 tablet (20 mg) by mouth daily     fluticasone (FLONASE) 50 MCG/ACT spray Spray 1 spray into both nostrils daily     gabapentin (NEURONTIN) 100 MG capsule 1 a day at night for a week then 2 a day at night.     ginkgo biloba 60 MG CAPS capsule Take 30 mg by mouth daily     LORazepam (ATIVAN) 0.5 MG tablet Take 1 tablet (0.5 mg) by mouth every 4 hours as needed (Anxiety, Nausea/Vomiting or Sleep)     melatonin 5 MG tablet      Multiple Vitamins-Minerals (MULTIVITAMIN PO)      NEW MED NAC     ondansetron (ZOFRAN) 8 MG tablet Take 1 tablet (8 mg) by mouth every 8 hours as needed (Nausea/Vomiting)     prochlorperazine (COMPAZINE) 10 MG tablet Take 0.5 tablets (5 mg) by mouth every 8 hours as needed (Nausea/Vomiting)     saw palmetto 450 MG CAPS capsule Take 450 mg by mouth daily     selenium 50 MCG TABS tablet Take  50 mcg by mouth daily     senna-docusate (SENOKOT-S/PERICOLACE) 8.6-50 MG tablet Take 1 tablet by mouth daily     SF 5000 PLUS 1.1 % CREA USE TOOTH PASTE TWICE DAILY AS DIRECTED. NOTHING BY MOUTH FOR 30 MINUTES AFTER USE     Turmeric (QC TUMERIC COMPLEX) 500 MG CAPS      vitamin E 400 units TABS Take 400 Units by mouth daily     No current facility-administered medications for this visit.        Allergies:     Allergies   Allergen Reactions     Seasonal Allergies         Review of Systems:   As above     Physical Exam:   Vitals: /68 (BP Location: Right arm, Patient Position: Sitting, Cuff Size: Adult Regular)   Pulse 58   Wt 90.3 kg (199 lb)   BMI 27.75 kg/m     General: Seated comfortably in no acute distress.  Lungs: breathing comfortably  Extremities: no edema  Skin: No rashes  Neurologic:     Mental Status: Fully alert, attentive. Normal memory and fund of knowledge. Language normal, speech clear and fluent, no paraphasic errors.      Cranial Nerves: Visual fields intact. PERRL. EOMI with normal smooth pursuit. Facial sensation intact/symmetric. Facial movements symmetric. Hearing not formally tested but intact to conversation. Palate elevation symmetric, uvula midline. No dysarthria. Shoulder shrug strong bilaterally. Tongue protrusion midline.     Motor: Very mild postural and action tremor in bilateral upper extremities, otherwise no tremor. Muscle tone normal throughout.  Normal/symmetric rapid finger tapping. Strength 5/5 throughout upper and lower extremities.     Deep Tendon Reflexes: 2+/symmetric throughout upper and lower extremities with exception of 1+ ankle jerks. Toes downgoing bilaterally.     Sensory: Absent vibration in bilateral great toes, ~8 seconds in bilateral ankles, 11 seconds in right thumb and 15 seconds in left thumb. Decreased temperature sensation in left foot, otherwise intact/symmetric to light touch, pinprick, temperature, and proprioception throughout upper and lower  extremities. Negative Romberg.      Coordination: Finger-nose-finger and heel-shin intact without dysmetria. Rapid alternating movements intact/symmetric with normal speed and rhythm.     Gait: Normal, steady casual gait. Able to walk on toes without difficulty. Mildly ataxic and heel and tandem gaits.          Data: Pertinent prior to visit   Imaging:  None    Procedures:  None    Laboratory:  CBC/CMP unremarkable, Monoclonal protein 0.7 IgM Kappa  B12 678 (4/2021)         Assessment and Plan:   Assessment:  Ag Evans is a 71 year old male who presents today for evaluation of numbness in the extremities. Symptoms started about 2 years ago. He was recently diagnosed with Waldenstrom's lymphoma and started on treatment in July. Examination shows mild sensory ataxia suggestive of peripheral neuropathy. He has an IgM Kappra monoclonal protein. I suspect neuropathy is likely directly or indirectly related to Waldenstrom's. B12 was previously normal. We discussed checking MAG and SGPG antibodies given association with IgM neuropathy. We will also check A1c to complete neuropathy work-up. EMG ordered to better define neuropathy subtype. Gabapentin dosage increased as below.      Plan:  - EMG (1 arm and leg)  - A1c, MAG/SGPG Abs  - Increase Gabapentin to 300 mg TID; call with side effects of lack of efficacy    Follow up in Neurology clinic in 3 months or earlier as needed should new concerns arise.    Sharad Ruiz MD   of Neurology  [facility]      The total time of this encounter today amounted to 52 minutes. This time included time spent with the patient, prep work, ordering tests, and performing post visit documentation.

## 2021-08-30 LAB
MAG IGM SER IA-ACNC: 133 TU
SGPG IGM SER-ACNC: 0.35 IV

## 2021-09-02 ENCOUNTER — DOCUMENTATION ONLY (OUTPATIENT)
Dept: ONCOLOGY | Facility: CLINIC | Age: 71
End: 2021-09-02

## 2021-09-13 ENCOUNTER — VIRTUAL VISIT (OUTPATIENT)
Dept: ONCOLOGY | Facility: CLINIC | Age: 71
End: 2021-09-13
Payer: COMMERCIAL

## 2021-09-13 VITALS — HEIGHT: 71 IN | BODY MASS INDEX: 27.86 KG/M2 | WEIGHT: 199 LBS

## 2021-09-13 DIAGNOSIS — G62.9 PERIPHERAL POLYNEUROPATHY: ICD-10-CM

## 2021-09-13 DIAGNOSIS — D70.1 CHEMOTHERAPY-INDUCED NEUTROPENIA (H): ICD-10-CM

## 2021-09-13 DIAGNOSIS — T45.1X5A CHEMOTHERAPY-INDUCED NEUTROPENIA (H): ICD-10-CM

## 2021-09-13 DIAGNOSIS — C83.00 MALIGNANT LYMPHOMA, LYMPHOPLASMACYTIC (H): Primary | ICD-10-CM

## 2021-09-13 DIAGNOSIS — I51.5 CARDIAC CALCIFICATION (H): ICD-10-CM

## 2021-09-13 PROCEDURE — 99214 OFFICE O/P EST MOD 30 MIN: CPT | Mod: 95 | Performed by: NURSE PRACTITIONER

## 2021-09-13 ASSESSMENT — MIFFLIN-ST. JEOR: SCORE: 1674.79

## 2021-09-13 ASSESSMENT — PAIN SCALES - GENERAL: PAINLEVEL: NO PAIN (0)

## 2021-09-13 NOTE — PROGRESS NOTES
Oncology Follow Up Visit: September 13, 2021     Oncologist: Dr Keerthi Hernandez  PCP: Jose Antonio    Diagnosis: Malignant lymphoma, lymphoplasmacytic  Ag Evans is a 72 yo male who presented with 2 year complaint of worsening peripheral neuropathy to feet and hands. In April 2021 was noted to have a monoclonal protein-IgM kappa on serum immunofixation electrophoresis.  M spike measured 1.1 g/dL in April 2021.  Serum IgM level was elevated at 1075 mg/dL.    Serum free kappa lambda light chain ratio was normal on Yudi 3, 2021.  Serum protein electrophoresis confirmed the presence of monoclonal IgM kappa protein.  M spike remained at 1.1 g/dL on Yudi 3, 2021.  Beta-2 microglobulin was normal.  CMP was unremarkable.  Serum viscosity was slightly elevated at 2.1.  CBC with differential counts was unremarkable.  Bone marrow biopsy on Yudi 10, 2021 with current anemia - hemoglobin of 12.7 g/dL and MCV was normal.  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  Flow cytometry showed findings suggestion of kappa skewing on B cells and rare to absent plasma cells. Otherwise bone marrow was hypercellular with normal trilineage hematopoiesis.  Cytogenetics was normal 46 XY.  Treatment:   7/19/2021 - plan with Bendamustine and will add Rituxan cycle 2.     Interval History: Mr. Evans is seen today for continuation of chemotherapy for treatment of his Lymphoblastic Lymphoma. - due for cycle 3 of Bendamustine and Rituxan. Pt reports noting fatigue that lingers after treatment. He has been able to continue to eat well and has not lost weight. He has also noted some dizziness at times and is trying to add fluids. Bowels are normal. Neuropathy is noted to feet and fingers.   Did see genetic counselor 7/15/2021 and will be having some testing in October.   Rest of comprehensive and complete ROS  "is reviewed and is negative.   Past Medical History:   Diagnosis Date     DJD (degenerative joint disease), lumbar      Malignant lymphoma, lymphoplasmacytic (H) 2021     Sinus bradycardia      Current Outpatient Medications   Medication     atorvastatin (LIPITOR) 20 MG tablet     fluticasone (FLONASE) 50 MCG/ACT spray     gabapentin (NEURONTIN) 300 MG capsule     ginkgo biloba 60 MG CAPS capsule     LORazepam (ATIVAN) 0.5 MG tablet     melatonin 5 MG tablet     Multiple Vitamins-Minerals (MULTIVITAMIN PO)     NEW MED     ondansetron (ZOFRAN) 8 MG tablet     prochlorperazine (COMPAZINE) 10 MG tablet     saw palmetto 450 MG CAPS capsule     selenium 50 MCG TABS tablet     SF 5000 PLUS 1.1 % CREA     vitamin E 400 units TABS     No current facility-administered medications for this visit.     Allergies   Allergen Reactions     Seasonal Allergies    FHX of malignancy- metastatic breast cancer at the age of 55. Sister had pancreatic cancer at 52, . Father  of stomach cancer at the age of 71. Another sister had breast cancer at the age of 81. He is of [place] descent on dad's side.  He is seen with his wife for this visit.    Physical Exam:Ht 1.803 m (5' 11\")   Wt 90.3 kg (199 lb)   BMI 27.75 kg/m     ECOG PS- 0  Constitutional: Alert, cooperative, and in no distress.   ENT: Eyes bright , No mouth sores  Neck: Supple, No adenopathy.  Cardiac: Heart rate and rhythm is regular and strong without murmur  Respiratory: Breathing easy. Lung sounds clear to auscultation  GI: Abdomen is soft, non-tender, BS normal. No masses or organomegaly  MS: Muscle tone normal, extremities normal with no edema.   Skin: No suspicious lesions or rashes  Neuro: Sensory grossly WNL. Neuropathy to fingertips and feet to ankle but gait is normal.   Lymph: Normal ant/post cervical, axillary, supraclavicular nodes  Psych: Mentation appears normal and affect normal/bright and smiling    Laboratory Results:   - will be completed at " infusion    Assessment and Plan:   Malignant lymphoma, lymphoplasmacytic- Pt is due to continue with Cycle 3 of Bendamustine and Rituxan- started with Bendamustine and will add the Rituxan with cycle 2 due to serum viscosity.  Pt will see provider prior to next cycle.   Plan is for bendamustine and Rituxan for 4 cycles.  Peripheral neuropathy-continues to feet to ankle and fingertips- stable.    Saw Dr Ruiz in August and is now using Neurontin 300 mg tid. Has EMg  Set up soon.   Family history of breast cancer and pancreatic cancer- saw Genetic counselor 7/15 and would like to proceed with genetic testing    Colon cancer screening-Previously scheduled for July 16 procedure however patient canceled procedure and will wait until chemotherapy treatment is completed.  The total time of this encounter amounted to 30 minutes. This time included face to face time spent with the patient, prep work, ordering tests, and performing post visit documentation.  Maggy Trent,Cnp

## 2021-09-13 NOTE — LETTER
9/13/2021         RE: Ag Evans  [address] [address] [place] [address]        Dear Colleague,    Thank you for referring your patient, Ag Evans, to the [facility]. Please see a copy of my visit note below.    Oncology Follow Up Visit: September 13, 2021     Oncologist: Dr Keerthi Hernandez  PCP: Jose Antonio    Diagnosis: Malignant lymphoma, lymphoplasmacytic  Ag Evans is a 70 yo male who presented with 2 year complaint of worsening peripheral neuropathy to feet and hands. In April 2021 was noted to have a monoclonal protein-IgM kappa on serum immunofixation electrophoresis.  M spike measured 1.1 g/dL in April 2021.  Serum IgM level was elevated at 1075 mg/dL.    Serum free kappa lambda light chain ratio was normal on Yudi 3, 2021.  Serum protein electrophoresis confirmed the presence of monoclonal IgM kappa protein.  M spike remained at 1.1 g/dL on Yudi 3, 2021.  Beta-2 microglobulin was normal.  CMP was unremarkable.  Serum viscosity was slightly elevated at 2.1.  CBC with differential counts was unremarkable.  Bone marrow biopsy on Yudi 10, 2021 with current anemia - hemoglobin of 12.7 g/dL and MCV was normal.  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  Flow cytometry showed findings suggestion of kappa skewing on B cells and rare to absent plasma cells. Otherwise bone marrow was hypercellular with normal trilineage hematopoiesis.  Cytogenetics was normal 46 XY.  Treatment:   7/19/2021 - plan with Bendamustine and will add Rituxan cycle 2.     Interval History: Mr. Evans is seen today for continuation of chemotherapy for treatment of his Lymphoblastic Lymphoma. - due for cycle 3 of Bendamustine and Rituxan. Pt reports noting fatigue that lingers after treatment. He has been able to continue to eat well and has not lost weight.  "He has also noted some dizziness at times and is trying to add fluids. Bowels are normal. Neuropathy is noted to feet and fingers.   Did see genetic counselor 7/15/2021 and will be having some testing in October.   Rest of comprehensive and complete ROS is reviewed and is negative.   Past Medical History:   Diagnosis Date     DJD (degenerative joint disease), lumbar      Malignant lymphoma, lymphoplasmacytic (H) 2021     Sinus bradycardia      Current Outpatient Medications   Medication     atorvastatin (LIPITOR) 20 MG tablet     fluticasone (FLONASE) 50 MCG/ACT spray     gabapentin (NEURONTIN) 300 MG capsule     ginkgo biloba 60 MG CAPS capsule     LORazepam (ATIVAN) 0.5 MG tablet     melatonin 5 MG tablet     Multiple Vitamins-Minerals (MULTIVITAMIN PO)     NEW MED     ondansetron (ZOFRAN) 8 MG tablet     prochlorperazine (COMPAZINE) 10 MG tablet     saw palmetto 450 MG CAPS capsule     selenium 50 MCG TABS tablet     SF 5000 PLUS 1.1 % CREA     vitamin E 400 units TABS     No current facility-administered medications for this visit.     Allergies   Allergen Reactions     Seasonal Allergies    FHX of malignancy- metastatic breast cancer at the age of 55. Sister had pancreatic cancer at 52, . Father  of stomach cancer at the age of 71. Another sister had breast cancer at the age of 81. He is of [place] descent on dad's side.  He is seen with his wife for this visit.    Physical Exam:Ht 1.803 m (5' 11\")   Wt 90.3 kg (199 lb)   BMI 27.75 kg/m     ECOG PS- 0  Constitutional: Alert, cooperative, and in no distress.   ENT: Eyes bright , No mouth sores  Neck: Supple, No adenopathy.  Cardiac: Heart rate and rhythm is regular and strong without murmur  Respiratory: Breathing easy. Lung sounds clear to auscultation  GI: Abdomen is soft, non-tender, BS normal. No masses or organomegaly  MS: Muscle tone normal, extremities normal with no edema.   Skin: No suspicious lesions or rashes  Neuro: Sensory grossly WNL. " Neuropathy to fingertips and feet to ankle but gait is normal.   Lymph: Normal ant/post cervical, axillary, supraclavicular nodes  Psych: Mentation appears normal and affect normal/bright and smiling    Laboratory Results:   - will be completed at infusion    Assessment and Plan:   Malignant lymphoma, lymphoplasmacytic- Pt is due to continue with Cycle 3 of Bendamustine and Rituxan- started with Bendamustine and will add the Rituxan with cycle 2 due to serum viscosity.  Pt will see provider prior to next cycle.   Plan is for bendamustine and Rituxan for 4 cycles.  Peripheral neuropathy-continues to feet to ankle and fingertips- stable.    Saw Dr Ruiz in August and is now using Neurontin 300 mg tid. Has EMg  Set up soon.   Family history of breast cancer and pancreatic cancer- saw Genetic counselor 7/15 and would like to proceed with genetic testing    Colon cancer screening-Previously scheduled for July 16 procedure however patient canceled procedure and will wait until chemotherapy treatment is completed.  The total time of this encounter amounted to 30 minutes. This time included face to face time spent with the patient, prep work, ordering tests, and performing post visit documentation.  Maggy Trent Cnp        Again, thank you for allowing me to participate in the care of your patient.        Sincerely,        Maggy Trent, SELWYN, APRN CNP

## 2021-09-13 NOTE — NURSING NOTE
Ag is a 71 year old who is being evaluated via a billable video visit.      How would you like to obtain your AVS? MyChart  If the video visit is dropped, the invitation should be resent by: Text to cell phone: [phone]  Will anyone else be joining your video visit? No      Video-Visit Details    Type of service:  Video Visit    Originating Location (pt. Location): Home    Distant Location (provider location):  [facility]     Platform used for Video Visit: [employer]     Concerns:  No concerns      Shala Mckenzie CMA

## 2021-09-15 ENCOUNTER — INFUSION THERAPY VISIT (OUTPATIENT)
Dept: INFUSION THERAPY | Facility: CLINIC | Age: 71
End: 2021-09-15
Attending: INTERNAL MEDICINE
Payer: COMMERCIAL

## 2021-09-15 ENCOUNTER — LAB (OUTPATIENT)
Dept: LAB | Facility: CLINIC | Age: 71
End: 2021-09-15
Payer: COMMERCIAL

## 2021-09-15 VITALS
WEIGHT: 197.4 LBS | TEMPERATURE: 96.8 F | HEART RATE: 62 BPM | RESPIRATION RATE: 16 BRPM | SYSTOLIC BLOOD PRESSURE: 143 MMHG | DIASTOLIC BLOOD PRESSURE: 77 MMHG | OXYGEN SATURATION: 98 % | BODY MASS INDEX: 27.53 KG/M2

## 2021-09-15 DIAGNOSIS — C83.00 MALIGNANT LYMPHOMA, LYMPHOPLASMACYTIC (H): ICD-10-CM

## 2021-09-15 DIAGNOSIS — T45.1X5A CHEMOTHERAPY-INDUCED NEUTROPENIA (H): Primary | ICD-10-CM

## 2021-09-15 DIAGNOSIS — D70.1 CHEMOTHERAPY-INDUCED NEUTROPENIA (H): Primary | ICD-10-CM

## 2021-09-15 LAB
ALBUMIN SERPL-MCNC: 4 G/DL (ref 3.4–5)
ALP SERPL-CCNC: 87 U/L (ref 40–150)
ALT SERPL W P-5'-P-CCNC: 24 U/L (ref 0–70)
ANION GAP SERPL CALCULATED.3IONS-SCNC: 6 MMOL/L (ref 3–14)
AST SERPL W P-5'-P-CCNC: 13 U/L (ref 0–45)
BASOPHILS # BLD AUTO: 0.1 10E3/UL (ref 0–0.2)
BASOPHILS NFR BLD AUTO: 2 %
BILIRUB SERPL-MCNC: 0.7 MG/DL (ref 0.2–1.3)
BUN SERPL-MCNC: 17 MG/DL (ref 7–30)
CALCIUM SERPL-MCNC: 8.7 MG/DL (ref 8.5–10.1)
CHLORIDE BLD-SCNC: 109 MMOL/L (ref 94–109)
CO2 SERPL-SCNC: 26 MMOL/L (ref 20–32)
CREAT SERPL-MCNC: 0.78 MG/DL (ref 0.66–1.25)
EOSINOPHIL # BLD AUTO: 0.1 10E3/UL (ref 0–0.7)
EOSINOPHIL NFR BLD AUTO: 2 %
ERYTHROCYTE [DISTWIDTH] IN BLOOD BY AUTOMATED COUNT: 13.1 % (ref 10–15)
GFR SERPL CREATININE-BSD FRML MDRD: 90 ML/MIN/1.73M2
GLUCOSE BLD-MCNC: 98 MG/DL (ref 70–99)
HCT VFR BLD AUTO: 38.1 % (ref 40–53)
HGB BLD-MCNC: 13.2 G/DL (ref 13.3–17.7)
HOLD SPECIMEN: NORMAL
HOLD SPECIMEN: NORMAL
IMM GRANULOCYTES # BLD: 0 10E3/UL
IMM GRANULOCYTES NFR BLD: 0 %
LYMPHOCYTES # BLD AUTO: 0.4 10E3/UL (ref 0.8–5.3)
LYMPHOCYTES NFR BLD AUTO: 7 %
MCH RBC QN AUTO: 33.6 PG (ref 26.5–33)
MCHC RBC AUTO-ENTMCNC: 34.6 G/DL (ref 31.5–36.5)
MCV RBC AUTO: 97 FL (ref 78–100)
MONOCYTES # BLD AUTO: 1 10E3/UL (ref 0–1.3)
MONOCYTES NFR BLD AUTO: 15 %
NEUTROPHILS # BLD AUTO: 5.1 10E3/UL (ref 1.6–8.3)
NEUTROPHILS NFR BLD AUTO: 74 %
NRBC # BLD AUTO: 0 10E3/UL
NRBC BLD AUTO-RTO: 0 /100
PLATELET # BLD AUTO: 244 10E3/UL (ref 150–450)
POTASSIUM BLD-SCNC: 4.1 MMOL/L (ref 3.4–5.3)
PROT SERPL-MCNC: 7.4 G/DL (ref 6.8–8.8)
RBC # BLD AUTO: 3.93 10E6/UL (ref 4.4–5.9)
SODIUM SERPL-SCNC: 141 MMOL/L (ref 133–144)
TOTAL PROTEIN SERUM FOR ELP: 7.2 G/DL (ref 6.8–8.8)
WBC # BLD AUTO: 6.8 10E3/UL (ref 4–11)

## 2021-09-15 PROCEDURE — 96409 CHEMO IV PUSH SNGL DRUG: CPT

## 2021-09-15 PROCEDURE — 84165 PROTEIN E-PHORESIS SERUM: CPT | Mod: TC | Performed by: PATHOLOGY

## 2021-09-15 PROCEDURE — 85810 BLOOD VISCOSITY EXAMINATION: CPT | Performed by: INTERNAL MEDICINE

## 2021-09-15 PROCEDURE — 250N000013 HC RX MED GY IP 250 OP 250 PS 637: Performed by: INTERNAL MEDICINE

## 2021-09-15 PROCEDURE — 96367 TX/PROPH/DG ADDL SEQ IV INF: CPT

## 2021-09-15 PROCEDURE — 258N000003 HC RX IP 258 OP 636: Performed by: INTERNAL MEDICINE

## 2021-09-15 PROCEDURE — 85025 COMPLETE CBC W/AUTO DIFF WBC: CPT | Performed by: INTERNAL MEDICINE

## 2021-09-15 PROCEDURE — 96375 TX/PRO/DX INJ NEW DRUG ADDON: CPT

## 2021-09-15 PROCEDURE — 82784 ASSAY IGA/IGD/IGG/IGM EACH: CPT | Performed by: INTERNAL MEDICINE

## 2021-09-15 PROCEDURE — 96366 THER/PROPH/DIAG IV INF ADDON: CPT

## 2021-09-15 PROCEDURE — 250N000011 HC RX IP 250 OP 636: Performed by: INTERNAL MEDICINE

## 2021-09-15 PROCEDURE — 83883 ASSAY NEPHELOMETRY NOT SPEC: CPT | Performed by: INTERNAL MEDICINE

## 2021-09-15 PROCEDURE — 80053 COMPREHEN METABOLIC PANEL: CPT | Performed by: INTERNAL MEDICINE

## 2021-09-15 PROCEDURE — 84165 PROTEIN E-PHORESIS SERUM: CPT | Mod: 26 | Performed by: PATHOLOGY

## 2021-09-15 PROCEDURE — 84155 ASSAY OF PROTEIN SERUM: CPT | Performed by: INTERNAL MEDICINE

## 2021-09-15 RX ORDER — METHYLPREDNISOLONE SODIUM SUCCINATE 125 MG/2ML
125 INJECTION, POWDER, LYOPHILIZED, FOR SOLUTION INTRAMUSCULAR; INTRAVENOUS
Status: COMPLETED | OUTPATIENT
Start: 2021-09-15 | End: 2021-09-15

## 2021-09-15 RX ORDER — ACETAMINOPHEN 325 MG/1
650 TABLET ORAL ONCE
Status: COMPLETED | OUTPATIENT
Start: 2021-09-15 | End: 2021-09-15

## 2021-09-15 RX ORDER — DIPHENHYDRAMINE HCL 25 MG
50 CAPSULE ORAL ONCE
Status: COMPLETED | OUTPATIENT
Start: 2021-09-15 | End: 2021-09-15

## 2021-09-15 RX ADMIN — DIPHENHYDRAMINE HYDROCHLORIDE 50 MG: 25 CAPSULE ORAL at 08:45

## 2021-09-15 RX ADMIN — DEXAMETHASONE SODIUM PHOSPHATE: 10 INJECTION, SOLUTION INTRAMUSCULAR; INTRAVENOUS at 08:55

## 2021-09-15 RX ADMIN — SODIUM CHLORIDE 250 ML: 9 INJECTION, SOLUTION INTRAVENOUS at 08:48

## 2021-09-15 RX ADMIN — ACETAMINOPHEN 650 MG: 325 TABLET, FILM COATED ORAL at 08:45

## 2021-09-15 RX ADMIN — BENDAMUSTINE HYDROCHLORIDE 200 MG: 25 INJECTION, SOLUTION INTRAVENOUS at 14:05

## 2021-09-15 RX ADMIN — RITUXIMAB-ABBS 800 MG: 10 INJECTION, SOLUTION INTRAVENOUS at 09:38

## 2021-09-15 RX ADMIN — METHYLPREDNISOLONE SODIUM SUCCINATE 125 MG: 125 INJECTION, POWDER, FOR SOLUTION INTRAMUSCULAR; INTRAVENOUS at 09:24

## 2021-09-15 ASSESSMENT — PAIN SCALES - GENERAL: PAINLEVEL: NO PAIN (0)

## 2021-09-15 NOTE — PROGRESS NOTES
Infusion Nursing Note:  Ag Evans presents today for Rituxan and Bendamustine.    Patient seen by provider today: No   present during visit today: Not Applicable.    Note: N/A.      Intravenous Access:  Peripheral IV placed.    Treatment Conditions:  Lab Results   Component Value Date    HGB 13.2 (L) 09/15/2021    WBC 6.8 09/15/2021    ANEU 5.2 06/10/2021    ANEUTAUTO 5.1 09/15/2021     09/15/2021      Lab Results   Component Value Date     09/15/2021    POTASSIUM 4.1 09/15/2021    CR 0.78 09/15/2021    TANNA 8.7 09/15/2021    BILITOTAL 0.7 09/15/2021    ALBUMIN 4.0 09/15/2021    ALT 24 09/15/2021    AST 13 09/15/2021     Results reviewed, labs MET treatment parameters, ok to proceed with treatment.      Post Infusion Assessment:  Patient tolerated infusion without incident.  Patient observed for 15 minutes post Bendamustine per protocol.  Site patent and intact, free from redness, edema or discomfort.  No evidence of extravasations.  Access discontinued per protocol.       Discharge Plan:   Discharge instructions reviewed with: Patient.  Patient discharged in stable condition accompanied by: wife.  Departure Mode: Ambulatory.      Patsy Lala RN

## 2021-09-16 ENCOUNTER — INFUSION THERAPY VISIT (OUTPATIENT)
Dept: INFUSION THERAPY | Facility: CLINIC | Age: 71
End: 2021-09-16
Attending: INTERNAL MEDICINE
Payer: COMMERCIAL

## 2021-09-16 VITALS
OXYGEN SATURATION: 99 % | WEIGHT: 203.4 LBS | TEMPERATURE: 97.5 F | HEART RATE: 53 BPM | DIASTOLIC BLOOD PRESSURE: 59 MMHG | RESPIRATION RATE: 20 BRPM | BODY MASS INDEX: 28.37 KG/M2 | SYSTOLIC BLOOD PRESSURE: 121 MMHG

## 2021-09-16 DIAGNOSIS — C83.00 MALIGNANT LYMPHOMA, LYMPHOPLASMACYTIC (H): Primary | ICD-10-CM

## 2021-09-16 DIAGNOSIS — D70.1 CHEMOTHERAPY-INDUCED NEUTROPENIA (H): ICD-10-CM

## 2021-09-16 DIAGNOSIS — T45.1X5A CHEMOTHERAPY-INDUCED NEUTROPENIA (H): ICD-10-CM

## 2021-09-16 LAB
ALBUMIN SERPL ELPH-MCNC: 4.3 G/DL (ref 3.7–5.1)
ALPHA1 GLOB SERPL ELPH-MCNC: 0.3 G/DL (ref 0.2–0.4)
ALPHA2 GLOB SERPL ELPH-MCNC: 0.6 G/DL (ref 0.5–0.9)
B-GLOBULIN SERPL ELPH-MCNC: 0.6 G/DL (ref 0.6–1)
GAMMA GLOB SERPL ELPH-MCNC: 1.3 G/DL (ref 0.7–1.6)
IGM SERPL-MCNC: 962 MG/DL (ref 35–242)
KAPPA LC FREE SER-MCNC: 1 MG/DL (ref 0.33–1.94)
KAPPA LC FREE/LAMBDA FREE SER NEPH: 1.16 {RATIO} (ref 0.26–1.65)
LAMBDA LC FREE SERPL-MCNC: 0.86 MG/DL (ref 0.57–2.63)
M PROTEIN SERPL ELPH-MCNC: 0.8 G/DL
PROT PATTERN SERPL ELPH-IMP: ABNORMAL
VISC SER: 1.7 CP (ref 1.4–1.8)

## 2021-09-16 PROCEDURE — 96372 THER/PROPH/DIAG INJ SC/IM: CPT | Performed by: INTERNAL MEDICINE

## 2021-09-16 PROCEDURE — 96377 APPLICATON ON-BODY INJECTOR: CPT

## 2021-09-16 PROCEDURE — 258N000003 HC RX IP 258 OP 636: Performed by: INTERNAL MEDICINE

## 2021-09-16 PROCEDURE — 96366 THER/PROPH/DIAG IV INF ADDON: CPT

## 2021-09-16 PROCEDURE — 96409 CHEMO IV PUSH SNGL DRUG: CPT

## 2021-09-16 PROCEDURE — 250N000011 HC RX IP 250 OP 636: Performed by: INTERNAL MEDICINE

## 2021-09-16 RX ADMIN — PEGFILGRASTIM 6 MG: KIT SUBCUTANEOUS at 14:45

## 2021-09-16 RX ADMIN — SODIUM CHLORIDE 250 ML: 9 INJECTION, SOLUTION INTRAVENOUS at 13:32

## 2021-09-16 RX ADMIN — DEXAMETHASONE SODIUM PHOSPHATE: 10 INJECTION, SOLUTION INTRAMUSCULAR; INTRAVENOUS at 13:35

## 2021-09-16 RX ADMIN — BENDAMUSTINE HYDROCHLORIDE 200 MG: 25 INJECTION, SOLUTION INTRAVENOUS at 14:20

## 2021-09-16 ASSESSMENT — PAIN SCALES - GENERAL: PAINLEVEL: NO PAIN (0)

## 2021-09-16 NOTE — PROGRESS NOTES
Infusion Nursing Note:  Ag ESPANA Wyattmaritza presents today for C3D2 Bendamustine   Patient seen by provider today: No   present during visit today: Not Applicable.    Note: N/A.      Intravenous Access:  Peripheral IV placed.    Treatment Conditions:  Not Applicable.      Post Infusion Assessment:  Patient tolerated infusion without incident.  Patient observed for 15 minutes post chemotherapy.  Site patent and intact, free from redness, edema or discomfort.  No evidence of extravasations.  Access discontinued per protocol.       Discharge Plan:   Discharge instructions reviewed with: Patient.  Patient discharged in stable condition accompanied by: wife.  Departure Mode: Ambulatory.      Patsy Lala RN

## 2021-09-30 ENCOUNTER — TELEPHONE (OUTPATIENT)
Dept: CARDIOLOGY | Facility: CLINIC | Age: 71
End: 2021-09-30

## 2021-09-30 NOTE — TELEPHONE ENCOUNTER
Called patient regarding appointment tomorrow with Dr Stanford. Asked him to call back. He has been seen for referral, and no reason found in chart for follow up appt.     Rere Garcia RN  Medical Speciality Care Coordinator  [employer] [place], [place]  Phone: [phone]

## 2021-10-01 NOTE — TELEPHONE ENCOUNTER
Called and spoke to patient. He does not know why he has an appt today. Dr Trent did not tell him he needed one. He is aware of the calcifications and is taking Lipitor.   He does note some muscle discomfort, but is not sure if it is the Lipitor, lymphoma, the medications he gets for the lymphoma, or his DJD.    Advised he can stop the Lipitor for one week, and if his pain gets better, to call the clinic and we can change the Lipitor to something different. If he does not notice any change, he should resume the Lipitor. He understands.   Cancelled appt for today.     Rere Garcia RN  Medical Speciality Care Coordinator  [facility] [place], [place]  Phone: [phone]

## 2021-10-03 ENCOUNTER — HEALTH MAINTENANCE LETTER (OUTPATIENT)
Age: 71
End: 2021-10-03

## 2021-10-04 ENCOUNTER — OFFICE VISIT (OUTPATIENT)
Dept: NEUROLOGY | Facility: CLINIC | Age: 71
End: 2021-10-04
Attending: INTERNAL MEDICINE
Payer: COMMERCIAL

## 2021-10-04 DIAGNOSIS — G62.9 PERIPHERAL POLYNEUROPATHY: ICD-10-CM

## 2021-10-04 PROCEDURE — 95913 NRV CNDJ TEST 13/> STUDIES: CPT | Performed by: PSYCHIATRY & NEUROLOGY

## 2021-10-04 PROCEDURE — 95885 MUSC TST DONE W/NERV TST LIM: CPT | Performed by: PSYCHIATRY & NEUROLOGY

## 2021-10-04 NOTE — LETTER
10/4/2021         RE: Ag Evans  [address] [address] [place] [id number]        Dear Colleague,    Thank you for referring your patient, Ag Evans, to the [facility] [place]. Please see a copy of my visit note below.    [facility]   EMG Laboratory      Nerve Conduction & EMG Report          Patient:       Ag Evans  Patient ID:    [id number]  Gender:        Male  YOB: 1950  Age:           71 Years 2 Months      History and Examination:  Ag Evans is a 71 year old man with Waldenstrom s macroglobulinemia and sensory symptoms in the feet. He is referred for evaluation of suspected polyneuropathy.    Techniques:  Motor conduction studies were done with surface recording electrodes. Sensory conduction studies were performed with surface electrodes, unless indicated otherwise by (n), designating the use of subdermal recording electrodes. Temperature was monitored and recorded throughout the study. Upper extremities were maintained at a temperature of 32 degrees Centigrade or higher.  Lower extremities were maintained at a temperature of 31 degrees Centigrade or higher. EMG was done with a concentric needle electrode.     Results:  Sural sensory nerve action potentials were mildly attenuated bilaterally, with conduction velocities at or slightly below the lower limit of the normal range. Bilateral superficial peroneal, left median, left ulnar, and left radial sensory conduction studies were normal. Left peroneal, tibial, median, and ulnar motor conduction studies were normal. Screening electromyography of the left lower limb was normal.     Interpretation:  This is an abnormal study, demonstrating electrophysiologic evidence of a very mild, length-dependent axonal sensory polyneuropathy.      Adam Loya MD        Sensory NCS      Nerve / Sites Rec. Site Onset Peak Ref. NP Amp Ref. PP Amp Dist Orlin Ref. Temp     ms ms ms  V  V  V cm m/s m/s  C   L MEDIAN -  Dig II Anti      Wrist Dig II 2.29 3.65  11.2 10.0 10.4 14 61.1 48.0 26.5   L ULNAR - Dig V Anti      Wrist Dig V 2.50 3.33  12.6 8.0 22.9 12.5 50.0 48.0 32.1   L RADIAL - Snuff      Forearm Snuff 1.93 2.71  15.2 15.0 18.8 10 51.9 48.0 34   L SURAL - Lat Mall 60      Calf Ankle 3.75 5.26  3.1 5.0 5.3 14 37.3  32.2   R SURAL - Lat Mall 60      Calf Ankle 3.59 4.74  3.0 5.0 1.9 14 39.0  32.5   L SUP PERONEAL      Lat Leg Aguirre 2.92 4.01  4.8  3.8 12.5 42.9 38.0 32.2   R SUP PERONEAL      Lat Leg Aguirre 2.55 3.23  4.2  3.7 12.5 49.0 38.0 32.5   L MEDIAN - Ulnar - Palmar      Median Wrist 1.67 2.24 2.40 27.5  22.9 8 48.0  33.9      Ulnar Wrist 1.67 2.14 2.40 17.3  25.5 8 48.0  34       Motor NCS      Nerve / Sites Rec. Site Lat Ref. Amp Ref. Rel Amp Dist Orlin Ref. Dur. Area Temp.     ms ms mV mV % cm m/s m/s ms %  C   L MEDIAN - APB      Wrist APB 4.32 4.40 7.3 5.0 100 8   6.61 100 34.1      Elbow APB 8.39  7.1  98.2 20 49.2 48.0 6.77 99.8 34.1   L ULNAR - ADM      Wrist ADM 3.02 3.50 10.8 5.0 100 8   5.68 100 33      B.Elbow ADM 6.88  10.2  94.5 21 54.5 48.0 5.89 104 32.9      A.Elbow ADM 8.65  9.9  91.2 9 50.8 48.0 5.99 90.7 32.6   L DEEP PERONEAL - EDB 60      Ankle EDB 3.70 6.00 3.2 2.0 100 8   6.25 100 32.6      FibHead EDB 11.61  2.8  88.2 35 44.2 38.0 7.19 94 32.6      Pop Fos EDB 13.85  2.8  86.3 10 44.7 38.0 7.29 93 32.6   L TIBIAL - AH      Ankle AH 4.06 6.00 7.7 4.0 100 8   5.52 100 32.6      Pop Fos AH 14.01  7.1  92.1 42 42.2 38.0 6.82 95.4 32.6   L MEDIAN - II Lumb      Median II Lumb 3.65  0.4  100 10   5.73 100 33.9      Ulnar Palm Int 3.13  2.9  716 10   5.94 557 33.9       F  Wave      Nerve Min F Lat Max F Lat Mean FLat Temp.    ms ms ms  C   L TIBIAL 52.34 55.47 54.09 32.6       EMG Summary Table     Spontaneous MUAP Recruitment    IA Fib PSW Fasc H.F. Amp Dur. PPP Pattern   L. TIB ANTERIOR N None None None None N N N N   L. VAST LATERALIS N None None None None N N N N                                           Again, thank you for allowing me to participate in the care of your patient.        Sincerely,        Adam Loya MD

## 2021-10-04 NOTE — PROGRESS NOTES
[facility]   EMG Laboratory      Nerve Conduction & EMG Report          Patient:       Ag Evans  Patient ID:    [id number]  Gender:        Male  YOB: 1950  Age:           71 Years 2 Months      History and Examination:  Ag Evans is a 71 year old man with Waldenstrom s macroglobulinemia and sensory symptoms in the feet. He is referred for evaluation of suspected polyneuropathy.    Techniques:  Motor conduction studies were done with surface recording electrodes. Sensory conduction studies were performed with surface electrodes, unless indicated otherwise by (n), designating the use of subdermal recording electrodes. Temperature was monitored and recorded throughout the study. Upper extremities were maintained at a temperature of 32 degrees Centigrade or higher.  Lower extremities were maintained at a temperature of 31 degrees Centigrade or higher. EMG was done with a concentric needle electrode.     Results:  Sural sensory nerve action potentials were mildly attenuated bilaterally, with conduction velocities at or slightly below the lower limit of the normal range. Bilateral superficial peroneal, left median, left ulnar, and left radial sensory conduction studies were normal. Left peroneal, tibial, median, and ulnar motor conduction studies were normal. Screening electromyography of the left lower limb was normal.     Interpretation:  This is an abnormal study, demonstrating electrophysiologic evidence of a very mild, length-dependent axonal sensory polyneuropathy.      Adam Loya MD        Sensory NCS      Nerve / Sites Rec. Site Onset Peak Ref. NP Amp Ref. PP Amp Dist Orlin Ref. Temp     ms ms ms  V  V  V cm m/s m/s  C   L MEDIAN - Dig II Anti      Wrist Dig II 2.29 3.65  11.2 10.0 10.4 14 61.1 48.0 26.5   L ULNAR - Dig V Anti      Wrist Dig V 2.50 3.33  12.6 8.0 22.9 12.5 50.0 48.0 32.1   L RADIAL - Snuff      Forearm Snuff 1.93 2.71  15.2 15.0 18.8 10 51.9 48.0 34   L SURAL - Lat  Mall 60      Calf Ankle 3.75 5.26  3.1 5.0 5.3 14 37.3  32.2   R SURAL - Lat Mall 60      Calf Ankle 3.59 4.74  3.0 5.0 1.9 14 39.0  32.5   L SUP PERONEAL      Lat Leg Aguirre 2.92 4.01  4.8  3.8 12.5 42.9 38.0 32.2   R SUP PERONEAL      Lat Leg Aguirre 2.55 3.23  4.2  3.7 12.5 49.0 38.0 32.5   L MEDIAN - Ulnar - Palmar      Median Wrist 1.67 2.24 2.40 27.5  22.9 8 48.0  33.9      Ulnar Wrist 1.67 2.14 2.40 17.3  25.5 8 48.0  34       Motor NCS      Nerve / Sites Rec. Site Lat Ref. Amp Ref. Rel Amp Dist Orlin Ref. Dur. Area Temp.     ms ms mV mV % cm m/s m/s ms %  C   L MEDIAN - APB      Wrist APB 4.32 4.40 7.3 5.0 100 8   6.61 100 34.1      Elbow APB 8.39  7.1  98.2 20 49.2 48.0 6.77 99.8 34.1   L ULNAR - ADM      Wrist ADM 3.02 3.50 10.8 5.0 100 8   5.68 100 33      B.Elbow ADM 6.88  10.2  94.5 21 54.5 48.0 5.89 104 32.9      A.Elbow ADM 8.65  9.9  91.2 9 50.8 48.0 5.99 90.7 32.6   L DEEP PERONEAL - EDB 60      Ankle EDB 3.70 6.00 3.2 2.0 100 8   6.25 100 32.6      FibHead EDB 11.61  2.8  88.2 35 44.2 38.0 7.19 94 32.6      Pop Fos EDB 13.85  2.8  86.3 10 44.7 38.0 7.29 93 32.6   L TIBIAL - AH      Ankle AH 4.06 6.00 7.7 4.0 100 8   5.52 100 32.6      Pop Fos AH 14.01  7.1  92.1 42 42.2 38.0 6.82 95.4 32.6   L MEDIAN - II Lumb      Median II Lumb 3.65  0.4  100 10   5.73 100 33.9      Ulnar Palm Int 3.13  2.9  716 10   5.94 557 33.9       F  Wave      Nerve Min F Lat Max F Lat Mean FLat Temp.    ms ms ms  C   L TIBIAL 52.34 55.47 54.09 32.6       EMG Summary Table     Spontaneous MUAP Recruitment    IA Fib PSW Fasc H.F. Amp Dur. PPP Pattern   L. TIB ANTERIOR N None None None None N N N N   L. VAST LATERALIS N None None None None N N N N

## 2021-10-05 ENCOUNTER — OFFICE VISIT (OUTPATIENT)
Dept: ORTHOPEDICS | Facility: OTHER | Age: 71
End: 2021-10-05
Payer: COMMERCIAL

## 2021-10-05 ENCOUNTER — ANCILLARY PROCEDURE (OUTPATIENT)
Dept: GENERAL RADIOLOGY | Facility: OTHER | Age: 71
End: 2021-10-05
Attending: ORTHOPAEDIC SURGERY
Payer: COMMERCIAL

## 2021-10-05 VITALS
HEIGHT: 71 IN | BODY MASS INDEX: 27.93 KG/M2 | SYSTOLIC BLOOD PRESSURE: 135 MMHG | WEIGHT: 199.5 LBS | DIASTOLIC BLOOD PRESSURE: 76 MMHG | HEART RATE: 54 BPM

## 2021-10-05 DIAGNOSIS — T84.069D PROSTHETIC WEAR FOLLOWING TOTAL HIP ARTHROPLASTY, SUBSEQUENT ENCOUNTER: ICD-10-CM

## 2021-10-05 DIAGNOSIS — M17.12 PRIMARY OSTEOARTHRITIS OF LEFT KNEE: ICD-10-CM

## 2021-10-05 DIAGNOSIS — M25.562 LEFT KNEE PAIN, UNSPECIFIED CHRONICITY: ICD-10-CM

## 2021-10-05 DIAGNOSIS — M11.262 PSEUDOGOUT OF KNEE, LEFT: ICD-10-CM

## 2021-10-05 DIAGNOSIS — Z96.649 PROSTHETIC WEAR FOLLOWING TOTAL HIP ARTHROPLASTY, SUBSEQUENT ENCOUNTER: ICD-10-CM

## 2021-10-05 DIAGNOSIS — Z96.641 STATUS POST TOTAL HIP REPLACEMENT, RIGHT: Primary | ICD-10-CM

## 2021-10-05 DIAGNOSIS — Z96.641 STATUS POST TOTAL HIP REPLACEMENT, RIGHT: ICD-10-CM

## 2021-10-05 PROCEDURE — 73562 X-RAY EXAM OF KNEE 3: CPT | Mod: LT | Performed by: RADIOLOGY

## 2021-10-05 PROCEDURE — 99214 OFFICE O/P EST MOD 30 MIN: CPT | Performed by: ORTHOPAEDIC SURGERY

## 2021-10-05 PROCEDURE — 73502 X-RAY EXAM HIP UNI 2-3 VIEWS: CPT | Mod: RT | Performed by: RADIOLOGY

## 2021-10-05 ASSESSMENT — MIFFLIN-ST. JEOR: SCORE: 1677.06

## 2021-10-05 ASSESSMENT — PAIN SCALES - GENERAL: PAINLEVEL: NO PAIN (1)

## 2021-10-05 NOTE — LETTER
10/5/2021         RE: Ag Evans  [address]   [facility] [address]        Dear Colleague,    Thank you for referring your patient, Ag Evans, to the [facility] [place]. Please see a copy of my visit note below.    Ag Evans is a 71 year old male who is seen in follow up for left knee clicking and pain.  Also has right total hip arthroplasty with wear and has developed feeling of subluxation at times. He had right total hip arthroplasty by Dr. Jose Martinez on 11/15/2014.  He had left total hip arthroplasty by Dr. Roderick Santos on 4/2/2012.    He does report a dull pain in the right buttock down the back of his thigh associated with numbness.  He describes a dull pain rated 1 out of 10.  He is quite active with exercises, golf, pickleball.  Several months ago he had a sensation of the right hip feeling unstable, so he has used a strap support since then, with relief of the symptoms.  He now has pain with climbing stairs with the left knee.  This started about 2 weeks ago.  He has pain primarily at the front of the knee and a clicking in the knee.  It has occurred sporadically in the past.  He is concerned about the clicking and mild pain.  He also currently is under treatment for malignant lymphoma.  He is on chemotherapy.  He has a meeting with his oncologist next week when he expects to have a discussion about prognosis.  He has also been told that his chemotherapy regimen affects his immune system for close to a year.    X-rays of the pelvis and lateral both hips were obtained today.  Images were reviewed with the patient. He has equal leg lengths.  The right is a 28 mm head, and the left has a 36 mm head.  Left total hip arthroplasty is in good position with no sign of loosening or wear.  Right total hip arthroplasty shows asymmetric femoral ball in the socket consistent with polyethylene wear superiorly.  On the lateral view there appears to be significant lysis about the  acetabulum.  This finding was verified by a CT scan he had in July showing the same thing.  There may be early lysis at the lateral shoulder of the right stem, but most of the stem is quite solid.  Left knee x-ray was obtained today showing moderate osteoarthritis medially with narrowing.  It is not close to bone-on-bone.  There is presence of calcification in the meniscus consistent with pseudogout.    Past Medical History:   Diagnosis Date     DJD (degenerative joint disease), lumbar      Malignant lymphoma, lymphoplasmacytic (H) 7/1/2021     Sinus bradycardia        Past Surgical History:   Procedure Laterality Date     APPENDECTOMY       BIOPSY  mass right side     BONE MARROW BIOPSY, BONE SPECIMEN, NEEDLE/TROCAR N/A 06/10/2021    Procedure: BIOPSY, BONE MARROW;  Surgeon: Josephine Santamaria MD;  Location:  GI     COLONOSCOPY  01/01/2010    benign with hyperplastic polyps     DE TOTAL HIP ARTHROPLASTY Left 04/02/2012    Dr Roderick Santos     DE TOTAL HIP ARTHROPLASTY Right 11/15/2004    Dr. Jose Martinez     SURGICAL HISTORY OF -       Removal of a benign soft tissue mass right abdominal wall     TONSILLECTOMY         Family History   Problem Relation Age of Onset     Prostate Cancer Father      Other Cancer Father         stomach     Other Cancer Mother      Breast Cancer Mother      Other Cancer Sister         Pancreatic cancer     Breast Cancer Sister      Osteoporosis Sister         from cancer drug?     Other Cancer Sister         pancreatic       Social History     Socioeconomic History     Marital status:      Spouse name: Not on file     Number of children: Not on file     Years of education: Not on file     Highest education level: Not on file   Occupational History     Not on file   Tobacco Use     Smoking status: Never Smoker     Smokeless tobacco: Never Used   Substance and Sexual Activity     Alcohol use: Yes     Comment: Rare     Drug use: No     Sexual activity: Yes     Partners: Female      Birth control/protection: None   Other Topics Concern     Parent/sibling w/ CABG, MI or angioplasty before 65F 55M? No   Social History Narrative     Not on file     Social Determinants of Health     Financial Resource Strain:      Difficulty of Paying Living Expenses:    Food Insecurity:      Worried About Running Out of Food in the Last Year:      Ran Out of Food in the Last Year:    Transportation Needs:      Lack of Transportation (Medical):      Lack of Transportation (Non-Medical):    Physical Activity:      Days of Exercise per Week:      Minutes of Exercise per Session:    Stress:      Feeling of Stress :    Social Connections:      Frequency of Communication with Friends and Family:      Frequency of Social Gatherings with Friends and Family:      Attends [employer] Services:      Active Member of Clubs or Organizations:      Attends Club or Organization Meetings:      Marital Status:    Intimate Partner Violence:      Fear of Current or Ex-Partner:      Emotionally Abused:      Physically Abused:      Sexually Abused:        Current Outpatient Medications   Medication Sig Dispense Refill     atorvastatin (LIPITOR) 20 MG tablet Take 1 tablet (20 mg) by mouth daily 90 tablet 3     fluticasone (FLONASE) 50 MCG/ACT spray Spray 1 spray into both nostrils daily       gabapentin (NEURONTIN) 300 MG capsule Take 1 capsule (300 mg) by mouth 3 times daily 90 capsule 3     ginkgo biloba 60 MG CAPS capsule Take 30 mg by mouth daily       LORazepam (ATIVAN) 0.5 MG tablet Take 1 tablet (0.5 mg) by mouth every 4 hours as needed (Anxiety, Nausea/Vomiting or Sleep) 30 tablet 3     melatonin 5 MG tablet        Multiple Vitamins-Minerals (MULTIVITAMIN PO)        NEW MED NAC       ondansetron (ZOFRAN) 8 MG tablet Take 1 tablet (8 mg) by mouth every 8 hours as needed (Nausea/Vomiting) 10 tablet 3     prochlorperazine (COMPAZINE) 10 MG tablet Take 0.5 tablets (5 mg) by mouth every 8 hours as needed (Nausea/Vomiting) 30 tablet  "3     saw palmetto 450 MG CAPS capsule Take 450 mg by mouth daily       selenium 50 MCG TABS tablet Take 50 mcg by mouth daily       SF 5000 PLUS 1.1 % CREA USE TOOTH PASTE TWICE DAILY AS DIRECTED. NOTHING BY MOUTH FOR 30 MINUTES AFTER USE       vitamin E 400 units TABS Take 400 Units by mouth daily         Allergies   Allergen Reactions     Seasonal Allergies        REVIEW OF SYSTEMS:  CONSTITUTIONAL:  NEGATIVE for fever, chills, change in weight, not feeling tired  SKIN:  NEGATIVE for worrisome rashes, no skin lumps, no skin ulcers and no non-healing wounds  EYES:  NEGATIVE for vision changes or irritation.  ENT/MOUTH:  NEGATIVE.  No hearing loss, no hoarseness, no difficulty swallowing.  RESP:  NEGATIVE. No cough or shortness of breath.  CV:  NEGATIVE for chest pain, palpitations or peripheral edema  GI:  NEGATIVE for nausea, abdominal pain, heartburn, or change in bowel habits  :  Negative. No dysuria, no hematuria  MUSCULOSKELETAL:  See HPI above  NEURO:  NEGATIVE . No headaches, no dizziness,  no numbness  ENDOCRINE:  NEGATIVE for temperature intolerance, skin/hair changes  HEME/ALLERGY/IMMUNE:  NEGATIVE for bleeding problems  PSYCHIATRIC:  NEGATIVE. no anxiety, no depression.     Exam:  Vitals: /76   Pulse 54   Ht 1.803 m (5' 11\")   Wt 90.5 kg (199 lb 8 oz)   BMI 27.82 kg/m    BMI= Body mass index is 27.82 kg/m .  Constitutional:  healthy, alert and no distress  Neuro: Alert and Oriented x 3, Sensation grossly WNL.  Psych: Affect normal   Respiratory: Breathing not labored.  Cardiovascular: normal peripheral pulses  Lymph: no adenopathy  Skin: No rashes,worrisome lesions or skin problems  He has some tenderness in the right buttock consistent with sciatica.  No tenderness on the left.  He has about 50 degrees external rotation of each hip.  On the right his internal rotation is about 5 degrees, on the left it is about 20 to 30 degrees.  He walks without a limp.  He has good mobility of his " knees.  He has patellofemoral crepitation on the left more than on the right.  He has limited mobility of his left patella side to side.  Better mobility on the right.  He has no ligamentous laxity of MCL, LCL, or cruciates.  He has a trace effusion on both knees.  Sensation, motor and circulation are intact.    Assessment:  1. Left total hip arthroplasty in good position with no sign of loosening or wear.  2. Right total hip arthroplasty is in good position but showing evidence of polyethylene wear with asymmetric positioning of the femoral head.  There appears to be erosion and lysis above the acetabulum, which is concerning.  3.  Left knee osteoarthritis with presence of pseudogout also.    Plan: For the knee we could consider anti-inflammatory or steroid injection.  He has naproxen and wishes to stay with that for now.  For the hip, acetabular revision is probably necessary.  The stem may be salvageable.  This does make a difficult revision however.  If he gets clearance to have a major surgery, I would elect to refer him to the [facility] for revision of this right hip.  The details of the hip arthroplasty are in the electronic medical record through [employer].  Avoid aggressive use for now, such as pickle ball.      Again, thank you for allowing me to participate in the care of your patient.        Sincerely,        Adam Riggs MD

## 2021-10-05 NOTE — PATIENT INSTRUCTIONS
Assessment:  1. Left total hip arthroplasty in good position with no sign of loosening or wear.  2. Right total hip arthroplasty is in good position but showing evidence of polyethylene wear with asymmetric positioning of the femoral head.  There appears to be erosion and lysis above the acetabulum, which is concerning.  3.  Left knee osteoarthritis with presence of pseudogout also.    Plan: For the knee we could consider anti-inflammatory or steroid injection.  He has naproxen and wishes to stay with that for now.  For the hip, acetabular revision is probably necessary.  The stem may be salvageable.  This does make a difficult revision however.  If he gets clearance to have a major surgery, I would elect to refer him to the [facility] for revision of this right hip.  The details of the hip arthroplasty are in the electronic medical record through [employer].  Avoid aggressive use for now, such as pickle ball.

## 2021-10-05 NOTE — PROGRESS NOTES
Ag Evans is a 71 year old male who is seen in follow up for left knee clicking and pain.  Also has right total hip arthroplasty with wear and has developed feeling of subluxation at times. He had right total hip arthroplasty by Dr. Jose Martinez on 11/15/2014.  He had left total hip arthroplasty by Dr. Roderick Santos on 4/2/2012.    He does report a dull pain in the right buttock down the back of his thigh associated with numbness.  He describes a dull pain rated 1 out of 10.  He is quite active with exercises, golf, pickleball.  Several months ago he had a sensation of the right hip feeling unstable, so he has used a strap support since then, with relief of the symptoms.  He now has pain with climbing stairs with the left knee.  This started about 2 weeks ago.  He has pain primarily at the front of the knee and a clicking in the knee.  It has occurred sporadically in the past.  He is concerned about the clicking and mild pain.  He also currently is under treatment for malignant lymphoma.  He is on chemotherapy.  He has a meeting with his oncologist next week when he expects to have a discussion about prognosis.  He has also been told that his chemotherapy regimen affects his immune system for close to a year.    X-rays of the pelvis and lateral both hips were obtained today.  Images were reviewed with the patient. He has equal leg lengths.  The right is a 28 mm head, and the left has a 36 mm head.  Left total hip arthroplasty is in good position with no sign of loosening or wear.  Right total hip arthroplasty shows asymmetric femoral ball in the socket consistent with polyethylene wear superiorly.  On the lateral view there appears to be significant lysis about the acetabulum.  This finding was verified by a CT scan he had in July showing the same thing.  There may be early lysis at the lateral shoulder of the right stem, but most of the stem is quite solid.  Left knee x-ray was obtained today showing moderate  osteoarthritis medially with narrowing.  It is not close to bone-on-bone.  There is presence of calcification in the meniscus consistent with pseudogout.    Past Medical History:   Diagnosis Date     DJD (degenerative joint disease), lumbar      Malignant lymphoma, lymphoplasmacytic (H) 7/1/2021     Sinus bradycardia        Past Surgical History:   Procedure Laterality Date     APPENDECTOMY       BIOPSY  mass right side     BONE MARROW BIOPSY, BONE SPECIMEN, NEEDLE/TROCAR N/A 06/10/2021    Procedure: BIOPSY, BONE MARROW;  Surgeon: Josephine Santamaria MD;  Location:  GI     COLONOSCOPY  01/01/2010    benign with hyperplastic polyps     NE TOTAL HIP ARTHROPLASTY Left 04/02/2012    Dr Roderick Santos     NE TOTAL HIP ARTHROPLASTY Right 11/15/2004    Dr. Jose Martinez     SURGICAL HISTORY OF -       Removal of a benign soft tissue mass right abdominal wall     TONSILLECTOMY         Family History   Problem Relation Age of Onset     Prostate Cancer Father      Other Cancer Father         stomach     Other Cancer Mother      Breast Cancer Mother      Other Cancer Sister         Pancreatic cancer     Breast Cancer Sister      Osteoporosis Sister         from cancer drug?     Other Cancer Sister         pancreatic       Social History     Socioeconomic History     Marital status:      Spouse name: Not on file     Number of children: Not on file     Years of education: Not on file     Highest education level: Not on file   Occupational History     Not on file   Tobacco Use     Smoking status: Never Smoker     Smokeless tobacco: Never Used   Substance and Sexual Activity     Alcohol use: Yes     Comment: Rare     Drug use: No     Sexual activity: Yes     Partners: Female     Birth control/protection: None   Other Topics Concern     Parent/sibling w/ CABG, MI or angioplasty before 65F 55M? No   Social History Narrative     Not on file     Social Determinants of Health     Financial Resource Strain:      Difficulty of  Paying Living Expenses:    Food Insecurity:      Worried About Running Out of Food in the Last Year:      Ran Out of Food in the Last Year:    Transportation Needs:      Lack of Transportation (Medical):      Lack of Transportation (Non-Medical):    Physical Activity:      Days of Exercise per Week:      Minutes of Exercise per Session:    Stress:      Feeling of Stress :    Social Connections:      Frequency of Communication with Friends and Family:      Frequency of Social Gatherings with Friends and Family:      Attends Je[employer] witness Services:      Active Member of Clubs or Organizations:      Attends Club or Organization Meetings:      Marital Status:    Intimate Partner Violence:      Fear of Current or Ex-Partner:      Emotionally Abused:      Physically Abused:      Sexually Abused:        Current Outpatient Medications   Medication Sig Dispense Refill     atorvastatin (LIPITOR) 20 MG tablet Take 1 tablet (20 mg) by mouth daily 90 tablet 3     fluticasone (FLONASE) 50 MCG/ACT spray Spray 1 spray into both nostrils daily       gabapentin (NEURONTIN) 300 MG capsule Take 1 capsule (300 mg) by mouth 3 times daily 90 capsule 3     ginkgo biloba 60 MG CAPS capsule Take 30 mg by mouth daily       LORazepam (ATIVAN) 0.5 MG tablet Take 1 tablet (0.5 mg) by mouth every 4 hours as needed (Anxiety, Nausea/Vomiting or Sleep) 30 tablet 3     melatonin 5 MG tablet        Multiple Vitamins-Minerals (MULTIVITAMIN PO)        NEW MED NAC       ondansetron (ZOFRAN) 8 MG tablet Take 1 tablet (8 mg) by mouth every 8 hours as needed (Nausea/Vomiting) 10 tablet 3     prochlorperazine (COMPAZINE) 10 MG tablet Take 0.5 tablets (5 mg) by mouth every 8 hours as needed (Nausea/Vomiting) 30 tablet 3     saw palmetto 450 MG CAPS capsule Take 450 mg by mouth daily       selenium 50 MCG TABS tablet Take 50 mcg by mouth daily       SF 5000 PLUS 1.1 % CREA USE TOOTH PASTE TWICE DAILY AS DIRECTED. NOTHING BY MOUTH FOR 30 MINUTES AFTER USE        "vitamin E 400 units TABS Take 400 Units by mouth daily         Allergies   Allergen Reactions     Seasonal Allergies        REVIEW OF SYSTEMS:  CONSTITUTIONAL:  NEGATIVE for fever, chills, change in weight, not feeling tired  SKIN:  NEGATIVE for worrisome rashes, no skin lumps, no skin ulcers and no non-healing wounds  EYES:  NEGATIVE for vision changes or irritation.  ENT/MOUTH:  NEGATIVE.  No hearing loss, no hoarseness, no difficulty swallowing.  RESP:  NEGATIVE. No cough or shortness of breath.  CV:  NEGATIVE for chest pain, palpitations or peripheral edema  GI:  NEGATIVE for nausea, abdominal pain, heartburn, or change in bowel habits  :  Negative. No dysuria, no hematuria  MUSCULOSKELETAL:  See HPI above  NEURO:  NEGATIVE . No headaches, no dizziness,  no numbness  ENDOCRINE:  NEGATIVE for temperature intolerance, skin/hair changes  HEME/ALLERGY/IMMUNE:  NEGATIVE for bleeding problems  PSYCHIATRIC:  NEGATIVE. no anxiety, no depression.     Exam:  Vitals: /76   Pulse 54   Ht 1.803 m (5' 11\")   Wt 90.5 kg (199 lb 8 oz)   BMI 27.82 kg/m    BMI= Body mass index is 27.82 kg/m .  Constitutional:  healthy, alert and no distress  Neuro: Alert and Oriented x 3, Sensation grossly WNL.  Psych: Affect normal   Respiratory: Breathing not labored.  Cardiovascular: normal peripheral pulses  Lymph: no adenopathy  Skin: No rashes,worrisome lesions or skin problems  He has some tenderness in the right buttock consistent with sciatica.  No tenderness on the left.  He has about 50 degrees external rotation of each hip.  On the right his internal rotation is about 5 degrees, on the left it is about 20 to 30 degrees.  He walks without a limp.  He has good mobility of his knees.  He has patellofemoral crepitation on the left more than on the right.  He has limited mobility of his left patella side to side.  Better mobility on the right.  He has no ligamentous laxity of MCL, LCL, or cruciates.  He has a trace effusion on " both knees.  Sensation, motor and circulation are intact.    Assessment:  1. Left total hip arthroplasty in good position with no sign of loosening or wear.  2. Right total hip arthroplasty is in good position but showing evidence of polyethylene wear with asymmetric positioning of the femoral head.  There appears to be erosion and lysis above the acetabulum, which is concerning.  3.  Left knee osteoarthritis with presence of pseudogout also.    Plan: For the knee we could consider anti-inflammatory or steroid injection.  He has naproxen and wishes to stay with that for now.  For the hip, acetabular revision is probably necessary.  The stem may be salvageable.  This does make a difficult revision however.  If he gets clearance to have a major surgery, I would elect to refer him to the [facility] for revision of this right hip.  The details of the hip arthroplasty are in the electronic medical record through [employer].  Avoid aggressive use for now, such as pickle ball.

## 2021-10-09 ENCOUNTER — MYC MEDICAL ADVICE (OUTPATIENT)
Dept: ORTHOPEDICS | Facility: OTHER | Age: 71
End: 2021-10-09

## 2021-10-12 ENCOUNTER — VIRTUAL VISIT (OUTPATIENT)
Dept: ONCOLOGY | Facility: CLINIC | Age: 71
End: 2021-10-12
Attending: NURSE PRACTITIONER
Payer: COMMERCIAL

## 2021-10-12 DIAGNOSIS — C83.00 MALIGNANT LYMPHOMA, LYMPHOPLASMACYTIC (H): ICD-10-CM

## 2021-10-12 DIAGNOSIS — G62.9 PERIPHERAL POLYNEUROPATHY: ICD-10-CM

## 2021-10-12 DIAGNOSIS — T45.1X5A CHEMOTHERAPY-INDUCED NEUTROPENIA (H): ICD-10-CM

## 2021-10-12 DIAGNOSIS — D70.1 CHEMOTHERAPY-INDUCED NEUTROPENIA (H): ICD-10-CM

## 2021-10-12 PROCEDURE — 99215 OFFICE O/P EST HI 40 MIN: CPT | Mod: 95 | Performed by: INTERNAL MEDICINE

## 2021-10-12 NOTE — PROGRESS NOTES
"This patient  is being evaluated via a billable video visit.      The patient has been notified of following:     \"This video visit will be conducted via a call between you and your physician/provider. We have found that certain health care needs can be provided without the need for an in-person physical exam.  This service lets us provide the care you need with a video conversation.  If a prescription is necessary we can send it directly to your pharmacy.  If lab work is needed we can place an order for that and you can then stop by our lab to have the test done at a later time.    Video visits are billed at different rates depending on your insurance coverage.  Please reach out to your insurance provider with any questions.    If during the course of the call the physician/provider feels a video visit is not appropriate, you will not be charged for this service.\"    Patient has given verbal consent for Video visit? yes  Video-Visit Details    Type of service:  Video Visit    Video visit duration: 14 min min  Originating Location (pt. Location): home    Distant Location (provider location):  [facility]     Platform used for Video Visit: DataRPM poor connection then used Doximety, total time .  Keerthi Hernandez MD      Hematology/Oncology follow-up visit:  Date on this visit: Oct 12, 2021  Primary Care Physician: Jose Antonio     Diagnosis: Lymphoplasmacytic lymphoma  Oncologic history:   1. Lymphoplasmacytic lymphoma- He was evaluated for worsening symptoms of peripheral neuropathy with worsening numbness and pins-and-needles in his feet for the preceding 2 years, and in April 2021 was noted to have a monoclonal protein-IgM kappa on serum immunofixation electrophoresis.  M spike measured 1.1 g/dL in April 2021.  Serum IgM level was elevated at 1075 mg/dL.    Since our last visit he proceeded with additional evaluation.  Serum free kappa lambda light chain ratio was normal on June " 3, 2021.  Serum protein electrophoresis confirmed the presence of monoclonal IgM kappa protein.  M spike remained at 1.1 g/dL on Yudi 3, 2021.  Beta-2 microglobulin was normal.  CMP was unremarkable.  Serum viscosity was slightly elevated at 2.1.  CBC with differential counts was unremarkable except  he was mildly anemic with hemoglobin of 12.7 g/dL. .  He proceeded to undergo bone marrow biopsy on Yudi 10, 2021.   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  Flow cytometry showed findings suggestion of kappa skewing on B cells and rare to absent plasma cells. Otherwise bone marrow was hypercellular with normal trilineage hematopoiesis.  Cytogenetics was normal 46 XY.  An activating mutation (L265P) in MYD88 is present in up to 90% of cases   of lymphoplasmacytic lymphoma (LPL).   [1] In addition to LPL, the MYD88 L265P mutation is reportedly found in   4-21% of splenic marginal zone   lymphomas, up to 30% of activated B-cell type diffuse large B-cell   lymphomas, and rarely in chronic   lymphocytic leukemia/small lymphocytic lymphoma.   NGS was positive for the MYD88 p.L265P mutation which favors a diagnosis of lymphoplasmacytic   lymphoma, consistent with the morphologic   impression ([id number]).   CT of the chest, abdomen and pelvis on 7/7/2021 showed mildly prominent right hilar lymph node measures up to 1.1 cm. No other evidence for lymphadenopathy is seen in the chest, abdomen or pelvis.     2. Cardiac- He was seen by Dr. Stanford for evaluation of CACs on CT.  Echocardiogram on 7/26/2021 showed normal LVEF and no valvular pathology. He was started on Lipitor.      History Of Present Illness:  Mr. Evans is a 71 year old male who presents with new diagnosis of lymphoplasmacytic lymphoma. He is s/p bendamustine cycle 1 on 7/19/2021., tolerated well. rituxan was held with cycle 1  due to concerns of worsening hyperviscosity.  He continues to have s/o peripheral neuropathy in the feet and hands- numbness in feet at the bottom and in the toes and 2-3 fingers in his  Hands,improving. He attributes improved s/o neuropathy to Gabapentin. Neuropathy does not affect his balance. He can feel the ground.  He is on gabapentin 300 mg PO TID.   He has had no new lumps or bumps, no constitutional symptoms and no bone pain.  He has had no vision changes.   Exercises daily on his bike. He has been keeping a healthy diet.     Wife present for visit. Hemoglobin A1c was borderline elevated at 5.8%.. MAG/SGPG Ab negative. EMG was abnormal, This is an abnormal study, demonstrating electrophysiologic evidence of a very mild, length-dependent axonal sensory polyneuropathy.  He is generally feeling well.  He has tolerated chemotherapy well and is due for cycle 4-day 1 is due tomorrow (on October 13, 2021).  His only complaint is mild fatigue.  He has not developed any nausea on chemotherapy.  He is able to perform his daily activities.  He was seen by Dr. Riggs from orthopedic surgery on October 5, 2021.  I have reviewed Dr. Riggs's note.  He has had some right-sided hip pain radiating down the right buttock in the right thigh, mild.  She is status post right total hip arthroplasty in November 2014.  He had left hip replacement in April 2012.  On imaging Right total hip arthroplasty showed asymmetric femoral ball in the socket consistent with polyethylene wear superiorly.  He will likely need revision of right total hip arthroplasty but he would like to postpone that until April of next year.  In addition, a complete 12 point  review of systems is negative.    Past Medical/Surgical History:  Past Medical History:   Diagnosis Date     DJD (degenerative joint disease), lumbar      Malignant lymphoma, lymphoplasmacytic (H) 7/1/2021     Sinus bradycardia      Past Surgical History:   Procedure Laterality Date      APPENDECTOMY       BIOPSY  mass right side     BONE MARROW BIOPSY, BONE SPECIMEN, NEEDLE/TROCAR N/A 06/10/2021    Procedure: BIOPSY, BONE MARROW;  Surgeon: Josephine Santamaria MD;  Location:  GI     COLONOSCOPY  01/01/2010    benign with hyperplastic polyps     NM TOTAL HIP ARTHROPLASTY Left 04/02/2012    Dr Roderick Santos     NM TOTAL HIP ARTHROPLASTY Right 11/15/2004    Dr. Jose Martinez     SURGICAL HISTORY OF -       Removal of a benign soft tissue mass right abdominal wall     TONSILLECTOMY       Allergies:  Allergies as of 10/12/2021 - Reviewed 10/12/2021   Allergen Reaction Noted     Seasonal allergies  07/30/2015     Current Medications:  Current Outpatient Medications   Medication Sig Dispense Refill     atorvastatin (LIPITOR) 20 MG tablet Take 1 tablet (20 mg) by mouth daily 90 tablet 3     fluticasone (FLONASE) 50 MCG/ACT spray Spray 1 spray into both nostrils daily       gabapentin (NEURONTIN) 300 MG capsule Take 1 capsule (300 mg) by mouth 3 times daily 90 capsule 3     ginkgo biloba 60 MG CAPS capsule Take 30 mg by mouth daily       LORazepam (ATIVAN) 0.5 MG tablet Take 1 tablet (0.5 mg) by mouth every 4 hours as needed (Anxiety, Nausea/Vomiting or Sleep) 30 tablet 3     melatonin 5 MG tablet        Multiple Vitamins-Minerals (MULTIVITAMIN PO)        NEW MED NAC       ondansetron (ZOFRAN) 8 MG tablet Take 1 tablet (8 mg) by mouth every 8 hours as needed (Nausea/Vomiting) 10 tablet 3     prochlorperazine (COMPAZINE) 10 MG tablet Take 0.5 tablets (5 mg) by mouth every 8 hours as needed (Nausea/Vomiting) 30 tablet 3     saw palmetto 450 MG CAPS capsule Take 450 mg by mouth daily       selenium 50 MCG TABS tablet Take 50 mcg by mouth daily       SF 5000 PLUS 1.1 % CREA USE TOOTH PASTE TWICE DAILY AS DIRECTED. NOTHING BY MOUTH FOR 30 MINUTES AFTER USE       vitamin E 400 units TABS Take 400 Units by mouth daily        Family History  There is a FHX of malignancy- metastatic breast cancer at the age of 56.  Sister had pancreatic cancer at 52, . Father  of stomach cancer at the age of 71. Another sister had breast cancer at the age of 81. He is of [place] descent on dad's side. Never smoker.    Physical Exam:  Wt Readings from Last 5 Encounters:   10/05/21 90.5 kg (199 lb 8 oz)   21 92.3 kg (203 lb 6.4 oz)   09/15/21 89.5 kg (197 lb 6.4 oz)   21 90.3 kg (199 lb)   21 90.3 kg (199 lb)       Constitutional: alert and in no distress  Eyes: No redness or discharge  Respiratory: No cough or labored breathing.  Musculoskeletal: Full range of motion in extremities.  Skin: no visible skin lesions or discoloration  Neurological: No tremors and denies headache.  Psychiatric: Mentation appears normal and affect is normal as well.  Alert and oriented x3.  The rest the comprehensive physical examination is deferred due to public health emergency video visit restrictions.      Laboratory/Imaging Studies  CBC-unremarkable with exception of mildly low hemoglobin of 13.2 g/dL. WBC and platelet count within normal limits. Mild lymphocytopenia with ALC of 0.4 noted. CMP unremarkable.  Results for ALANA GONZLAEZ (MRN [id number]) as of 10/12/2021 12:31   Ref. Range 6/3/2021 14:08 6/10/2021 09:37 2021 08:04 2021 08:10 9/15/2021 07:54   IGG Latest Ref Range: 610 - 1,616 mg/dL 780       IGM Latest Ref Range: 35 - 242 mg/dL 1,979 (H)  1,184 (H)  962 (H)   Immunofixation ELP Unknown (Note)       Kappa Free Lt Chain Latest Ref Range: 0.33 - 1.94 mg/dL 1.42       Kappa Lambda Ratio Latest Ref Range: 0.26 - 1.65  1.41       Lambda Free Lt Chain Latest Ref Range: 0.57 - 2.63 mg/dL 1.01       MYD88 MUTATION DETECTION NGS Unknown  Rpt      LEUKEMIA LYMPHOMA EVALUATION NON CSF Unknown  Rpt      Monoclonal Peak Latest Ref Range: <=0.0 g/dL 1.1 (H)   0.7 (H) 0.8 (H)   Viscosity Index Latest Ref Range: 1.4 - 1.8  2.1 (H)  1.8  1.7   Kappa Free Light Chains Latest Ref Range: 0.33 - 1.94 mg/dL   1.54  1.00   LAMBDA FREE LT  CHAINS Latest Ref Range: 0.57 - 2.63 mg/dL   1.31  0.86   KAPPA/LAMBDA RATIO Latest Ref Range: 0.26 - 1.65    1.18  1.16       ASSESSMENT/PLAN:  Ag is a very pleasant 71-year-old gentleman with progressive peripheral neuropathy and finding of IgM kappa on serum protein immunofixation of the pheresis, with elevated serum IgM level and serum M spike of 1.1 g/dL, and on bone marrow biopsy confirmed diagnosis of lymphoplasmacytic lymphoma.     1.  Lymphoplasmacytic lymphoma-serum IgM level is declining.  M spike has declined to 0.7-0.8 g/dL.  Serum viscosity is normal.  His symptoms of peripheral neuropathy improved although that could be secondary to gabapentin use as well.  Proceed with Rituxan/bendamustine cycle 4 day 1 on October 13, 2021.. We will check serum IgM level, serum protein electrophoresis and free kappa lambda light chain analysis with cycle 4-day 1 (tomorrow). I will f/up on the results. Also check CBC differential, CMP with cycle 4. Plan total of 4 cycles.  We will plan to see him back in the beginning of January, with labs prior.  He prefers to have labs done at [facility].  They will be going to [facility], for 2 months in the winter.    2.  Prevention of drug-induced neutropenia-we will continue to use Neulasta with his chemotherapy regimen.  3.  Peripheral neuropathy-proceed with bendamustine and Rituxan as above. Follow-up with Dr. Ruiz in November 2021.. Continue gabapentin in the interim.  4.  Family history of breast cancer and pancreatic cancer, as above -he was seen by our genetic counselor in early August and proceeded with [facility] genetic testing panel. Results pending.  5.  COVID-19 booster and influenza vaccinations recommended.  6. Colon cancer screening (n/a today)-he was scheduled to have a screening colonoscopy in July, but postponed it due to chemotherapy  7.  Status post right total hip arthroplasty with showing evidence of polyethylene wear with asymmetric  positioning of the femoral head and ?erosion and lysis above the acetabulum- f/up with orthopedic surgery team.  At the end of our visit patient verbalized understanding and concurred with the plan.    42 minutes spent on the date of the encounter doing chart review, review of test results, interpretation of tests,ordering tests, care coordination,  patient visit and documentation.

## 2021-10-12 NOTE — LETTER
"    10/12/2021         RE: Ag Evans  [address] [address] [place] [id number]        Dear Colleague,    Thank you for referring your patient, Ag Evans, to the [facility]. Please see a copy of my visit note below.    This patient  is being evaluated via a billable video visit.      The patient has been notified of following:     \"This video visit will be conducted via a call between you and your physician/provider. We have found that certain health care needs can be provided without the need for an in-person physical exam.  This service lets us provide the care you need with a video conversation.  If a prescription is necessary we can send it directly to your pharmacy.  If lab work is needed we can place an order for that and you can then stop by our lab to have the test done at a later time.    Video visits are billed at different rates depending on your insurance coverage.  Please reach out to your insurance provider with any questions.    If during the course of the call the physician/provider feels a video visit is not appropriate, you will not be charged for this service.\"    Patient has given verbal consent for Video visit? yes  Video-Visit Details    Type of service:  Video Visit    Video visit duration: 14 min min  Originating Location (pt. Location): home    Distant Location (provider location):  [facility]     Platform used for Video Visit: Amwell poor connection then used Doximety, total time .  Keerthi Hernandez MD      Hematology/Oncology follow-up visit:  Date on this visit: Oct 12, 2021  Primary Care Physician: Jose Antonio     Diagnosis: Lymphoplasmacytic lymphoma  Oncologic history:   1. Lymphoplasmacytic lymphoma- He was evaluated for worsening symptoms of peripheral neuropathy with worsening numbness and pins-and-needles in his feet for the preceding 2 years, and in April 2021 was noted to have a monoclonal protein-IgM kappa " on serum immunofixation electrophoresis.  M spike measured 1.1 g/dL in April 2021.  Serum IgM level was elevated at 1075 mg/dL.    Since our last visit he proceeded with additional evaluation.  Serum free kappa lambda light chain ratio was normal on Yudi 3, 2021.  Serum protein electrophoresis confirmed the presence of monoclonal IgM kappa protein.  M spike remained at 1.1 g/dL on Yudi 3, 2021.  Beta-2 microglobulin was normal.  CMP was unremarkable.  Serum viscosity was slightly elevated at 2.1.  CBC with differential counts was unremarkable except  he was mildly anemic with hemoglobin of 12.7 g/dL. .  He proceeded to undergo bone marrow biopsy on Yudi 10, 2021.   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  Flow cytometry showed findings suggestion of kappa skewing on B cells and rare to absent plasma cells. Otherwise bone marrow was hypercellular with normal trilineage hematopoiesis.  Cytogenetics was normal 46 XY.  An activating mutation (L265P) in MYD88 is present in up to 90% of cases   of lymphoplasmacytic lymphoma (LPL).   [1] In addition to LPL, the MYD88 L265P mutation is reportedly found in   4-21% of splenic marginal zone   lymphomas, up to 30% of activated B-cell type diffuse large B-cell   lymphomas, and rarely in chronic   lymphocytic leukemia/small lymphocytic lymphoma.   NGS was positive for the MYD88 p.L265P mutation which favors a diagnosis of lymphoplasmacytic   lymphoma, consistent with the morphologic   impression ([id number]).   CT of the chest, abdomen and pelvis on 7/7/2021 showed mildly prominent right hilar lymph node measures up to 1.1 cm. No other evidence for lymphadenopathy is seen in the chest, abdomen or pelvis.     2. Cardiac- He was seen by Dr. Stanford for evaluation of CACs on CT.  Echocardiogram on 7/26/2021 showed normal LVEF and no valvular  pathology. He was started on Lipitor.      History Of Present Illness:  Mr. Evans is a 71 year old male who presents with new diagnosis of lymphoplasmacytic lymphoma. He is s/p bendamustine cycle 1 on 7/19/2021., tolerated well. rituxan was held with cycle 1 due to concerns of worsening hyperviscosity.  He continues to have s/o peripheral neuropathy in the feet and hands- numbness in feet at the bottom and in the toes and 2-3 fingers in his  Hands,improving. He attributes improved s/o neuropathy to Gabapentin. Neuropathy does not affect his balance. He can feel the ground.  He is on gabapentin 300 mg PO TID.   He has had no new lumps or bumps, no constitutional symptoms and no bone pain.  He has had no vision changes.   Exercises daily on his bike. He has been keeping a healthy diet.     Wife present for visit. Hemoglobin A1c was borderline elevated at 5.8%.. MAG/SGPG Ab negative. EMG was abnormal, This is an abnormal study, demonstrating electrophysiologic evidence of a very mild, length-dependent axonal sensory polyneuropathy.  He is generally feeling well.  He has tolerated chemotherapy well and is due for cycle 4-day 1 is due tomorrow (on October 13, 2021).  His only complaint is mild fatigue.  He has not developed any nausea on chemotherapy.  He is able to perform his daily activities.  He was seen by Dr. Riggs from orthopedic surgery on October 5, 2021.  I have reviewed Dr. Riggs's note.  He has had some right-sided hip pain radiating down the right buttock in the right thigh, mild.  She is status post right total hip arthroplasty in November 2014.  He had left hip replacement in April 2012.  On imaging Right total hip arthroplasty showed asymmetric femoral ball in the socket consistent with polyethylene wear superiorly.  He will likely need revision of right total hip arthroplasty but he would like to postpone that until April of next year.  In addition, a complete 12 point  review of systems is  negative.    Past Medical/Surgical History:  Past Medical History:   Diagnosis Date     DJD (degenerative joint disease), lumbar      Malignant lymphoma, lymphoplasmacytic (H) 7/1/2021     Sinus bradycardia      Past Surgical History:   Procedure Laterality Date     APPENDECTOMY       BIOPSY  mass right side     BONE MARROW BIOPSY, BONE SPECIMEN, NEEDLE/TROCAR N/A 06/10/2021    Procedure: BIOPSY, BONE MARROW;  Surgeon: Josephine Santamaria MD;  Location:  GI     COLONOSCOPY  01/01/2010    benign with hyperplastic polyps     SD TOTAL HIP ARTHROPLASTY Left 04/02/2012    Dr Roderick Santos     SD TOTAL HIP ARTHROPLASTY Right 11/15/2004    Dr. Jose Martinez     SURGICAL HISTORY OF -       Removal of a benign soft tissue mass right abdominal wall     TONSILLECTOMY       Allergies:  Allergies as of 10/12/2021 - Reviewed 10/12/2021   Allergen Reaction Noted     Seasonal allergies  07/30/2015     Current Medications:  Current Outpatient Medications   Medication Sig Dispense Refill     atorvastatin (LIPITOR) 20 MG tablet Take 1 tablet (20 mg) by mouth daily 90 tablet 3     fluticasone (FLONASE) 50 MCG/ACT spray Spray 1 spray into both nostrils daily       gabapentin (NEURONTIN) 300 MG capsule Take 1 capsule (300 mg) by mouth 3 times daily 90 capsule 3     ginkgo biloba 60 MG CAPS capsule Take 30 mg by mouth daily       LORazepam (ATIVAN) 0.5 MG tablet Take 1 tablet (0.5 mg) by mouth every 4 hours as needed (Anxiety, Nausea/Vomiting or Sleep) 30 tablet 3     melatonin 5 MG tablet        Multiple Vitamins-Minerals (MULTIVITAMIN PO)        NEW MED NAC       ondansetron (ZOFRAN) 8 MG tablet Take 1 tablet (8 mg) by mouth every 8 hours as needed (Nausea/Vomiting) 10 tablet 3     prochlorperazine (COMPAZINE) 10 MG tablet Take 0.5 tablets (5 mg) by mouth every 8 hours as needed (Nausea/Vomiting) 30 tablet 3     saw palmetto 450 MG CAPS capsule Take 450 mg by mouth daily       selenium 50 MCG TABS tablet Take 50 mcg by mouth daily        SF 5000 PLUS 1.1 % CREA USE TOOTH PASTE TWICE DAILY AS DIRECTED. NOTHING BY MOUTH FOR 30 MINUTES AFTER USE       vitamin E 400 units TABS Take 400 Units by mouth daily        Family History  There is a FHX of malignancy- metastatic breast cancer at the age of 56. Sister had pancreatic cancer at 52, . Father  of stomach cancer at the age of 71. Another sister had breast cancer at the age of 81. He is of [place] descent on dad's side. Never smoker.    Physical Exam:  Wt Readings from Last 5 Encounters:   10/05/21 90.5 kg (199 lb 8 oz)   21 92.3 kg (203 lb 6.4 oz)   09/15/21 89.5 kg (197 lb 6.4 oz)   21 90.3 kg (199 lb)   21 90.3 kg (199 lb)       Constitutional: alert and in no distress  Eyes: No redness or discharge  Respiratory: No cough or labored breathing.  Musculoskeletal: Full range of motion in extremities.  Skin: no visible skin lesions or discoloration  Neurological: No tremors and denies headache.  Psychiatric: Mentation appears normal and affect is normal as well.  Alert and oriented x3.  The rest the comprehensive physical examination is deferred due to public health emergency video visit restrictions.      Laboratory/Imaging Studies  CBC-unremarkable with exception of mildly low hemoglobin of 13.2 g/dL. WBC and platelet count within normal limits. Mild lymphocytopenia with ALC of 0.4 noted. CMP unremarkable.  Results for ALANA GONZALEZ (MRN [id number]) as of 10/12/2021 12:31   Ref. Range 6/3/2021 14:08 6/10/2021 09:37 2021 08:04 2021 08:10 9/15/2021 07:54   IGG Latest Ref Range: 610 - 1,616 mg/dL 780       IGM Latest Ref Range: 35 - 242 mg/dL 1,979 (H)  1,184 (H)  962 (H)   Immunofixation ELP Unknown (Note)       Kappa Free Lt Chain Latest Ref Range: 0.33 - 1.94 mg/dL 1.42       Kappa Lambda Ratio Latest Ref Range: 0.26 - 1.65  1.41       Lambda Free Lt Chain Latest Ref Range: 0.57 - 2.63 mg/dL 1.01       MYD88 MUTATION DETECTION NGS Unknown  Rpt      LEUKEMIA  LYMPHOMA EVALUATION NON CSF Unknown  Rpt      Monoclonal Peak Latest Ref Range: <=0.0 g/dL 1.1 (H)   0.7 (H) 0.8 (H)   Viscosity Index Latest Ref Range: 1.4 - 1.8  2.1 (H)  1.8  1.7   Kappa Free Light Chains Latest Ref Range: 0.33 - 1.94 mg/dL   1.54  1.00   LAMBDA FREE LT CHAINS Latest Ref Range: 0.57 - 2.63 mg/dL   1.31  0.86   KAPPA/LAMBDA RATIO Latest Ref Range: 0.26 - 1.65    1.18  1.16       ASSESSMENT/PLAN:  Ag is a very pleasant 71-year-old gentleman with progressive peripheral neuropathy and finding of IgM kappa on serum protein immunofixation of the pheresis, with elevated serum IgM level and serum M spike of 1.1 g/dL, and on bone marrow biopsy confirmed diagnosis of lymphoplasmacytic lymphoma.     1.  Lymphoplasmacytic lymphoma-serum IgM level is declining.  M spike has declined to 0.7-0.8 g/dL.  Serum viscosity is normal.  His symptoms of peripheral neuropathy improved although that could be secondary to gabapentin use as well.  Proceed with Rituxan/bendamustine cycle 4 day 1 on October 13, 2021.. We will check serum IgM level, serum protein electrophoresis and free kappa lambda light chain analysis with cycle 4-day 1 (tomorrow). I will f/up on the results. Also check CBC differential, CMP with cycle 4. Plan total of 4 cycles.  We will plan to see him back in the beginning of January, with labs prior.  He prefers to have labs done at [facility].  They will be going to [facility], for 2 months in the winter.    2.  Prevention of drug-induced neutropenia-we will continue to use Neulasta with his chemotherapy regimen.  3.  Peripheral neuropathy-proceed with bendamustine and Rituxan as above. Follow-up with Dr. Ruiz in November 2021.. Continue gabapentin in the interim.  4.  Family history of breast cancer and pancreatic cancer, as above -he was seen by our genetic counselor in early August and proceeded with [facility] genetic testing panel. Results pending.  5.  COVID-19 booster and influenza  vaccinations recommended.  6. Colon cancer screening (n/a today)-he was scheduled to have a screening colonoscopy in July, but postponed it due to chemotherapy  7.  Status post right total hip arthroplasty with showing evidence of polyethylene wear with asymmetric positioning of the femoral head and ?erosion and lysis above the acetabulum- f/up with orthopedic surgery team.  At the end of our visit patient verbalized understanding and concurred with the plan.    42 minutes spent on the date of the encounter doing chart review, review of test results, interpretation of tests,ordering tests, care coordination,  patient visit and documentation.            Again, thank you for allowing me to participate in the care of your patient.        Sincerely,        Keerthi Hernandez MD, MD

## 2021-10-12 NOTE — NURSING NOTE
Ag Schneidermaritza 71 year old male presents today to discuss COVID booster shot if needed in the future and pt has upcoming vacations in January and February and wondering that would be okay with infusions, pt was informed hip replacement needed to be repaired and wanted to know if this could be done within the next year. Pt states infusion treatments are going well.    Shae Chambers, Virtual Facilitator

## 2021-10-13 ENCOUNTER — APPOINTMENT (OUTPATIENT)
Dept: LAB | Facility: CLINIC | Age: 71
End: 2021-10-13
Payer: COMMERCIAL

## 2021-10-13 ENCOUNTER — INFUSION THERAPY VISIT (OUTPATIENT)
Dept: INFUSION THERAPY | Facility: CLINIC | Age: 71
End: 2021-10-13
Attending: INTERNAL MEDICINE
Payer: COMMERCIAL

## 2021-10-13 VITALS
BODY MASS INDEX: 28.05 KG/M2 | DIASTOLIC BLOOD PRESSURE: 71 MMHG | TEMPERATURE: 97.5 F | RESPIRATION RATE: 20 BRPM | OXYGEN SATURATION: 100 % | WEIGHT: 201.1 LBS | HEART RATE: 46 BPM | SYSTOLIC BLOOD PRESSURE: 135 MMHG

## 2021-10-13 DIAGNOSIS — D70.1 CHEMOTHERAPY-INDUCED NEUTROPENIA (H): ICD-10-CM

## 2021-10-13 DIAGNOSIS — T45.1X5A CHEMOTHERAPY-INDUCED NEUTROPENIA (H): ICD-10-CM

## 2021-10-13 DIAGNOSIS — C83.00 MALIGNANT LYMPHOMA, LYMPHOPLASMACYTIC (H): Primary | ICD-10-CM

## 2021-10-13 LAB
ALBUMIN SERPL-MCNC: 3.9 G/DL (ref 3.4–5)
ALP SERPL-CCNC: 82 U/L (ref 40–150)
ALT SERPL W P-5'-P-CCNC: 26 U/L (ref 0–70)
ANION GAP SERPL CALCULATED.3IONS-SCNC: 3 MMOL/L (ref 3–14)
AST SERPL W P-5'-P-CCNC: 15 U/L (ref 0–45)
BASOPHILS # BLD AUTO: 0.1 10E3/UL (ref 0–0.2)
BASOPHILS NFR BLD AUTO: 2 %
BILIRUB SERPL-MCNC: 0.6 MG/DL (ref 0.2–1.3)
BUN SERPL-MCNC: 17 MG/DL (ref 7–30)
CALCIUM SERPL-MCNC: 8.7 MG/DL (ref 8.5–10.1)
CHLORIDE BLD-SCNC: 108 MMOL/L (ref 94–109)
CO2 SERPL-SCNC: 27 MMOL/L (ref 20–32)
CREAT SERPL-MCNC: 0.76 MG/DL (ref 0.66–1.25)
EOSINOPHIL # BLD AUTO: 0.2 10E3/UL (ref 0–0.7)
EOSINOPHIL NFR BLD AUTO: 3 %
ERYTHROCYTE [DISTWIDTH] IN BLOOD BY AUTOMATED COUNT: 13 % (ref 10–15)
GFR SERPL CREATININE-BSD FRML MDRD: >90 ML/MIN/1.73M2
GLUCOSE BLD-MCNC: 91 MG/DL (ref 70–99)
HCT VFR BLD AUTO: 39 % (ref 40–53)
HGB BLD-MCNC: 13.4 G/DL (ref 13.3–17.7)
IGM SERPL-MCNC: 862 MG/DL (ref 35–242)
IMM GRANULOCYTES # BLD: 0 10E3/UL
IMM GRANULOCYTES NFR BLD: 0 %
KAPPA LC FREE SER-MCNC: 0.93 MG/DL (ref 0.33–1.94)
KAPPA LC FREE/LAMBDA FREE SER NEPH: 1.21 {RATIO} (ref 0.26–1.65)
LAMBDA LC FREE SERPL-MCNC: 0.77 MG/DL (ref 0.57–2.63)
LYMPHOCYTES # BLD AUTO: 0.4 10E3/UL (ref 0.8–5.3)
LYMPHOCYTES NFR BLD AUTO: 7 %
MCH RBC QN AUTO: 33.8 PG (ref 26.5–33)
MCHC RBC AUTO-ENTMCNC: 34.4 G/DL (ref 31.5–36.5)
MCV RBC AUTO: 98 FL (ref 78–100)
MONOCYTES # BLD AUTO: 0.9 10E3/UL (ref 0–1.3)
MONOCYTES NFR BLD AUTO: 15 %
NEUTROPHILS # BLD AUTO: 4.4 10E3/UL (ref 1.6–8.3)
NEUTROPHILS NFR BLD AUTO: 73 %
NRBC # BLD AUTO: 0 10E3/UL
NRBC BLD AUTO-RTO: 0 /100
PLATELET # BLD AUTO: 182 10E3/UL (ref 150–450)
POTASSIUM BLD-SCNC: 4.1 MMOL/L (ref 3.4–5.3)
PROT SERPL-MCNC: 7.1 G/DL (ref 6.8–8.8)
RBC # BLD AUTO: 3.97 10E6/UL (ref 4.4–5.9)
SODIUM SERPL-SCNC: 138 MMOL/L (ref 133–144)
TOTAL PROTEIN SERUM FOR ELP: 6.9 G/DL (ref 6.8–8.8)
WBC # BLD AUTO: 6 10E3/UL (ref 4–11)

## 2021-10-13 PROCEDURE — 258N000003 HC RX IP 258 OP 636: Performed by: INTERNAL MEDICINE

## 2021-10-13 PROCEDURE — 250N000013 HC RX MED GY IP 250 OP 250 PS 637: Performed by: INTERNAL MEDICINE

## 2021-10-13 PROCEDURE — 96409 CHEMO IV PUSH SNGL DRUG: CPT

## 2021-10-13 PROCEDURE — 82784 ASSAY IGA/IGD/IGG/IGM EACH: CPT | Performed by: INTERNAL MEDICINE

## 2021-10-13 PROCEDURE — 85810 BLOOD VISCOSITY EXAMINATION: CPT | Performed by: INTERNAL MEDICINE

## 2021-10-13 PROCEDURE — 250N000011 HC RX IP 250 OP 636: Performed by: INTERNAL MEDICINE

## 2021-10-13 PROCEDURE — 96366 THER/PROPH/DIAG IV INF ADDON: CPT

## 2021-10-13 PROCEDURE — 84165 PROTEIN E-PHORESIS SERUM: CPT | Mod: TC | Performed by: PATHOLOGY

## 2021-10-13 PROCEDURE — 85025 COMPLETE CBC W/AUTO DIFF WBC: CPT | Performed by: INTERNAL MEDICINE

## 2021-10-13 PROCEDURE — 83883 ASSAY NEPHELOMETRY NOT SPEC: CPT | Performed by: INTERNAL MEDICINE

## 2021-10-13 PROCEDURE — 84155 ASSAY OF PROTEIN SERUM: CPT | Mod: 91 | Performed by: INTERNAL MEDICINE

## 2021-10-13 PROCEDURE — 96367 TX/PROPH/DG ADDL SEQ IV INF: CPT

## 2021-10-13 PROCEDURE — 84165 PROTEIN E-PHORESIS SERUM: CPT | Mod: 26 | Performed by: PATHOLOGY

## 2021-10-13 PROCEDURE — 36415 COLL VENOUS BLD VENIPUNCTURE: CPT | Performed by: INTERNAL MEDICINE

## 2021-10-13 PROCEDURE — 80053 COMPREHEN METABOLIC PANEL: CPT | Performed by: INTERNAL MEDICINE

## 2021-10-13 RX ORDER — DIPHENHYDRAMINE HCL 25 MG
50 CAPSULE ORAL ONCE
Status: COMPLETED | OUTPATIENT
Start: 2021-10-13 | End: 2021-10-13

## 2021-10-13 RX ORDER — ACETAMINOPHEN 325 MG/1
650 TABLET ORAL ONCE
Status: COMPLETED | OUTPATIENT
Start: 2021-10-13 | End: 2021-10-13

## 2021-10-13 RX ADMIN — DEXAMETHASONE SODIUM PHOSPHATE: 10 INJECTION, SOLUTION INTRAMUSCULAR; INTRAVENOUS at 09:12

## 2021-10-13 RX ADMIN — DIPHENHYDRAMINE HYDROCHLORIDE 50 MG: 25 CAPSULE ORAL at 08:55

## 2021-10-13 RX ADMIN — ACETAMINOPHEN 650 MG: 325 TABLET, FILM COATED ORAL at 08:55

## 2021-10-13 RX ADMIN — BENDAMUSTINE HYDROCHLORIDE 200 MG: 25 INJECTION, SOLUTION INTRAVENOUS at 13:07

## 2021-10-13 RX ADMIN — SODIUM CHLORIDE 250 ML: 9 INJECTION, SOLUTION INTRAVENOUS at 09:07

## 2021-10-13 RX ADMIN — RITUXIMAB-ABBS 800 MG: 10 INJECTION, SOLUTION INTRAVENOUS at 09:53

## 2021-10-13 ASSESSMENT — PAIN SCALES - GENERAL: PAINLEVEL: NO PAIN (0)

## 2021-10-13 NOTE — PROGRESS NOTES
Infusion Nursing Note:  Ag Evans presents today for C4D1 Rituximab-abbs/Bendamustine.    Patient seen by provider today: No, had Virtual visit with Dr. Hernandez yesterday.    present during visit today: Not Applicable.    Note: Patient reports feeling pretty good, very mild fatigue. Grade 2 neuropathy, Gabapentin was increased and he thinks it is helping. Constipation for a few days after chemo. R hip arthralgia, will be getting a 2nd hip replacement in November. Denies pain today, VSS, afebrile.       Intravenous Access:  Peripheral IV placed.    Treatment Conditions:  Lab Results   Component Value Date    HGB 13.4 10/13/2021    WBC 6.0 10/13/2021    ANEU 5.2 06/10/2021    ANEUTAUTO 4.4 10/13/2021     10/13/2021      Lab Results   Component Value Date     10/13/2021    POTASSIUM 4.1 10/13/2021    CR 0.76 10/13/2021    TANNA 8.7 10/13/2021    BILITOTAL 0.6 10/13/2021    ALBUMIN 3.9 10/13/2021    ALT 26 10/13/2021    AST 15 10/13/2021     Results reviewed, labs MET treatment parameters, ok to proceed with treatment.      Post Infusion Assessment:  Patient tolerated infusion without incident.  Blood return noted pre and post infusion.  Site patent and intact, free from redness, edema or discomfort.  No evidence of extravasations.  PIV access discontinued per protocol.       Discharge Plan:   Copy of AVS reviewed with patient.  Patient will return 10/14 for next appointment.  Patient discharged in stable condition accompanied by: wife.  Departure Mode: Ambulatory.      Julia Garrett RN

## 2021-10-14 ENCOUNTER — INFUSION THERAPY VISIT (OUTPATIENT)
Dept: INFUSION THERAPY | Facility: CLINIC | Age: 71
End: 2021-10-14
Attending: INTERNAL MEDICINE
Payer: COMMERCIAL

## 2021-10-14 VITALS
TEMPERATURE: 98.1 F | WEIGHT: 207.3 LBS | RESPIRATION RATE: 16 BRPM | SYSTOLIC BLOOD PRESSURE: 116 MMHG | BODY MASS INDEX: 28.91 KG/M2 | HEART RATE: 47 BPM | DIASTOLIC BLOOD PRESSURE: 62 MMHG | OXYGEN SATURATION: 96 %

## 2021-10-14 DIAGNOSIS — D70.1 CHEMOTHERAPY-INDUCED NEUTROPENIA (H): ICD-10-CM

## 2021-10-14 DIAGNOSIS — C83.00 MALIGNANT LYMPHOMA, LYMPHOPLASMACYTIC (H): Primary | ICD-10-CM

## 2021-10-14 DIAGNOSIS — T45.1X5A CHEMOTHERAPY-INDUCED NEUTROPENIA (H): ICD-10-CM

## 2021-10-14 LAB
ALBUMIN SERPL ELPH-MCNC: 4.4 G/DL (ref 3.7–5.1)
ALPHA1 GLOB SERPL ELPH-MCNC: 0.3 G/DL (ref 0.2–0.4)
ALPHA2 GLOB SERPL ELPH-MCNC: 0.6 G/DL (ref 0.5–0.9)
B-GLOBULIN SERPL ELPH-MCNC: 0.7 G/DL (ref 0.6–1)
GAMMA GLOB SERPL ELPH-MCNC: 1 G/DL (ref 0.7–1.6)
M PROTEIN SERPL ELPH-MCNC: 0.4 G/DL
PROT PATTERN SERPL ELPH-IMP: ABNORMAL
VISC SER: 1.6 CP (ref 1.4–1.8)

## 2021-10-14 PROCEDURE — 250N000011 HC RX IP 250 OP 636: Performed by: INTERNAL MEDICINE

## 2021-10-14 PROCEDURE — 96367 TX/PROPH/DG ADDL SEQ IV INF: CPT

## 2021-10-14 PROCEDURE — 96372 THER/PROPH/DIAG INJ SC/IM: CPT | Performed by: INTERNAL MEDICINE

## 2021-10-14 PROCEDURE — 96409 CHEMO IV PUSH SNGL DRUG: CPT

## 2021-10-14 PROCEDURE — 258N000003 HC RX IP 258 OP 636: Mod: 59 | Performed by: INTERNAL MEDICINE

## 2021-10-14 PROCEDURE — 96377 APPLICATON ON-BODY INJECTOR: CPT | Mod: 59 | Performed by: INTERNAL MEDICINE

## 2021-10-14 RX ADMIN — DEXAMETHASONE SODIUM PHOSPHATE: 10 INJECTION, SOLUTION INTRAMUSCULAR; INTRAVENOUS at 11:30

## 2021-10-14 RX ADMIN — BENDAMUSTINE HYDROCHLORIDE 200 MG: 25 INJECTION, SOLUTION INTRAVENOUS at 12:05

## 2021-10-14 RX ADMIN — PEGFILGRASTIM 6 MG: KIT SUBCUTANEOUS at 12:21

## 2021-10-14 RX ADMIN — SODIUM CHLORIDE 250 ML: 9 INJECTION, SOLUTION INTRAVENOUS at 11:30

## 2021-10-14 ASSESSMENT — PAIN SCALES - GENERAL: PAINLEVEL: NO PAIN (0)

## 2021-10-14 NOTE — PROGRESS NOTES
Infusion Nursing Note:  Ag Evans presents today for C4D2 Bendamustine.    Patient seen by provider today: No   present during visit today: Not Applicable.    Note: Patient denies new symptoms or concerns today. Doesn't sleep well when on Decadron. Denies pain. VSS, afebrile.       Intravenous Access:  Peripheral IV placed.    Treatment Conditions:  Not Applicable.      Post Infusion Assessment:  Patient tolerated infusion without incident.  Blood return noted pre and post infusion.  Site patent and intact, free from redness, edema or discomfort.  No evidence of extravasations.  PIV access discontinued per protocol.  ONPRO  Was placed on patient's: back of right arm.    Was placed at 12:28 PM    ONPRO injector device Lot number: [id number]    Patient education included: what patient can expect after application, what colored lights mean on the device, when to remove device, when and where to call with questions or issues, all patients questions answered and that Neulasta administration will occur at approx 3:30 pm tomorrow, 10/15.    Patient tolerated administration well.      Discharge Plan:   Patient verbalized understanding of discharge instructions and all questions answered. This is his final Cycle of treatment. Will followup with Oncology as directed.   Patient discharged in stable condition accompanied by: wife.  Discharged: Ambulatory. Wife picking up at main entrance.    Julia Garrett RN

## 2021-10-18 ENCOUNTER — TELEPHONE (OUTPATIENT)
Dept: ONCOLOGY | Facility: CLINIC | Age: 71
End: 2021-10-18

## 2021-10-18 NOTE — TELEPHONE ENCOUNTER
Attempted to reach pt to schedule video visit f/u appt w/NP first week of January 2022 and lab appt last week of December per Dr. Hernandez. LVM and clinic # given to call back.  Shae Chambers, Virtual Visit Facilitator

## 2021-11-01 ASSESSMENT — ACTIVITIES OF DAILY LIVING (ADL)
ADL_MEAN: 0
ADL_SUM: 0
ADL_SUBSCALE_SCORE: 100

## 2021-11-04 ENCOUNTER — OFFICE VISIT (OUTPATIENT)
Dept: ORTHOPEDICS | Facility: CLINIC | Age: 71
End: 2021-11-04
Payer: COMMERCIAL

## 2021-11-04 VITALS — HEIGHT: 71 IN | BODY MASS INDEX: 27.3 KG/M2 | WEIGHT: 195 LBS

## 2021-11-04 DIAGNOSIS — T84.069D PROSTHETIC WEAR FOLLOWING TOTAL HIP ARTHROPLASTY, SUBSEQUENT ENCOUNTER: Primary | ICD-10-CM

## 2021-11-04 DIAGNOSIS — Z96.649 PROSTHETIC WEAR FOLLOWING TOTAL HIP ARTHROPLASTY, SUBSEQUENT ENCOUNTER: Primary | ICD-10-CM

## 2021-11-04 PROCEDURE — 99214 OFFICE O/P EST MOD 30 MIN: CPT | Performed by: ORTHOPAEDIC SURGERY

## 2021-11-04 RX ORDER — DOCUSATE SODIUM 250 MG
CAPSULE ORAL
COMMUNITY
Start: 2021-08-16 | End: 2021-11-30

## 2021-11-04 ASSESSMENT — MIFFLIN-ST. JEOR: SCORE: 1656.64

## 2021-11-04 NOTE — PROGRESS NOTES
[facility]  Orthopaedic Surgery, Joint Replacement Consultation  by Baljit Joy M.D.    Ag Evans MRN# [id number]   Age: 71 year old YOB: 1950     Requesting physician: No ref. 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  Implant remains stable but is at risk for periprosthetic fracture or loosening.    No evidence of particulate wear debris involving left hip.     Plan:  Recommend proceeding with revision hip arthroplasty consisting of intralesional curettage of the acetabular defect with allograft bone impaction grafting.  Would also perform exchange and revision of acetabular bearing surface and femoral head.  Assuming the acetabular implant remains stable, I do not envision revision of the acetabular shell or the femoral stem.  Preoperatively, ESR and CRP should be obtained.    Patient is indicated desire to hold off on recommended surgery until the spring 2022 when he returns from [place].  I he was apprised of the risks involved with periprosthetic fracture or loosening and was advised to refrain from any impact type activity or other strenuous activities that might put him at risk for periprosthetic fracture or loosening of the implant.    He will return to see me upon his return from [place] with a follow-up CT scan of the pelvis to compare to the previous study of July of this year for any evidence of progressive enlargement of the osteolysis.  At that time, PACs clinic visit as well as preoperative H&P should be obtained.   Labs ESR and CRP should be obtained as well.  I will ask him to address any pending dental health care in the interim.            History of Present Illness:   71 year old male  chief complaint    R hip concerns.   Had R hip pain spontaneous onset, and subsequently resolved.  Saw Dr. Riggs XR done with concerns for  "osteolysis.  Now has no pain or any other sx's in hip and ambulating normally.    Currently completing treatment for Waldonstroms macroglobulinemia on chemo x 4 mo.       Current symptoms:  Problem: Right Hip   Onset and duration: 6 months ago  Awakens from sleep due to sx's:  No  Precipitating Injury:  No    Other joints or sites painful:  Yes - Knee arthritis       Background history:  DX:  1. Status post bilateral total hip arthroplasty    TREATMENTS:  1. 2005, Right, ERIC, Dr. Jose العلي, [facility]   2. 2011, Left ERIC, Dr. Macario, [facility]              Physical Exam:     EXAMINATION pertinent findings:   PSYCH: Pleasant, healthy-appearing, alert, oriented x3, cooperative. Normal mood and affect.  VITAL SIGNS: Height 1.803 m (5' 11\"), weight 88.5 kg (195 lb)..  Reviewed nursing intake notes.   Body mass index is 27.2 kg/m .  RESP: non labored breathing   ABD: benign, soft, non-tender, no acute peritoneal findings  SKIN: grossly normal   LYMPHATIC: grossly normal, no adenopathy, no extremity edema  NEURO: grossly normal , no motor deficits  VASCULAR: satisfactory perfusion of all extremities   MUSCULOSKELETAL:   Gait is normal.  Both hips have 0 degrees extension flexion to 110 degrees internal rotation of 5 external rotation of 40 degrees without pain.  Abduction 40 adduction 20.    Knees have 3 to 5 degree fixed flexion contracture with further flexion to 110 degrees bilaterally.             Data:   All laboratory data reviewed  All imaging studies reviewed by me    3.5 mm linear wear R acetabulum with corresponding area of acetabular osteolysis related to particulate wear debris generated by the acetabular bearing.           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       Past Surgical History:     Past Surgical History:   Procedure Laterality Date     APPENDECTOMY       BIOPSY  mass right side     BONE MARROW BIOPSY, BONE SPECIMEN, NEEDLE/TROCAR N/A 06/10/2021    Procedure: BIOPSY, BONE MARROW;  Surgeon: Josephine Santamaria MD;  Location:  GI     COLONOSCOPY  01/01/2010    benign with hyperplastic polyps     NM TOTAL HIP ARTHROPLASTY Left 04/02/2012    Dr Roderick Santos     NM TOTAL HIP ARTHROPLASTY Right 11/15/2004    Dr. Jose Martinez     SURGICAL HISTORY OF -       Removal of a benign soft tissue mass right abdominal wall     TONSILLECTOMY              Social History:     Social History     Socioeconomic History     Marital status:      Spouse name: Not on file     Number of children: Not on file     Years of education: Not on file     Highest education level: Not on file   Occupational History     Not on file   Tobacco Use     Smoking status: Never Smoker     Smokeless tobacco: Never Used   Substance and Sexual Activity     Alcohol use: Yes     Comment: Rare     Drug use: No     Sexual activity: Yes     Partners: Female     Birth control/protection: None   Other Topics Concern     Parent/sibling w/ CABG, MI or angioplasty before 65F 55M? No   Social History Narrative     Not on file     Social Determinants of Health     Financial Resource Strain:      Difficulty of Paying Living Expenses:    Food  Insecurity:      Worried About Running Out of Food in the Last Year:      Ran Out of Food in the Last Year:    Transportation Needs:      Lack of Transportation (Medical):      Lack of Transportation (Non-Medical):    Physical Activity:      Days of Exercise per Week:      Minutes of Exercise per Session:    Stress:      Feeling of Stress :    Social Connections:      Frequency of Communication with Friends and Family:      Frequency of Social Gatherings with Friends and Family:      Attends Mu-ism Services:      Active Member of Clubs or Organizations:      Attends Club or Organization Meetings:      Marital Status:    Intimate Partner Violence:      Fear of Current or Ex-Partner:      Emotionally Abused:      Physically Abused:      Sexually Abused:             Family History:       Family History   Problem Relation Age of Onset     Prostate Cancer Father      Other Cancer Father         stomach     Other Cancer Mother      Breast Cancer Mother      Other Cancer Sister         Pancreatic cancer     Breast Cancer Sister      Osteoporosis Sister         from cancer drug?     Other Cancer Sister         pancreatic            Medications:     Current Outpatient Medications   Medication Sig     atorvastatin (LIPITOR) 20 MG tablet Take 1 tablet (20 mg) by mouth daily     docusate sodium (COLACE) 250 MG capsule      fluticasone (FLONASE) 50 MCG/ACT spray Spray 1 spray into both nostrils daily     gabapentin (NEURONTIN) 300 MG capsule Take 1 capsule (300 mg) by mouth 3 times daily     ginkgo biloba 60 MG CAPS capsule Take 30 mg by mouth daily     melatonin 5 MG tablet      Multiple Vitamins-Minerals (MULTIVITAMIN PO)      NEW MED NAC     saw palmetto 450 MG CAPS capsule Take 450 mg by mouth daily     SF 5000 PLUS 1.1 % CREA USE TOOTH PASTE TWICE DAILY AS DIRECTED. NOTHING BY MOUTH FOR 30 MINUTES AFTER USE     LORazepam (ATIVAN) 0.5 MG tablet Take 1 tablet (0.5 mg) by mouth every 4 hours as needed (Anxiety,  Nausea/Vomiting or Sleep) (Patient not taking: Reported on 11/4/2021)     ondansetron (ZOFRAN) 8 MG tablet Take 1 tablet (8 mg) by mouth every 8 hours as needed (Nausea/Vomiting) (Patient not taking: Reported on 11/4/2021)     prochlorperazine (COMPAZINE) 10 MG tablet Take 0.5 tablets (5 mg) by mouth every 8 hours as needed (Nausea/Vomiting) (Patient not taking: Reported on 11/4/2021)     selenium 50 MCG TABS tablet Take 50 mcg by mouth daily (Patient not taking: Reported on 11/4/2021)     vitamin E 400 units TABS Take 400 Units by mouth daily     No current facility-administered medications for this visit.              Review of Systems:   A comprehensive 10 point review of systems (constitutional, ENT, cardiac, peripheral vascular, lymphatic, respiratory, GI, , Musculoskeletal, skin, Neurological) was performed and found to be negative except as described in this note.       HOOS Hip Dysfunction & Osteoarthritis Outcome Questionnaire    Hip Dysfunction & Osteoarthritis Outcome Score (HOOS), English Version LK 2.0 (Burt Naylor, Elbert BARKLEY., 2003) 11/1/2021   S1. Do you feel grinding, hear clicking or any other type of noise from your hip? Never   S2. Difficulties spreading legs wide apart None   S3. Difficulties to stride out when walking None   S4. How severe is your hip joint stiffness after first wakening in the morning? None   S5. How severe is your hip stiffness after sitting, lying or resting LATER IN THE DAY? None   Symptom Count 5   Symptom Sum 0   Symptom Mean 0   Symptom Subscale Score 100   P1. How often is your hip painful? Never   P2. Straightening your hip fully None   P3. Bending your hip FULLY None   P4. Walking on a flat surface None   P5. Going up or down stairs None   P6. At night while in bed None   P7. Sitting or lying None   P8. Standing upright None   P9. Walking on a hard surface (asphalt, concrete, etc.) None   P10. Walking on an uneven surface None   Pain Count 10   Pain Sum 0    Pain Mean 0   Pain Subscale Score 100   A1. Descending stairs None   A2. Ascending stairs None   A3. Rising from sitting None   A4. Standing None   A5. Bending to the floor/ an object None   A6. Walking on a flat surface None   A7. Getting in/out of car None   A8. Going shopping None   A9. Putting on socks/stockings None   A10. Rising from bed None   A11. Taking off socks/stockings None   A12. Lying in bed (turning over, maintaining hip position) None   A13. Getting in/out of bed None   A14. Sitting None   A15. Getting on/off toilet None   A16. Heavy domestic duties (moving heavy boxes, scrubbing floors, etc.) None   A17. Light domestic duties (cooking, dusting, etc.) None   ADL Count 17   ADL Sum 0   ADL Mean 0   ADL Subscale Score 100   SP1. Squatting None   SP2. Running None   SP3. Twisting/pivoting on loaded leg None   SP4. Walking on uneven surface None   Sports/Rec Count 4   Sports/Rec Sum 0   Sports/Rec Mean 0   Sports/Rec Subscale Score 100   Q1. How often are you aware of your hip problem? Never   Q2. Have you modified you life style to avoid activities potentially damaging to your hip? Moderately   Q3. How much are you troubled with lack of confidence in your hip? Moderately   Q4. In general, how much difficulty do you have with your hip? Moderate   QOL Count 4   QOL Sum 6   QOL Mean 1.5   Quality of Life Subscale Score 62.5              [unfilled]    KOOS Knee Survey Assessment    No flowsheet data found.           Promis 10 Assessment    PROMIS 10 11/3/2021   In general, would you say your health is: Good   In general, would you say your quality of life is: Good   In general, how would you rate your physical health? Fair   In general, how would you rate your mental health, including your mood and your ability to think? Excellent   In general, how would you rate your satisfaction with your social activities and relationships? Excellent   In general, please rate how well you carry out your usual  social activities and roles Excellent   To what extent are you able to carry out your everyday physical activities such as walking, climbing stairs, carrying groceries, or moving a chair? Completely   How often have you been bothered by emotional problems such as feeling anxious, depressed or irritable? Never   How would you rate your fatigue on average? Mild   How would you rate your pain on average?   0 = No Pain  to  10 = Worst Imaginable Pain 0   In general, would you say your health is: 3   In general, would you say your quality of life is: 3   In general, how would you rate your physical health? 2   In general, how would you rate your mental health, including your mood and your ability to think? 5   In general, how would you rate your satisfaction with your social activities and relationships? 5   In general, please rate how well you carry out your usual social activities and roles. (This includes activities at home, at work and in your community, and responsibilities as a parent, child, spouse, employee, friend, etc.) 5   To what extent are you able to carry out your everyday physical activities such as walking, climbing stairs, carrying groceries, or moving a chair? 5   In the past 7 days, how often have you been bothered by emotional problems such as feeling anxious, depressed, or irritable? 1   In the past 7 days, how would you rate your fatigue on average? 2   In the past 7 days, how would you rate your pain on average, where 0 means no pain, and 10 means worst imaginable pain? 0   Global Mental Health Score 18   Global Physical Health Score 16   PROMIS TOTAL - SUBSCORES 34   Some recent data might be hidden              [facility] Knee Questionnaire    No flowsheet data found.             See intake form completed by patient

## 2021-11-04 NOTE — LETTER
11/4/2021         RE: Ag Evans  [address]   [facility] [address]        Dear Colleague,    Thank you for referring your patient, Ag Evans, to the [facility] [place]. Please see a copy of my visit note below.        [facility]  Orthopaedic Surgery, Joint Replacement Consultation  by Baljit Joy M.D.    Ag Evans MRN# [id number]   Age: 71 year old YOB: 1950     Requesting physician: No ref. 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  Implant remains stable but is at risk for periprosthetic fracture or loosening.    No evidence of particulate wear debris involving left hip.     Plan:  Recommend proceeding with revision hip arthroplasty consisting of intralesional curettage of the acetabular defect with allograft bone impaction grafting.  Would also perform exchange and revision of acetabular bearing surface and femoral head.  Assuming the acetabular implant remains stable, I do not envision revision of the acetabular shell or the femoral stem.  Preoperatively, ESR and CRP should be obtained.    Patient is indicated desire to hold off on recommended surgery until the spring 2022 when he returns from [place].  I he was apprised of the risks involved with periprosthetic fracture or loosening and was advised to refrain from any impact type activity or other strenuous activities that might put him at risk for periprosthetic fracture or loosening of the implant.    He will return to see me upon his return from [place] with a follow-up CT scan of the pelvis to compare to the previous study of July of this year for any evidence of progressive enlargement of the osteolysis.  At that time, PACs clinic visit as well as preoperative H&P should be obtained.   Labs ESR and CRP should be obtained as well.  I will ask him to address  "any pending dental health care in the interim.            History of Present Illness:   71 year old male  chief complaint    R hip concerns.   Had R hip pain spontaneous onset, and subsequently resolved.  Saw Dr. Riggs XR done with concerns for osteolysis.  Now has no pain or any other sx's in hip and ambulating normally.    Currently completing treatment for Waldonstroms macroglobulinemia on chemo x 4 mo.       Current symptoms:  Problem: Right Hip   Onset and duration: 6 months ago  Awakens from sleep due to sx's:  No  Precipitating Injury:  No    Other joints or sites painful:  Yes - Knee arthritis       Background history:  DX:  1. Status post bilateral total hip arthroplasty    TREATMENTS:  1. 2005, Right, ERIC, Dr. Jose العلي, [facility]   2. 2011, Left ERIC, Dr. Macario, [facility]              Physical Exam:     EXAMINATION pertinent findings:   PSYCH: Pleasant, healthy-appearing, alert, oriented x3, cooperative. Normal mood and affect.  VITAL SIGNS: Height 1.803 m (5' 11\"), weight 88.5 kg (195 lb)..  Reviewed nursing intake notes.   Body mass index is 27.2 kg/m .  RESP: non labored breathing   ABD: benign, soft, non-tender, no acute peritoneal findings  SKIN: grossly normal   LYMPHATIC: grossly normal, no adenopathy, no extremity edema  NEURO: grossly normal , no motor deficits  VASCULAR: satisfactory perfusion of all extremities   MUSCULOSKELETAL:   Gait is normal.  Both hips have 0 degrees extension flexion to 110 degrees internal rotation of 5 external rotation of 40 degrees without pain.  Abduction 40 adduction 20.    Knees have 3 to 5 degree fixed flexion contracture with further flexion to 110 degrees bilaterally.             Data:   All laboratory data reviewed  All imaging studies reviewed by me    3.5 mm linear wear R acetabulum with corresponding area of acetabular osteolysis related to particulate wear debris generated by the acetabular bearing.           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       Past Surgical History:     Past Surgical History:   Procedure Laterality Date     APPENDECTOMY       BIOPSY  mass right side     BONE MARROW BIOPSY, BONE SPECIMEN, NEEDLE/TROCAR N/A 06/10/2021    Procedure: BIOPSY, BONE MARROW;  Surgeon: Josephine Santamaria MD;  Location:  GI     COLONOSCOPY  01/01/2010    benign with hyperplastic polyps     [place] TOTAL HIP ARTHROPLASTY Left 04/02/2012    Dr Roderick Santos     [place] TOTAL HIP ARTHROPLASTY Right 11/15/2004    Dr. Jose Martinez     SURGICAL HISTORY OF -       Removal of a benign soft tissue mass right abdominal wall     TONSILLECTOMY              Social History:     Social History     Socioeconomic History     Marital status:      Spouse name: Not on file     Number of children: Not on file     Years of education: Not on file     Highest education level: Not on file   Occupational History     Not on file   Tobacco Use     Smoking status: Never Smoker     Smokeless tobacco: Never Used   Substance and Sexual Activity     Alcohol use: Yes     Comment: Rare     Drug use: No     Sexual activity: Yes     Partners: Female     Birth control/protection:  None   Other Topics Concern     Parent/sibling w/ CABG, MI or angioplasty before 65F 55M? No   Social History Narrative     Not on file     Social Determinants of Health     Financial Resource Strain:      Difficulty of Paying Living Expenses:    Food Insecurity:      Worried About Running Out of Food in the Last Year:      Ran Out of Food in the Last Year:    Transportation Needs:      Lack of Transportation (Medical):      Lack of Transportation (Non-Medical):    Physical Activity:      Days of Exercise per Week:      Minutes of Exercise per Session:    Stress:      Feeling of Stress :    Social Connections:      Frequency of Communication with Friends and Family:      Frequency of Social Gatherings with Friends and Family:      Attends [employer] Services:      Active Member of Clubs or Organizations:      Attends Club or Organization Meetings:      Marital Status:    Intimate Partner Violence:      Fear of Current or Ex-Partner:      Emotionally Abused:      Physically Abused:      Sexually Abused:             Family History:       Family History   Problem Relation Age of Onset     Prostate Cancer Father      Other Cancer Father         stomach     Other Cancer Mother      Breast Cancer Mother      Other Cancer Sister         Pancreatic cancer     Breast Cancer Sister      Osteoporosis Sister         from cancer drug?     Other Cancer Sister         pancreatic            Medications:     Current Outpatient Medications   Medication Sig     atorvastatin (LIPITOR) 20 MG tablet Take 1 tablet (20 mg) by mouth daily     docusate sodium (COLACE) 250 MG capsule      fluticasone (FLONASE) 50 MCG/ACT spray Spray 1 spray into both nostrils daily     gabapentin (NEURONTIN) 300 MG capsule Take 1 capsule (300 mg) by mouth 3 times daily     ginkgo biloba 60 MG CAPS capsule Take 30 mg by mouth daily     melatonin 5 MG tablet      Multiple Vitamins-Minerals (MULTIVITAMIN PO)      NEW MED NAC     saw palmetto 450 MG CAPS capsule  Take 450 mg by mouth daily     SF 5000 PLUS 1.1 % CREA USE TOOTH PASTE TWICE DAILY AS DIRECTED. NOTHING BY MOUTH FOR 30 MINUTES AFTER USE     LORazepam (ATIVAN) 0.5 MG tablet Take 1 tablet (0.5 mg) by mouth every 4 hours as needed (Anxiety, Nausea/Vomiting or Sleep) (Patient not taking: Reported on 11/4/2021)     ondansetron (ZOFRAN) 8 MG tablet Take 1 tablet (8 mg) by mouth every 8 hours as needed (Nausea/Vomiting) (Patient not taking: Reported on 11/4/2021)     prochlorperazine (COMPAZINE) 10 MG tablet Take 0.5 tablets (5 mg) by mouth every 8 hours as needed (Nausea/Vomiting) (Patient not taking: Reported on 11/4/2021)     selenium 50 MCG TABS tablet Take 50 mcg by mouth daily (Patient not taking: Reported on 11/4/2021)     vitamin E 400 units TABS Take 400 Units by mouth daily     No current facility-administered medications for this visit.              Review of Systems:   A comprehensive 10 point review of systems (constitutional, ENT, cardiac, peripheral vascular, lymphatic, respiratory, GI, , Musculoskeletal, skin, Neurological) was performed and found to be negative except as described in this note.       HOOS Hip Dysfunction & Osteoarthritis Outcome Questionnaire    Hip Dysfunction & Osteoarthritis Outcome Score (HOOS), English Version LK 2.0 (Burt Naylor, Elbert BARKLEY., 2003) 11/1/2021   S1. Do you feel grinding, hear clicking or any other type of noise from your hip? Never   S2. Difficulties spreading legs wide apart None   S3. Difficulties to stride out when walking None   S4. How severe is your hip joint stiffness after first wakening in the morning? None   S5. How severe is your hip stiffness after sitting, lying or resting LATER IN THE DAY? None   Symptom Count 5   Symptom Sum 0   Symptom Mean 0   Symptom Subscale Score 100   P1. How often is your hip painful? Never   P2. Straightening your hip fully None   P3. Bending your hip FULLY None   P4. Walking on a flat surface None   P5. Going up or  down stairs None   P6. At night while in bed None   P7. Sitting or lying None   P8. Standing upright None   P9. Walking on a hard surface (asphalt, concrete, etc.) None   P10. Walking on an uneven surface None   Pain Count 10   Pain Sum 0   Pain Mean 0   Pain Subscale Score 100   A1. Descending stairs None   A2. Ascending stairs None   A3. Rising from sitting None   A4. Standing None   A5. Bending to the floor/ an object None   A6. Walking on a flat surface None   A7. Getting in/out of car None   A8. Going shopping None   A9. Putting on socks/stockings None   A10. Rising from bed None   A11. Taking off socks/stockings None   A12. Lying in bed (turning over, maintaining hip position) None   A13. Getting in/out of bed None   A14. Sitting None   A15. Getting on/off toilet None   A16. Heavy domestic duties (moving heavy boxes, scrubbing floors, etc.) None   A17. Light domestic duties (cooking, dusting, etc.) None   ADL Count 17   ADL Sum 0   ADL Mean 0   ADL Subscale Score 100   SP1. Squatting None   SP2. Running None   SP3. Twisting/pivoting on loaded leg None   SP4. Walking on uneven surface None   Sports/Rec Count 4   Sports/Rec Sum 0   Sports/Rec Mean 0   Sports/Rec Subscale Score 100   Q1. How often are you aware of your hip problem? Never   Q2. Have you modified you life style to avoid activities potentially damaging to your hip? Moderately   Q3. How much are you troubled with lack of confidence in your hip? Moderately   Q4. In general, how much difficulty do you have with your hip? Moderate   QOL Count 4   QOL Sum 6   QOL Mean 1.5   Quality of Life Subscale Score 62.5              [unfilled]    KOOS Knee Survey Assessment    No flowsheet data found.           Promis 10 Assessment    PROMIS 10 11/3/2021   In general, would you say your health is: Good   In general, would you say your quality of life is: Good   In general, how would you rate your physical health? Fair   In general, how would you rate your  mental health, including your mood and your ability to think? Excellent   In general, how would you rate your satisfaction with your social activities and relationships? Excellent   In general, please rate how well you carry out your usual social activities and roles Excellent   To what extent are you able to carry out your everyday physical activities such as walking, climbing stairs, carrying groceries, or moving a chair? Completely   How often have you been bothered by emotional problems such as feeling anxious, depressed or irritable? Never   How would you rate your fatigue on average? Mild   How would you rate your pain on average?   0 = No Pain  to  10 = Worst Imaginable Pain 0   In general, would you say your health is: 3   In general, would you say your quality of life is: 3   In general, how would you rate your physical health? 2   In general, how would you rate your mental health, including your mood and your ability to think? 5   In general, how would you rate your satisfaction with your social activities and relationships? 5   In general, please rate how well you carry out your usual social activities and roles. (This includes activities at home, at work and in your community, and responsibilities as a parent, child, spouse, employee, friend, etc.) 5   To what extent are you able to carry out your everyday physical activities such as walking, climbing stairs, carrying groceries, or moving a chair? 5   In the past 7 days, how often have you been bothered by emotional problems such as feeling anxious, depressed, or irritable? 1   In the past 7 days, how would you rate your fatigue on average? 2   In the past 7 days, how would you rate your pain on average, where 0 means no pain, and 10 means worst imaginable pain? 0   Global Mental Health Score 18   Global Physical Health Score 16   PROMIS TOTAL - SUBSCORES 34   Some recent data might be hidden        [facility] Knee Questionnaire    No flowsheet data  found.       See intake form completed by patient

## 2021-11-04 NOTE — NURSING NOTE
Tentatively set a date of April 19th, 2022 for the revision surgery.  Patient and wife Gunjan will be going to [place], Jan 8th- March 14th.  This RN discussed that I will reach out to them the first week of Jan and schedule appointments for mid March when they return for  appt with Dr. Joy, CT scan, labs, and PAC appt.  Based on the results of the PAC appt, will move forward with confirming surgical date and confirmed that he will need a preop with his primary MD 30 days prior to surgery.  This RN also discussed that he should schedule a dental cleaning for shortly after they return.  Ag has recently been diagnosed with a form of blood cancer, finished chemo. He is not sure what his follow up will be.  Ag denies cardiac or pulm history.  Ag denies diabetes. Ag denies bleeding or clotting disorders.  Ag is fully vaccinated for COVID.   Ag had had surgeries on both hips, appendectomy, tolerated anesthesia well.      Teaching Flowsheet   Relevant Diagnosis: revision right hip arthroplasty  Teaching Topic: Preop Teaching for above     Person(s) involved in teaching:   Patient and Wife     Motivation Level:  Asks Questions: Yes  Eager to Learn: Yes  Cooperative: Yes  Receptive (willing/able to accept information): Yes  Any cultural factors/Temple beliefs that may influence understanding or compliance? No       Patient and wife demonstrate understanding of the following:  Reason for the appointment, diagnosis and treatment plan: Yes  Knowledge of proper use of medications and conditions for which they are ordered (with special attention to potential side effects or drug interactions): Yes  Which situations necessitate calling provider and whom to contact: Yes       Teaching Concerns Addressed: see above    Total Joint Surgery:  Identified:  Wife Gunjan               Outpatient procedure explained:yes  No dental procedures 6 months prior to surgery and no dental procedures 6 months after surgery  explained: yes     Proper use and care of walker  Nutritional needs and diet plan: Yes  Pain management techniques: Yes  Wound Care: Yes  How and/when to access community resources: NA     Instructional Materials Used/Given: Preop Packet and Antiseptic Soap  Total Joint Book, Get Well Loop Flyer, Joint Class Information Sheet, Prophylactic Antibiotic Reminder Card,     Time spent with patient: 30 minutes.    MO BuckN, RN  RN Care Coordinator, Dr. Rufino HEREDIA [facility]

## 2021-11-10 ENCOUNTER — OFFICE VISIT (OUTPATIENT)
Dept: DERMATOLOGY | Facility: CLINIC | Age: 71
End: 2021-11-10
Payer: COMMERCIAL

## 2021-11-10 DIAGNOSIS — Z13.79 GENETIC TESTING: Primary | ICD-10-CM

## 2021-11-10 PROCEDURE — 11104 PUNCH BX SKIN SINGLE LESION: CPT | Performed by: DERMATOLOGY

## 2021-11-10 NOTE — NURSING NOTE
The following medication was given:     MEDICATION:  Lidocaine with epinephrine 1% [id number]  ROUTE: [place]  SITE: see procedure note  DOSE: see procedure note  LOT #: -[id number]  : [employer]  EXPIRATION DATE: 6/1/2022  NDC#: 0281-5176-39     Was there drug waste? Yes  Multi-dose vial: Yes    Flory Forrest LPN  November 10, 2021

## 2021-11-10 NOTE — PROGRESS NOTES
[facility] Dermatology Note  Encounter Date: Nov 10, 2021  Office Visit     Dermatology Problem List:  1. Genetics requesting skin biopsy - 3mm punch taken from right lateral flank on 11/10/21, results pending.    Family history: Positive for breast cancer, pancreatic cancer, stomach cancer, and lymphoma.  ____________________________________________    Assessment & Plan:     # Genetics requesting skin biopsy for genetic evaluation.  - See procedure note below.    Procedures Performed:   - Punch biopsy procedure note, location: right lateral flank. After discussion of benefits and risks including but not limited to bleeding, infection, scar, incomplete removal, recurrence, and non-diagnostic biopsy, written consent and photographs were obtained. The area was cleaned with isopropyl alcohol. 0.5mL of 1% lidocaine with epinephrine was injected to obtain adequate anesthesia and a 3 mm punch biopsy was performed at site. 4-0 Ethilon sutures were utilized to approximate the epidermal edges. White petrolatum ointment and a bandage was applied to the wound. Explicit verbal and written wound care instructions were provided. The patient left the dermatology clinic in good condition.     Follow-up: pending path results    Staff and Scribe:     Scribe Disclosure:   I, Chrissy Damon, am serving as a scribe to document services personally performed by this physician, Dr. Selena Dias, based on data collection and the provider's statements to me.     Provider Disclosure:   The documentation recorded by the scribe accurately reflects the services I personally performed and the decisions made by me.    Selena Dias MD    Department of Dermatology  [facility]: Phone: [phone], Fax:[phone]  [facility] [place]: Phone: [phone], Fax:  [phone]    ____________________________________________    CC: Derm Problem (3mm punch, rpmi media)    HPI:  Mr. Ag Evans is a(n) 71 year old male who presents today as a new patient for skin biopsy for genetic evaluation.    He is new to our department.    Referred by genetics for family history of breast cancer, pancreatic cancer, stomach cancer and history of lymphoma. Referral for skin biopsy for genetic evaluation.    Today, the patient notes he is here today for a biopsy. Wishes to proceed with right flank site.    Patient is otherwise feeling well, without additional skin concerns.     Labs Reviewed:  N/A    Physical Exam:  Vitals: There were no vitals taken for this visit.  SKIN: Focused examination of the right lateral flank was performed.  - Normal appearing skin on the right lateral flank.  - No other lesions of concern on areas examined.     Medications:  Current Outpatient Medications   Medication     atorvastatin (LIPITOR) 20 MG tablet     docusate sodium (COLACE) 250 MG capsule     fluticasone (FLONASE) 50 MCG/ACT spray     gabapentin (NEURONTIN) 300 MG capsule     ginkgo biloba 60 MG CAPS capsule     LORazepam (ATIVAN) 0.5 MG tablet     melatonin 5 MG tablet     Multiple Vitamins-Minerals (MULTIVITAMIN PO)     NEW MED     ondansetron (ZOFRAN) 8 MG tablet     prochlorperazine (COMPAZINE) 10 MG tablet     saw palmetto 450 MG CAPS capsule     selenium 50 MCG TABS tablet     SF 5000 PLUS 1.1 % CREA     vitamin E 400 units TABS     No current facility-administered medications for this visit.      Past Medical History:   Patient Active Problem List   Diagnosis     Family history of malignant neoplasm of prostate     Incomplete RBBB     History of total hip replacement     Sinus bradycardia     Hyperlipidemia LDL goal <130     CARDIOVASCULAR SCREENING; LDL GOAL LESS THAN 130     Advanced directives, counseling/discussion     DJD (degenerative joint disease), lumbar     Prosthetic wear following  total hip arthroplasty (H)     Malignant lymphoma, lymphoplasmacytic (H)     Chemotherapy-induced neutropenia (H)     Primary osteoarthritis of left knee     Pseudogout of knee, left     Past Medical History:   Diagnosis Date     DJD (degenerative joint disease), lumbar      Malignant lymphoma, lymphoplasmacytic (H) 7/1/2021     Sinus bradycardia        CC No referring provider defined for this encounter. on close of this encounter.

## 2021-11-10 NOTE — PATIENT INSTRUCTIONS
Wound Care After a Biopsy    What is a skin biopsy?  A skin biopsy allows the doctor to examine a very small piece of tissue under the microscope to determine the diagnosis and the best treatment for the skin condition. A local anesthetic (numbing medicine)  is injected with a very small needle into the skin area to be tested. A small piece of skin is taken from the area. Sometimes a suture (stitch) is used.     What are the risks of a skin biopsy?  I will experience scar, bleeding, swelling, pain, crusting and redness. I may experience incomplete removal or recurrence. Risks of this procedure are excessive bleeding, bruising, infection, nerve damage, numbness, thick (hypertrophic or keloidal) scar and non-diagnostic biopsy.    How should I care for my wound for the first 24 hours?    Keep the wound dry and covered for 24 hours    If it bleeds, hold direct pressure on the area for 15 minutes. If bleeding does not stop then go to the emergency room    Avoid strenuous exercise the first 1-2 days or as your doctor instructs you    How should I care for the wound after 24 hours?    After 24 hours, remove the bandage    You may bathe or shower as normal    If you had a scalp biopsy, you can shampoo as usual and can use shower water to clean the biopsy site daily    Clean the wound twice a day with gentle soap and water    Do not scrub, be gentle    Apply white petroleum/Vaseline after cleaning the wound with a cotton swab or a clean finger, and keep the site covered with a Bandaid /bandage. Bandages are not necessary with a scalp biopsy    If you are unable to cover the site with a Bandaid /bandage, re-apply ointment 2-3 times a day to keep the site moist. Moisture will help with healing    Avoid strenuous activity for first 1-2 days    Avoid lakes, rivers, pools, and oceans until the stitches are removed or the site is healed    How do I clean my wound?    Wash hands thoroughly with soap or use hand  before all  wound care    Clean the wound with gentle soap and water    Apply white petroleum/Vaseline  to wound after it is clean    Replace the Bandaid /bandage to keep the wound covered for the first few days or as instructed by your doctor    If you had a scalp biopsy, warm shower water to the area on a daily basis should suffice    What should I use to clean my wound?     Cotton-tipped applicators (Qtips )    White petroleum jelly (Vaseline ). Use a clean new container and use Q-tips to apply.    Bandaids   as needed    Gentle soap     How should I care for my wound long term?    Do not get your wound dirty    Keep up with wound care for one week or until the area is healed.    A small scab will form and fall off by itself when the area is completely healed. The area will be red and will become pink in color as it heals. Sun protection is very important for how your scar will turn out. Sunscreen with an SPF 30 or greater is recommended once the area is healed.    If you have stitches, stitches need to be removed in 14 days. You may return to our clinic for this or you may have it done locally at your doctor s office.    You should have some soreness but it should be mild and slowly go away over several days. Talk to your doctor about using tylenol for pain,    When should I call my doctor?  If you have increased:     Pain or swelling    Pus or drainage (clear or slightly yellow drainage is ok)    Temperature over 100F    Spreading redness or warmth around wound    When will I hear about my results?  The biopsy results can take 2 weeks to come back.  Your results will automatically release to [employer] before your provider has even reviewed them.  The clinic will call you with the results, send you a [employer] message, or have you schedule a follow-up clinic or phone time to discuss the results.  Contact our clinics if you do not hear from us in 2 weeks.    Who should I call with questions?    [facility],  Marcie Devine: [phone]    [facility]: [phone]    For urgent needs outside of business hours call the [facility] at [phone] and ask for the dermatology resident on call

## 2021-11-10 NOTE — LETTER
11/10/2021         RE: Ag Evans  [address]   [place] [address]        Dear Colleague,    Thank you for referring your patient, Ag Evans, to the [facility]. Please see a copy of my visit note below.    [facility] Dermatology Note  Encounter Date: Nov 10, 2021  Office Visit     Dermatology Problem List:  1. Genetics requesting skin biopsy - 3mm punch taken from right lateral flank on 11/10/21, results pending.    Family history: Positive for breast cancer, pancreatic cancer, stomach cancer, and lymphoma.  ____________________________________________    Assessment & Plan:     # Genetics requesting skin biopsy for genetic evaluation.  - See procedure note below.    Procedures Performed:   - Punch biopsy procedure note, location: right lateral flank. After discussion of benefits and risks including but not limited to bleeding, infection, scar, incomplete removal, recurrence, and non-diagnostic biopsy, written consent and photographs were obtained. The area was cleaned with isopropyl alcohol. 0.5mL of 1% lidocaine with epinephrine was injected to obtain adequate anesthesia and a 3 mm punch biopsy was performed at site. 4-0 Ethilon sutures were utilized to approximate the epidermal edges. White petrolatum ointment and a bandage was applied to the wound. Explicit verbal and written wound care instructions were provided. The patient left the dermatology clinic in good condition.     Follow-up: pending path results    Staff and Scribe:     Scribe Disclosure:   I, Chrissy Damon, am serving as a scribe to document services personally performed by this physician, Dr. Selena Dias, based on data collection and the provider's statements to me.     Provider Disclosure:   The documentation recorded by the scribe accurately reflects the services I personally performed and the decisions made by me.    Selena Dias MD    Department of  Dermatology  [facility]: Phone: [phone], Fax:[phone]  [facility]: Phone: [phone], Fax: [phone]    ____________________________________________    CC: Derm Problem (3mm punch, rpmi media)    HPI:  Mr. Ag Evans is a(n) 71 year old male who presents today as a new patient for skin biopsy for genetic evaluation.    He is new to our department.    Referred by genetics for family history of breast cancer, pancreatic cancer, stomach cancer and history of lymphoma. Referral for skin biopsy for genetic evaluation.    Today, the patient notes he is here today for a biopsy. Wishes to proceed with right flank site.    Patient is otherwise feeling well, without additional skin concerns.     Labs Reviewed:  N/A    Physical Exam:  Vitals: There were no vitals taken for this visit.  SKIN: Focused examination of the right lateral flank was performed.  - Normal appearing skin on the right lateral flank.  - No other lesions of concern on areas examined.     Medications:  Current Outpatient Medications   Medication     atorvastatin (LIPITOR) 20 MG tablet     docusate sodium (COLACE) 250 MG capsule     fluticasone (FLONASE) 50 MCG/ACT spray     gabapentin (NEURONTIN) 300 MG capsule     ginkgo biloba 60 MG CAPS capsule     LORazepam (ATIVAN) 0.5 MG tablet     melatonin 5 MG tablet     Multiple Vitamins-Minerals (MULTIVITAMIN PO)     NEW MED     ondansetron (ZOFRAN) 8 MG tablet     prochlorperazine (COMPAZINE) 10 MG tablet     saw palmetto 450 MG CAPS capsule     selenium 50 MCG TABS tablet     SF 5000 PLUS 1.1 % CREA     vitamin E 400 units TABS     No current facility-administered medications for this visit.      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 on close of this encounter.        Again, thank you for allowing me to participate in the care of your patient.        Sincerely,        Selena Dias MD

## 2021-11-10 NOTE — NURSING NOTE
Ag Evans's goals for this visit include:   Chief Complaint   Patient presents with     Derm Problem     3mm punch, rpmi media       He requests these members of his care team be copied on today's visit information: no    PCP: Jose Antonio    Referring Provider:  No referring provider defined for this encounter.    There were no vitals taken for this visit.    Do you need any medication refills at today's visit? No    Flory Forrest LPN

## 2021-11-11 DIAGNOSIS — Z80.0 FHX: STOMACH CANCER: ICD-10-CM

## 2021-11-11 DIAGNOSIS — Z80.3 FHX: BREAST CANCER IN FIRST DEGREE RELATIVE: Primary | ICD-10-CM

## 2021-11-11 DIAGNOSIS — C83.00 MALIGNANT LYMPHOMA, LYMPHOPLASMACYTIC (H): ICD-10-CM

## 2021-11-11 DIAGNOSIS — Z80.0 FHX: PANCREATIC CANCER: ICD-10-CM

## 2021-11-11 PROCEDURE — 88233 TISSUE CULTURE SKIN/BIOPSY: CPT | Performed by: GENETIC COUNSELOR, MS

## 2021-11-24 ENCOUNTER — ALLIED HEALTH/NURSE VISIT (OUTPATIENT)
Dept: FAMILY MEDICINE | Facility: OTHER | Age: 71
End: 2021-11-24
Payer: COMMERCIAL

## 2021-11-24 ENCOUNTER — TELEPHONE (OUTPATIENT)
Dept: INTERNAL MEDICINE | Facility: CLINIC | Age: 71
End: 2021-11-24

## 2021-11-24 DIAGNOSIS — Z48.02 VISIT FOR SUTURE REMOVAL: Primary | ICD-10-CM

## 2021-11-24 PROCEDURE — 99207 PR NO CHARGE NURSE ONLY: CPT

## 2021-11-24 NOTE — PROGRESS NOTES
Ag Evans presents to the clinic today for removal of sutures.  The patient has had the sutures in place for 14 days.  There has been no history of infection or drainage.  2 sutures are seen located on the right flank.  The wound is healing well with no signs of infection.  Tetanus status is up to date.   All sutures were easily removed today.  Routine wound care discussed.  The patient will follow up as needed.    Wife tried to take sutures out at home per provider and was given kit. Wife tried to do this and could not get them all out.     Patient came into clinic and nurse was able to remove all sutures.    Advised patient to watch for signs of infection and to call clinic right away they arise.     Patient states understanding.     MARSHALL Parada/[place] [facility]

## 2021-11-24 NOTE — TELEPHONE ENCOUNTER
"Patient's wife calling in with concerns of husbands stitches.     States he had biopsy and they put 2 stitches in on side flank area. Was given suture removal kit and told to take them out 2 weeks later.     She tried to do this today and states \"I messed this up and now I can't get them out, I do not know medical stuff.\"     Wife is worried about infection as patient is on chemotherapy for cancer.     This nurse offered patient to come into clinic and nurse can take a look.     Scheduled.    MARSHALL Parada/[place] [facility]    "

## 2021-11-28 ENCOUNTER — HEALTH MAINTENANCE LETTER (OUTPATIENT)
Age: 71
End: 2021-11-28

## 2021-11-30 ENCOUNTER — OFFICE VISIT (OUTPATIENT)
Dept: NEUROLOGY | Facility: CLINIC | Age: 71
End: 2021-11-30
Payer: COMMERCIAL

## 2021-11-30 VITALS
HEART RATE: 59 BPM | WEIGHT: 200 LBS | SYSTOLIC BLOOD PRESSURE: 121 MMHG | BODY MASS INDEX: 27.89 KG/M2 | DIASTOLIC BLOOD PRESSURE: 71 MMHG

## 2021-11-30 DIAGNOSIS — G62.9 NEUROPATHY: ICD-10-CM

## 2021-11-30 PROCEDURE — 99214 OFFICE O/P EST MOD 30 MIN: CPT | Performed by: INTERNAL MEDICINE

## 2021-11-30 RX ORDER — GABAPENTIN 300 MG/1
300 CAPSULE ORAL 3 TIMES DAILY
Qty: 270 CAPSULE | Refills: 3 | Status: SHIPPED | OUTPATIENT
Start: 2021-11-30 | End: 2022-12-05

## 2021-11-30 ASSESSMENT — PAIN SCALES - GENERAL: PAINLEVEL: NO PAIN (0)

## 2021-11-30 NOTE — LETTER
11/30/2021         RE: Ag Evans  [address] [place] [address]        Dear Colleague,    Thank you for referring your patient, Ag Evans, to the [facility]. Please see a copy of my visit note below.    [facility] Neurology Follow Up Visit    Ag Evans MRN# [id number]   Age: 71 year old YOB: 1950     Brief history of symptoms: The patient was initially seen in neurologic consultation on 8/26/2021 for evaluation of numbness/tingling. Please see the comprehensive neurologic consultation note from that date in the Epic records for details.     Interval history:   Things are about the same today as last visit. He continues to have numbness/tingling in the finger tips (primarily 1-3 digits) and feet. Gabapentin has been adequately controlling his symptoms. He denies any side effects.        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)     Malignant lymphoma, lymphoplasmacytic (H)     Chemotherapy-induced neutropenia (H)     Primary osteoarthritis of left knee     Pseudogout of knee, left     Past Medical History:   Diagnosis Date     DJD (degenerative joint disease), lumbar      Malignant lymphoma, lymphoplasmacytic (H) 7/1/2021     Sinus bradycardia         Past Surgical History:     Past Surgical History:   Procedure Laterality Date     APPENDECTOMY       BIOPSY  mass right side     BONE MARROW BIOPSY, BONE SPECIMEN, NEEDLE/TROCAR N/A 06/10/2021    Procedure: BIOPSY, BONE MARROW;  Surgeon: Josephine Santamaria MD;  Location:  GI     COLONOSCOPY  01/01/2010    benign  with hyperplastic polyps     [place] STOMACH SURGERY PROCEDURE UNLISTED  Appendix    1971     [place] TOTAL HIP ARTHROPLASTY Left 04/02/2012    Dr Roderick Santos     [place] TOTAL HIP ARTHROPLASTY Right 11/15/2004    Dr. Jose Martinez     SURGICAL HISTORY OF -       Removal of a benign soft tissue mass right abdominal wall     TONSILLECTOMY          Social History:     Social History     Tobacco Use     Smoking status: Never Smoker     Smokeless tobacco: Never Used   Substance Use Topics     Alcohol use: Yes     Comment: intermittent with food     Drug use: No        Family History:     Family History   Problem Relation Age of Onset     Prostate Cancer Father      Other Cancer Father         stomach     Other Cancer Mother         breast spread to lungs     Breast Cancer Mother         spread to lungs     Other Cancer Sister         Pancreatic cancer     Breast Cancer Sister      Osteoporosis Sister         from cancer drug?     Other Cancer Sister         breast     Other Cancer Sister         pancreatic        Medications:     Current Outpatient Medications   Medication Sig     atorvastatin (LIPITOR) 20 MG tablet Take 1 tablet (20 mg) by mouth daily     docusate sodium (COLACE) 250 MG capsule      fluticasone (FLONASE) 50 MCG/ACT spray Spray 1 spray into both nostrils daily     gabapentin (NEURONTIN) 300 MG capsule Take 1 capsule (300 mg) by mouth 3 times daily     ginkgo biloba 60 MG CAPS capsule Take 30 mg by mouth daily     LORazepam (ATIVAN) 0.5 MG tablet Take 1 tablet (0.5 mg) by mouth every 4 hours as needed (Anxiety, Nausea/Vomiting or Sleep)     melatonin 5 MG tablet      Multiple Vitamins-Minerals (MULTIVITAMIN PO)      NEW MED NAC     ondansetron (ZOFRAN) 8 MG tablet Take 1 tablet (8 mg) by mouth every 8 hours as needed (Nausea/Vomiting)     prochlorperazine (COMPAZINE) 10 MG tablet Take 0.5 tablets (5 mg) by mouth every 8 hours as needed (Nausea/Vomiting)     saw palmetto 450 MG CAPS capsule Take 450 mg by mouth daily      selenium 50 MCG TABS tablet Take 50 mcg by mouth daily      SF 5000 PLUS 1.1 % CREA USE TOOTH PASTE TWICE DAILY AS DIRECTED. NOTHING BY MOUTH FOR 30 MINUTES AFTER USE     No current facility-administered medications for this visit.        Allergies:     Allergies   Allergen Reactions     Seasonal Allergies         Review of Systems:   As above     Physical Exam:   Vitals: /71 (BP Location: Right arm, Patient Position: Sitting, Cuff Size: Adult Regular)   Pulse 59   Wt 90.7 kg (200 lb)   BMI 27.89 kg/m     General: Seated comfortably in no acute distress.  Lungs: breathing comfortably  Extremities: no edema  Skin: No rashes  Neurologic:     Mental Status: Fully alert, attentive. Normal memory and fund of knowledge. Language normal, speech clear and fluent, no paraphasic errors.      Cranial Nerves: Visual fields intact. PERRL. EOMI with normal smooth pursuit. Facial sensation intact/symmetric. Facial movements symmetric. Hearing not formally tested but intact to conversation. Palate elevation symmetric, uvula midline. No dysarthria. Shoulder shrug strong bilaterally. Tongue protrusion midline.     Motor: Very mild postural and action tremor in bilateral upper extremities, otherwise no tremor. Muscle tone normal throughout.  Normal/symmetric rapid finger tapping. Strength 5/5 throughout upper and lower extremities.     Deep Tendon Reflexes: 2+/symmetric throughout upper and lower extremities with exception of 1+ ankle jerks. Toes downgoing bilaterally.     Sensory: Absent vibration in bilateral great toes, ~8 seconds in bilateral ankles, normal in the hands. otherwise intact/symmetric to light touch, pinprick, temperature, and proprioception throughout upper and lower extremities. Negative Romberg.      Coordination: Finger-nose-finger and heel-shin intact without dysmetria. Rapid alternating movements intact/symmetric with normal speed and rhythm.     Gait: Normal, steady casual gait. Able to walk on toes  without difficulty. Mildly ataxic on heel and tandem gaits.     Data reviewed on previous visits    Laboratory:  CBC/CMP unremarkable, Monoclonal protein 0.7 IgM Kappa  B12 678 (4/2021)    Pertinent Investigations since last visit:   EMG 10/4/2021  Interpretation:  This is an abnormal study, demonstrating electrophysiologic evidence of a very mild, length-dependent axonal sensory polyneuropathy.    A1c 5.8, Negative MAG/SGPG Abs (8/2021)         Assessment and Plan:   Assessment:  Ag Evans is a 71 year old male with history of Waldenstrom's lymphoma who presents today for follow-up of neuropathy. Recent EMG showed a very mild, length-dependent axonal sensory polyneuropathy. I suspect his neuropathy is likely directly or indirectly related to Waldenstrom's given timing of symptom onset. MAG and SGPG antibodies were negative. B12 was previously normal. A1c was mildly elevated at 5.8 and he has worked at improving his diet. Prior gabapentin increase was helpful.     Plan:  - Continue Gabapentin 300 mg TID  - Primary care provider can send future prescriptions of gabapentin if symptoms are stable    Follow up in Neurology clinic as needed should new concerns arise.     Sharad Ruiz MD   of Neurology  [facility]    The total time of this encounter today amounted to 32 minutes. This time included time spent with the patient, prep work, ordering tests, and performing post visit documentation.        Again, thank you for allowing me to participate in the care of your patient.        Sincerely,        Sharad Ruiz MD

## 2021-11-30 NOTE — PROGRESS NOTES
[facility] Neurology Follow Up Visit    Ag Evans MRN# [id number]   Age: 71 year old YOB: 1950     Brief history of symptoms: The patient was initially seen in neurologic consultation on 8/26/2021 for evaluation of numbness/tingling. Please see the comprehensive neurologic consultation note from that date in the Epic records for details.     Interval history:   Things are about the same today as last visit. He continues to have numbness/tingling in the finger tips (primarily 1-3 digits) and feet. Gabapentin has been adequately controlling his symptoms. He denies any side effects.        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)     Malignant lymphoma, lymphoplasmacytic (H)     Chemotherapy-induced neutropenia (H)     Primary osteoarthritis of left knee     Pseudogout of knee, left     Past Medical History:   Diagnosis Date     DJD (degenerative joint disease), lumbar      Malignant lymphoma, lymphoplasmacytic (H) 7/1/2021     Sinus bradycardia         Past Surgical History:     Past Surgical History:   Procedure Laterality Date     APPENDECTOMY       BIOPSY  mass right side     BONE MARROW BIOPSY, BONE SPECIMEN, NEEDLE/TROCAR N/A 06/10/2021    Procedure: BIOPSY, BONE MARROW;  Surgeon: Josephine Santamaria MD;  Location:  GI     COLONOSCOPY  01/01/2010    benign with hyperplastic polyps     OR STOMACH SURGERY PROCEDURE UNLISTED  Appendix    1971     OR TOTAL HIP ARTHROPLASTY Left 04/02/2012    Dr Roderick Santos     OR TOTAL HIP ARTHROPLASTY Right 11/15/2004    Dr. Jose Martinez     SURGICAL HISTORY OF -       Removal of a  benign soft tissue mass right abdominal wall     TONSILLECTOMY          Social History:     Social History     Tobacco Use     Smoking status: Never Smoker     Smokeless tobacco: Never Used   Substance Use Topics     Alcohol use: Yes     Comment: intermittent with food     Drug use: No        Family History:     Family History   Problem Relation Age of Onset     Prostate Cancer Father      Other Cancer Father         stomach     Other Cancer Mother         breast spread to lungs     Breast Cancer Mother         spread to lungs     Other Cancer Sister         Pancreatic cancer     Breast Cancer Sister      Osteoporosis Sister         from cancer drug?     Other Cancer Sister         breast     Other Cancer Sister         pancreatic        Medications:     Current Outpatient Medications   Medication Sig     atorvastatin (LIPITOR) 20 MG tablet Take 1 tablet (20 mg) by mouth daily     docusate sodium (COLACE) 250 MG capsule      fluticasone (FLONASE) 50 MCG/ACT spray Spray 1 spray into both nostrils daily     gabapentin (NEURONTIN) 300 MG capsule Take 1 capsule (300 mg) by mouth 3 times daily     ginkgo biloba 60 MG CAPS capsule Take 30 mg by mouth daily     LORazepam (ATIVAN) 0.5 MG tablet Take 1 tablet (0.5 mg) by mouth every 4 hours as needed (Anxiety, Nausea/Vomiting or Sleep)     melatonin 5 MG tablet      Multiple Vitamins-Minerals (MULTIVITAMIN PO)      NEW MED NAC     ondansetron (ZOFRAN) 8 MG tablet Take 1 tablet (8 mg) by mouth every 8 hours as needed (Nausea/Vomiting)     prochlorperazine (COMPAZINE) 10 MG tablet Take 0.5 tablets (5 mg) by mouth every 8 hours as needed (Nausea/Vomiting)     saw palmetto 450 MG CAPS capsule Take 450 mg by mouth daily     selenium 50 MCG TABS tablet Take 50 mcg by mouth daily      SF 5000 PLUS 1.1 % CREA USE TOOTH PASTE TWICE DAILY AS DIRECTED. NOTHING BY MOUTH FOR 30 MINUTES AFTER USE     No current facility-administered medications for this visit.        Allergies:      Allergies   Allergen Reactions     Seasonal Allergies         Review of Systems:   As above     Physical Exam:   Vitals: /71 (BP Location: Right arm, Patient Position: Sitting, Cuff Size: Adult Regular)   Pulse 59   Wt 90.7 kg (200 lb)   BMI 27.89 kg/m     General: Seated comfortably in no acute distress.  Lungs: breathing comfortably  Extremities: no edema  Skin: No rashes  Neurologic:     Mental Status: Fully alert, attentive. Normal memory and fund of knowledge. Language normal, speech clear and fluent, no paraphasic errors.      Cranial Nerves: Visual fields intact. PERRL. EOMI with normal smooth pursuit. Facial sensation intact/symmetric. Facial movements symmetric. Hearing not formally tested but intact to conversation. Palate elevation symmetric, uvula midline. No dysarthria. Shoulder shrug strong bilaterally. Tongue protrusion midline.     Motor: Very mild postural and action tremor in bilateral upper extremities, otherwise no tremor. Muscle tone normal throughout.  Normal/symmetric rapid finger tapping. Strength 5/5 throughout upper and lower extremities.     Deep Tendon Reflexes: 2+/symmetric throughout upper and lower extremities with exception of 1+ ankle jerks. Toes downgoing bilaterally.     Sensory: Absent vibration in bilateral great toes, ~8 seconds in bilateral ankles, normal in the hands. otherwise intact/symmetric to light touch, pinprick, temperature, and proprioception throughout upper and lower extremities. Negative Romberg.      Coordination: Finger-nose-finger and heel-shin intact without dysmetria. Rapid alternating movements intact/symmetric with normal speed and rhythm.     Gait: Normal, steady casual gait. Able to walk on toes without difficulty. Mildly ataxic on heel and tandem gaits.     Data reviewed on previous visits    Laboratory:  CBC/CMP unremarkable, Monoclonal protein 0.7 IgM Kappa  B12 678 (4/2021)    Pertinent Investigations since last visit:   EMG  10/4/2021  Interpretation:  This is an abnormal study, demonstrating electrophysiologic evidence of a very mild, length-dependent axonal sensory polyneuropathy.    A1c 5.8, Negative MAG/SGPG Abs (8/2021)         Assessment and Plan:   Assessment:  Ag Evans is a 71 year old male with history of Waldenstrom's lymphoma who presents today for follow-up of neuropathy. Recent EMG showed a very mild, length-dependent axonal sensory polyneuropathy. I suspect his neuropathy is likely directly or indirectly related to Waldenstrom's given timing of symptom onset. MAG and SGPG antibodies were negative. B12 was previously normal. A1c was mildly elevated at 5.8 and he has worked at improving his diet. Prior gabapentin increase was helpful.     Plan:  - Continue Gabapentin 300 mg TID  - Primary care provider can send future prescriptions of gabapentin if symptoms are stable    Follow up in Neurology clinic as needed should new concerns arise.     Sharad Ruiz MD   of Neurology  [facility]    The total time of this encounter today amounted to 32 minutes. This time included time spent with the patient, prep work, ordering tests, and performing post visit documentation.

## 2021-12-02 ENCOUNTER — LAB (OUTPATIENT)
Dept: LAB | Facility: CLINIC | Age: 71
End: 2021-12-02
Payer: COMMERCIAL

## 2021-12-03 LAB — Lab: NORMAL

## 2021-12-10 ENCOUNTER — MYC MEDICAL ADVICE (OUTPATIENT)
Dept: INTERNAL MEDICINE | Facility: CLINIC | Age: 71
End: 2021-12-10
Payer: COMMERCIAL

## 2021-12-10 DIAGNOSIS — Z12.11 COLON CANCER SCREENING: Primary | ICD-10-CM

## 2021-12-17 LAB — COLOGUARD-ABSTRACT: NEGATIVE

## 2021-12-21 DIAGNOSIS — Z80.3 FHX: BREAST CANCER IN FIRST DEGREE RELATIVE: Primary | ICD-10-CM

## 2021-12-21 DIAGNOSIS — C83.00 MALIGNANT LYMPHOMA, LYMPHOPLASMACYTIC (H): ICD-10-CM

## 2021-12-21 DIAGNOSIS — Z80.0 FHX: PANCREATIC CANCER: ICD-10-CM

## 2021-12-21 DIAGNOSIS — Z80.3 FHX: BREAST CANCER IN FIRST DEGREE RELATIVE: ICD-10-CM

## 2021-12-21 DIAGNOSIS — Z80.0 FHX: STOMACH CANCER: ICD-10-CM

## 2021-12-21 PROCEDURE — 88233 TISSUE CULTURE SKIN/BIOPSY: CPT | Mod: GZ | Performed by: GENETIC COUNSELOR, MS

## 2021-12-21 NOTE — LETTER
Cancer Risk Management  Program Locations    [facility]  [facility]  Mailing Address  Cancer Risk Management Program  [facility]  [address]  [place] [address]    New patient appointments  [phone]  December 21, 2021    CYTOGENETICS LABORATORY  [facility]  FAX: [phone]    Cytogenetics Laboratory:    This letter is regarding patient Ag Evans (1950; MRN: [id number]) and a skin biopsy sample that you received today. Sarthak Thompson MD and Tai Ayala MS, [facility] are requesting that cultured fibroblast be sent to [facility] for genetic testing for [facility].      Please culture and send 2 T-25 culture flasks to overnight to:    Attn: Accessioning  [facility]  [address] [place] [id number]  Phone: [phone]  Fax: [phone]    The accompanying [facility] test requisition (2 pages) should accompany the sample when sent. Please hold a frozen culture for the standard 90 days after send-out.    Should you have any questions or concerns regarding this request, feel free to contact me directly by sending me an email or inbasket message. Thank you for your assistance.    Sincerely,    Tai Ayala MS, [facility]  Licensed, Certified Genetic Counselor  (P): [phone]

## 2021-12-21 NOTE — PROGRESS NOTES
"12/21/2021    I received a message from [facility] that the previous cell culture was \"quality insufficient\". Given that, they requested that we send them another sample. I spoke to Sarah Abraham in the cytogenetics laboratory and she stated that they still have frozen cells available. She asked that I place another culture order and that they will attempt to re-establish a new culture from the frozen cells. If it is successful, they will send [facility] another sample. If not, we will have to figure out next steps. I will also contact the patient to update him on this process.    Tai Ayala MS, [facility]  Licensed, Certified Genetic Counselor  "

## 2021-12-28 DIAGNOSIS — Z80.0 FHX: PANCREATIC CANCER: ICD-10-CM

## 2021-12-28 DIAGNOSIS — C83.00 MALIGNANT LYMPHOMA, LYMPHOPLASMACYTIC (H): ICD-10-CM

## 2021-12-28 DIAGNOSIS — Z80.0 FHX: STOMACH CANCER: ICD-10-CM

## 2021-12-28 DIAGNOSIS — Z80.3 FHX: BREAST CANCER IN FIRST DEGREE RELATIVE: Primary | ICD-10-CM

## 2021-12-29 ENCOUNTER — LAB (OUTPATIENT)
Dept: LAB | Facility: OTHER | Age: 71
End: 2021-12-29
Payer: COMMERCIAL

## 2021-12-29 DIAGNOSIS — C83.00 MALIGNANT LYMPHOMA, LYMPHOPLASMACYTIC (H): ICD-10-CM

## 2021-12-29 LAB
ALBUMIN SERPL-MCNC: 3.9 G/DL (ref 3.4–5)
ALP SERPL-CCNC: 62 U/L (ref 40–150)
ALT SERPL W P-5'-P-CCNC: 31 U/L (ref 0–70)
ANION GAP SERPL CALCULATED.3IONS-SCNC: 4 MMOL/L (ref 3–14)
AST SERPL W P-5'-P-CCNC: 20 U/L (ref 0–45)
BASOPHILS # BLD AUTO: 0.1 10E3/UL (ref 0–0.2)
BASOPHILS NFR BLD AUTO: 1 %
BILIRUB SERPL-MCNC: 0.6 MG/DL (ref 0.2–1.3)
BUN SERPL-MCNC: 22 MG/DL (ref 7–30)
CALCIUM SERPL-MCNC: 8.8 MG/DL (ref 8.5–10.1)
CHLORIDE BLD-SCNC: 109 MMOL/L (ref 94–109)
CO2 SERPL-SCNC: 27 MMOL/L (ref 20–32)
CREAT SERPL-MCNC: 0.86 MG/DL (ref 0.66–1.25)
EOSINOPHIL # BLD AUTO: 0.1 10E3/UL (ref 0–0.7)
EOSINOPHIL NFR BLD AUTO: 2 %
ERYTHROCYTE [DISTWIDTH] IN BLOOD BY AUTOMATED COUNT: 12.2 % (ref 10–15)
GFR SERPL CREATININE-BSD FRML MDRD: >90 ML/MIN/1.73M2
GLUCOSE BLD-MCNC: 91 MG/DL (ref 70–99)
HCT VFR BLD AUTO: 38.6 % (ref 40–53)
HGB BLD-MCNC: 13.3 G/DL (ref 13.3–17.7)
LYMPHOCYTES # BLD AUTO: 0.6 10E3/UL (ref 0.8–5.3)
LYMPHOCYTES NFR BLD AUTO: 12 %
Lab: NORMAL
MCH RBC QN AUTO: 33.8 PG (ref 26.5–33)
MCHC RBC AUTO-ENTMCNC: 34.5 G/DL (ref 31.5–36.5)
MCV RBC AUTO: 98 FL (ref 78–100)
MONOCYTES # BLD AUTO: 0.9 10E3/UL (ref 0–1.3)
MONOCYTES NFR BLD AUTO: 18 %
NEUTROPHILS # BLD AUTO: 3.4 10E3/UL (ref 1.6–8.3)
NEUTROPHILS NFR BLD AUTO: 67 %
PLATELET # BLD AUTO: 200 10E3/UL (ref 150–450)
POTASSIUM BLD-SCNC: 4.2 MMOL/L (ref 3.4–5.3)
PROT SERPL-MCNC: 6.9 G/DL (ref 6.8–8.8)
RBC # BLD AUTO: 3.93 10E6/UL (ref 4.4–5.9)
SODIUM SERPL-SCNC: 140 MMOL/L (ref 133–144)
TOTAL PROTEIN SERUM FOR ELP: 6.7 G/DL (ref 6.8–8.8)
WBC # BLD AUTO: 5 10E3/UL (ref 4–11)

## 2021-12-29 PROCEDURE — 85025 COMPLETE CBC W/AUTO DIFF WBC: CPT

## 2021-12-29 PROCEDURE — 84165 PROTEIN E-PHORESIS SERUM: CPT | Performed by: PATHOLOGY

## 2021-12-29 PROCEDURE — 83883 ASSAY NEPHELOMETRY NOT SPEC: CPT

## 2021-12-29 PROCEDURE — 80053 COMPREHEN METABOLIC PANEL: CPT

## 2021-12-29 PROCEDURE — 82784 ASSAY IGA/IGD/IGG/IGM EACH: CPT

## 2021-12-29 PROCEDURE — 84155 ASSAY OF PROTEIN SERUM: CPT | Mod: 59

## 2021-12-29 PROCEDURE — 36415 COLL VENOUS BLD VENIPUNCTURE: CPT

## 2021-12-29 PROCEDURE — 85810 BLOOD VISCOSITY EXAMINATION: CPT

## 2021-12-30 LAB
ALBUMIN SERPL ELPH-MCNC: 4.5 G/DL (ref 3.7–5.1)
ALPHA1 GLOB SERPL ELPH-MCNC: 0.3 G/DL (ref 0.2–0.4)
ALPHA2 GLOB SERPL ELPH-MCNC: 0.6 G/DL (ref 0.5–0.9)
B-GLOBULIN SERPL ELPH-MCNC: 0.6 G/DL (ref 0.6–1)
GAMMA GLOB SERPL ELPH-MCNC: 0.8 G/DL (ref 0.7–1.6)
IGM SERPL-MCNC: 458 MG/DL (ref 35–242)
KAPPA LC FREE SER-MCNC: 1.19 MG/DL (ref 0.33–1.94)
KAPPA LC FREE/LAMBDA FREE SER NEPH: 1.23 {RATIO} (ref 0.26–1.65)
LAMBDA LC FREE SERPL-MCNC: 0.97 MG/DL (ref 0.57–2.63)
M PROTEIN SERPL ELPH-MCNC: 0.3 G/DL
PROT PATTERN SERPL ELPH-IMP: ABNORMAL
VISC SER: 1.5 CP (ref 1.4–1.8)

## 2022-01-03 DIAGNOSIS — Z96.649 PROSTHETIC WEAR FOLLOWING TOTAL HIP ARTHROPLASTY, SUBSEQUENT ENCOUNTER: Primary | ICD-10-CM

## 2022-01-03 DIAGNOSIS — T84.069D PROSTHETIC WEAR FOLLOWING TOTAL HIP ARTHROPLASTY, SUBSEQUENT ENCOUNTER: Primary | ICD-10-CM

## 2022-01-03 NOTE — PROGRESS NOTES
Multiple calls to Ag to discuss plan for 3/21, scheduling CT, labs, PAC and clinic visit all on same day. Plan confirmed with Ag. Will work toward a surgical date of 4/19.    Jacquelin Lassiter, MON, RN  RN Care Coordinator, Dr. Rufino HEREDIA [facility]

## 2022-01-04 NOTE — TELEPHONE ENCOUNTER
FUTURE VISIT INFORMATION      SURGERY INFORMATION:    Surgery with Dr. Joy , Revision Left Hip Arthroplasty    RECORDS REQUESTED FROM:        Primary Care Provider: Jose Antonio MD- [facility]    Pertinent Medical History: incomplete RBBB, Sinus bradycardia    Most recent EKG+ Tracin21    Most recent ECHO: 21

## 2022-01-05 NOTE — PROGRESS NOTES
Oncology Follow Up Visit: January 6, 2022     Oncologist: Dr Keerthi Hernandez  PCP: Jose Antonio    Diagnosis: Malignant lymphoma, lymphoplasmacytic  Ag Evans is a 72 yo male who presented with 2 year complaint of worsening peripheral neuropathy to feet and hands. In April 2021 was noted to have a monoclonal protein-IgM kappa on serum immunofixation electrophoresis.  M spike measured 1.1 g/dL in April 2021.  Serum IgM level was elevated at 1075 mg/dL.    Serum free kappa lambda light chain ratio was normal on Yudi 3, 2021.  Serum protein electrophoresis confirmed the presence of monoclonal IgM kappa protein.  M spike remained at 1.1 g/dL on Yudi 3, 2021.  Beta-2 microglobulin was normal.  CMP was unremarkable.  Serum viscosity was slightly elevated at 2.1.  CBC with differential counts was unremarkable.  Bone marrow biopsy on Yudi 10, 2021 with current anemia - hemoglobin of 12.7 g/dL and MCV was normal.  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  Flow cytometry showed findings suggestion of kappa skewing on B cells and rare to absent plasma cells. Otherwise bone marrow was hypercellular with normal trilineage hematopoiesis.  Cytogenetics was normal 46 XY.  Treatment:   7/19- 10/2021 Bendamustine and will add Rituxan cycle 2- completed 4 cycles    Interval History: Mr. Evans and wife are seen in office today for review of his lymphoma. He completed initial treatment with bendamustine and rituxan x 4 cycles in 10/2021. He states he is feeling well at this time and denies night sweats, weight changes, chest pain or SOB. He denies new masses or tenderness.  He has had good energy and is staying active with winter snow shoveling and more. Continues with precaution for the pandemic- no current symptoms.    Peripheral neuropathy noted to hands and feet but may be slowly improving.  "  Did see genetic counselor 7/15/2021 due to significant family history of cancers and will be having some testing but no results not yet available.   Rest of comprehensive and complete ROS is reviewed and is negative.   Past Medical History:   Diagnosis Date     DJD (degenerative joint disease), lumbar      Malignant lymphoma, lymphoplasmacytic (H) 2021     Sinus bradycardia      Current Outpatient Medications   Medication     atorvastatin (LIPITOR) 20 MG tablet     fluticasone (FLONASE) 50 MCG/ACT spray     gabapentin (NEURONTIN) 300 MG capsule     melatonin 5 MG tablet     Multiple Vitamins-Minerals (MULTIVITAMIN PO)     SF 5000 PLUS 1.1 % CREA     No current facility-administered medications for this visit.     Allergies   Allergen Reactions     Seasonal Allergies    FHX of malignancy- metastatic breast cancer at the age of 55. Sister had pancreatic cancer at 52, . Father  of stomach cancer at the age of 71. Another sister had breast cancer at the age of 81. He is of [place] descent on dad's side.    Physical Exam:/83 (BP Location: Right arm, Patient Position: Chair, Cuff Size: Adult Large)   Pulse 60   Ht 1.803 m (5' 11\")   Wt 91.2 kg (201 lb)   SpO2 96%   BMI 28.03 kg/m     ECOG PS- 0  Constitutional: Alert, cooperative, and in no distress.   ENT: Eyes bright , No mouth sores  Neck: Supple, No adenopathy.  Cardiac: Heart rate and rhythm is regular and strong without murmur  Respiratory: Breathing easy. Lung sounds clear to auscultation  GI: Abdomen is soft, non-tender, BS normal. No masses or organomegaly  MS: Muscle tone normal, extremities normal with no edema.   Skin: No suspicious lesions or rashes  Neuro: Sensory grossly WNL. Neuropathy to fingertips and feet to ankle but gait is normal.   Lymph: Normal ant/post cervical, axillary, supraclavicular nodes  Psych: Mentation appears normal and affect normal/bright and smiling    Laboratory Results:    Ref. Range 2021 08:40   Sodium " Latest Ref Range: 133 - 144 mmol/L 140   Potassium Latest Ref Range: 3.4 - 5.3 mmol/L 4.2   Chloride Latest Ref Range: 94 - 109 mmol/L 109   Carbon Dioxide Latest Ref Range: 20 - 32 mmol/L 27   Urea Nitrogen Latest Ref Range: 7 - 30 mg/dL 22   Creatinine Latest Ref Range: 0.66 - 1.25 mg/dL 0.86   GFR Estimate Latest Ref Range: >60 mL/min/1.73m2 >90   Calcium Latest Ref Range: 8.5 - 10.1 mg/dL 8.8   Anion Gap Latest Ref Range: 3 - 14 mmol/L 4   Albumin Latest Ref Range: 3.4 - 5.0 g/dL 3.9   Protein Total Latest Ref Range: 6.8 - 8.8 g/dL 6.9   Bilirubin Total Latest Ref Range: 0.2 - 1.3 mg/dL 0.6   Alkaline Phosphatase Latest Ref Range: 40 - 150 U/L 62   ALT Latest Ref Range: 0 - 70 U/L 31   AST Latest Ref Range: 0 - 45 U/L 20   Glucose Latest Ref Range: 70 - 99 mg/dL 91   WBC Latest Ref Range: 4.0 - 11.0 10e3/uL 5.0   Hemoglobin Latest Ref Range: 13.3 - 17.7 g/dL 13.3   Hematocrit Latest Ref Range: 40.0 - 53.0 % 38.6 (L)   Platelet Count Latest Ref Range: 150 - 450 10e3/uL 200   RBC Count Latest Ref Range: 4.40 - 5.90 10e6/uL 3.93 (L)   MCV Latest Ref Range: 78 - 100 fL 98   MCH Latest Ref Range: 26.5 - 33.0 pg 33.8 (H)   MCHC Latest Ref Range: 31.5 - 36.5 g/dL 34.5   RDW Latest Ref Range: 10.0 - 15.0 % 12.2   % Neutrophils Latest Units: % 67   % Lymphocytes Latest Units: % 12   % Monocytes Latest Units: % 18   % Eosinophils Latest Units: % 2   % Basophils Latest Units: % 1   Absolute Basophils Latest Ref Range: 0.0 - 0.2 10e3/uL 0.1   Absolute Eosinophils Latest Ref Range: 0.0 - 0.7 10e3/uL 0.1   Absolute Lymphocytes Latest Ref Range: 0.8 - 5.3 10e3/uL 0.6 (L)   Absolute Monocytes Latest Ref Range: 0.0 - 1.3 10e3/uL 0.9   Absolute Neutrophils Latest Ref Range: 1.6 - 8.3 10e3/uL 3.4      Ref. Range 10/13/2021 08:20 10/13/2021 08:21 12/29/2021 08:40   Albumin Fraction Latest Ref Range: 3.7 - 5.1 g/dL  4.4 4.5   Alpha 1 Fraction Latest Ref Range: 0.2 - 0.4 g/dL  0.3 0.3   Alpha 2 Fraction Latest Ref Range: 0.5 - 0.9  g/dL  0.6 0.6   Beta Fraction Latest Ref Range: 0.6 - 1.0 g/dL  0.7 0.6   ELP Interpretation: Unknown  Monoclonal protei... Small monoclonal ...   Gamma Fraction Latest Ref Range: 0.7 - 1.6 g/dL  1.0 0.8   IGM Latest Ref Range: 35 - 242 mg/dL   458 (H)   Monoclonal Peak Latest Ref Range: <=0.0 g/dL  0.4 (H) 0.3 (H)   Viscosity Index Latest Ref Range: 1.4 - 1.8  1.6  1.5   Kappa Free Light Chains Latest Ref Range: 0.33 - 1.94 mg/dL   1.19   LAMBDA FREE LT CHAINS Latest Ref Range: 0.57 - 2.63 mg/dL   0.97   KAPPA/LAMBDA RATIO Latest Ref Range: 0.26 - 1.65    1.23   Total Protein Serum for ELP Latest Ref Range: 6.8 - 8.8 g/dL  6.9 6.7 (L)       Assessment and Plan:   Malignant lymphoma, lymphoplasmacytic- Pt completed 4 cycle of bendamustine and Rituxan.  Review today proved pt feeling well and lab and imaging showing improvement.   Reduction of IgM by Approximately 70% with viscosity improved and Imaging showing improvement=partial response with no new symptoms of active disease.   He has some side effect of treatment but will continue on observation.   Pt will return in 3 months for review with repeat of labs( follow up review every 3 months x 2 years then move to every 4-6 months x 3 years).   Pt would like to move to seeing Dr Perez since Dr Hernandez will be leaving the practice.   Pt and wife will be in [place] for 2 months and will return in April for follow up- labs 1 week prior to visit. .   Peripheral neuropathy-continues to feet and fingertips- stable.    Saw Dr Ruiz and is now using Neurontin 300 mg tid.  Family history of breast cancer and pancreatic cancer- saw Genetic counselor 7/15 and has drawn for genetic testing but results are not available yet at this time.   Colon cancer screening-Previously scheduled for July 16 procedure however patient wanted to wait for chemotherapy to be completed.  Discussed for setting up in near future.   The total time of this encounter amounted to 30 minutes. This  time included face to face time spent with the patient, prep work, ordering tests, and performing post visit documentation.  Maggy Trent,Cnp

## 2022-01-06 ENCOUNTER — ONCOLOGY VISIT (OUTPATIENT)
Dept: ONCOLOGY | Facility: CLINIC | Age: 72
End: 2022-01-06
Attending: INTERNAL MEDICINE
Payer: COMMERCIAL

## 2022-01-06 VITALS
HEART RATE: 60 BPM | WEIGHT: 201 LBS | OXYGEN SATURATION: 96 % | HEIGHT: 71 IN | DIASTOLIC BLOOD PRESSURE: 83 MMHG | BODY MASS INDEX: 28.14 KG/M2 | SYSTOLIC BLOOD PRESSURE: 139 MMHG

## 2022-01-06 DIAGNOSIS — Z80.9 FAMILY HISTORY OF CANCER: ICD-10-CM

## 2022-01-06 DIAGNOSIS — C83.00 MALIGNANT LYMPHOMA, LYMPHOPLASMACYTIC (H): Primary | ICD-10-CM

## 2022-01-06 DIAGNOSIS — G62.9 PERIPHERAL POLYNEUROPATHY: ICD-10-CM

## 2022-01-06 PROCEDURE — 99214 OFFICE O/P EST MOD 30 MIN: CPT | Performed by: NURSE PRACTITIONER

## 2022-01-06 ASSESSMENT — PAIN SCALES - GENERAL: PAINLEVEL: NO PAIN (0)

## 2022-01-06 ASSESSMENT — MIFFLIN-ST. JEOR: SCORE: 1683.86

## 2022-01-06 NOTE — LETTER
1/6/2022         RE: Ag Evans  [address] [address] [place] [address]        Dear Colleague,    Thank you for referring your patient, Ag Evans, to the [facility]. Please see a copy of my visit note below.    Oncology Follow Up Visit: January 6, 2022     Oncologist: Dr Keerthi Hernandez  PCP: Jose Antonio    Diagnosis: Malignant lymphoma, lymphoplasmacytic  Ag Evans is a 72 yo male who presented with 2 year complaint of worsening peripheral neuropathy to feet and hands. In April 2021 was noted to have a monoclonal protein-IgM kappa on serum immunofixation electrophoresis.  M spike measured 1.1 g/dL in April 2021.  Serum IgM level was elevated at 1075 mg/dL.    Serum free kappa lambda light chain ratio was normal on Yudi 3, 2021.  Serum protein electrophoresis confirmed the presence of monoclonal IgM kappa protein.  M spike remained at 1.1 g/dL on Yudi 3, 2021.  Beta-2 microglobulin was normal.  CMP was unremarkable.  Serum viscosity was slightly elevated at 2.1.  CBC with differential counts was unremarkable.  Bone marrow biopsy on Yudi 10, 2021 with current anemia - hemoglobin of 12.7 g/dL and MCV was normal.  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  Flow cytometry showed findings suggestion of kappa skewing on B cells and rare to absent plasma cells. Otherwise bone marrow was hypercellular with normal trilineage hematopoiesis.  Cytogenetics was normal 46 XY.  Treatment:   7/19- 10/2021 Bendamustine and will add Rituxan cycle 2- completed 4 cycles    Interval History: Mr. Evans and wife are seen in office today for review of his lymphoma. He completed initial treatment with bendamustine and rituxan x 4 cycles in 10/2021. He states he is feeling well at this time and denies night sweats, weight changes, chest pain or SOB. He  "denies new masses or tenderness.  He has had good energy and is staying active with winter snow shoveling and more. Continues with precaution for the pandemic- no current symptoms.    Peripheral neuropathy noted to hands and feet but may be slowly improving.   Did see genetic counselor 7/15/2021 due to significant family history of cancers and will be having some testing but no results not yet available.   Rest of comprehensive and complete ROS is reviewed and is negative.   Past Medical History:   Diagnosis Date     DJD (degenerative joint disease), lumbar      Malignant lymphoma, lymphoplasmacytic (H) 2021     Sinus bradycardia      Current Outpatient Medications   Medication     atorvastatin (LIPITOR) 20 MG tablet     fluticasone (FLONASE) 50 MCG/ACT spray     gabapentin (NEURONTIN) 300 MG capsule     melatonin 5 MG tablet     Multiple Vitamins-Minerals (MULTIVITAMIN PO)     SF 5000 PLUS 1.1 % CREA     No current facility-administered medications for this visit.     Allergies   Allergen Reactions     Seasonal Allergies    FHX of malignancy- metastatic breast cancer at the age of 55. Sister had pancreatic cancer at 52, . Father  of stomach cancer at the age of 71. Another sister had breast cancer at the age of 81. He is of [place] descent on dad's side.    Physical Exam:/83 (BP Location: Right arm, Patient Position: Chair, Cuff Size: Adult Large)   Pulse 60   Ht 1.803 m (5' 11\")   Wt 91.2 kg (201 lb)   SpO2 96%   BMI 28.03 kg/m     ECOG PS- 0  Constitutional: Alert, cooperative, and in no distress.   ENT: Eyes bright , No mouth sores  Neck: Supple, No adenopathy.  Cardiac: Heart rate and rhythm is regular and strong without murmur  Respiratory: Breathing easy. Lung sounds clear to auscultation  GI: Abdomen is soft, non-tender, BS normal. No masses or organomegaly  MS: Muscle tone normal, extremities normal with no edema.   Skin: No suspicious lesions or rashes  Neuro: Sensory grossly WNL. " Neuropathy to fingertips and feet to ankle but gait is normal.   Lymph: Normal ant/post cervical, axillary, supraclavicular nodes  Psych: Mentation appears normal and affect normal/bright and smiling    Laboratory Results:    Ref. Range 12/29/2021 08:40   Sodium Latest Ref Range: 133 - 144 mmol/L 140   Potassium Latest Ref Range: 3.4 - 5.3 mmol/L 4.2   Chloride Latest Ref Range: 94 - 109 mmol/L 109   Carbon Dioxide Latest Ref Range: 20 - 32 mmol/L 27   Urea Nitrogen Latest Ref Range: 7 - 30 mg/dL 22   Creatinine Latest Ref Range: 0.66 - 1.25 mg/dL 0.86   GFR Estimate Latest Ref Range: >60 mL/min/1.73m2 >90   Calcium Latest Ref Range: 8.5 - 10.1 mg/dL 8.8   Anion Gap Latest Ref Range: 3 - 14 mmol/L 4   Albumin Latest Ref Range: 3.4 - 5.0 g/dL 3.9   Protein Total Latest Ref Range: 6.8 - 8.8 g/dL 6.9   Bilirubin Total Latest Ref Range: 0.2 - 1.3 mg/dL 0.6   Alkaline Phosphatase Latest Ref Range: 40 - 150 U/L 62   ALT Latest Ref Range: 0 - 70 U/L 31   AST Latest Ref Range: 0 - 45 U/L 20   Glucose Latest Ref Range: 70 - 99 mg/dL 91   WBC Latest Ref Range: 4.0 - 11.0 10e3/uL 5.0   Hemoglobin Latest Ref Range: 13.3 - 17.7 g/dL 13.3   Hematocrit Latest Ref Range: 40.0 - 53.0 % 38.6 (L)   Platelet Count Latest Ref Range: 150 - 450 10e3/uL 200   RBC Count Latest Ref Range: 4.40 - 5.90 10e6/uL 3.93 (L)   MCV Latest Ref Range: 78 - 100 fL 98   MCH Latest Ref Range: 26.5 - 33.0 pg 33.8 (H)   MCHC Latest Ref Range: 31.5 - 36.5 g/dL 34.5   RDW Latest Ref Range: 10.0 - 15.0 % 12.2   % Neutrophils Latest Units: % 67   % Lymphocytes Latest Units: % 12   % Monocytes Latest Units: % 18   % Eosinophils Latest Units: % 2   % Basophils Latest Units: % 1   Absolute Basophils Latest Ref Range: 0.0 - 0.2 10e3/uL 0.1   Absolute Eosinophils Latest Ref Range: 0.0 - 0.7 10e3/uL 0.1   Absolute Lymphocytes Latest Ref Range: 0.8 - 5.3 10e3/uL 0.6 (L)   Absolute Monocytes Latest Ref Range: 0.0 - 1.3 10e3/uL 0.9   Absolute Neutrophils Latest Ref  Range: 1.6 - 8.3 10e3/uL 3.4      Ref. Range 10/13/2021 08:20 10/13/2021 08:21 12/29/2021 08:40   Albumin Fraction Latest Ref Range: 3.7 - 5.1 g/dL  4.4 4.5   Alpha 1 Fraction Latest Ref Range: 0.2 - 0.4 g/dL  0.3 0.3   Alpha 2 Fraction Latest Ref Range: 0.5 - 0.9 g/dL  0.6 0.6   Beta Fraction Latest Ref Range: 0.6 - 1.0 g/dL  0.7 0.6   ELP Interpretation: Unknown  Monoclonal protei... Small monoclonal ...   Gamma Fraction Latest Ref Range: 0.7 - 1.6 g/dL  1.0 0.8   IGM Latest Ref Range: 35 - 242 mg/dL   458 (H)   Monoclonal Peak Latest Ref Range: <=0.0 g/dL  0.4 (H) 0.3 (H)   Viscosity Index Latest Ref Range: 1.4 - 1.8  1.6  1.5   Kappa Free Light Chains Latest Ref Range: 0.33 - 1.94 mg/dL   1.19   LAMBDA FREE LT CHAINS Latest Ref Range: 0.57 - 2.63 mg/dL   0.97   KAPPA/LAMBDA RATIO Latest Ref Range: 0.26 - 1.65    1.23   Total Protein Serum for ELP Latest Ref Range: 6.8 - 8.8 g/dL  6.9 6.7 (L)       Assessment and Plan:   Malignant lymphoma, lymphoplasmacytic- Pt completed 4 cycle of bendamustine and Rituxan.  Review today proved pt feeling well and lab and imaging showing improvement.   Reduction of IgM by Approximately 70% with viscosity improved and Imaging showing improvement=partial response with no new symptoms of active disease.   He has some side effect of treatment but will continue on observation.   Pt will return in 3 months for review with repeat of labs( follow up review every 3 months x 2 years then move to every 4-6 months x 3 years).   Pt would like to move to seeing Dr Perez since Dr Hernandez will be leaving the practice.   Pt and wife will be in [place] for 2 months and will return in April for follow up- labs 1 week prior to visit. .   Peripheral neuropathy-continues to feet and fingertips- stable.    Saw Dr Ruiz and is now using Neurontin 300 mg tid.  Family history of breast cancer and pancreatic cancer- saw Genetic counselor 7/15 and has drawn for genetic testing but results are not  available yet at this time.   Colon cancer screening-Previously scheduled for July 16 procedure however patient wanted to wait for chemotherapy to be completed.  Discussed for setting up in near future.   The total time of this encounter amounted to 30 minutes. This time included face to face time spent with the patient, prep work, ordering tests, and performing post visit documentation.  Maggy Trent Cnp        Again, thank you for allowing me to participate in the care of your patient.        Sincerely,        Maggy Trent, SELWYN, APRN CNP

## 2022-01-06 NOTE — NURSING NOTE
"Oncology Rooming Note    January 6, 2022 10:54 AM   Ag Evans is a 71 year old male who presents for:    Chief Complaint   Patient presents with     Oncology Clinic Visit     follow up     Initial Vitals: /83 (BP Location: Right arm, Patient Position: Chair, Cuff Size: Adult Large)   Pulse 60   Ht 1.803 m (5' 11\")   Wt 91.2 kg (201 lb)   SpO2 96%   BMI 28.03 kg/m   Estimated body mass index is 28.03 kg/m  as calculated from the following:    Height as of this encounter: 1.803 m (5' 11\").    Weight as of this encounter: 91.2 kg (201 lb). Body surface area is 2.14 meters squared.  No Pain (0) Comment: Data Unavailable   No LMP for male patient.  Allergies reviewed: Yes  Medications reviewed: Yes    Medications: Medication refills not needed today.  Pharmacy name entered into [employer]: [facility] [address] [place] [id number] [facility]    Clinical concerns: NO Maggy was notified.      Shae Fritz CMA              "

## 2022-01-15 ENCOUNTER — TELEPHONE (OUTPATIENT)
Dept: SURGERY | Facility: CLINIC | Age: 72
End: 2022-01-15
Payer: COMMERCIAL

## 2022-03-18 ASSESSMENT — ACTIVITIES OF DAILY LIVING (ADL)
ADL_MEAN: .35
ADL_SUM: 6
ADL_SUBSCALE_SCORE: 91.18

## 2022-03-21 ENCOUNTER — ANESTHESIA EVENT (OUTPATIENT)
Dept: SURGERY | Facility: CLINIC | Age: 72
End: 2022-03-21

## 2022-03-21 ENCOUNTER — OFFICE VISIT (OUTPATIENT)
Dept: ORTHOPEDICS | Facility: CLINIC | Age: 72
End: 2022-03-21
Payer: COMMERCIAL

## 2022-03-21 ENCOUNTER — PREP FOR PROCEDURE (OUTPATIENT)
Dept: ORTHOPEDICS | Facility: CLINIC | Age: 72
End: 2022-03-21

## 2022-03-21 ENCOUNTER — VIRTUAL VISIT (OUTPATIENT)
Dept: SURGERY | Facility: CLINIC | Age: 72
End: 2022-03-21
Payer: COMMERCIAL

## 2022-03-21 ENCOUNTER — ANCILLARY PROCEDURE (OUTPATIENT)
Dept: CT IMAGING | Facility: CLINIC | Age: 72
End: 2022-03-21
Attending: ORTHOPAEDIC SURGERY
Payer: COMMERCIAL

## 2022-03-21 ENCOUNTER — LAB (OUTPATIENT)
Dept: LAB | Facility: CLINIC | Age: 72
End: 2022-03-21
Payer: COMMERCIAL

## 2022-03-21 ENCOUNTER — LAB (OUTPATIENT)
Dept: LAB | Facility: CLINIC | Age: 72
End: 2022-03-21

## 2022-03-21 ENCOUNTER — PRE VISIT (OUTPATIENT)
Dept: SURGERY | Facility: CLINIC | Age: 72
End: 2022-03-21

## 2022-03-21 VITALS
HEIGHT: 70 IN | RESPIRATION RATE: 16 BRPM | WEIGHT: 207 LBS | BODY MASS INDEX: 29.63 KG/M2 | DIASTOLIC BLOOD PRESSURE: 80 MMHG | TEMPERATURE: 97.8 F | OXYGEN SATURATION: 96 % | HEART RATE: 53 BPM | SYSTOLIC BLOOD PRESSURE: 136 MMHG

## 2022-03-21 DIAGNOSIS — Z96.649 PROSTHETIC WEAR FOLLOWING TOTAL HIP ARTHROPLASTY, SUBSEQUENT ENCOUNTER: ICD-10-CM

## 2022-03-21 DIAGNOSIS — T84.069D PROSTHETIC WEAR FOLLOWING TOTAL HIP ARTHROPLASTY, SUBSEQUENT ENCOUNTER: Primary | ICD-10-CM

## 2022-03-21 DIAGNOSIS — Z96.649 PROSTHETIC WEAR FOLLOWING TOTAL HIP ARTHROPLASTY, SUBSEQUENT ENCOUNTER: Primary | ICD-10-CM

## 2022-03-21 DIAGNOSIS — T84.069D PROSTHETIC WEAR FOLLOWING TOTAL HIP ARTHROPLASTY, SUBSEQUENT ENCOUNTER: ICD-10-CM

## 2022-03-21 DIAGNOSIS — Z01.818 PRE-OP EVALUATION: Primary | ICD-10-CM

## 2022-03-21 DIAGNOSIS — Z01.818 PRE-OP EVALUATION: ICD-10-CM

## 2022-03-21 LAB
ABO/RH(D): NORMAL
ANION GAP SERPL CALCULATED.3IONS-SCNC: 6 MMOL/L (ref 3–14)
ANTIBODY SCREEN: NEGATIVE
BUN SERPL-MCNC: 13 MG/DL (ref 7–30)
CALCIUM SERPL-MCNC: 9 MG/DL (ref 8.5–10.1)
CHLORIDE BLD-SCNC: 108 MMOL/L (ref 94–109)
CO2 SERPL-SCNC: 30 MMOL/L (ref 20–32)
CREAT SERPL-MCNC: 0.75 MG/DL (ref 0.66–1.25)
CRP SERPL-MCNC: <2.9 MG/L (ref 0–8)
ERYTHROCYTE [DISTWIDTH] IN BLOOD BY AUTOMATED COUNT: 12.3 % (ref 10–15)
ERYTHROCYTE [SEDIMENTATION RATE] IN BLOOD BY WESTERGREN METHOD: 10 MM/HR (ref 0–20)
GFR SERPL CREATININE-BSD FRML MDRD: >90 ML/MIN/1.73M2
GLUCOSE BLD-MCNC: 100 MG/DL (ref 70–99)
HCT VFR BLD AUTO: 40.8 % (ref 40–53)
HGB BLD-MCNC: 13.9 G/DL (ref 13.3–17.7)
MCH RBC QN AUTO: 32.9 PG (ref 26.5–33)
MCHC RBC AUTO-ENTMCNC: 34.1 G/DL (ref 31.5–36.5)
MCV RBC AUTO: 97 FL (ref 78–100)
PLATELET # BLD AUTO: 185 10E3/UL (ref 150–450)
POTASSIUM BLD-SCNC: 4.8 MMOL/L (ref 3.4–5.3)
RBC # BLD AUTO: 4.22 10E6/UL (ref 4.4–5.9)
SODIUM SERPL-SCNC: 144 MMOL/L (ref 133–144)
SPECIMEN EXPIRATION DATE: NORMAL
WBC # BLD AUTO: 3.5 10E3/UL (ref 4–11)

## 2022-03-21 PROCEDURE — 99203 OFFICE O/P NEW LOW 30 MIN: CPT | Mod: 95 | Performed by: PHYSICIAN ASSISTANT

## 2022-03-21 PROCEDURE — 86901 BLOOD TYPING SEROLOGIC RH(D): CPT | Mod: 90 | Performed by: PATHOLOGY

## 2022-03-21 PROCEDURE — 36415 COLL VENOUS BLD VENIPUNCTURE: CPT | Performed by: PATHOLOGY

## 2022-03-21 PROCEDURE — 72192 CT PELVIS W/O DYE: CPT | Performed by: RADIOLOGY

## 2022-03-21 PROCEDURE — 80048 BASIC METABOLIC PNL TOTAL CA: CPT | Performed by: PATHOLOGY

## 2022-03-21 PROCEDURE — 85652 RBC SED RATE AUTOMATED: CPT | Performed by: PATHOLOGY

## 2022-03-21 PROCEDURE — 86850 RBC ANTIBODY SCREEN: CPT | Mod: 90 | Performed by: PATHOLOGY

## 2022-03-21 PROCEDURE — 99214 OFFICE O/P EST MOD 30 MIN: CPT | Performed by: ORTHOPAEDIC SURGERY

## 2022-03-21 PROCEDURE — 99000 SPECIMEN HANDLING OFFICE-LAB: CPT | Performed by: PATHOLOGY

## 2022-03-21 PROCEDURE — 85027 COMPLETE CBC AUTOMATED: CPT | Performed by: PATHOLOGY

## 2022-03-21 PROCEDURE — 86900 BLOOD TYPING SEROLOGIC ABO: CPT | Mod: 90 | Performed by: PATHOLOGY

## 2022-03-21 PROCEDURE — 86140 C-REACTIVE PROTEIN: CPT | Performed by: PATHOLOGY

## 2022-03-21 RX ORDER — IBUPROFEN 200 MG
200 TABLET ORAL EVERY 4 HOURS PRN
Status: ON HOLD | COMMUNITY
End: 2022-04-21

## 2022-03-21 RX ORDER — DOCUSATE SODIUM 250 MG
250 CAPSULE ORAL EVERY MORNING
COMMUNITY
End: 2022-06-10

## 2022-03-21 RX ORDER — ACETAMINOPHEN 325 MG/1
325-650 TABLET ORAL EVERY 6 HOURS PRN
Status: ON HOLD | COMMUNITY
End: 2022-04-20

## 2022-03-21 ASSESSMENT — LIFESTYLE VARIABLES: TOBACCO_USE: 0

## 2022-03-21 ASSESSMENT — PAIN SCALES - GENERAL: PAINLEVEL: MILD PAIN (2)

## 2022-03-21 NOTE — PROGRESS NOTES
[facility]3] Physicians  Orthopaedic Surgery, Joint Replacement Consultation  by Baljit Joy M.D.    Ag Evans MRN# [id number]   Age: 71 year old YOB: 1950     Requesting physician: No ref. provider found  Jose Antonio       Background history:  DX:  1. Status post bilateral total hip arthroplasty    TREATMENTS:  1. 2005, Right, ERIC, Dr. Jose العلي, [facility]   2. 2011, Left ERIC, Dr. Macario, [facility]       I have met with Ag and his wife today to review the situation regards to his right hip osteolysis with particular wear debris present.  There is evidence of bearing wear as well.  He notes that he is still ambulating independently.  He has minimal discomfort and was able to go to [place] this past winter prior to returning back to [place] the spring.  In the intervening time he developed COVID-19 while in [place] but has recovered and tested negative.     On examination, he is ambulating independently.  Otherwise examination is unchanged as prior visit.    I reviewed the CT scan performed today compared to previous studies.  The area of osteolysis adjacent to his acetabular cup remains similar without major progression.    Recent Labs   Lab Test 03/21/22  1055 03/21/22  0739 12/29/21  0840 10/13/21  0821   HGB 13.9  --  13.3 13.4   SED  --  10  --   --    CRP  --  <2.9  --   --    WBC 3.5*  --  5.0 6.0     No results for input(s): FTYP, FNEU, FOTH, FCOL, FAPR, FWBC, DT3827, REDCELLCOUNT, PMN, MONONCLRS in the last 77245 hours.         Assessment and Plan:   Assessment:  1. 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  Implant remains stable but is at risk for periprosthetic fracture or loosening.  Proposed procedure: Revision of right total hip arthroplasty, 2 hours, tier 3, 1-2 night stay.  2. No evidence of particulate wear debris involving left hip.  3. 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      Plan:  Recommend proceeding with revision hip arthroplasty consisting of intralesional curettage of the acetabular defect with allograft bone impaction grafting.  Would also perform exchange and revision of acetabular bearing surface and femoral head.  Assuming the acetabular implant remains stable, I do not envision revision of the acetabular shell or the femoral stem.      ESR and CRP are normal and I do not think preoperative aspiration is necessary.            Background history of Present Illness:   71 year old male  chief complaint    R hip concerns.   Had R hip pain spontaneous onset, and subsequently resolved.  Saw Dr. Riggs XR done with concerns for osteolysis.  Now has no pain or any other sx's in hip and ambulating normally.    Currently completing treatment for Waldonstroms macroglobulinemia on chemo x 4 mo.       Current symptoms:  Problem: Right Hip   Onset and duration: 6 months ago  Awakens from sleep due to sx's:  No  Precipitating Injury:  No    Other joints or sites painful:  Yes - Knee arthritis                Physical Exam:     EXAMINATION pertinent findings:   PSYCH: Pleasant, healthy-appearing, alert, oriented x3, cooperative. Normal mood and affect.  VITAL SIGNS: There were no vitals taken for this visit..  Reviewed nursing intake notes.   There is no height or weight on file to calculate BMI.  RESP: non labored breathing   ABD: benign, soft, non-tender, no acute peritoneal findings  SKIN: grossly normal   LYMPHATIC: grossly normal, no adenopathy, no extremity edema  NEURO: grossly normal , no motor deficits  VASCULAR: satisfactory perfusion of all extremities   MUSCULOSKELETAL:   Gait is normal.  Both hips have 0 degrees extension flexion to 110 degrees internal rotation of 5 external rotation of 40 degrees without pain.  Abduction 40 adduction 20.    Knees have 3 to 5 degree fixed flexion contracture with further flexion to 110 degrees bilaterally.             Data:   All laboratory  data reviewed  All imaging studies reviewed by me    3.5 mm linear wear R acetabulum with corresponding area of acetabular osteolysis related to particulate wear debris generated by the acetabular bearing.               Total combined visit time and work time before and after clinic visit = 30 min

## 2022-03-21 NOTE — PATIENT INSTRUCTIONS
Preparing for Your Surgery      Name:  Ag Evans   MRN:  [id number]   :  1950   Today's Date:  3/21/2022       Arriving for surgery:  Surgery date:  To be determined  Arrival time:  To be determined    Restrictions due to COVID 19       Effective 22 [facility] is implementing the following visitor policy:     1 person may accompany the patient through the Pre-Op process.      That same person may wait in the Surgery Waiting room, provided there is enough room to social distance         Inpatients are allowed 2 visitors per day for the duration of their stay.        Visitors must wear a mask.      Visitors must not be ill.      Visiting hours are 8 am to 8 pm.    [employer] parking is available for anyone with mobility limitations or disabilities.  ([place]  24 hours/ 7 days a week; [facility]  7 am- 3:30 pm, Mon- Fri)    Please come to: To be determined     What can I eat or drink?  -  You may eat and drink normally for up to 8 hours before your surgery.   -  You may have clear liquids until 2 hours before surgery.     Examples of clear liquids:  Water  Clear broth  Juices (apple, white grape, white cranberry  and cider) without pulp  Noncarbonated, powder based beverages  (lemonade and Nacho-Aid)  Sodas (Sprite, 7-Up, ginger ale and seltzer)  Coffee or tea (without milk or cream)  Gatorade    -  No Alcohol for at least 24 hours before surgery     Which medicines can I take?    Hold Aspirin for 7 days before surgery.   Hold Multivitamins for 7 days before surgery.  Hold Supplements for 7 days before surgery.  Hold Ibuprofen (Advil, Motrin) for 1 day before surgery--unless otherwise directed by surgeon.  Hold Naproxen (Aleve) for 4 days before surgery.    -  DO NOT take these medications the day of surgery:   Vitamin D   Docusate (colace)    -  PLEASE TAKE these medications the day of surgery:   Acetaminophen (tylenol) as needed   Atorvastatin (lipitor)   Gabapentin (neuronitin)     How do I prepare  myself?  - Please take 2 showers before surgery using Scrubcare or Hibiclens soap.    Use this soap only from the neck to your toes.     Leave the soap on your skin for one minute--then rinse thoroughly.      You may use your own shampoo and conditioner; no other hair products.   - Please remove all jewelry and body piercings.  - No lotions, deodorants or fragrance.  - Bring your ID and insurance card.    -If you have a Deep Brain Stimulator, Spinal Cord Stimulator or any neuro stimulator device---you must bring the remote control to the hospital     - All patients are required to have a Covid-19 test within 4 days of surgery/procedure.      -Patients will be contacted by the [facility] scheduling team within 1 week of surgery to make an appointment.      - Patients may call the Scheduling team at [phone] if they have not been scheduled within 4 days of  surgery.      ALL PATIENTS GOING HOME THE SAME DAY OF SURGERY ARE REQUIRED TO HAVE A RESPONSIBLE ADULT TO DRIVE AND BE IN ATTENDANCE WITH THEM FOR 24 HOURS FOLLOWING SURGERY.      Questions or Concerns:    - For any questions regarding the day of surgery or your hospital stay, please contact the Pre Admission Nursing Office at [phone].       - If you have health changes between today and your surgery please call your surgeon.       For questions after surgery please call your surgeons office.

## 2022-03-21 NOTE — NURSING NOTE
Initial preop teaching completed on 11/4. Patient has packet and soap.  Patient will have PAC today following clinic visit.  Surgical date of 4/19 confirmed with Dr. Joy and patient/wife.  Reviewed preop surgical teaching, showering, NPO, location of hospital, COVID restrictions at hospital.    Ag tested positive on 2/1, will not repeat COVID test.    Ag and wife will review all teaching materials and will reach out with any further questions.  Ag is scheduled for his dental cleaning, next week.    Jacquelin Lassiter, MON, RN  RN Care Coordinator, Dr. Joy  [facility]

## 2022-03-21 NOTE — PROGRESS NOTES
Ag is a 71 year old who is being evaluated via a billable video visit.      How would you like to obtain your AVS? MyChart  HPI         Review of Systems         Objective    Vitals - Patient Reported  Pain Score: Mild Pain (2)        Physical Exam   AFSANEH Hopkins LPN

## 2022-03-21 NOTE — LETTER
3/21/2022         RE: Ag Evans  [address]   [place] [facility] [address]        Dear Colleague,    Thank you for referring your patient, Ag Evans, to the [facility] [place]. Please see a copy of my visit note below.        [facility] [place] [facility]  Orthopaedic Surgery, Joint Replacement Consultation  by Baljit Joy M.D.    Ag Evans MRN# [id number]   Age: 71 year old YOB: 1950     Requesting physician: No ref. provider found  Jose Antonio       Background history:  DX:  1. Status post bilateral total hip arthroplasty    TREATMENTS:  1. 2005, Right, ERIC, Dr. Jose العلي, [facility]   2. 2011, Left ERIC, Dr. Macario, [facility]       I have met with Ag and his wife today to review the situation regards to his right hip osteolysis with particular wear debris present.  There is evidence of bearing wear as well.  He notes that he is still ambulating independently.  He has minimal discomfort and was able to go to [place] this past winter prior to returning back to [place] the spring.  In the intervening time he developed COVID-19 while in [place] but has recovered and tested negative.     On examination, he is ambulating independently.  Otherwise examination is unchanged as prior visit.    I reviewed the CT scan performed today compared to previous studies.  The area of osteolysis adjacent to his acetabular cup remains similar without major progression.    Recent Labs   Lab Test 03/21/22  1055 03/21/22  0739 12/29/21  0840 10/13/21  0821   HGB 13.9  --  13.3 13.4   SED  --  10  --   --    CRP  --  <2.9  --   --    WBC 3.5*  --  5.0 6.0     No results for input(s): FTYP, FNEU, FOTH, FCOL, FAPR, FWBC, AV5159, REDCELLCOUNT, PMN, MONONCLRS in the last 01640 hours.         Assessment and Plan:   Assessment:  1. 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  Implant remains stable but is at risk for  periprosthetic fracture or loosening.  Proposed procedure: Revision of right total hip arthroplasty, 2 hours, tier 3, 1-2 night stay.  2. No evidence of particulate wear debris involving left hip.  3. 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     Plan:  Recommend proceeding with revision hip arthroplasty consisting of intralesional curettage of the acetabular defect with allograft bone impaction grafting.  Would also perform exchange and revision of acetabular bearing surface and femoral head.  Assuming the acetabular implant remains stable, I do not envision revision of the acetabular shell or the femoral stem.      ESR and CRP are normal and I do not think preoperative aspiration is necessary.            Background history of Present Illness:   71 year old male  chief complaint    R hip concerns.   Had R hip pain spontaneous onset, and subsequently resolved.  Saw Dr. Riggs XR done with concerns for osteolysis.  Now has no pain or any other sx's in hip and ambulating normally.    Currently completing treatment for Waldonstroms macroglobulinemia on chemo x 4 mo.       Current symptoms:  Problem: Right Hip   Onset and duration: 6 months ago  Awakens from sleep due to sx's:  No  Precipitating Injury:  No    Other joints or sites painful:  Yes - Knee arthritis                Physical Exam:     EXAMINATION pertinent findings:   PSYCH: Pleasant, healthy-appearing, alert, oriented x3, cooperative. Normal mood and affect.  VITAL SIGNS: There were no vitals taken for this visit..  Reviewed nursing intake notes.   There is no height or weight on file to calculate BMI.  RESP: non labored breathing   ABD: benign, soft, non-tender, no acute peritoneal findings  SKIN: grossly normal   LYMPHATIC: grossly normal, no adenopathy, no extremity edema  NEURO: grossly normal , no motor deficits  VASCULAR: satisfactory perfusion of all extremities   MUSCULOSKELETAL:   Gait is normal.  Both hips have 0 degrees  extension flexion to 110 degrees internal rotation of 5 external rotation of 40 degrees without pain.  Abduction 40 adduction 20.    Knees have 3 to 5 degree fixed flexion contracture with further flexion to 110 degrees bilaterally.             Data:   All laboratory data reviewed  All imaging studies reviewed by me    3.5 mm linear wear R acetabulum with corresponding area of acetabular osteolysis related to particulate wear debris generated by the acetabular bearing.               Total combined visit time and work time before and after clinic visit = 30 min

## 2022-03-21 NOTE — H&P
Pre-Operative H & P     CC:  Preoperative exam to assess for increased cardiopulmonary risk while undergoing surgery and anesthesia.    Date of Encounter: 3/21/2022  Primary Care Physician:  Jose Antonio     Reason for visit:   Encounter Diagnosis   Name Primary?     Pre-op evaluation Yes       HPI  Ag Evans is a 71 year old male who presents for pre-operative H & P in preparation for  Procedure Information     Date/Time: TBD     Procedure: ERIC revision    Anesthesia type: TBD    Pre-op diagnosis: hip pain    Location: TBD    Providers: Rufino          Patient is being evaluated for comorbid conditions of Dyslipidemia, lymphoma    Mr. Evans has a history of bilateral ERIC. He now has right hip pain. He was seen by Dr. Joy for further evaluation. Imaging revealed concern for osteolysis. He is now being considered for the above procedure.     History is obtained from the patient and chart review    Hx of abnormal bleeding or anti-platelet use: denies      Past Medical History  Past Medical History:   Diagnosis Date     DJD (degenerative joint disease), lumbar      Malignant lymphoma, lymphoplasmacytic (H) 7/1/2021     Sinus bradycardia        Past Surgical History  Past Surgical History:   Procedure Laterality Date     APPENDECTOMY       BIOPSY  mass right side     BONE MARROW BIOPSY, BONE SPECIMEN, NEEDLE/TROCAR N/A 06/10/2021    Procedure: BIOPSY, BONE MARROW;  Surgeon: Josephine Santamaria MD;  Location:  GI     COLONOSCOPY  01/01/2010    benign with hyperplastic polyps     [facility] STOMACH SURGERY PROCEDURE UNLISTED  Appendix    1971     [facility] TOTAL HIP ARTHROPLASTY Left 04/02/2012    Dr Roderick Santos     NH TOTAL HIP ARTHROPLASTY Right 11/15/2004    Dr. Jose Martinez     SURGICAL HISTORY OF -       Removal of a benign soft tissue mass right abdominal wall     TONSILLECTOMY         Prior to Admission Medications  Current Outpatient Medications   Medication Sig Dispense Refill     acetaminophen (TYLENOL) 325 MG  tablet Take 325-650 mg by mouth every 6 hours as needed for mild pain       atorvastatin (LIPITOR) 20 MG tablet Take 1 tablet (20 mg) by mouth daily (Patient taking differently: Take 20 mg by mouth every morning ) 90 tablet 3     docusate sodium (COLACE) 250 MG capsule Take 250 mg by mouth every morning       gabapentin (NEURONTIN) 300 MG capsule Take 1 capsule (300 mg) by mouth 3 times daily 270 capsule 3     ibuprofen (ADVIL/MOTRIN) 200 MG tablet Take 200 mg by mouth every 4 hours as needed for mild pain       melatonin 5 MG tablet Take 5 mg by mouth nightly as needed        Multiple Vitamins-Minerals (MULTIVITAMIN PO) Take by mouth every morning        SF 5000 PLUS 1.1 % CREA USE TOOTH PASTE TWICE DAILY AS DIRECTED. NOTHING BY MOUTH FOR 30 MINUTES AFTER USE       Vitamin D, Cholecalciferol, 25 MCG (1000 UT) TABS          Allergies  Allergies   Allergen Reactions     Seasonal Allergies        Social History  Social History     Socioeconomic History     Marital status:      Spouse name: Not on file     Number of children: Not on file     Years of education: Not on file     Highest education level: Not on file   Occupational History     Not on file   Tobacco Use     Smoking status: Never Smoker     Smokeless tobacco: Never Used   Substance and Sexual Activity     Alcohol use: Yes     Comment: intermittent with food     Drug use: No     Sexual activity: Yes     Partners: Female     Birth control/protection: None   Other Topics Concern     Parent/sibling w/ CABG, MI or angioplasty before 65F 55M? No   Social History Narrative     Not on file     Social Determinants of Health     Financial Resource Strain: Not on file   Food Insecurity: Not on file   Transportation Needs: Not on file   Physical Activity: Not on file   Stress: Not on file   Social Connections: Not on file   Intimate Partner Violence: Not on file   Housing Stability: Not on file       Family History  Family History   Problem Relation Age of Onset      Other Cancer Mother         breast spread to lungs     Breast Cancer Mother         spread to lungs     Prostate Cancer Father      Other Cancer Father         stomach     Other Cancer Sister         Pancreatic cancer     Breast Cancer Sister      Osteoporosis Sister         from cancer drug?     Other Cancer Sister         breast     Other Cancer Sister         pancreatic     Anesthesia Reaction No family hx of      Deep Vein Thrombosis (DVT) No family hx of        Review of Systems  The complete review of systems is negative other than noted in the HPI or here.   Anesthesia Evaluation   Pt has had prior anesthetic.     No history of anesthetic complications       ROS/MED HX  ENT/Pulmonary:  - neg pulmonary ROS  (-) tobacco use   Neurologic:  - neg neurologic ROS     Cardiovascular:     (+) Dyslipidemia -----Previous cardiac testing   Echo: Date: 7/2021 Results:  1. Left ventricular systolic function is normal. The visual ejection fraction  is 60-65%.  2. Global peak LV longitudinal strain is averaged at -20.6%. This is within  reported normal limits (normal <-18%).  3. No regional wall motion abnormalities noted.  4. The right ventricle is normal in structure, function and size.  5. No evidence for significant valvular pathology.    Stress Test: Date: Results:    ECG Reviewed: Date: 7/2021 Results:  Sinus bradycardia  Cath: Date: Results:   (-) taking anticoagulants/antiplatelets   METS/Exercise Tolerance: >4 METS Comment: Recumbent bike, walks daily about 30 minutes   Hematologic:    (-) history of blood clots and history of blood transfusion   Musculoskeletal: Comment: S/p bilateral hip replacements       GI/Hepatic:  - neg GI/hepatic ROS  (-) GERD   Renal/Genitourinary:  - neg Renal ROS     Endo:  - neg endo ROS  (-) chronic steroid usage   Psychiatric/Substance Use:  - neg psychiatric ROS     Infectious Disease: Comment: COVID positive beginning of February, symptoms have since resolved       Malignancy:    "(+) Malignancy, History of Lymphoma/Leukemia.Lymph CA status post Chemo.        Other:            /80 (BP Location: Right arm, Patient Position: Chair, Cuff Size: Adult Large)   Pulse 53   Temp 97.8  F (36.6  C) (Oral)   Resp 16   Ht 1.778 m (5' 10\")   Wt 93.9 kg (207 lb)   SpO2 96%   BMI 29.70 kg/m      Physical Exam   Constitutional: Awake, alert, cooperative, no apparent distress, and appears stated age.  Respiratory: non labored breathing   Neuropsychiatric: Calm, cooperative. Normal affect.     Prior Labs/Diagnostic Studies   All labs and imaging personally reviewed     EKG/ stress test - if available please see in ROS above   Echo result w/o MOPS: Interpretation Summary 1. Left ventricular systolic function is normal. The visual ejection fractionis 60-65%.2. Global peak LV longitudinal strain is averaged at -20.6%. This is withinreported normal limits (normal <-18%).3. No regional wall motion abnormalities noted.4. The right ventricle is normal in structure, function and size.5. No evidence for significant valvular pathology.      No flowsheet data found.      The patient's records and results personally reviewed by this provider.     Assessment      Ag Evans is a 71 year old male seen as a PAC referral for risk assessment and optimization for anesthesia.    Plan/Recommendations  Pt will be optimized for the proposed procedure.  See below for details on the assessment, risk, and preoperative recommendations    NEUROLOGY  - No history of TIA, CVA or seizure  -Post Op delirium risk factors:  No risk identified    ENT  - No current airway concerns.  Will need to be reassessed day of surgery.  Mallampati: Unable to assess  TM: Unable to assess    CARDIAC  - No history of CAD, Hypertension and Afib  - Hyperlipidemia  using lipitor   -Denies cardiac history or symptoms  -previous cardiac testing above. Echo 7/2021 normal, EF 60-65%  - METS (Metabolic Equivalents)  Patient performs 4 or more METS " "exercise without symptoms            Total Score: 0      RCRI-Very low risk: Class 1 0.4% complication rate            Total Score: 0        PULMONARY  NEL Low Risk            Total Score: 2    NEL: Over 50 ys old    NEL: Male      - Denies asthma or inhaler use  - Tobacco History      History   Smoking Status     Never Smoker   Smokeless Tobacco     Never Used       GI  PONV Medium Risk  Total Score: 2           1 AN PONV: Patient is not a current smoker    1 AN PONV: Intended Post Op Opioids        /RENAL  - Baseline Creatinine WNL    ENDOCRINE    - BMI: Estimated body mass index is 29.7 kg/m  as calculated from the following:    Height as of this encounter: 1.778 m (5' 10\").    Weight as of this encounter: 93.9 kg (207 lb).  Overweight (BMI 25.0-29.9)  - No history of Diabetes Mellitus    HEME  VTE High Risk 3%            Total Score: 8    VTE: Greater than 59 yrs old    VTE: Male    VTE: Current cancer        MSK  -Hip pain with revision ERIC planned    ONC  -Lymphoma s/p chemotherapy. In remission. Last seen 1/2022 with plan to continue observation. Scheduled for follow up with oncology 4/7/22, will discuss upcoming surgery with oncology at that time.   -will update CBC, T&S today    The patient is optimized for their procedure. AVS with information on surgery time/arrival time, meds and NPO status given by nursing staff. No further diagnostic testing indicated.        Video-Visit Details    Type of service:  Video Visit    Patient verbally consented to video service today: YES      Video Start Time: 0959  Video End Time (time video stopped): 1017    Originating Location (pt. Location): Other PAC clinic    Distant Location (provider location):  home    Mode of Communication:  Video Conference via [employer]        On the day of service:     Prep time: 6 minutes  Visit time: 18 minutes  Documentation time: 9 minutes  ------------------------------------------  Total time: 33 minutes      Izzy Page, " SANDHYA  Preoperative Assessment Center  [facility]  Phone: [phone]  Fax: [phone]

## 2022-03-22 DIAGNOSIS — Z11.59 ENCOUNTER FOR SCREENING FOR OTHER VIRAL DISEASES: Primary | ICD-10-CM

## 2022-03-23 ENCOUNTER — VIRTUAL VISIT (OUTPATIENT)
Dept: ONCOLOGY | Facility: CLINIC | Age: 72
End: 2022-03-23
Attending: GENETIC COUNSELOR, MS
Payer: COMMERCIAL

## 2022-03-23 DIAGNOSIS — C83.00 MALIGNANT LYMPHOMA, LYMPHOPLASMACYTIC (H): ICD-10-CM

## 2022-03-23 DIAGNOSIS — Z80.0 FAMILY HISTORY OF MALIGNANT NEOPLASM OF PANCREAS: Primary | ICD-10-CM

## 2022-03-23 DIAGNOSIS — Z80.3 FAMILY HISTORY OF MALIGNANT NEOPLASM OF BREAST: ICD-10-CM

## 2022-03-23 PROCEDURE — 999N000069 HC STATISTIC GENETIC COUNSELING, < 16 MIN: Mod: GT | Performed by: GENETIC COUNSELOR, MS

## 2022-03-23 NOTE — PROGRESS NOTES
"3/23/2022    Referring Provider: Keerthi Hernandez MD    Presenting Information:  I spoke to Ag by video today to discuss his genetic testing results. A skin biopsy was taken on 11/10/21. The CancerNext panel was ordered from [facility]. This testing was done because of Ag's family history of breast and pancreatic cancer.    Genetic Testing Result: NEGATIVE  Ag is negative for mutations in the APC, BRE, AXIN2, BARD1, BMPR1A, BRCA1, BRCA2, BRIP1, CDH1, CDK4, CDKN2A, CHEK2, DICER1, EPCAM, GREM1, HOXB13, MLH1, MSH2, MSH3, MSH6, MUTYH, NBN, NF1, NTHL1, PALB2, PMS2, POLD1, POLE, PTEN, RAD51C, RAD51D, RECQL, SMAD4, SMARCA4, STK11 and TP53. No mutations were found in any of the 36 genes analyzed. This test involved sequencing and deletion/duplication analysis of all genes with the exceptions of EPCAM and GREM1 (deletions/duplications only) and HOXB13, POLD1 and POLE (sequencing only).     A copy of the test report can be found in the Laboratory tab, dated 12/24/21, and named \"LABORATORY MISCELLANEOUS ORDER\". The report is scanned in as a linked document.    Interpretation:  We discussed several different interpretations of this negative test result.    1. One explanation may be that there is a different gene or combination of genes and environment that are associated with the cancers in this family.  2. It is possible that his mother or sisters with cancer did have a mutation in one of these genes and he did not inherit it.  3. There is also a small possibility that there is a mutation in one of these genes, and the testing laboratory could not find it with their current testing methods.       Screening:  Based on this negative test result, it is important for Ag and his relatives to refer back to the family history for appropriate cancer screening.      Population cancer screening options, such as those recommended by the American Cancer Society and the National Comprehensive Cancer Network (NCCN), are " also appropriate for Ag and his family. These screening recommendations may change if there are changes to Ag's personal and/or family history of cancer. Final screening recommendations should be made by each individual's primary care provider.      Inheritance:  We reviewed autosomal dominant inheritance. We discussed that Ag did not pass on an identifiable mutation in these genes to his children based on this test result. Mutations in these genes do not skip generations.      Additional Testing Considerations:  Although Ag's genetic testing result was negative, other relatives may still carry a gene mutation associated with breast and/or pancreatic cancer. Genetic counseling is recommended for other close relative, such as his living sister or nieces and nephews, to discuss genetic testing options. If any of these relatives do pursue genetic testing, Ag is encouraged to contact me so that we may review the impact of their test results on him.    Summary:  We do not have an explanation for Ag's family history of cancer. While no genetic changes were identified, Ag may still be at risk for certain cancers due to family history, environmental factors, or other genetic causes not identified by this test. Because of that, it is important that he continue with cancer screening based on his personal and family history as discussed above.    Genetic testing is rapidly advancing, and new cancer susceptibility genes will most likely be identified in the future. Therefore, I encouraged Ag to contact me annually or if there are changes in his personal or family history. This may change how we assess his cancer risk, screening, and the testing we would offer.    Plan:  1.  A copy of the test results will be mailed to Ag.  2. He plans to follow-up with his other providers.  3. He should contact me regularly, or sooner if his family history changes.    If Ag has any further questions, I encouraged  him to contact me at [phone].    Time spent on video: 10 minutes.    Tai Ayala MS, [facility]  Licensed, Certified Genetic Counselor  Phone: [phone]

## 2022-03-23 NOTE — Clinical Note
"Hello,    Please enclose a copy of the test report from the laboratory tab titled \"laboratory miscellaneous order\" dated 12/24/21 (Order [id number]) to send to the patient along with the letter.    Referring provider: please see summary of genetic test results below.    Thank you,  Tai"

## 2022-03-23 NOTE — LETTER
"    Cancer Risk Management  Program [facility]  [facility]  [facility]  Mailing Address  Cancer Risk Management Program  [address]  [place] [address]    New patient appointments  [phone]  March 23, 2022    Ag Evans  [address] [address] [place] [id number]    Dear Ag,    It was a pleasure speaking with you over video on 3/23/2022. Here is a copy of the progress note from our discussion. If you have any additional questions, please feel free to call.    Referring Provider: Keerthi Hernandez MD    Presenting Information:  I spoke to Ag by video today to discuss his genetic testing results. A skin biopsy was taken on 11/10/21. The CancerNext panel was ordered from [facility]. This testing was done because of Ag's family history of breast and pancreatic cancer.    Genetic Testing Result: NEGATIVE  Ag is negative for mutations in the APC, BRE, AXIN2, BARD1, BMPR1A, BRCA1, BRCA2, BRIP1, CDH1, CDK4, CDKN2A, CHEK2, DICER1, EPCAM, GREM1, HOXB13, MLH1, MSH2, MSH3, MSH6, MUTYH, NBN, NF1, NTHL1, PALB2, PMS2, POLD1, POLE, PTEN, RAD51C, RAD51D, RECQL, SMAD4, SMARCA4, STK11 and TP53. No mutations were found in any of the 36 genes analyzed. This test involved sequencing and deletion/duplication analysis of all genes with the exceptions of EPCAM and GREM1 (deletions/duplications only) and HOXB13, POLD1 and POLE (sequencing only).     A copy of the test report can be found in the Laboratory tab, dated 12/24/21, and named \"LABORATORY MISCELLANEOUS ORDER\". The report is scanned in as a linked document.    Interpretation:  We discussed several different interpretations of this negative test result.    1. One explanation may be that there is a different gene or combination of genes and environment that are " associated with the cancers in this family.  2. It is possible that his mother or sisters with cancer did have a mutation in one of these genes and he did not inherit it.  3. There is also a small possibility that there is a mutation in one of these genes, and the testing laboratory could not find it with their current testing methods.       Screening:  Based on this negative test result, it is important for Ag and his relatives to refer back to the family history for appropriate cancer screening.      Population cancer screening options, such as those recommended by the American Cancer Society and the National Comprehensive Cancer Network (NCCN), are also appropriate for Ag and his family. These screening recommendations may change if there are changes to Ag's personal and/or family history of cancer. Final screening recommendations should be made by each individual's primary care provider.      Inheritance:  We reviewed autosomal dominant inheritance. We discussed that Ag did not pass on an identifiable mutation in these genes to his children based on this test result. Mutations in these genes do not skip generations.      Additional Testing Considerations:  Although Ag's genetic testing result was negative, other relatives may still carry a gene mutation associated with breast and/or pancreatic cancer. Genetic counseling is recommended for other close relative, such as his living sister or nieces and nephews, to discuss genetic testing options. If any of these relatives do pursue genetic testing, Ag is encouraged to contact me so that we may review the impact of their test results on him.    Summary:  We do not have an explanation for Ag's family history of cancer. While no genetic changes were identified, Ag may still be at risk for certain cancers due to family history, environmental factors, or other genetic causes not identified by this test. Because of that, it is important that he  continue with cancer screening based on his personal and family history as discussed above.    Genetic testing is rapidly advancing, and new cancer susceptibility genes will most likely be identified in the future. Therefore, I encouraged Ag to contact me annually or if there are changes in his personal or family history. This may change how we assess his cancer risk, screening, and the testing we would offer.    Plan:  1.  A copy of the test results will be mailed to Ag.  2. He plans to follow-up with his other providers.  3. He should contact me regularly, or sooner if his family history changes.    If Ag has any further questions, I encouraged him to contact me at [phone].    Time spent on video: 10 minutes.    Tai Ayala MS, [facility]  Licensed, Certified Genetic Counselor  Phone: [phone]

## 2022-03-29 DIAGNOSIS — C83.00 MALIGNANT LYMPHOMA, LYMPHOPLASMACYTIC (H): Primary | ICD-10-CM

## 2022-03-31 ENCOUNTER — LAB (OUTPATIENT)
Dept: LAB | Facility: CLINIC | Age: 72
End: 2022-03-31
Payer: COMMERCIAL

## 2022-03-31 DIAGNOSIS — C83.00 MALIGNANT LYMPHOMA, LYMPHOPLASMACYTIC (H): ICD-10-CM

## 2022-03-31 LAB
ALBUMIN SERPL-MCNC: 4.4 G/DL (ref 3.4–5)
ALP SERPL-CCNC: 69 U/L (ref 40–150)
ALT SERPL W P-5'-P-CCNC: 44 U/L (ref 0–70)
ANION GAP SERPL CALCULATED.3IONS-SCNC: 4 MMOL/L (ref 3–14)
AST SERPL W P-5'-P-CCNC: 27 U/L (ref 0–45)
BASOPHILS # BLD AUTO: 0.1 10E3/UL (ref 0–0.2)
BASOPHILS NFR BLD AUTO: 1 %
BILIRUB SERPL-MCNC: 0.8 MG/DL (ref 0.2–1.3)
BUN SERPL-MCNC: 19 MG/DL (ref 7–30)
CALCIUM SERPL-MCNC: 9 MG/DL (ref 8.5–10.1)
CHLORIDE BLD-SCNC: 106 MMOL/L (ref 94–109)
CO2 SERPL-SCNC: 28 MMOL/L (ref 20–32)
CREAT SERPL-MCNC: 0.81 MG/DL (ref 0.66–1.25)
EOSINOPHIL # BLD AUTO: 0.1 10E3/UL (ref 0–0.7)
EOSINOPHIL NFR BLD AUTO: 1 %
ERYTHROCYTE [DISTWIDTH] IN BLOOD BY AUTOMATED COUNT: 12.3 % (ref 10–15)
GFR SERPL CREATININE-BSD FRML MDRD: >90 ML/MIN/1.73M2
GLUCOSE BLD-MCNC: 97 MG/DL (ref 70–99)
HCT VFR BLD AUTO: 42.2 % (ref 40–53)
HGB BLD-MCNC: 14.6 G/DL (ref 13.3–17.7)
IMM GRANULOCYTES # BLD: 0 10E3/UL
IMM GRANULOCYTES NFR BLD: 0 %
LYMPHOCYTES # BLD AUTO: 0.6 10E3/UL (ref 0.8–5.3)
LYMPHOCYTES NFR BLD AUTO: 10 %
MCH RBC QN AUTO: 34.2 PG (ref 26.5–33)
MCHC RBC AUTO-ENTMCNC: 34.6 G/DL (ref 31.5–36.5)
MCV RBC AUTO: 99 FL (ref 78–100)
MONOCYTES # BLD AUTO: 0.8 10E3/UL (ref 0–1.3)
MONOCYTES NFR BLD AUTO: 15 %
NEUTROPHILS # BLD AUTO: 3.8 10E3/UL (ref 1.6–8.3)
NEUTROPHILS NFR BLD AUTO: 73 %
NRBC # BLD AUTO: 0 10E3/UL
NRBC BLD AUTO-RTO: 0 /100
PLATELET # BLD AUTO: 228 10E3/UL (ref 150–450)
POTASSIUM BLD-SCNC: 3.9 MMOL/L (ref 3.4–5.3)
PROT SERPL-MCNC: 7.4 G/DL (ref 6.8–8.8)
RBC # BLD AUTO: 4.27 10E6/UL (ref 4.4–5.9)
SODIUM SERPL-SCNC: 138 MMOL/L (ref 133–144)
TOTAL PROTEIN SERUM FOR ELP: 7.2 G/DL (ref 6.8–8.8)
WBC # BLD AUTO: 5.3 10E3/UL (ref 4–11)

## 2022-03-31 PROCEDURE — 82784 ASSAY IGA/IGD/IGG/IGM EACH: CPT | Performed by: INTERNAL MEDICINE

## 2022-03-31 PROCEDURE — 84155 ASSAY OF PROTEIN SERUM: CPT | Mod: 59 | Performed by: INTERNAL MEDICINE

## 2022-03-31 PROCEDURE — 36415 COLL VENOUS BLD VENIPUNCTURE: CPT

## 2022-03-31 PROCEDURE — 83521 IG LIGHT CHAINS FREE EACH: CPT | Performed by: INTERNAL MEDICINE

## 2022-03-31 PROCEDURE — 85810 BLOOD VISCOSITY EXAMINATION: CPT | Performed by: INTERNAL MEDICINE

## 2022-03-31 PROCEDURE — 80053 COMPREHEN METABOLIC PANEL: CPT | Performed by: INTERNAL MEDICINE

## 2022-03-31 PROCEDURE — 84165 PROTEIN E-PHORESIS SERUM: CPT | Performed by: PATHOLOGY

## 2022-03-31 PROCEDURE — 85025 COMPLETE CBC W/AUTO DIFF WBC: CPT | Performed by: INTERNAL MEDICINE

## 2022-04-01 LAB
ALBUMIN SERPL ELPH-MCNC: 4.9 G/DL (ref 3.7–5.1)
ALPHA1 GLOB SERPL ELPH-MCNC: 0.3 G/DL (ref 0.2–0.4)
ALPHA2 GLOB SERPL ELPH-MCNC: 0.6 G/DL (ref 0.5–0.9)
B-GLOBULIN SERPL ELPH-MCNC: 0.7 G/DL (ref 0.6–1)
GAMMA GLOB SERPL ELPH-MCNC: 0.7 G/DL (ref 0.7–1.6)
IGM SERPL-MCNC: 357 MG/DL (ref 35–242)
KAPPA LC FREE SER-MCNC: 1.28 MG/DL (ref 0.33–1.94)
KAPPA LC FREE/LAMBDA FREE SER NEPH: 1.19 {RATIO} (ref 0.26–1.65)
LAMBDA LC FREE SERPL-MCNC: 1.08 MG/DL (ref 0.57–2.63)
M PROTEIN SERPL ELPH-MCNC: 0.2 G/DL
PROT PATTERN SERPL ELPH-IMP: ABNORMAL
VISC SER: 1.5 CP (ref 1.4–1.8)

## 2022-04-06 ENCOUNTER — MYC MEDICAL ADVICE (OUTPATIENT)
Dept: ORTHOPEDICS | Facility: CLINIC | Age: 72
End: 2022-04-06
Payer: COMMERCIAL

## 2022-04-06 NOTE — PROGRESS NOTES
SURGERY PLAN (PRE-OP PLAN)    Brief:  Ancef// TXA// Lateral w/ wixon// Revision ERIC w/ head liner exchange and bone grafting via ANTEROLATERAL approach// cocktail    Background history:  DX:  1. Status post bilateral total hip arthroplasty     TREATMENTS:  1. 2005, Right, ERIC, Dr. Jose العلي, [facility], ANTEROLATERAL MIS APPROACH     Components: Biomet 2 piece modular porous coated Erna-Head Acetabular Component Size 62 MM outer diameter, Biomet Intergral #14 porous-coated femoral component, 28 mm head    2. 2011, Left ERIC, Dr. Macario, [facility]      I have met with Ag and his wife today to review the situation regards to his right hip osteolysis with particular wear debris present.  There is evidence of bearing wear as well.  He notes that he is still ambulating independently.  He has minimal discomfort.     I reviewed the CT scan performed today compared to previous studies.  The area of osteolysis adjacent to his acetabular cup remains similar without major progression.     Assessment:   1. 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  Implant remains stable but is at risk for periprosthetic fracture or loosening.  Proposed procedure: Revision of right total hip arthroplasty, 2 hours, tier 3, 1-2 night stay.  2. No evidence of particulate wear debris involving left hip.  3. 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     Plan:  Recommend proceeding with revision hip arthroplasty consisting of intralesional curettage of the acetabular defect with allograft bone impaction grafting.  Would also perform exchange and revision of acetabular bearing surface and femoral head.  Assuming the acetabular implant remains stable, I do not envision revision of the acetabular shell or the femoral stem.       ESR and CRP are normal and I do not think preoperative aspiration is necessary.        Patient Position (indicated by x):     Supine     Supine with torso rolled up on a bump     Floppy lateral on torso length bean bag     Lateral decubitus, bean bag, full length   x  Lateral decubitus, Wixson hip positioner     Safety paddle side supports x 2 clamped to side rail     Lithotomy, both legs in yellow padded leg chow     Lithotomy, single leg in yellow padded leg chow     Prone on blanket rolls/round gel pad     Prone on Hayden (arched) frame on Lenin table     Single thigh in orange arthroscopy clamp     Beach chair semi recumbent     Spider limb positioner     Arm out on radiolucent arm table     Split drape with top bar   x  Revision ERIC drape with plastic side bags for leg     Extremity drape     Shoulder pack drape     Laparotomy drape     Mathur catheter          General Equipment Requests (indicated by x):      C-Arm with C-Armor drape     C-Arm (video capable, [employer]00 model)     O-Arm with Stealth imaging     Fracture Table     Lenin XR Table     SurgiGraphic 6000 (diving board) fluoro table     Cell saver   x  Rufino flexible bone tamps   x  Small pituitary rongeur   x  Rufino's angled curettes, narrow shaft   x  90 ml crushed cancellous cubes Bone graft     Midas Jose Carlos Medtronic emma, electric motor     Phenol 5%     [place] BMAC stem cell     Vancomycin 1 gram powder     Zometa 4 mg vials     Depo Medrol steroid   x  Blunt Pelvic Retractor (.55, Blunt Hohmann with  slight bend)     (1) Portable hand held radiation detector machine for sentinel node biopsy and (2) Lymphazurin   x  [place] Osteotomes     ERIC Requests (indicated by x):  x  Biomet Erna-Head Acetabular Liners- for Acetabular Component Size 62 MM outer diameter, both lipped and regular  Biomet Intergral #14 porous-coated femoral component- 28 mm, 32 mm (ceramic and poly liners) and 36 mm heads   x  Biomet G7 multihole osseoti revision cup and augments (backup)     Biomet RB cup, 28+32 heads     Biomet Bipolar cup     Biomet Constrained Freedom liner with  "heads     [place] Nephew BHR resurfacing ERIC with Recargo navigation unit (need 2 weeks advance notice)     DePuy S-ROM long stems     [place] Restoration modular long stem   x  Biomet CHRISTY long stems, both interlocked and splined stems (backup)     Biomet Regenerex TM cup + augments     [place] Tritanium TM cup +  augments     Reginald GAP II acet cage + cemented poly cup     Biomet OSS prox femur replacement ERIC     Biomet OSS Compress fixation     IM flexible reamers + guidewire, < 9 mm     IM flexible reamers + guidewire, > 9 mm     Allograft: femoral head     Allograft: distal femur     Allograft: proximal tibia     Bone mill     Allograft cancellous chips     Allograft cancellous crushed     Dall DoYouBuzz Troch Claw + cable, insertion instruments     Dall DoYouBuzz beaded cerclage cable, green cable  x2 , insertion instruments     Michael CTR Troch cable plate     AO pelvic instruments and clamps (angled) Blunt Hohmann & angled Campo, large bone reduction forceps     Implant Extraction/Cement spacer tools (indicated by x):     Biomet Ultrasound cement removal     Midas Jose Carlos, AC-1 (1mm tiny dissecting tip with wire guide gold handpiece)     Flexible osteotomes (both black and wood handled with new replacement blades)   x  Nahed Universal stem extractor (backup)     \"L\" hook & hoop style stem extractor stem extractor (DePuy AML stem extractor)   x  Michael Explant cup removal osteotomes (backup)     Suction tip with debris trap (clear plastic disposable)   x  Biomet offset implant impactor (white handle in Joy's cart)     Hansel IM canal long shaft cement removal gouges, pituitary ronguers)     Biomet Spacer One hip stem spacer molds with trials     Biomet Articulating knee spacer molds with trials     Biomet [place] Blue gentamycin PMMA and Vanco 1 g vial/package PMMA     Negron Rods, large + mary cutter     Specimens and cultures (indicated by x):   x  Tissue cultures, aerobic and anaerobic without gram stain    "  Frozen section     pathology specimens - fresh     pathology specimens - formalin       Raj Bunn DO  Adult Joint Reconstruction Fellow  Dept Orthopaedic Surgery, [facility]

## 2022-04-07 ENCOUNTER — TELEPHONE (OUTPATIENT)
Dept: OTOLARYNGOLOGY | Facility: OTHER | Age: 72
End: 2022-04-07

## 2022-04-07 ENCOUNTER — ONCOLOGY VISIT (OUTPATIENT)
Dept: ONCOLOGY | Facility: CLINIC | Age: 72
End: 2022-04-07
Payer: COMMERCIAL

## 2022-04-07 VITALS
WEIGHT: 208.2 LBS | DIASTOLIC BLOOD PRESSURE: 66 MMHG | TEMPERATURE: 97.1 F | BODY MASS INDEX: 29.15 KG/M2 | HEIGHT: 71 IN | OXYGEN SATURATION: 98 % | HEART RATE: 59 BPM | SYSTOLIC BLOOD PRESSURE: 124 MMHG

## 2022-04-07 DIAGNOSIS — C88.00 WALDENSTROM MACROGLOBULINEMIA: ICD-10-CM

## 2022-04-07 DIAGNOSIS — G62.9 PERIPHERAL POLYNEUROPATHY: ICD-10-CM

## 2022-04-07 PROCEDURE — 99214 OFFICE O/P EST MOD 30 MIN: CPT | Performed by: INTERNAL MEDICINE

## 2022-04-07 NOTE — TELEPHONE ENCOUNTER
Patient is seeing oncology today for his follow up/transfer of care. Patient thought this would be his preop for his surgery on 4/19/22. Informed patient today's visit will not be a preop. Can patient be worked in for preop within the next week? Please call patient. Annie Mercado CMA

## 2022-04-07 NOTE — PATIENT INSTRUCTIONS
1) Radha Flores 3 months w/ labs (CBC, IgM, IgG, SPEP).  2) BJT 6 months 15 mins w/ labs (CBC, IgM, IgG, SPEP).    Manuel Perez MD.      Lab Date/Time: 7/12/22 at 10:15 am [facility]    Lab Date/Time: 10/6/22 at 9:45 am [facility]       Office visit follow up with KALEB Sweet, MICHELLE Date/Time: 7/12/22 at 10:30 am [facility]     Office visit follow up with Manuel Perez MD Date/Time: 10/6/22 at 10 am [facility]          If you have any questions or concerns please feel free to call.    If you need to reschedule please call:  Clinic or Lab Appointment - [phone]  Infusion - [phone]  Imaging - [phone]    Tracee Jones RN  [facility]   [phone]  4/7/2022, 10:49 AM    After Hours Oncology Nurse Line - [phone]

## 2022-04-07 NOTE — PROGRESS NOTES
DISCHARGE PLAN:  Next appointments: See patient instruction section  Departure Mode: Ambulatory  Accompanied by: self  0 minutes for nursing discharge (face to face time)     Ag Evans is here today for Follow up.  Patient was not seen by writing nurse at time of appointment.  Appointments scheduled. AVS mailed to patient.  See patient instructions and Oncologist's Progress note for further details. Questions and concerns addressed to patient's satisfaction. Patient verbalized and demonstrated understanding of plan.  Contact information provided and patient is encouraged to call with any that arise in the interim of care.    Tracee Jones RN  [facility]   [phone]  4/7/2022, 10:52 AM

## 2022-04-07 NOTE — LETTER
"    2022         RE: Ag Evans  [address] [facility] [address]        Dear Colleague,    Thank you for referring your patient, Ag Evans, to the [facility] [place]. Please see a copy of my visit note below.    DISCHARGE PLAN:  Next appointments: See patient instruction section  Departure Mode: Ambulatory  Accompanied by: self  0 minutes for nursing discharge (face to face time)     Ag Evans is here today for Follow up.  Patient was not seen by writing nurse at time of appointment.  Appointments scheduled. AVS mailed to patient.  See patient instructions and Oncologist's Progress note for further details. Questions and concerns addressed to patient's satisfaction. Patient verbalized and demonstrated understanding of plan.  Contact information provided and patient is encouraged to call with any that arise in the interim of care.    Tracee Jones RN  [facility]   [phone]  2022, 10:52 AM      [facility] Hematology / Oncology  Progress Note  Name: Ag Evans  :  1950    MRN:  [id number]    --------------------    Assessment / Plan:  Waldenstromg / Lymphoplasmacytic Lymphoma:  # Status post BR x 4 cycles; VGPR.    Clinically well; IgM continues to show nice response to treatment.  Recovered from chemo and disease.  Chemo neuropathy stable; continue gabapentin.  Personally reviewed incidental findings on CT scan; INOCENCIO.  RTC every 3 months w/ labs; follow IgM.    Manuel Perez MD    --------------------    Interval History:  Ag returns for follow up of Waldenstroms accompanied by his wife.  He is well and w/o complaints; all in all, feeling great.  No new health concerns or complaints.  No unexplained sweats; no adenopathy.  Good energy; stable appetite and weight.  COVID vaccinated.    --------------------    Physical Exam:  VS: /66   Pulse 59   Temp 97.1  F (36.2  C) (Temporal)   Ht 1.803 m (5' 11\")   Wt 94.4 " kg (208 lb 3.2 oz)   SpO2 98%   BMI 29.04 kg/m    GEN: Well appearing.  HEENT: PERRL, EOMI, no scleral icterus or injection; OP clear, moist mucous membranes, no oral lesions.  NECK: Supple.  DANIEL: No cervical, supraclavicular, axillary or inguinal DANIEL.  CHEST: Breathing comfortably, good airflow bilaterally, no crackles, no wheezing.  CV: RRR, s1/s2, no murmurs; strong distal pulses.  ABD: Non-tender, non-distended, soft, positive bowel sounds.  EXT: Warm and well perfused; no edema; no clubbing.  SKIN: Warm and dry, no visible rashes.  NEURO: Alert, engaged; CN 2-12 grossly intact; no gross sensorimotor deficits; gait and coordination intact; speech clear and fluent.    Labs / Imaging / Path:  We reviewed labs, personally reviewed imaging and reviewed pathology reports      Again, thank you for allowing me to participate in the care of your patient.        Sincerely,        Manuel Perez MD

## 2022-04-08 ENCOUNTER — OFFICE VISIT (OUTPATIENT)
Dept: INTERNAL MEDICINE | Facility: CLINIC | Age: 72
End: 2022-04-08
Payer: COMMERCIAL

## 2022-04-08 VITALS
OXYGEN SATURATION: 98 % | HEIGHT: 71 IN | HEART RATE: 56 BPM | BODY MASS INDEX: 29.05 KG/M2 | WEIGHT: 207.5 LBS | SYSTOLIC BLOOD PRESSURE: 128 MMHG | TEMPERATURE: 98.1 F | RESPIRATION RATE: 16 BRPM | DIASTOLIC BLOOD PRESSURE: 80 MMHG

## 2022-04-08 DIAGNOSIS — Z01.818 PREOP GENERAL PHYSICAL EXAM: ICD-10-CM

## 2022-04-08 DIAGNOSIS — C88.00 WALDENSTROM MACROGLOBULINEMIA: ICD-10-CM

## 2022-04-08 DIAGNOSIS — Z96.641 STATUS POST RIGHT HIP REPLACEMENT: Primary | ICD-10-CM

## 2022-04-08 PROCEDURE — 93000 ELECTROCARDIOGRAM COMPLETE: CPT | Performed by: INTERNAL MEDICINE

## 2022-04-08 PROCEDURE — 99214 OFFICE O/P EST MOD 30 MIN: CPT | Performed by: INTERNAL MEDICINE

## 2022-04-08 RX ORDER — MELATONIN/PYRIDOXINE HCL (B6) 10 MG-10MG
1 TABLET,IMMED, EXTENDED RELEASE, BIPHASIC ORAL AT BEDTIME
COMMUNITY

## 2022-04-08 ASSESSMENT — PAIN SCALES - GENERAL: PAINLEVEL: NO PAIN (0)

## 2022-04-08 NOTE — PROGRESS NOTES
[address] [address]  Phone: [phone]  Primary Provider: Jose Antonio  Pre-op Performing Provider: JOSE ANTONIO    PREOPERATIVE EVALUATION:  Today's date: 4/8/2022    Ag Evans is a 71 year old male who presents for a preoperative evaluation.    Surgical Information:  Surgery/Procedure: Revision Right total hip arthroplasty  Surgery Location: [facility]  Surgeon: Dr. Joy  Surgery Date: 4-  Time of Surgery: 11:30 am  Where patient plans to recover: At home with family  Fax number for surgical facility: Note does not need to be faxed, will be available electronically in Epic.    Type of Anesthesia Anticipated: General    Assessment & Plan     The proposed surgical procedure is considered INTERMEDIATE risk.    Problem List Items Addressed This Visit     None      Visit Diagnoses     Status post right hip replacement    -  Primary    Relevant Orders    EKG 12-lead complete w/read - Clinics (Completed)    Preop general physical exam        Waldenstrom macroglobulinemia (H)            Patient is okay to proceed with his right hip repair, he has had this replaced before needs to have it repaired.  He has a history of Waldenström macroglobulinemia.  But his last labs have been stable with normal hemoglobin, white count, platelets.  His electrolytes have all been stable.  He is active and healthy.  He had a normal echocardiogram last year.    The patient was positive for Covid on February 1.  He is subsequently tested negative but he is within 3 months of his test where he will not do a presurgical testing.         Risks and Recommendations:  The patient has the following additional risks and recommendations for perioperative complications:   - No identified additional risk factors other than previously addressed    Medication Instructions:  Hold ibuprofen for 3 days.     RECOMMENDATION:  APPROVAL GIVEN to proceed with  proposed procedure, without further diagnostic evaluation.                      Subjective     HPI related to upcoming procedure: Revision Right total hip arthroplasty    Preop Questions 4/7/2022   1. Have you ever had a heart attack or stroke? No   2. Have you ever had surgery on your heart or blood vessels, such as a stent placement, a coronary artery bypass, or surgery on an artery in your head, neck, heart, or legs? No   3. Do you have chest pain with activity? No   4. Do you have a history of  heart failure? No   5. Do you currently have a cold, bronchitis or symptoms of other infection? No   6. Do you have a cough, shortness of breath, or wheezing? No   7. Do you or anyone in your family have previous history of blood clots? No   8. Do you or does anyone in your family have a serious bleeding problem such as prolonged bleeding following surgeries or cuts? No   9. Have you ever had problems with anemia or been told to take iron pills? No   10. Have you had any abnormal blood loss such as black, tarry or bloody stools? No   11. Have you ever had a blood transfusion? No   11a. Have you ever had a transfusion reaction? -   12. Are you willing to have a blood transfusion if it is medically needed before, during, or after your surgery? Yes   13. Have you or any of your relatives ever had problems with anesthesia? No   14. Do you have sleep apnea, excessive snoring or daytime drowsiness? No   15. Do you have any artifical heart valves or other implanted medical devices like a pacemaker, defibrillator, or continuous glucose monitor? No   16. Do you have artificial joints? YES - right hip   17. Are you allergic to latex? No       Health Care Directive:  Patient does not have a Health Care Directive or Living Will: Patient states has Advance Directive and will bring in a copy to clinic.    Preoperative Review of :   reviewed - only on gabapentin, no narcotics      Status of Chronic Conditions:  HYPERLIPIDEMIA -  Patient has a long history of significant Hyperlipidemia requiring medication for treatment with recent good control. Patient reports no problems or side effects with the medication.     Neuropathy and is on gabapentin.      Waldenstroms macroglobinemia is treated.     Review of Systems  Constitutional, neuro, ENT, endocrine, pulmonary, cardiac, gastrointestinal, genitourinary, musculoskeletal, integument and psychiatric systems are negative, except as otherwise noted.    Patient Active Problem List    Diagnosis Date Noted     Primary osteoarthritis of left knee 10/05/2021     Priority: Medium     Pseudogout of knee, left 10/05/2021     Priority: Medium     Malignant lymphoma, lymphoplasmacytic (H) 07/01/2021     Priority: Medium     Chemotherapy-induced neutropenia (H) 07/01/2021     Priority: Medium     Prosthetic wear following total hip arthroplasty (H) 10/08/2020     Priority: Medium     Sinus bradycardia 07/30/2015     Priority: Medium     Hyperlipidemia LDL goal <130 07/30/2015     Priority: Medium     CARDIOVASCULAR SCREENING; LDL GOAL LESS THAN 130 07/30/2015     Priority: Medium     Advanced directives, counseling/discussion 07/30/2015     Priority: Medium     Discussed 7/30/2015. Has a current advanced directive completed and will bring a  to the clinic.  Rafat Moss MD         DJD (degenerative joint disease), lumbar      Priority: Medium     Incomplete RBBB 03/06/2012     Priority: Medium     History of total hip replacement 02/12/2006     Priority: Medium     Overview:   bilat   ; Hip joint replacement by other means       Family history of malignant neoplasm of prostate 09/11/2003     Priority: Medium      Past Medical History:   Diagnosis Date     DJD (degenerative joint disease), lumbar      Malignant lymphoma, lymphoplasmacytic (H) 7/1/2021     Sinus bradycardia      Past Surgical History:   Procedure Laterality Date     APPENDECTOMY       BIOPSY  mass right side     BONE MARROW BIOPSY, BONE  SPECIMEN, NEEDLE/TROCAR N/A 06/10/2021    Procedure: BIOPSY, BONE MARROW;  Surgeon: Josephine Santamaria MD;  Location:  GI     COLONOSCOPY  01/01/2010    benign with hyperplastic polyps     FL STOMACH SURGERY PROCEDURE UNLISTED  Appendix    1971     FL TOTAL HIP ARTHROPLASTY Left 04/02/2012    Dr Roderick Santos     FL TOTAL HIP ARTHROPLASTY Right 11/15/2004    Dr. Jose Martinez     SURGICAL HISTORY OF -       Removal of a benign soft tissue mass right abdominal wall     TONSILLECTOMY       Current Outpatient Medications   Medication Sig Dispense Refill     atorvastatin (LIPITOR) 20 MG tablet Take 1 tablet (20 mg) by mouth daily (Patient taking differently: Take 20 mg by mouth every morning ) 90 tablet 3     docusate sodium (COLACE) 250 MG capsule Take 250 mg by mouth every morning       gabapentin (NEURONTIN) 300 MG capsule Take 1 capsule (300 mg) by mouth 3 times daily 270 capsule 3     ibuprofen (ADVIL/MOTRIN) 200 MG tablet Take 200 mg by mouth every 4 hours as needed for mild pain       Melatonin 10-10 MG TBCR 1 tablet ar bedtime as needed       SF 5000 PLUS 1.1 % CREA USE TOOTH PASTE TWICE DAILY AS DIRECTED. NOTHING BY MOUTH FOR 30 MINUTES AFTER USE       acetaminophen (TYLENOL) 325 MG tablet Take 325-650 mg by mouth every 6 hours as needed for mild pain (Patient not taking: Reported on 4/8/2022)       melatonin 5 MG tablet Take 5 mg by mouth nightly as needed  (Patient not taking: Reported on 4/8/2022)       Multiple Vitamins-Minerals (MULTIVITAMIN PO) Take by mouth every morning  (Patient not taking: Reported on 4/8/2022)       Vitamin D, Cholecalciferol, 25 MCG (1000 UT) TABS  (Patient not taking: Reported on 4/8/2022)         Allergies   Allergen Reactions     Seasonal Allergies         Social History     Tobacco Use     Smoking status: Never Smoker     Smokeless tobacco: Never Used   Substance Use Topics     Alcohol use: Yes     Comment: intermittent with food       History   Drug Use No         Objective  "    /80 (BP Location: Left arm, Patient Position: Chair, Cuff Size: Adult Large)   Pulse 56   Temp 98.1  F (36.7  C) (Temporal)   Resp 16   Ht 1.791 m (5' 10.5\")   Wt 94.1 kg (207 lb 8 oz)   SpO2 98%   BMI 29.35 kg/m      Physical Exam  GENERAL APPEARANCE: healthy, alert and no distress  HENT: ear canals and TM's normal and nose and mouth without ulcers or lesions  RESP: lungs clear to auscultation - no rales, rhonchi or wheezes  CV: regular rate and rhythm, normal S1 S2, no S3 or S4 and no murmur, click or rub   NEURO: Normal strength and tone, sensory exam grossly normal, mentation intact and speech normal    Recent Labs   Lab Test 03/31/22  1003 03/21/22  1055 09/15/21  0754 08/26/21  1125   HGB 14.6 13.9   < >  --     185   < >  --     144   < >  --    POTASSIUM 3.9 4.8   < >  --    CR 0.81 0.75   < >  --    A1C  --   --   --  5.8*    < > = values in this interval not displayed.        Diagnostics:  No labs were ordered during this visit.   EKG: appears normal, NSR, normal axis, normal intervals, no acute ST/T changes c/w ischemia, no LVH by voltage criteria, unchanged from previous tracings    Revised Cardiac Risk Index (RCRI):  The patient has the following serious cardiovascular risks for perioperative complications:       RCRI Interpretation: 1 point: Class II (low risk - 0.9% complication rate)           Signed Electronically by: Jose Antonio MD  Copy of this evaluation report is provided to requesting physician.      "

## 2022-04-08 NOTE — PATIENT INSTRUCTIONS
Preparing for Your Surgery  Getting started  A nurse will call you to review your health history and instructions. They will give you an arrival time based on your scheduled surgery time. Please be ready to share:    Your doctor's clinic name and phone number    Your medical, surgical and anesthesia history    A list of allergies and sensitivities    A list of medicines, including herbal treatments and over-the-counter drugs    Whether the patient has a legal guardian (ask how to send us the papers in advance)  Please tell us if you're pregnant--or if there's any chance you might be pregnant. Some surgeries may injure a fetus (unborn baby), so they require a pregnancy test. Surgeries that are safe for a fetus don't always need a test, and you can choose whether to have one.   If you have a child who's having surgery, please ask for a copy of Preparing for Your Child's Surgery.    Preparing for surgery    Within 30 days of surgery: Have a pre-op exam (sometimes called an H&P, or History and Physical). This can be done at a clinic or pre-operative center.  ? If you're having a , you may not need this exam. Talk to your care team.    At your pre-op exam, talk to your care team about all medicines you take. If you need to stop any medicines before surgery, ask when to start taking them again.  ? We do this for your safety. Many medicines can make you bleed too much during surgery. Some change how well surgery (anesthesia) drugs work.    Call your insurance company to let them know you're having surgery. (If you don't have insurance, call [phone].)    Call your clinic if there's any change in your health. This includes signs of a cold or flu (sore throat, runny nose, cough, rash, fever). It also includes a scrape or scratch near the surgery site.    If you have questions on the day of surgery, call your hospital or surgery center.  COVID testing  You may need to be tested for COVID-19 before having  surgery. If so, your surgical team will give you instructions for scheduling this test, separate from your preoperative history and physical.  Eating and drinking guidelines  For your safety: Unless your surgeon tells you otherwise, follow the guidelines below.    Eat and drink as usual until 8 hours before surgery. After that, no food or milk.    Drink clear liquids until 2 hours before surgery. These are liquids you can see through, like water, Gatorade and Propel Water. You may also have black coffee and tea (no cream or milk).    Nothing by mouth within 2 hours of surgery. This includes gum, candy and breath mints.    If you drink alcohol: Stop drinking it the night before surgery.    If your care team tells you to take medicine on the morning of surgery, it's okay to take it with a sip of water.  Preventing infection    Shower or bathe the night before and morning of your surgery. Follow the instructions your clinic gave you. (If no instructions, use regular soap.)    Don't shave or clip hair near your surgery site. We'll remove the hair if needed.    Don't smoke or vape the morning of surgery. You may chew nicotine gum up to 2 hours before surgery. A nicotine patch is okay.  ? Note: Some surgeries require you to completely quit smoking and nicotine. Check with your surgeon.    Your care team will make every effort to keep you safe from infection. We will:  ? Clean our hands often with soap and water (or an alcohol-based hand rub).  ? Clean the skin at your surgery site with a special soap that kills germs.  ? Give you a special gown to keep you warm. (Cold raises the risk of infection.)  ? Wear special hair covers, masks, gowns and gloves during surgery.  ? Give antibiotic medicine, if prescribed. Not all surgeries need antibiotics.  What to bring on the day of surgery    Photo ID and insurance card    Copy of your health care directive, if you have one    Glasses and hearing aides (bring cases)  ? You can't  wear contacts during surgery    Inhaler and eye drops, if you use them (tell us about these when you arrive)    CPAP machine or breathing device, if you use them    A few personal items, if spending the night    If you have . . .  ? A pacemaker, ICD (cardiac defibrillator) or other implant: Bring the ID card.  ? An implanted stimulator: Bring the remote control.  ? A legal guardian: Bring a copy of the certified (court-stamped) guardianship papers.  Please remove any jewelry, including body piercings. Leave jewelry and other valuables at home.  If you're going home the day of surgery    You must have a responsible adult drive you home. They should stay with you overnight as well.    If you don't have someone to stay with you, and you aren't safe to go home alone, we may keep you overnight. Insurance often won't pay for this.  After surgery  If it's hard to control your pain or you need more pain medicine, please call your surgeon's office.  Questions?   If you have any questions for your care team, list them here: _________________________________________________________________________________________________________________________________________________________________________ ____________________________________ ____________________________________ ____________________________________  For informational purposes only. Not to replace the advice of your health care provider. Copyright   2003, 2019 [facility]. All rights reserved. Clinically reviewed by Shira Carrillo MD. Sgnam [id number] - REV 07/21.

## 2022-04-08 NOTE — NURSING NOTE
Health Maintenance Due   Topic Date Due     ANNUAL REVIEW OF HM ORDERS  Never done     DTAP/TDAP/TD IMMUNIZATION (3 - Td or Tdap) 01/27/2021     MEDICARE ANNUAL WELLNESS VISIT  10/02/2021     LIPID  10/02/2021     COVID-19 Vaccine (4 - Booster for Moderna series) 01/20/2022     Shelley ALAMO LPN

## 2022-04-10 NOTE — PROGRESS NOTES
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    Clinically well; IgM continues to show nice response to treatment.  Recovered from chemo and disease.  Chemo neuropathy stable; continue gabapentin.  Personally reviewed incidental findings on CT scan; INOCENCIO.  RTC every 3 months w/ labs; follow IgM.    Manuel Perez MD    --------------------    Interval History:  Ag returns for follow up of Waldenstroms accompanied by his wife.  He is well and w/o complaints; all in all, feeling great.  No new health concerns or complaints.  No unexplained sweats; no adenopathy.  Good energy; stable appetite and weight.  COVID vaccinated.    --------------------    Physical Exam:  VS: /66   Pulse 59   Temp 97.1  F (36.2  C) (Temporal)   Ht 1.803 m (5' 11\")   Wt 94.4 kg (208 lb 3.2 oz)   SpO2 98%   BMI 29.04 kg/m    GEN: Well appearing.  HEENT: PERRL, EOMI, no scleral icterus or injection; OP clear, moist mucous membranes, no oral lesions.  NECK: Supple.  DANIEL: No cervical, supraclavicular, axillary or inguinal DANIEL.  CHEST: Breathing comfortably, good airflow bilaterally, no crackles, no wheezing.  CV: RRR, s1/s2, no murmurs; strong distal pulses.  ABD: Non-tender, non-distended, soft, positive bowel sounds.  EXT: Warm and well perfused; no edema; no clubbing.  SKIN: Warm and dry, no visible rashes.  NEURO: Alert, engaged; CN 2-12 grossly intact; no gross sensorimotor deficits; gait and coordination intact; speech clear and fluent.    Labs / Imaging / Path:  We reviewed labs, personally reviewed imaging and reviewed pathology reports  "

## 2022-04-16 ENCOUNTER — MYC MEDICAL ADVICE (OUTPATIENT)
Dept: ORTHOPEDICS | Facility: CLINIC | Age: 72
End: 2022-04-16

## 2022-04-16 ENCOUNTER — HOSPITAL ENCOUNTER (EMERGENCY)
Facility: CLINIC | Age: 72
Discharge: HOME | End: 2022-04-16
Attending: EMERGENCY MEDICINE | Admitting: EMERGENCY MEDICINE
Payer: COMMERCIAL

## 2022-04-16 VITALS
BODY MASS INDEX: 28 KG/M2 | RESPIRATION RATE: 17 BRPM | HEIGHT: 71 IN | TEMPERATURE: 98.1 F | SYSTOLIC BLOOD PRESSURE: 180 MMHG | OXYGEN SATURATION: 98 % | DIASTOLIC BLOOD PRESSURE: 85 MMHG | HEART RATE: 55 BPM | WEIGHT: 200 LBS

## 2022-04-16 DIAGNOSIS — S90.229A SUBUNGUAL HEMATOMA OF FOOT, INITIAL ENCOUNTER: ICD-10-CM

## 2022-04-16 PROCEDURE — 11740 EVACUATION SUBUNGUAL HMTMA: CPT | Mod: TA | Performed by: EMERGENCY MEDICINE

## 2022-04-16 PROCEDURE — 99284 EMERGENCY DEPT VISIT MOD MDM: CPT | Mod: 25 | Performed by: EMERGENCY MEDICINE

## 2022-04-16 RX ORDER — CEPHALEXIN 500 MG/1
500 CAPSULE ORAL 4 TIMES DAILY
Qty: 20 CAPSULE | Refills: 0 | Status: ON HOLD | OUTPATIENT
Start: 2022-04-16 | End: 2022-04-21

## 2022-04-16 NOTE — ED TRIAGE NOTES
Presents right great toe pain that started this morning.  Noticed pain and some redness this morning.  History of in grown toe nails and was filing his nail recently and it caught on his sock and ripped up causing toe to bleed.  Toe nail is now discolored. Does have surgery on Tuesday for his hip and is concerned this could hinder his surgery

## 2022-04-16 NOTE — ED PROVIDER NOTES
History   No chief complaint on file.    HPI  Ag Evans is a 71 year old male who presents to the emergency department secondary to left great toe pain.  He is preparing for a surgery on his hip next week.  He has had problems with this toenail with ingrown toenail problems in the past.  He is careful to trim the toenail regularly.  The toenail is thickened most likely with fungal disease.  He has been careful to trim the toenail so he does not have further problems has had part of the toenail removed in the past.  He was trimming the toenail and then put a sock on and the sock caught and pulled the toenail back resulting in some bleeding underneath the toenail.  Since then he has been okay but has not had a bandage on it.  He noticed some pain over the side of the toe but no significant swelling redness pustular discharge etc.  It does not feel like when he has had ingrown toenails in the past.  There is a black coloration behind the nail at this point.  No fever or chills.    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 Has a current advanced directive completed and will bring a  to the clinic.  MD BEBETO Galaviz (degenerative  joint disease), lumbar      Priority: Medium     Incomplete RBBB 03/06/2012     Priority: Medium     History of total hip replacement 02/12/2006     Priority: Medium     Overview:   bilat   ; Hip joint replacement by other means       Family history of malignant neoplasm of prostate 09/11/2003     Priority: Medium        Past Medical History:    Past Medical History:   Diagnosis Date     DJD (degenerative joint disease), lumbar      Malignant lymphoma, lymphoplasmacytic (H) 7/1/2021     Sinus bradycardia        Past Surgical History:    Past Surgical History:   Procedure Laterality Date     APPENDECTOMY       BIOPSY  mass right side     BONE MARROW BIOPSY, BONE SPECIMEN, NEEDLE/TROCAR N/A 06/10/2021    Procedure: BIOPSY, BONE MARROW;  Surgeon: Josephine Santamaria MD;  Location:  GI     COLONOSCOPY  01/01/2010    benign with hyperplastic polyps     SC STOMACH SURGERY PROCEDURE UNLISTED  Appendix    1971     SC TOTAL HIP ARTHROPLASTY Left 04/02/2012    Dr Roderick Santos     SC TOTAL HIP ARTHROPLASTY Right 11/15/2004    Dr. Jose Martinez     SURGICAL HISTORY OF -       Removal of a benign soft tissue mass right abdominal wall     TONSILLECTOMY         Family History:    Family History   Problem Relation Age of Onset     Other Cancer Mother         breast spread to lungs     Breast Cancer Mother         spread to lungs     Prostate Cancer Father      Other Cancer Father         stomach     Other Cancer Sister         Pancreatic cancer     Breast Cancer Sister      Osteoporosis Sister         from cancer drug?     Other Cancer Sister         breast     Other Cancer Sister         pancreatic     Anesthesia Reaction No family hx of      Deep Vein Thrombosis (DVT) No family hx of        Social History:  Marital Status:   [2]  Social History     Tobacco Use     Smoking status: Never Smoker     Smokeless tobacco: Never Used   Substance Use Topics     Alcohol use: Yes     Comment: intermittent with food     Drug use:  "No        Medications:    cephALEXin (KEFLEX) 500 MG capsule  acetaminophen (TYLENOL) 325 MG tablet  atorvastatin (LIPITOR) 20 MG tablet  docusate sodium (COLACE) 250 MG capsule  gabapentin (NEURONTIN) 300 MG capsule  ibuprofen (ADVIL/MOTRIN) 200 MG tablet  Melatonin 10-10 MG TBCR  melatonin 5 MG tablet  Multiple Vitamins-Minerals (MULTIVITAMIN PO)  SF 5000 PLUS 1.1 % CREA  Vitamin D, Cholecalciferol, 25 MCG (1000 UT) TABS          Review of Systems   All other systems reviewed and are negative.      Physical Exam   BP: (!) 180/85  Pulse: 55  Temp: 98.1  F (36.7  C)  Resp: 17  Height: 180.3 cm (5' 11\")  Weight: 90.7 kg (200 lb)  SpO2: 98 %      Physical Exam  Vitals and nursing note reviewed.   Constitutional:       General: He is not in acute distress.     Appearance: He is well-developed. He is not diaphoretic.   HENT:      Head: Normocephalic and atraumatic.      Nose: Nose normal.      Mouth/Throat:      Mouth: Mucous membranes are moist.   Eyes:      General: No scleral icterus.  Pulmonary:      Effort: Pulmonary effort is normal.   Musculoskeletal:         General: Normal range of motion.      Cervical back: Normal range of motion and neck supple.      Right lower leg: No edema.      Left lower leg: No edema.   Skin:     General: Skin is warm and dry.      Findings: No rash.      Comments: Left great toenail with thickening of the nail onychomycoses likely and evidence for subungual hematoma that appears old.  No increase in swelling or abscess formation near the nail.   Neurological:      Mental Status: He is alert and oriented to person, place, and time.         ED Course                 Procedures         After discussion with the patient with options we decided proceed with trephination of the nail.  Initially I tried cautery but was unable to get through the nail being so thick.  Then we decided to proceed with digital block using 1% lidocaine without epinephrine.  Good effect to cause numbness of the " toe.  Trephination was performed using a 6 encouraged needle.  Old dark blood oozed out of the trephination.  The patient tolerated the procedure well.       No results found for this or any previous visit (from the past 24 hour(s)).    Medications - No data to display    Assessments & Plan (with Medical Decision Making)  Nail trauma with subungual hematoma.  No evidence for significant paronychia.  We will put him on 5 days of Keflex given we do not want any difficulty with proceeding with his upcoming surgery.  Patient is in agreement with this plan.  Have advised him to use Epson salt soaks as well.  Wound care including antibiotic ointment and bandage discussed with the patient.  He was discharged home in stable condition.  Return to ER precautions and follow-up precautions discussed.     I have reviewed the nursing notes.    I have reviewed the findings, diagnosis, plan and need for follow up with the patient.      Discharge Medication List as of 4/16/2022  9:18 AM      START taking these medications    Details   cephALEXin (KEFLEX) 500 MG capsule Take 1 capsule (500 mg) by mouth 4 times daily for 5 days, Disp-20 capsule, R-0, E-Prescribe             Final diagnoses:   Subungual hematoma of foot, initial encounter       4/16/2022   [facility] EMERGENCY DEPT     Kurtis Cole MD  04/16/22 0615

## 2022-04-16 NOTE — DISCHARGE INSTRUCTIONS
-I think the most of the problem is related to the blood behind the nail.  This is called a subungual hematoma.  We will put you on a few days of antibiotics just to be sure you do not have a problem with your upcoming surgery.  Most likely this nail will fall off on its own.  Return to the emergency department if you develop new or worsening symptoms.  You can do Epsom salt soaks as well.  It was a pleasure to meet you and good luck with your upcoming surgery.  I sent a prescription for Keflex to [facility] pharmacy in [place].  That is the antibiotic.

## 2022-04-18 ENCOUNTER — ANESTHESIA EVENT (OUTPATIENT)
Dept: SURGERY | Facility: CLINIC | Age: 72
DRG: 467 | End: 2022-04-18
Payer: COMMERCIAL

## 2022-04-18 ASSESSMENT — ENCOUNTER SYMPTOMS
ORTHOPNEA: 0
SEIZURES: 0
DYSRHYTHMIAS: 0

## 2022-04-18 ASSESSMENT — LIFESTYLE VARIABLES: TOBACCO_USE: 0

## 2022-04-18 ASSESSMENT — COPD QUESTIONNAIRES: COPD: 0

## 2022-04-18 NOTE — ANESTHESIA PREPROCEDURE EVALUATION
Anesthesia Pre-Procedure Evaluation    Patient: Ag Evans   MRN: [id number] : 1950        Procedure : Procedure(s):  Revision Right Total Hip Arthroplasty          Past Medical History:   Diagnosis Date     DJD (degenerative joint disease), lumbar      Malignant lymphoma, lymphoplasmacytic (H) 2021     Sinus bradycardia       Past Surgical History:   Procedure Laterality Date     APPENDECTOMY       BIOPSY  mass right side     BONE MARROW BIOPSY, BONE SPECIMEN, NEEDLE/TROCAR N/A 06/10/2021    Procedure: BIOPSY, BONE MARROW;  Surgeon: Josephine Santamaria MD;  Location:  GI     COLONOSCOPY  2010    benign with hyperplastic polyps     MT STOMACH SURGERY PROCEDURE UNLISTED  Appendix    1971     MT TOTAL HIP ARTHROPLASTY Left 2012    Dr Roderick Santos     MT TOTAL HIP ARTHROPLASTY Right 11/15/2004    Dr. Jose Martinez     SURGICAL HISTORY OF -       Removal of a benign soft tissue mass right abdominal wall     TONSILLECTOMY        Allergies   Allergen Reactions     Seasonal Allergies       Social History     Tobacco Use     Smoking status: Never Smoker     Smokeless tobacco: Never Used   Substance Use Topics     Alcohol use: Yes     Comment: intermittent with food      Wt Readings from Last 1 Encounters:   22 90.7 kg (200 lb)        Anesthesia Evaluation   Pt has had prior anesthetic. Type: General and MAC.    No history of anesthetic complications       ROS/MED HX  ENT/Pulmonary:     (+) allergic rhinitis,  (-) tobacco use, asthma, COPD, sleep apnea and recent URI   Neurologic:     (+) peripheral neuropathy, - after cancer treatment, in fingers and toes .  (-) no seizures and no CVA   Cardiovascular: Comment: Marked Sinus Saman    (+) -----Previous cardiac testing   Echo: Date:  Results:   1) LVEF 55%. Normal LV size and systolic function.Normal LV     diastolic function. No regional wall motion abnormalities.     2) Normal RV size and function.     3) Mild biatrial enlargement.     4) No significant valvular abnormalities other than mild sclerosis of     the aortic valve.    5) Normal IVC. IVC is responsive to inspiration indicating normal     RA pressure.     6) No prior studies for comparison.   Stress Test: Date: Results:    ECG Reviewed: Date: Results:    Cath: Date: Results:   (-) angina, hypertension, CAD, CHF, TRAYLOR, orthopnea/PND, syncope, arrhythmias, irregular heartbeat/palpitations, valvular problems/murmurs and angina   METS/Exercise Tolerance: >4 METS    Hematologic: Comments: Monoclonal Gammopathy      Musculoskeletal: Comment: -Left great toe nail hematoma, 5 day course of cephalexin  (last yesterday)  (+) arthritis,     GI/Hepatic:    (-) GERD and liver disease   Renal/Genitourinary:    (-) renal disease   Endo:    (-) Type II DM, thyroid disease and chronic steroid usage   Psychiatric/Substance Use:    (-) alcohol abuse history   Infectious Disease:       Malignancy:       Other:               OUTSIDE LABS:  CBC:   Lab Results   Component Value Date    WBC 5.3 03/31/2022    WBC 3.5 (L) 03/21/2022    HGB 14.6 03/31/2022    HGB 13.9 03/21/2022    HCT 42.2 03/31/2022    HCT 40.8 03/21/2022     03/31/2022     03/21/2022     BMP:   Lab Results   Component Value Date     03/31/2022     03/21/2022    POTASSIUM 3.9 03/31/2022    POTASSIUM 4.8 03/21/2022    CHLORIDE 106 03/31/2022    CHLORIDE 108 03/21/2022    CO2 28 03/31/2022    CO2 30 03/21/2022    BUN 19 03/31/2022    BUN 13 03/21/2022    CR 0.81 03/31/2022    CR 0.75 03/21/2022    GLC 97 03/31/2022     (H) 03/21/2022     COAGS:   Lab Results   Component Value Date    INR 2.1 04/16/2012     POC: No results found for: BGM, HCG, HCGS  HEPATIC:   Lab Results   Component Value Date    ALBUMIN 4.4 03/31/2022    PROTTOTAL 7.4 03/31/2022    ALT 44 03/31/2022    AST 27 03/31/2022    ALKPHOS 69 03/31/2022    BILITOTAL 0.8 03/31/2022     OTHER:   Lab Results   Component Value Date    A1C 5.8 (H) 08/26/2021     TANNA 9.0 03/31/2022    TSH 2.01 04/13/2021    CRP <2.9 03/21/2022    SED 10 03/21/2022       Anesthesia Plan    ASA Status:  2   NPO Status:  NPO Appropriate    Anesthesia Type: General.     - Airway: ETT   Induction: Intravenous.   Maintenance: Inhalation.   Techniques and Equipment:     - Lines/Monitors: 2nd IV, BIS     - Blood: T&S     Consents         - Extended Intubation/Ventilatory Support Discussed: No.      - Patient is DNR/DNI Status: No    Use of blood products discussed: Yes.     - Discussed with: Patient.     Postoperative Care    Pain management: IV analgesics, Oral pain medications.   PONV prophylaxis: Ondansetron (or other 5HT-3)     Comments:                Tapan Dockery MD

## 2022-04-19 ENCOUNTER — ANESTHESIA (OUTPATIENT)
Dept: SURGERY | Facility: CLINIC | Age: 72
DRG: 467 | End: 2022-04-19
Payer: COMMERCIAL

## 2022-04-19 ENCOUNTER — APPOINTMENT (OUTPATIENT)
Dept: GENERAL RADIOLOGY | Facility: CLINIC | Age: 72
DRG: 467 | End: 2022-04-19
Attending: ORTHOPAEDIC SURGERY
Payer: COMMERCIAL

## 2022-04-19 ENCOUNTER — HOSPITAL ENCOUNTER (INPATIENT)
Facility: CLINIC | Age: 72
LOS: 2 days | Discharge: HOME HEALTH | DRG: 467 | End: 2022-04-21
Attending: ORTHOPAEDIC SURGERY | Admitting: ORTHOPAEDIC SURGERY
Payer: COMMERCIAL

## 2022-04-19 DIAGNOSIS — Z96.649 S/P REVISION OF TOTAL HIP: Primary | ICD-10-CM

## 2022-04-19 LAB
ABO/RH(D): NORMAL
ANTIBODY SCREEN: NEGATIVE
GLUCOSE BLDC GLUCOMTR-MCNC: 96 MG/DL (ref 70–99)
SPECIMEN EXPIRATION DATE: NORMAL

## 2022-04-19 PROCEDURE — C1713 ANCHOR/SCREW BN/BN,TIS/BN: HCPCS | Performed by: ORTHOPAEDIC SURGERY

## 2022-04-19 PROCEDURE — 999N000141 HC STATISTIC PRE-PROCEDURE NURSING ASSESSMENT: Performed by: ORTHOPAEDIC SURGERY

## 2022-04-19 PROCEDURE — 120N000002 HC R&B MED SURG/OB UMMC

## 2022-04-19 PROCEDURE — 370N000017 HC ANESTHESIA TECHNICAL FEE, PER MIN: Performed by: ORTHOPAEDIC SURGERY

## 2022-04-19 PROCEDURE — 0SPA0JZ REMOVAL OF SYNTHETIC SUBSTITUTE FROM RIGHT HIP JOINT, ACETABULAR SURFACE, OPEN APPROACH: ICD-10-PCS | Performed by: ORTHOPAEDIC SURGERY

## 2022-04-19 PROCEDURE — C1776 JOINT DEVICE (IMPLANTABLE): HCPCS | Performed by: ORTHOPAEDIC SURGERY

## 2022-04-19 PROCEDURE — 250N000009 HC RX 250: Performed by: STUDENT IN AN ORGANIZED HEALTH CARE EDUCATION/TRAINING PROGRAM

## 2022-04-19 PROCEDURE — 250N000011 HC RX IP 250 OP 636: Performed by: NURSE ANESTHETIST, CERTIFIED REGISTERED

## 2022-04-19 PROCEDURE — 258N000003 HC RX IP 258 OP 636: Performed by: PHYSICIAN ASSISTANT

## 2022-04-19 PROCEDURE — 250N000011 HC RX IP 250 OP 636: Performed by: STUDENT IN AN ORGANIZED HEALTH CARE EDUCATION/TRAINING PROGRAM

## 2022-04-19 PROCEDURE — 86901 BLOOD TYPING SEROLOGIC RH(D): CPT | Performed by: PHYSICIAN ASSISTANT

## 2022-04-19 PROCEDURE — 87075 CULTR BACTERIA EXCEPT BLOOD: CPT | Performed by: ORTHOPAEDIC SURGERY

## 2022-04-19 PROCEDURE — 250N000013 HC RX MED GY IP 250 OP 250 PS 637: Performed by: STUDENT IN AN ORGANIZED HEALTH CARE EDUCATION/TRAINING PROGRAM

## 2022-04-19 PROCEDURE — 0QU40KZ SUPPLEMENT RIGHT ACETABULUM WITH NONAUTOLOGOUS TISSUE SUBSTITUTE, OPEN APPROACH: ICD-10-PCS | Performed by: ORTHOPAEDIC SURGERY

## 2022-04-19 PROCEDURE — 87176 TISSUE HOMOGENIZATION CULTR: CPT | Performed by: ORTHOPAEDIC SURGERY

## 2022-04-19 PROCEDURE — 250N000011 HC RX IP 250 OP 636: Performed by: ORTHOPAEDIC SURGERY

## 2022-04-19 PROCEDURE — 250N000009 HC RX 250: Performed by: ORTHOPAEDIC SURGERY

## 2022-04-19 PROCEDURE — 250N000011 HC RX IP 250 OP 636: Performed by: PHYSICIAN ASSISTANT

## 2022-04-19 PROCEDURE — 27134 REVISE HIP JOINT REPLACEMENT: CPT | Mod: 52 | Performed by: ORTHOPAEDIC SURGERY

## 2022-04-19 PROCEDURE — 250N000009 HC RX 250: Performed by: NURSE ANESTHETIST, CERTIFIED REGISTERED

## 2022-04-19 PROCEDURE — C1762 CONN TISS, HUMAN(INC FASCIA): HCPCS | Performed by: ORTHOPAEDIC SURGERY

## 2022-04-19 PROCEDURE — 0SRR03Z REPLACEMENT OF RIGHT HIP JOINT, FEMORAL SURFACE WITH CERAMIC SYNTHETIC SUBSTITUTE, OPEN APPROACH: ICD-10-PCS | Performed by: ORTHOPAEDIC SURGERY

## 2022-04-19 PROCEDURE — 999N000065 XR PELVIS AD HIP PORTABLE RIGHT 1 VIEW

## 2022-04-19 PROCEDURE — 0SRA01A REPLACEMENT OF RIGHT HIP JOINT, ACETABULAR SURFACE WITH METAL SYNTHETIC SUBSTITUTE, UNCEMENTED, OPEN APPROACH: ICD-10-PCS | Performed by: ORTHOPAEDIC SURGERY

## 2022-04-19 PROCEDURE — 710N000010 HC RECOVERY PHASE 1, LEVEL 2, PER MIN: Performed by: ORTHOPAEDIC SURGERY

## 2022-04-19 PROCEDURE — 258N000001 HC RX 258: Performed by: ORTHOPAEDIC SURGERY

## 2022-04-19 PROCEDURE — 250N000025 HC SEVOFLURANE, PER MIN: Performed by: ORTHOPAEDIC SURGERY

## 2022-04-19 PROCEDURE — 360N000078 HC SURGERY LEVEL 5, PER MIN: Performed by: ORTHOPAEDIC SURGERY

## 2022-04-19 PROCEDURE — 272N000001 HC OR GENERAL SUPPLY STERILE: Performed by: ORTHOPAEDIC SURGERY

## 2022-04-19 PROCEDURE — 99232 SBSQ HOSP IP/OBS MODERATE 35: CPT | Performed by: INTERNAL MEDICINE

## 2022-04-19 PROCEDURE — 258N000003 HC RX IP 258 OP 636: Performed by: STUDENT IN AN ORGANIZED HEALTH CARE EDUCATION/TRAINING PROGRAM

## 2022-04-19 PROCEDURE — 258N000003 HC RX IP 258 OP 636: Performed by: NURSE ANESTHETIST, CERTIFIED REGISTERED

## 2022-04-19 PROCEDURE — 0SPR0JZ REMOVAL OF SYNTHETIC SUBSTITUTE FROM RIGHT HIP JOINT, FEMORAL SURFACE, OPEN APPROACH: ICD-10-PCS | Performed by: ORTHOPAEDIC SURGERY

## 2022-04-19 PROCEDURE — 250N000013 HC RX MED GY IP 250 OP 250 PS 637: Performed by: PHYSICIAN ASSISTANT

## 2022-04-19 PROCEDURE — 250N000013 HC RX MED GY IP 250 OP 250 PS 637: Performed by: ORTHOPAEDIC SURGERY

## 2022-04-19 DEVICE — IMPLANTABLE DEVICE: Type: IMPLANTABLE DEVICE | Site: HIP | Status: FUNCTIONAL

## 2022-04-19 DEVICE — IMPLANTABLE DEVICE
Type: IMPLANTABLE DEVICE | Site: HIP | Status: FUNCTIONAL
Brand: RINGLOC® HIP SYSTEM

## 2022-04-19 DEVICE — BONE CHIP CANCELLOUS 30CC 100030: Type: IMPLANTABLE DEVICE | Site: HIP | Status: FUNCTIONAL

## 2022-04-19 RX ORDER — OXYCODONE HYDROCHLORIDE 5 MG/1
5 TABLET ORAL EVERY 4 HOURS PRN
Status: DISCONTINUED | OUTPATIENT
Start: 2022-04-19 | End: 2022-04-21 | Stop reason: HOSPADM

## 2022-04-19 RX ORDER — SODIUM CHLORIDE, SODIUM LACTATE, POTASSIUM CHLORIDE, CALCIUM CHLORIDE 600; 310; 30; 20 MG/100ML; MG/100ML; MG/100ML; MG/100ML
INJECTION, SOLUTION INTRAVENOUS CONTINUOUS PRN
Status: DISCONTINUED | OUTPATIENT
Start: 2022-04-19 | End: 2022-04-19

## 2022-04-19 RX ORDER — ATORVASTATIN CALCIUM 20 MG/1
20 TABLET, FILM COATED ORAL DAILY
Status: DISCONTINUED | OUTPATIENT
Start: 2022-04-19 | End: 2022-04-21 | Stop reason: HOSPADM

## 2022-04-19 RX ORDER — BISACODYL 10 MG
10 SUPPOSITORY, RECTAL RECTAL DAILY PRN
Status: DISCONTINUED | OUTPATIENT
Start: 2022-04-19 | End: 2022-04-21 | Stop reason: HOSPADM

## 2022-04-19 RX ORDER — ONDANSETRON 4 MG/1
4 TABLET, ORALLY DISINTEGRATING ORAL EVERY 30 MIN PRN
Status: DISCONTINUED | OUTPATIENT
Start: 2022-04-19 | End: 2022-04-19 | Stop reason: HOSPADM

## 2022-04-19 RX ORDER — FENTANYL CITRATE 50 UG/ML
25 INJECTION, SOLUTION INTRAMUSCULAR; INTRAVENOUS EVERY 5 MIN PRN
Status: DISCONTINUED | OUTPATIENT
Start: 2022-04-19 | End: 2022-04-19 | Stop reason: HOSPADM

## 2022-04-19 RX ORDER — ONDANSETRON 2 MG/ML
4 INJECTION INTRAMUSCULAR; INTRAVENOUS EVERY 6 HOURS PRN
Status: DISCONTINUED | OUTPATIENT
Start: 2022-04-19 | End: 2022-04-21 | Stop reason: HOSPADM

## 2022-04-19 RX ORDER — ONDANSETRON 2 MG/ML
INJECTION INTRAMUSCULAR; INTRAVENOUS PRN
Status: DISCONTINUED | OUTPATIENT
Start: 2022-04-19 | End: 2022-04-19

## 2022-04-19 RX ORDER — ONDANSETRON 2 MG/ML
4 INJECTION INTRAMUSCULAR; INTRAVENOUS EVERY 30 MIN PRN
Status: DISCONTINUED | OUTPATIENT
Start: 2022-04-19 | End: 2022-04-19 | Stop reason: HOSPADM

## 2022-04-19 RX ORDER — NALOXONE HYDROCHLORIDE 0.4 MG/ML
0.2 INJECTION, SOLUTION INTRAMUSCULAR; INTRAVENOUS; SUBCUTANEOUS
Status: DISCONTINUED | OUTPATIENT
Start: 2022-04-19 | End: 2022-04-21 | Stop reason: HOSPADM

## 2022-04-19 RX ORDER — ACETAMINOPHEN 325 MG/1
975 TABLET ORAL EVERY 8 HOURS
Status: DISCONTINUED | OUTPATIENT
Start: 2022-04-19 | End: 2022-04-21 | Stop reason: HOSPADM

## 2022-04-19 RX ORDER — ASPIRIN 81 MG/1
162 TABLET ORAL DAILY
Status: DISCONTINUED | OUTPATIENT
Start: 2022-04-20 | End: 2022-04-21 | Stop reason: HOSPADM

## 2022-04-19 RX ORDER — PROCHLORPERAZINE MALEATE 5 MG
5 TABLET ORAL EVERY 6 HOURS PRN
Status: DISCONTINUED | OUTPATIENT
Start: 2022-04-19 | End: 2022-04-21 | Stop reason: HOSPADM

## 2022-04-19 RX ORDER — FLUMAZENIL 0.1 MG/ML
0.2 INJECTION, SOLUTION INTRAVENOUS
Status: ACTIVE | OUTPATIENT
Start: 2022-04-19 | End: 2022-04-20

## 2022-04-19 RX ORDER — EPHEDRINE SULFATE 50 MG/ML
INJECTION, SOLUTION INTRAMUSCULAR; INTRAVENOUS; SUBCUTANEOUS PRN
Status: DISCONTINUED | OUTPATIENT
Start: 2022-04-19 | End: 2022-04-19

## 2022-04-19 RX ORDER — GABAPENTIN 300 MG/1
300 CAPSULE ORAL 3 TIMES DAILY
Status: DISCONTINUED | OUTPATIENT
Start: 2022-04-19 | End: 2022-04-21 | Stop reason: HOSPADM

## 2022-04-19 RX ORDER — TRANEXAMIC ACID 650 MG/1
1950 TABLET ORAL ONCE
Status: COMPLETED | OUTPATIENT
Start: 2022-04-19 | End: 2022-04-19

## 2022-04-19 RX ORDER — CELECOXIB 200 MG/1
400 CAPSULE ORAL ONCE
Status: COMPLETED | OUTPATIENT
Start: 2022-04-19 | End: 2022-04-19

## 2022-04-19 RX ORDER — OXYCODONE HYDROCHLORIDE 5 MG/1
5 TABLET ORAL EVERY 4 HOURS PRN
Status: DISCONTINUED | OUTPATIENT
Start: 2022-04-19 | End: 2022-04-19 | Stop reason: HOSPADM

## 2022-04-19 RX ORDER — DOCUSATE SODIUM 250 MG
250 CAPSULE ORAL EVERY MORNING
Status: DISCONTINUED | OUTPATIENT
Start: 2022-04-20 | End: 2022-04-21 | Stop reason: HOSPADM

## 2022-04-19 RX ORDER — ACETAMINOPHEN 325 MG/1
975 TABLET ORAL ONCE
Status: COMPLETED | OUTPATIENT
Start: 2022-04-19 | End: 2022-04-19

## 2022-04-19 RX ORDER — PROPOFOL 10 MG/ML
INJECTION, EMULSION INTRAVENOUS PRN
Status: DISCONTINUED | OUTPATIENT
Start: 2022-04-19 | End: 2022-04-19

## 2022-04-19 RX ORDER — HYDROMORPHONE HYDROCHLORIDE 1 MG/ML
0.2 INJECTION, SOLUTION INTRAMUSCULAR; INTRAVENOUS; SUBCUTANEOUS EVERY 5 MIN PRN
Status: DISCONTINUED | OUTPATIENT
Start: 2022-04-19 | End: 2022-04-19 | Stop reason: HOSPADM

## 2022-04-19 RX ORDER — MAGNESIUM HYDROXIDE 1200 MG/15ML
LIQUID ORAL PRN
Status: DISCONTINUED | OUTPATIENT
Start: 2022-04-19 | End: 2022-04-19 | Stop reason: HOSPADM

## 2022-04-19 RX ORDER — SODIUM CHLORIDE, SODIUM LACTATE, POTASSIUM CHLORIDE, CALCIUM CHLORIDE 600; 310; 30; 20 MG/100ML; MG/100ML; MG/100ML; MG/100ML
INJECTION, SOLUTION INTRAVENOUS CONTINUOUS
Status: DISCONTINUED | OUTPATIENT
Start: 2022-04-19 | End: 2022-04-21 | Stop reason: HOSPADM

## 2022-04-19 RX ORDER — NALOXONE HYDROCHLORIDE 0.4 MG/ML
0.4 INJECTION, SOLUTION INTRAMUSCULAR; INTRAVENOUS; SUBCUTANEOUS
Status: DISCONTINUED | OUTPATIENT
Start: 2022-04-19 | End: 2022-04-21 | Stop reason: HOSPADM

## 2022-04-19 RX ORDER — CEFAZOLIN SODIUM/WATER 2 G/20 ML
2 SYRINGE (ML) INTRAVENOUS SEE ADMIN INSTRUCTIONS
Status: DISCONTINUED | OUTPATIENT
Start: 2022-04-19 | End: 2022-04-19 | Stop reason: HOSPADM

## 2022-04-19 RX ORDER — AMOXICILLIN 250 MG
1 CAPSULE ORAL 2 TIMES DAILY
Status: DISCONTINUED | OUTPATIENT
Start: 2022-04-19 | End: 2022-04-21 | Stop reason: HOSPADM

## 2022-04-19 RX ORDER — GABAPENTIN 100 MG/1
300 CAPSULE ORAL ONCE
Status: COMPLETED | OUTPATIENT
Start: 2022-04-19 | End: 2022-04-19

## 2022-04-19 RX ORDER — POLYETHYLENE GLYCOL 3350 17 G/17G
17 POWDER, FOR SOLUTION ORAL DAILY
Status: DISCONTINUED | OUTPATIENT
Start: 2022-04-20 | End: 2022-04-21 | Stop reason: HOSPADM

## 2022-04-19 RX ORDER — OXYCODONE HYDROCHLORIDE 10 MG/1
10 TABLET ORAL EVERY 4 HOURS PRN
Status: DISCONTINUED | OUTPATIENT
Start: 2022-04-19 | End: 2022-04-21 | Stop reason: HOSPADM

## 2022-04-19 RX ORDER — LIDOCAINE HYDROCHLORIDE 20 MG/ML
INJECTION, SOLUTION INFILTRATION; PERINEURAL PRN
Status: DISCONTINUED | OUTPATIENT
Start: 2022-04-19 | End: 2022-04-19

## 2022-04-19 RX ORDER — METOPROLOL TARTRATE 1 MG/ML
1-2 INJECTION, SOLUTION INTRAVENOUS EVERY 5 MIN PRN
Status: DISCONTINUED | OUTPATIENT
Start: 2022-04-19 | End: 2022-04-19 | Stop reason: HOSPADM

## 2022-04-19 RX ORDER — ONDANSETRON 4 MG/1
4 TABLET, ORALLY DISINTEGRATING ORAL EVERY 6 HOURS PRN
Status: DISCONTINUED | OUTPATIENT
Start: 2022-04-19 | End: 2022-04-21 | Stop reason: HOSPADM

## 2022-04-19 RX ORDER — FENTANYL CITRATE 50 UG/ML
INJECTION, SOLUTION INTRAMUSCULAR; INTRAVENOUS PRN
Status: DISCONTINUED | OUTPATIENT
Start: 2022-04-19 | End: 2022-04-19

## 2022-04-19 RX ORDER — HYDROMORPHONE HCL IN WATER/PF 6 MG/30 ML
0.4 PATIENT CONTROLLED ANALGESIA SYRINGE INTRAVENOUS
Status: DISCONTINUED | OUTPATIENT
Start: 2022-04-19 | End: 2022-04-21 | Stop reason: HOSPADM

## 2022-04-19 RX ORDER — SODIUM CHLORIDE, SODIUM LACTATE, POTASSIUM CHLORIDE, CALCIUM CHLORIDE 600; 310; 30; 20 MG/100ML; MG/100ML; MG/100ML; MG/100ML
INJECTION, SOLUTION INTRAVENOUS CONTINUOUS
Status: DISCONTINUED | OUTPATIENT
Start: 2022-04-19 | End: 2022-04-19 | Stop reason: HOSPADM

## 2022-04-19 RX ORDER — ACETAMINOPHEN 325 MG/1
650 TABLET ORAL EVERY 4 HOURS PRN
Status: DISCONTINUED | OUTPATIENT
Start: 2022-04-22 | End: 2022-04-21 | Stop reason: HOSPADM

## 2022-04-19 RX ORDER — LIDOCAINE 40 MG/G
CREAM TOPICAL
Status: DISCONTINUED | OUTPATIENT
Start: 2022-04-19 | End: 2022-04-21 | Stop reason: HOSPADM

## 2022-04-19 RX ORDER — HYDROMORPHONE HCL IN WATER/PF 6 MG/30 ML
0.2 PATIENT CONTROLLED ANALGESIA SYRINGE INTRAVENOUS
Status: DISCONTINUED | OUTPATIENT
Start: 2022-04-19 | End: 2022-04-21 | Stop reason: HOSPADM

## 2022-04-19 RX ORDER — DEXAMETHASONE SODIUM PHOSPHATE 4 MG/ML
INJECTION, SOLUTION INTRA-ARTICULAR; INTRALESIONAL; INTRAMUSCULAR; INTRAVENOUS; SOFT TISSUE PRN
Status: DISCONTINUED | OUTPATIENT
Start: 2022-04-19 | End: 2022-04-19

## 2022-04-19 RX ORDER — CEFAZOLIN SODIUM 2 G/100ML
2 INJECTION, SOLUTION INTRAVENOUS EVERY 8 HOURS
Status: COMPLETED | OUTPATIENT
Start: 2022-04-19 | End: 2022-04-20

## 2022-04-19 RX ORDER — HALOPERIDOL 5 MG/ML
1 INJECTION INTRAMUSCULAR
Status: DISCONTINUED | OUTPATIENT
Start: 2022-04-19 | End: 2022-04-19 | Stop reason: HOSPADM

## 2022-04-19 RX ORDER — HYDRALAZINE HYDROCHLORIDE 20 MG/ML
2.5-5 INJECTION INTRAMUSCULAR; INTRAVENOUS EVERY 10 MIN PRN
Status: DISCONTINUED | OUTPATIENT
Start: 2022-04-19 | End: 2022-04-19 | Stop reason: HOSPADM

## 2022-04-19 RX ORDER — LIDOCAINE 40 MG/G
CREAM TOPICAL
Status: DISCONTINUED | OUTPATIENT
Start: 2022-04-19 | End: 2022-04-19 | Stop reason: HOSPADM

## 2022-04-19 RX ORDER — CEFAZOLIN SODIUM/WATER 2 G/20 ML
2 SYRINGE (ML) INTRAVENOUS
Status: DISCONTINUED | OUTPATIENT
Start: 2022-04-19 | End: 2022-04-19 | Stop reason: HOSPADM

## 2022-04-19 RX ADMIN — SODIUM CHLORIDE, POTASSIUM CHLORIDE, SODIUM LACTATE AND CALCIUM CHLORIDE: 600; 310; 30; 20 INJECTION, SOLUTION INTRAVENOUS at 12:50

## 2022-04-19 RX ADMIN — Medication 10 MG: at 14:35

## 2022-04-19 RX ADMIN — ROCURONIUM BROMIDE 20 MG: 50 INJECTION, SOLUTION INTRAVENOUS at 13:42

## 2022-04-19 RX ADMIN — ONDANSETRON 4 MG: 2 INJECTION INTRAMUSCULAR; INTRAVENOUS at 19:03

## 2022-04-19 RX ADMIN — FENTANYL CITRATE 100 MCG: 50 INJECTION, SOLUTION INTRAMUSCULAR; INTRAVENOUS at 12:50

## 2022-04-19 RX ADMIN — ROCURONIUM BROMIDE 70 MG: 50 INJECTION, SOLUTION INTRAVENOUS at 12:50

## 2022-04-19 RX ADMIN — HYDROMORPHONE HYDROCHLORIDE 0.2 MG: 1 INJECTION, SOLUTION INTRAMUSCULAR; INTRAVENOUS; SUBCUTANEOUS at 16:19

## 2022-04-19 RX ADMIN — SUGAMMADEX 200 MG: 100 INJECTION, SOLUTION INTRAVENOUS at 16:45

## 2022-04-19 RX ADMIN — GABAPENTIN 300 MG: 300 CAPSULE ORAL at 19:13

## 2022-04-19 RX ADMIN — HYDROMORPHONE HYDROCHLORIDE 0.2 MG: 1 INJECTION, SOLUTION INTRAMUSCULAR; INTRAVENOUS; SUBCUTANEOUS at 17:15

## 2022-04-19 RX ADMIN — FENTANYL CITRATE 50 MCG: 50 INJECTION, SOLUTION INTRAMUSCULAR; INTRAVENOUS at 14:59

## 2022-04-19 RX ADMIN — Medication 5 MG: at 12:50

## 2022-04-19 RX ADMIN — Medication 5 MG: at 15:25

## 2022-04-19 RX ADMIN — SODIUM CHLORIDE, POTASSIUM CHLORIDE, SODIUM LACTATE AND CALCIUM CHLORIDE: 600; 310; 30; 20 INJECTION, SOLUTION INTRAVENOUS at 16:57

## 2022-04-19 RX ADMIN — Medication 10 MG: at 14:09

## 2022-04-19 RX ADMIN — ACETAMINOPHEN 975 MG: 325 TABLET ORAL at 09:26

## 2022-04-19 RX ADMIN — SODIUM CHLORIDE, SODIUM LACTATE, POTASSIUM CHLORIDE, CALCIUM CHLORIDE: 600; 310; 30; 20 INJECTION, SOLUTION INTRAVENOUS at 12:50

## 2022-04-19 RX ADMIN — HYDROMORPHONE HYDROCHLORIDE 0.2 MG: 1 INJECTION, SOLUTION INTRAMUSCULAR; INTRAVENOUS; SUBCUTANEOUS at 18:05

## 2022-04-19 RX ADMIN — FENTANYL CITRATE 50 MCG: 50 INJECTION, SOLUTION INTRAMUSCULAR; INTRAVENOUS at 16:41

## 2022-04-19 RX ADMIN — GABAPENTIN 300 MG: 300 CAPSULE ORAL at 12:25

## 2022-04-19 RX ADMIN — ACETAMINOPHEN 975 MG: 325 TABLET ORAL at 19:13

## 2022-04-19 RX ADMIN — ROCURONIUM BROMIDE 20 MG: 50 INJECTION, SOLUTION INTRAVENOUS at 15:15

## 2022-04-19 RX ADMIN — HYDROMORPHONE HYDROCHLORIDE 0.3 MG: 1 INJECTION, SOLUTION INTRAMUSCULAR; INTRAVENOUS; SUBCUTANEOUS at 15:56

## 2022-04-19 RX ADMIN — LIDOCAINE HYDROCHLORIDE 100 MG: 20 INJECTION, SOLUTION INFILTRATION; PERINEURAL at 12:50

## 2022-04-19 RX ADMIN — PHENYLEPHRINE HYDROCHLORIDE 0.3 MCG/KG/MIN: 10 INJECTION INTRAVENOUS at 15:25

## 2022-04-19 RX ADMIN — PROPOFOL 170 MG: 10 INJECTION, EMULSION INTRAVENOUS at 12:50

## 2022-04-19 RX ADMIN — DEXAMETHASONE SODIUM PHOSPHATE 10 MG: 4 INJECTION, SOLUTION INTRAMUSCULAR; INTRAVENOUS at 12:50

## 2022-04-19 RX ADMIN — DOCUSATE SODIUM AND SENNOSIDES 1 TABLET: 8.6; 5 TABLET ORAL at 19:13

## 2022-04-19 RX ADMIN — HYDROMORPHONE HYDROCHLORIDE 0.2 MG: 1 INJECTION, SOLUTION INTRAMUSCULAR; INTRAVENOUS; SUBCUTANEOUS at 17:10

## 2022-04-19 RX ADMIN — ROCURONIUM BROMIDE 20 MG: 50 INJECTION, SOLUTION INTRAVENOUS at 14:47

## 2022-04-19 RX ADMIN — ROCURONIUM BROMIDE 20 MG: 50 INJECTION, SOLUTION INTRAVENOUS at 14:18

## 2022-04-19 RX ADMIN — OXYCODONE HYDROCHLORIDE 5 MG: 5 TABLET ORAL at 20:26

## 2022-04-19 RX ADMIN — ONDANSETRON 4 MG: 2 INJECTION INTRAMUSCULAR; INTRAVENOUS at 16:29

## 2022-04-19 RX ADMIN — CELECOXIB 400 MG: 200 CAPSULE ORAL at 09:26

## 2022-04-19 RX ADMIN — HYDROMORPHONE HYDROCHLORIDE 0.2 MG: 1 INJECTION, SOLUTION INTRAMUSCULAR; INTRAVENOUS; SUBCUTANEOUS at 17:35

## 2022-04-19 RX ADMIN — HYDROMORPHONE HYDROCHLORIDE 0.2 MG: 1 INJECTION, SOLUTION INTRAMUSCULAR; INTRAVENOUS; SUBCUTANEOUS at 17:25

## 2022-04-19 RX ADMIN — ATORVASTATIN CALCIUM 20 MG: 20 TABLET, FILM COATED ORAL at 22:06

## 2022-04-19 RX ADMIN — CEFAZOLIN SODIUM 2 G: 2 INJECTION, SOLUTION INTRAVENOUS at 20:21

## 2022-04-19 RX ADMIN — TRANEXAMIC ACID 1950 MG: 650 TABLET ORAL at 09:26

## 2022-04-19 RX ADMIN — Medication 2 G: at 12:58

## 2022-04-19 ASSESSMENT — ACTIVITIES OF DAILY LIVING (ADL)
ADLS_ACUITY_SCORE: 4
ADLS_ACUITY_SCORE: 9
ADLS_ACUITY_SCORE: 9
ADLS_ACUITY_SCORE: 4
ADLS_ACUITY_SCORE: 9
ADLS_ACUITY_SCORE: 4
ADLS_ACUITY_SCORE: 9
ADLS_ACUITY_SCORE: 4

## 2022-04-19 NOTE — BRIEF OP NOTE
Orthopaedic Surgery Brief Op-Note      Patient: Ag Evans  : 1950  Date of Service: 2022 4:51 PM    Pre-operative Diagnosis: Prosthetic wear following total hip arthroplasty, subsequent encounter [T84.859D, Z96.140]  Post-operative Diagnosis: same    Procedure(s) Performed: Procedure(s):  Revision Right Total Hip Arthroplasty    Staff: Dr. Joy  Assistants: Raj Bunn MD    Anesthesia: General  EBL: 200 cc  UOP: see anesthesia record    Implants:   Implant Name Type Inv. Item Serial No.  Lot No. LRB No. Used Action   Right Hip explants      Right 1 Explanted   IMP CANCELLOUS CHIPS 30CC      Right 1 Implanted   IMP CANCELLOUS CHIPS 30CC      Right 1 Implanted   IMP CANCELLOUS CHIPS 30CC   [id number]   Right 1 Implanted   IMP ACETABULAR LOCKING RING SIZE 26     [id number] Right 1 Implanted   IMP LINER RINGLOCK BIOM [id number] - [id number] Total Joint Component/Insert IMP LINER RINGLOCK BIOM [id number]  [employer] [id number] Right 1 Implanted   IMP BIOLOX CERAMIC HEAD 36mm HEAD TYPE 16/18 TAPER     [id number] Right 1 Implanted   IMP B IOLOX TAPER SLEEVE TYPE 1 TAPER, 3+mm NECK     [id number] Right 1 Implanted     Drains: PERLA drain  Intra-op Labs/Cxs/Specimens:   ID Type Source Tests Collected by Time Destination   A : Right hip 1 Tissue Hip, Right ANAEROBIC BACTERIAL CULTURE ROUTINE, AEROBIC BACTERIAL CULTURE ROUTINE Baljit Joy MD 2022  1:47 PM    B : Right hip 2 Tissue Hip, Right ANAEROBIC BACTERIAL CULTURE ROUTINE, AEROBIC BACTERIAL CULTURE ROUTINE Baljit Joy MD 2022  2:41 PM    C : Right hip 3 Tissue Hip, Right ANAEROBIC BACTERIAL CULTURE ROUTINE, AEROBIC BACTERIAL CULTURE ROUTINE Baljit Joy MD 2022  2:41 PM    D : Right hip 4 Tissue Hip, Right ANAEROBIC BACTERIAL CULTURE ROUTINE, AEROBIC BACTERIAL CULTURE ROUTINE Baljit Joy MD 2022  2:41 PM    E : Right hip 5 Tissue Hip, Right ANAEROBIC BACTERIAL CULTURE ROUTINE,  AEROBIC BACTERIAL CULTURE ROUTINE Baljit Joy MD 4/19/2022  2:41 PM      Complications: No apparent complications during procedure  Findings: Please see dictated operative note for details    Disposition: Stable to PACU, then admit to Orthopaedics    POST OPERATIVE PLAN    Assessment/Plan: Ag Evans is a 71 year old male s/p Procedure(s):  Revision Right Total Hip Arthroplasty on 4/19/2022 with Dr. Joy.    Activity: Up with assist and assistive devices as needed until independent. Posterior hip precautions to operative hip x 3 months:  1) No hip flexion beyond 90 degrees  2) No adduction beyond midline  3) No internal rotation beyond neutral   Weight bearing status: Toe Touch Weight Bearing x 6 weeks then 50% WB x 6wks, the full WB thereafter pending clinic visit.  Antibiotics: Cefazolin x 3 days while inpt and until hosp discharge, monitor intra-op cultures  Diet: Begin with clear fluids and progress diet as tolerated. Bowel regimen. Anti-emetics PRN.    DVT prophylaxis: Mechanical while in hospital with Aspirin 162mg daily x 4 weeks, starting morning of POD 1  Elevation: Elevate heels off of bed on pillows   Wound Care: Aquacel x 5-7 days.    Drains: None  Mathur: None  Pain management: Orals PRN, IV for breakthrough only  X-rays: AP Pelvis and lateral in PACU  Physical Therapy: Mobilization, ROM, ADL's, Posterior hip precautions  Occupational Therapy: ADL's  Labs: Trend Hgb on POD #1, 2  Consults: PT, OT. Hospitalist, appreciate assistance in caring for this patient throughout the perioperative period    Future Appointments   Date Time Provider Department Center   4/20/2022  8:45 AM Melissa Aparicio Pt, PT [facility] [place]   4/20/2022 10:15 AM Ely Cohen OT [facility] [place]   4/20/2022  1:00 PM Melissa Aparicio Pt, PT [facility] [place]   5/19/2022 10:45 AM [facility]   7[facility]12/2022 10:15 AM [facility]   7/12/2022 10:30 AM Radha Flores APRN CNP [facility]    10/20/2022  9:45 AM [facility]   10/20/2022 10:00 AM Manuel Perez MD [facility]       Disposition: Pending progress with therapies, pain control on orals, and medical stability, anticipate discharge to Home vs TCU on POD #1-2.    Raj Bunn DO  Adult Reconstruction Fellow

## 2022-04-19 NOTE — ANESTHESIA POSTPROCEDURE EVALUATION
Patient: Ag Evans    Procedure: Procedure(s):  Revision Right Total Hip Arthroplasty       Anesthesia Type:  General    Note:  Disposition: Inpatient   Postop Pain Control: Uneventful            Sign Out: Well controlled pain   PONV: No   Neuro/Psych: Uneventful            Sign Out: Acceptable/Baseline neuro status   Airway/Respiratory: Uneventful            Sign Out: Acceptable/Baseline resp. status   CV/Hemodynamics: Uneventful            Sign Out: Acceptable CV status   Other NRE: NONE   DID A NON-ROUTINE EVENT OCCUR? No           Last vitals:  Vitals Value Taken Time   /70 04/19/22 1800   Temp 36.6  C (97.9  F) 04/19/22 1730   Pulse 71 04/19/22 1802   Resp 8 04/19/22 1802   SpO2 98 % 04/19/22 1755   Vitals shown include unvalidated device data.    Electronically Signed By: Sherry Evans MD  April 19, 2022  6:03 PM

## 2022-04-19 NOTE — ANESTHESIA CARE TRANSFER NOTE
Patient: Ag Evans    Procedure: Procedure(s):  Revision Right Total Hip Arthroplasty       Diagnosis: Prosthetic wear following total hip arthroplasty, subsequent encounter [T84.069D, Z96.064]  Diagnosis Additional Information: No value filed.    Anesthesia Type:   General     Note:    Oropharynx: oropharynx clear of all foreign objects and spontaneously breathing  Level of Consciousness: drowsy  Oxygen Supplementation: face mask  Level of Supplemental Oxygen (L/min / FiO2): 6  Independent Airway: airway patency satisfactory and stable  Dentition: dentition unchanged  Vital Signs Stable: post-procedure vital signs reviewed and stable  Report to RN Given: handoff report given  Patient transferred to: PACU    Handoff Report: Identifed the Patient, Identified the Reponsible Provider, Reviewed the pertinent medical history, Discussed the surgical course, Reviewed Intra-OP anesthesia mangement and issues during anesthesia, Set expectations for post-procedure period and Allowed opportunity for questions and acknowledgement of understanding      Vitals:  Vitals Value Taken Time   /64 04/19/22 1700   Temp     Pulse 74 04/19/22 1705   Resp 11 04/19/22 1705   SpO2 97 % 04/19/22 1705   Vitals shown include unvalidated device data.    Electronically Signed By: KALEB Yancey CRNA  April 19, 2022  5:06 PM

## 2022-04-19 NOTE — ANESTHESIA PROCEDURE NOTES
Airway         Procedure Start/Stop Times: 4/19/2022 12:56 PM  Staff -        Anesthesiologist:  Jennifer Fernandez MD       Resident/Fellow: Tapan Dockery MD       Performed By: resident and anesthesiologist  Consent for Airway        Urgency: elective  Indications and Patient Condition       Indications for airway management: shantelle-procedural       Induction type:intravenous       Mask difficulty assessment: 2 - vent by mask + OA or adjuvant +/- NMBA    Final Airway Details       Final airway type: endotracheal airway       Successful airway: ETT - single and Oral  Endotracheal Airway Details        ETT size (mm): 7.5       Cuffed: yes       Successful intubation technique: video laryngoscopy       VL Blade Size: MAC 4       Grade View of Cords: 1       Adjucts: stylet       Position: Right       Measured from: gums/teeth       Secured at (cm): 25       Bite block used: Soft    Post intubation assessment        Placement verified by: capnometry, equal breath sounds and chest rise        Number of attempts at approach: 3       Number of other approaches attempted: 1       Secured with: pink tape (overlying benzoin and tegederm)       Ease of procedure: easy       Dentition: Intact and Unchanged    Medication(s) Administered   Medication Administration Time: 4/19/2022 12:56 PM    Additional Comments       Airway anterior, unsuccessful DL with MAC 4. VL Successful w/ Mac 4 and cricoid pressure.

## 2022-04-19 NOTE — OR NURSING
PACU to Inpatient Nursing Handoff    Patient Ag Evans is a 71 year old male who speaks English.   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    Anesthesia General   Dermatome Level     Preop Meds acetaminophen (Tylenol) - time given: 930  celecoxib (Celebrex) - time given: 930   Nerve block Not applicable   Intraop Meds dexamethasone (Decadron)  fentanyl (Sublimaze): 200 mcg total  hydromorphone (Dilaudid): 0.5 mg total  ondansetron (Zofran): last given at 1629   Local Meds Yes - Local Cocktail (morphine, ropivacaine, epinephrine, Toradol)   Antibiotics cefazolin (Ancef) - last given at 1245     Pain Patient Currently in Pain: yes   PACU meds  hydromorphone (Dilaudid): 0.6 mg (total dose) last given at 1725    PCA / epidural No   Capnography Respiratory Monitoring (EtCO2): 34 mmHg  Integrated Pulmonary Index (IPI): 10   Telemetry ECG Rhythm: Sinus rhythm   Inpatient Telemetry Monitor Ordered? No        Labs Glucose Lab Results   Component Value Date    GLC 96 04/19/2022    GLC 97 03/31/2022     06/03/2021       Hgb Lab Results   Component Value Date    HGB 14.6 03/31/2022    HGB 12.7 06/10/2021       INR Lab Results   Component Value Date    INR 2.1 04/16/2012      PACU Imaging Completed     Wound/Incision Incision/Surgical Site 04/19/22 Right;Lateral Hip (Active)   Incision Assessment UTV 04/19/22 1756   Renetta-Incision Assessment UTV 04/19/22 1756   Closure Sutures 04/19/22 1333   Incision Drainage Amount None 04/19/22 1700   Dressing Intervention Clean, dry, intact 04/19/22 1756   Number of days: 0      CMS        Equipment ice pack and abductor  pillow   Other LDA       IV Access Peripheral IV 04/19/22 Left;Posterior Hand (Active)   Site Assessment [facility] 04/19/22 1700   Line Status Saline locked 04/19/22 1700   Phlebitis Scale 0-->no symptoms 04/19/22 0942   Number of days: 0       Peripheral IV Left Hand (Active)   Site Assessment [facility] 04/19/22 1700   Line Status Infusing 04/19/22 1700   Number of days:       Blood Products Not applicable  mL   Intake/Output Date 04/19/22 0700 - 04/20/22 0659   Shift 7562-7060 7139-3177 7474-6451 24 Hour Total   INTAKE   P.O.  100  100   I.V. 684.5 1298  1982.5   Shift Total(mL/kg) 684.5(7.42) 1398(15.15)  2082.5(22.56)   OUTPUT   Urine  200  200   Blood  200  200   Shift Total(mL/kg)  400(4.33)  400(4.33)   Weight (kg) 92.3 92.3 92.3 92.3      Drains / Mathur     Time of void PreOp Void Prior to Procedure: 1125 (04/19/22 1125)    PostOp Voided (mL): 200 mL (04/19/22 1700)    Diapered? No   Bladder Scan      mL (04/19/22 1700)  tolerating sips     Vitals    B/P: 131/68  T: 97.9  F (36.6  C)    Temp src: Oral  P:  Pulse: 71 (04/19/22 1745)          R: 12  O2:  SpO2: 97 %    O2 Device: None (Room air) (04/19/22 0857)    Oxygen Delivery: 2 LPM (04/19/22 1745)         Family/support present significant other   Patient belongings     Patient transported on bed   DC meds/scripts (obs/outpt) Not applicable   Inpatient Pain Meds Released? Yes       Special needs/considerations None   Tasks needing completion None       Jacquelin Jackson, MARSHALL  ASCOM [id number]

## 2022-04-20 ENCOUNTER — APPOINTMENT (OUTPATIENT)
Dept: PHYSICAL THERAPY | Facility: CLINIC | Age: 72
DRG: 467 | End: 2022-04-20
Attending: ORTHOPAEDIC SURGERY
Payer: COMMERCIAL

## 2022-04-20 ENCOUNTER — APPOINTMENT (OUTPATIENT)
Dept: OCCUPATIONAL THERAPY | Facility: CLINIC | Age: 72
DRG: 467 | End: 2022-04-20
Attending: ORTHOPAEDIC SURGERY
Payer: COMMERCIAL

## 2022-04-20 LAB
ALBUMIN SERPL-MCNC: 3.5 G/DL (ref 3.4–5)
ALP SERPL-CCNC: 54 U/L (ref 40–150)
ALT SERPL W P-5'-P-CCNC: 29 U/L (ref 0–70)
ANION GAP SERPL CALCULATED.3IONS-SCNC: 6 MMOL/L (ref 3–14)
AST SERPL W P-5'-P-CCNC: 39 U/L (ref 0–45)
BASOPHILS # BLD AUTO: 0 10E3/UL (ref 0–0.2)
BASOPHILS NFR BLD AUTO: 0 %
BILIRUB SERPL-MCNC: 0.6 MG/DL (ref 0.2–1.3)
BUN SERPL-MCNC: 13 MG/DL (ref 7–30)
CALCIUM SERPL-MCNC: 8.9 MG/DL (ref 8.5–10.1)
CHLORIDE BLD-SCNC: 105 MMOL/L (ref 94–109)
CO2 SERPL-SCNC: 26 MMOL/L (ref 20–32)
CREAT SERPL-MCNC: 0.78 MG/DL (ref 0.66–1.25)
EOSINOPHIL # BLD AUTO: 0 10E3/UL (ref 0–0.7)
EOSINOPHIL NFR BLD AUTO: 0 %
ERYTHROCYTE [DISTWIDTH] IN BLOOD BY AUTOMATED COUNT: 12.6 % (ref 10–15)
GFR SERPL CREATININE-BSD FRML MDRD: >90 ML/MIN/1.73M2
GLUCOSE BLD-MCNC: 147 MG/DL (ref 70–99)
HCT VFR BLD AUTO: 35.7 % (ref 40–53)
HGB BLD-MCNC: 12.4 G/DL (ref 13.3–17.7)
IMM GRANULOCYTES # BLD: 0.1 10E3/UL
IMM GRANULOCYTES NFR BLD: 1 %
LYMPHOCYTES # BLD AUTO: 0.4 10E3/UL (ref 0.8–5.3)
LYMPHOCYTES NFR BLD AUTO: 3 %
MCH RBC QN AUTO: 34 PG (ref 26.5–33)
MCHC RBC AUTO-ENTMCNC: 34.7 G/DL (ref 31.5–36.5)
MCV RBC AUTO: 98 FL (ref 78–100)
MONOCYTES # BLD AUTO: 1.3 10E3/UL (ref 0–1.3)
MONOCYTES NFR BLD AUTO: 9 %
NEUTROPHILS # BLD AUTO: 13.3 10E3/UL (ref 1.6–8.3)
NEUTROPHILS NFR BLD AUTO: 87 %
NRBC # BLD AUTO: 0 10E3/UL
NRBC BLD AUTO-RTO: 0 /100
PLATELET # BLD AUTO: 183 10E3/UL (ref 150–450)
POTASSIUM BLD-SCNC: 4.4 MMOL/L (ref 3.4–5.3)
PROT SERPL-MCNC: 6.2 G/DL (ref 6.8–8.8)
RBC # BLD AUTO: 3.65 10E6/UL (ref 4.4–5.9)
SODIUM SERPL-SCNC: 137 MMOL/L (ref 133–144)
WBC # BLD AUTO: 15.1 10E3/UL (ref 4–11)

## 2022-04-20 PROCEDURE — 97116 GAIT TRAINING THERAPY: CPT | Mod: GP | Performed by: PHYSICAL THERAPIST

## 2022-04-20 PROCEDURE — 250N000013 HC RX MED GY IP 250 OP 250 PS 637: Performed by: ORTHOPAEDIC SURGERY

## 2022-04-20 PROCEDURE — 97530 THERAPEUTIC ACTIVITIES: CPT | Mod: GO

## 2022-04-20 PROCEDURE — 97165 OT EVAL LOW COMPLEX 30 MIN: CPT | Mod: GO

## 2022-04-20 PROCEDURE — 97530 THERAPEUTIC ACTIVITIES: CPT | Mod: GP | Performed by: PHYSICAL THERAPIST

## 2022-04-20 PROCEDURE — 250N000011 HC RX IP 250 OP 636: Performed by: ORTHOPAEDIC SURGERY

## 2022-04-20 PROCEDURE — 97161 PT EVAL LOW COMPLEX 20 MIN: CPT | Mod: GP | Performed by: PHYSICAL THERAPIST

## 2022-04-20 PROCEDURE — 250N000011 HC RX IP 250 OP 636: Performed by: CLINICAL NURSE SPECIALIST

## 2022-04-20 PROCEDURE — 99232 SBSQ HOSP IP/OBS MODERATE 35: CPT | Performed by: INTERNAL MEDICINE

## 2022-04-20 PROCEDURE — 97535 SELF CARE MNGMENT TRAINING: CPT | Mod: GO

## 2022-04-20 PROCEDURE — 36415 COLL VENOUS BLD VENIPUNCTURE: CPT | Performed by: INTERNAL MEDICINE

## 2022-04-20 PROCEDURE — 258N000003 HC RX IP 258 OP 636: Performed by: ORTHOPAEDIC SURGERY

## 2022-04-20 PROCEDURE — 85025 COMPLETE CBC W/AUTO DIFF WBC: CPT | Performed by: INTERNAL MEDICINE

## 2022-04-20 PROCEDURE — 258N000003 HC RX IP 258 OP 636: Performed by: INTERNAL MEDICINE

## 2022-04-20 PROCEDURE — 120N000002 HC R&B MED SURG/OB UMMC

## 2022-04-20 PROCEDURE — 80053 COMPREHEN METABOLIC PANEL: CPT | Performed by: INTERNAL MEDICINE

## 2022-04-20 RX ORDER — CEFAZOLIN SODIUM 2 G/100ML
2 INJECTION, SOLUTION INTRAVENOUS EVERY 8 HOURS
Status: DISCONTINUED | OUTPATIENT
Start: 2022-04-20 | End: 2022-04-21 | Stop reason: HOSPADM

## 2022-04-20 RX ORDER — OXYCODONE HYDROCHLORIDE 5 MG/1
5 TABLET ORAL EVERY 4 HOURS PRN
Qty: 40 TABLET | Refills: 0 | Status: SHIPPED | OUTPATIENT
Start: 2022-04-20 | End: 2022-05-13

## 2022-04-20 RX ORDER — ACETAMINOPHEN 325 MG/1
650 TABLET ORAL EVERY 4 HOURS PRN
Qty: 60 TABLET | Refills: 0 | Status: SHIPPED | OUTPATIENT
Start: 2022-04-22 | End: 2022-05-13

## 2022-04-20 RX ORDER — POLYETHYLENE GLYCOL 3350 17 G/17G
17 POWDER, FOR SOLUTION ORAL DAILY
Qty: 7 PACKET | Refills: 0 | Status: SHIPPED | OUTPATIENT
Start: 2022-04-21 | End: 2022-06-10

## 2022-04-20 RX ORDER — AMOXICILLIN 250 MG
1 CAPSULE ORAL 2 TIMES DAILY
Qty: 30 TABLET | Refills: 0 | Status: SHIPPED | OUTPATIENT
Start: 2022-04-20 | End: 2022-05-13

## 2022-04-20 RX ADMIN — ACETAMINOPHEN 975 MG: 325 TABLET ORAL at 02:04

## 2022-04-20 RX ADMIN — SODIUM CHLORIDE, POTASSIUM CHLORIDE, SODIUM LACTATE AND CALCIUM CHLORIDE 1000 ML: 600; 310; 30; 20 INJECTION, SOLUTION INTRAVENOUS at 11:30

## 2022-04-20 RX ADMIN — SODIUM CHLORIDE, POTASSIUM CHLORIDE, SODIUM LACTATE AND CALCIUM CHLORIDE: 600; 310; 30; 20 INJECTION, SOLUTION INTRAVENOUS at 13:30

## 2022-04-20 RX ADMIN — ATORVASTATIN CALCIUM 20 MG: 20 TABLET, FILM COATED ORAL at 22:09

## 2022-04-20 RX ADMIN — DOCUSATE SODIUM AND SENNOSIDES 1 TABLET: 8.6; 5 TABLET ORAL at 19:52

## 2022-04-20 RX ADMIN — POLYETHYLENE GLYCOL 3350 17 G: 17 POWDER, FOR SOLUTION ORAL at 08:18

## 2022-04-20 RX ADMIN — GABAPENTIN 300 MG: 300 CAPSULE ORAL at 08:18

## 2022-04-20 RX ADMIN — OXYCODONE HYDROCHLORIDE 5 MG: 5 TABLET ORAL at 14:46

## 2022-04-20 RX ADMIN — GABAPENTIN 300 MG: 300 CAPSULE ORAL at 19:51

## 2022-04-20 RX ADMIN — OXYCODONE HYDROCHLORIDE 5 MG: 5 TABLET ORAL at 06:14

## 2022-04-20 RX ADMIN — OXYCODONE HYDROCHLORIDE 5 MG: 5 TABLET ORAL at 01:57

## 2022-04-20 RX ADMIN — OXYCODONE HYDROCHLORIDE 5 MG: 5 TABLET ORAL at 19:51

## 2022-04-20 RX ADMIN — DOCUSATE SODIUM AND SENNOSIDES 1 TABLET: 8.6; 5 TABLET ORAL at 08:18

## 2022-04-20 RX ADMIN — CEFAZOLIN SODIUM 2 G: 2 INJECTION, SOLUTION INTRAVENOUS at 04:29

## 2022-04-20 RX ADMIN — CEFAZOLIN SODIUM 2 G: 2 INJECTION, SOLUTION INTRAVENOUS at 19:52

## 2022-04-20 RX ADMIN — ACETAMINOPHEN 975 MG: 325 TABLET ORAL at 17:30

## 2022-04-20 RX ADMIN — OXYCODONE HYDROCHLORIDE 5 MG: 5 TABLET ORAL at 10:23

## 2022-04-20 RX ADMIN — DOCUSATE SODIUM 250 MG: 250 CAPSULE, LIQUID FILLED ORAL at 08:18

## 2022-04-20 RX ADMIN — GABAPENTIN 300 MG: 300 CAPSULE ORAL at 14:46

## 2022-04-20 RX ADMIN — CEFAZOLIN SODIUM 2 G: 2 INJECTION, SOLUTION INTRAVENOUS at 11:29

## 2022-04-20 RX ADMIN — ACETAMINOPHEN 975 MG: 325 TABLET ORAL at 10:23

## 2022-04-20 RX ADMIN — ASPIRIN 162 MG: 81 TABLET ORAL at 08:18

## 2022-04-20 ASSESSMENT — ACTIVITIES OF DAILY LIVING (ADL)
ADLS_ACUITY_SCORE: 9
ADLS_ACUITY_SCORE: 7
ADLS_ACUITY_SCORE: 9
ADLS_ACUITY_SCORE: 7
ADLS_ACUITY_SCORE: 9
ADLS_ACUITY_SCORE: 7
ADLS_ACUITY_SCORE: 9
ADLS_ACUITY_SCORE: 7
ADLS_ACUITY_SCORE: 9
ADLS_ACUITY_SCORE: 7
ADLS_ACUITY_SCORE: 9
ADLS_ACUITY_SCORE: 9
ADLS_ACUITY_SCORE: 7
ADLS_ACUITY_SCORE: 9
ADLS_ACUITY_SCORE: 7
ADLS_ACUITY_SCORE: 9
ADLS_ACUITY_SCORE: 7
IADL_COMMENTS: WILL HAVE ASSIST WITH IADLS
ADLS_ACUITY_SCORE: 9
ADLS_ACUITY_SCORE: 9
ADLS_ACUITY_SCORE: 7
ADLS_ACUITY_SCORE: 7

## 2022-04-20 NOTE — CONSULTS
Internal Medicine History and Physical        Date of Admission:  4/19/2022    Assessment & Plan:     Status post right total hip arthroplasty revision.  Postop day 0.  Vital signs stable.  Satting 92 to 94% on room air.  Placed him on 1 L of oxygen via nasal cannula, satting 95%.  Encouraged him to use incentive spirometer.  We will obtain CBC, and CMP.    History of malignant lymphoma, lymphoplasmacytic on chemotherapy, CBC and CMP obtained on 3/21/2022 showed an essentially normal CMP and CBC.  His A1c was 5.8.  Neuropathy, tingling sensation in the fingers, on gabapentin.  Continue gabapentin  Dyslipidemia on statin  History of degenerative joint disease  History of bilateral hip arthroplasties, right hip arthroplasty 15 years ago left hip arthroplasty 10 years ago.  Chronic pain pain.  DVT and PE prophylaxis: As per primary team.  Discussed with the patient at length.  All his questions answered.  # Pain Assessment:  Current Pain Score 4/19/2022   Patient currently in pain? yes   Pain score (0-10) -   - Ag is experiencing pain due to hip surgery controlled with pain medications. Pain management was discussed and the plan was created in a collaborative fashion.       Orders Placed This Encounter      Advance Diet as Tolerated: Regular Diet Adult      Regular Diet Adult    Fluids: Lactated Ringer's at 75 cc/h  DVT Prophylaxis: per  The primary team  Code Status: full code  Disposition Plan: To be determined.        Annemarie Claros MD, MPH, FACP    ---------------------------------------------------------------------------------------------    Chief Complaint:   Right shoulder arthroplasty  From the patient and EMR  History of Present Illness   Patient is a 71-year-old that a pleasant male with history of    Review of Systems:   10 point ROS completed and negative unless noted in HPI.  Patient denies any chest pain, shortness of breath, wheezes, no past history of COPD or asthma, myocardial infarction or  CAD.  No history of DVT.  Reports tingling sensation in the legs and hands    Past Medical History:    Malignant lymphoma, lymphoplasmacytic  Degenerative joint disease, lumbar  Sinus bradycardia  Dyslipidemia    Past Surgical History:   Bilateral total hip arthroplasty  Appendectomy  Colonoscopy  Bone marrow biopsy  Tonsillectomy  Surgical removal of benign soft tissue mass, right abdominal wall  Social History:      never smoked  Alcohol use, yes, intermittent with food  Family History:   Breast cancer in his mother and sister   stomach cancer, father    Prior to Admission Medications:   No current facility-administered medications on file prior to encounter.  acetaminophen (TYLENOL) 325 MG tablet, Take 325-650 mg by mouth every 6 hours as needed for mild pain  atorvastatin (LIPITOR) 20 MG tablet, Take 1 tablet (20 mg) by mouth daily (Patient taking differently: Take 20 mg by mouth every morning)  docusate sodium (COLACE) 250 MG capsule, Take 250 mg by mouth every morning  gabapentin (NEURONTIN) 300 MG capsule, Take 1 capsule (300 mg) by mouth 3 times daily  ibuprofen (ADVIL/MOTRIN) 200 MG tablet, Take 200 mg by mouth every 4 hours as needed for mild pain  Multiple Vitamins-Minerals (MULTIVITAMIN PO), Take 1 tablet by mouth every morning  SF 5000 PLUS 1.1 % CREA, USE TOOTH PASTE TWICE DAILY AS DIRECTED. NOTHING BY MOUTH FOR 30 MINUTES AFTER USE  VITAMIN D (CHOLECALCIFEROL) PO, Take 1 tablet by mouth daily        Allergies:   Allergies   Allergen Reactions     Seasonal Allergies        Physical Exam:   Vital Signs: Temp: (!) 95.8  F (35.4  C) Temp src: Oral BP: 107/56 Pulse: 58   Resp: 15 SpO2: 93 % O2 Device: None (Room air) Oxygen Delivery: 2 LPM  Weight: 203 lbs 7.75 oz    General: Pleasant male, uncomfortable in no apparent distress.  HEENT: No Scleral icterus. NCAT, MMM. PERRL, EOMI.  Cardiac: Normal rate, regular rhythm. No m/r/g. Normal S1, S2.  Pulm: CTAB, no wheezes, or crackles. Normal respiratory  effort  Abd: Soft, non distended, non tender abdomen. No hepatosplenomegaly.  Skin: No jaundice, No rash  Extremities: No LE edema  Joints:  No edema in the lower extremities.  Neuro: A&Ox3, no focal deficits  Psych normal affect, no anxiety or agitation.  Data recent labs reviewed.

## 2022-04-20 NOTE — OP NOTE
OPERATION REPORT     DATE OF OPERATION: 2022    Patient: Ag Evans  MRN: [id number]  : 1950    SURGEON: Baljit Joy MD.    ASSISTANT: Raj Bunn DO [Adult Reconstruction Fellow]    ANESTHESIOLOGIST: Anesthesiologist: Jennifer Fernandez MD; Sherry Evans MD  CRNA: Mariam Mantilla APRN CRNA; Ebony Busch APRN CRNA; Gerda Bangura APRN CRNA  Anesthesia Resident: Preeti Stroud MD; Tapan Dockery MD    ANESTHESIA: General    PREOPERATIVE DIAGNOSIS:Prosthetic wear following total hip arthroplasty, subsequent encounter [T84.069D, Z96.9]    POSTOPERATIVE DIAGNOSIS: Same    OPERATION(S) PERFORMED: Revision Right total hip arthroplasty with head/ liner exchange and acetabular bone grafting    ESTIMATED BLOOD LOSS: 200 mL.     BACKGROUND/INDICATIONS: Mr. Evans is a 71-year-old male who previously underwent a right total hip arthroplasty in .  Over the course of the last year he has had increasing groin pain and discomfort however he was still ambulating quite well and independently.  He presented for evaluation and x-rays of the pelvis demonstrated evidence of polyethylene wear to the right total hip with significant osteolysis of the superior dome and medial wall of the acetabulum with particulate wear debris present.  There is no evidence of aseptic loosening and the implant remained stable, however given the degree of osteolysis he was at elevated risk for periprosthetic fracture, catastrophic failure, or loosening of the acetabular component.  We propose proceeding with revision of the right total hip arthroplasty with modular component exchange of the head and liner as well as intralesional curettage of the acetabular defect with allograft bone impaction grafting.  Prior to surgery patient was worked up for infection with normal inflammatory markers.  We discussed the risks benefits complications and alternatives of the proposed procedure with the patient.   Potential risks include but are not limited to infection, need for subsequent revision procedures, dislocation, fracture, bleeding, stiffness, persistent pain, and rarely loss of limb and loss of life.  Patient expressed understanding and these risks and informed consent was subsequently obtained.    OPERATIVE FINDINGS: Scarred anatomic tissue planes from previous arthroplasty procedure.  Evidence of polyethylene wear with eccentric wearing of the polyethylene liner.  No evidence of purulence or gross signs of infection.  Large cavitary lesion superior to the acetabular fossa extending from the 10:00 to 2:00 positions, as well as a cavitary lesion consistent with osteolysis medial to the acetabular component.  Evidence of polyethylene wear debris within the acetabular lesions.    FINAL IMPLANTS:  Implant Name Type Inv. Item Serial No.  Lot No. LRB No. Used Action   Right Hip explants      Right 1 Explanted   BONE CHIP CANCELLOUS 30CC [id number] - [id number] Bone/Tissue/Biologic BONE CHIP CANCELLOUS 30CC [id number]   Right 1 Implanted   BONE CHIP CANCELLOUS 30CC [id number] - [id number] Bone/Tissue/Biologic BONE CHIP CANCELLOUS 30CC [id number]   Right 1 Implanted   BONE CHIP CANCELLOUS 30CC [id number] - [id number] Bone/Tissue/Biologic BONE CHIP CANCELLOUS 30CC [id number]   Right 1 Implanted   IMP ACETABULAR LOCKING RING SIZE 26     [id number] Right 1 Implanted   IMP LINER RINGLOCK BIOM [id number] - [id number] Total Joint Component/Insert IMP LINER RINGLOCK BIOM [id number]  [employer] [id number] Right 1 Implanted   IMP BIOLOX CERAMIC HEAD 36mm HEAD TYPE 16/18 TAPER     [id number] Right 1 Implanted   IMP B IOLOX TAPER SLEEVE TYPE 1 TAPER, 3+mm NECK     [id number] Right 1 Implanted       OPERATIVE PROCEDURE:   On the day of surgery patient was seen in the preoperative holding area and informed consent was obtained.  The operative extremity and surgical site were properly noted and marked by  the operating surgeon.  Patient was brought back to the operating room and transferred from the hospital bed to the operating table where general anesthesia was induced via our anesthesia colleagues.  Patient was then positioned in the lateral decubitus position with the wixon hip positioner and all bony prominences were well-padded.  The right lower extremity was then prepped marked and draped in the standard sterile fashion.  Prior to initiation of the procedure a final multidisciplinary timeout was performed to confirm the correct patient, correct operative site, correct procedure, and that preoperative antibiotics and TXA had been administered.  All in the room were in agreement with the above assessment and plan.    We began the procedure by making a longitudinal incision in line with the previously utilized incision over the right hip centered about the greater trochanter extending proximally into the gluteus rodrigue.  Dissection was carried deeply through subcutaneous tissues and fat, and electrocautery was utilized to control any bleeding during the dissection.  The iliotibial band was encountered along with residual Ethibond sutures from the index procedure.  The sutures were removed and the IT band and gluteal fascia were split longitudinally in line with their fibers.  Blunt dissection was carried down through the fibers of the gluteus rodrigue and a Charnley retractor was placed underneath the iliotibial band.    The trochanteric bursa was excised and the piriformis tendon was identified and cut off the posterior aspect of the greater trochanter.  It was tagged for later repair.  We then performed a trapdoor capsulotomy and tagged the superior and inferior limbs of the capsule for later repair.  The hip was then internally rotated abducted and flexed in order to dislocate.  The cobalt chrome 28 mm head was inspected and with a childers blow from the onset drift removed from the trunnion without complication.   The trunnion was then inspected and found to be pristine without evidence of trunnion gnosis or degenerative changes.    We then directed our attention towards exposure of the acetabulum.  Appropriate retractors were placed and scarred fibrinous capsular tissue was removed circumferentially around the acetabular component.  Several cultures of tissue were taken at this point in time and sent for aerobic and anaerobic specimens.  After the acetabular component was entirely exposed we inspected the polyethylene liner.  There is evidence of eccentric polyethylene wear.  We then attempted to remove the liner with the device provided by the manufacture however were unsuccessful in doing this.  We then attempted to remove the liner with quarter-inch osteotomes the liver liner out of the acetabular cup which appeared well fixed.  We then utilized screws to remove the polyethylene liner and we were eventually successful while still retaining the acetabular cup.  The cup was then stressed and found to be well fixed without evidence of micromotion.  Utilizing the screwdriver, 2 acetabular screws were removed without complication.  We then thoroughly irrigated the acetabulum.    At this point in time we used a Campo elevator to gain exposure above the superior brim of the acetabulum by gently elevating the gluteus medius and minimus from the iliac fossa.  A self retainer was placed and a defect in the cortical wall of the ileum was noted just superior to the acetabular bone measuring approximately half a centimeter.  Using a drill and 1/4 inch osteotome we extended this slightly to increase access to the acetabular bone.  Using flexible obstetric curettes were able to remove residual polyethylene wear debris from the acetabular lesions in the superior and medial portions of the acetabulum.  We then utilized pulsatile lavage behind the lesion to free up any residual particulate wear debris.  After thorough debridement was  performed we placed 90 cc of crushed cancellous bone graft into the acetabular defect which was impacted with a malleable impactor.    We then thoroughly irrigated the acetabulum and placed a trial 36 mm inner diameter high wall liner in the 62 mm acetabular cup which was retained and well fixed.  A standard offset 36 mm head was placed on the trunnion and the hip was reduced.  Trial range of motion was performed.  The hip was stable in extension and external rotation of 15 degrees.  The hip was brought into flexion of 90 degrees and internally rotated.  Dislocation occurred at 45 degrees.  Leg lengths were assessed and the operative limb was found to be slightly shorter than the contralateral extremity on clinical assessment.  We then dislocated the hip and removed the 0 mm offset 36 mm trial head and placed a +3 mm offset 36 mm trial head.  The hip was reduced and stability exam was again performed.  The hip was stable in extension and maximum external rotation as well as in flexion to 90 degrees with internal rotation past 55 degrees.  Leg lengths were assessed and clinically found to be equal.  Satisfied with overall stability we dislocated the hip and removed the trial components.    The acetabulum was reexposed and a final 36 mm high wall polyethylene acetabular liner was placed with the wall in the posterior superior position.  Femur was exposed and a 36 mm ceramic +3 mm offset femoral head was impacted onto the trunnion.  The hip was reduced.  Final stability assessment was found to be equivalent to trial stability exam.  Posterior capsular structures were repaired to the posterior aspect of the greater trochanter.  Piriformis tendon was sutured into the piriformis fossa.  IT band was closed with #1 Ethibond and 0 Vicryl stitch in interrupted figure-of-eight fashion.  Deep closure was performed with 0 Vicryl, followed by 2-0 Vicryl, and 3-0 Monocryl running subcuticular.  Exofin skin glue was applied to the  incision and a sterile dressing consisting of alginate and Tegaderm was placed.    Patient was reversed from anesthesia and transferred from the operating table back to the hospital bed in an abduction pillow in stable condition.  Patient was brought to PACU where he will recover and discharged to the floor once PACU criteria are met.  Postoperatively he will be toe-touch weightbearing on the right lower extremity for 6 weeks, at which point time he can advance weightbearing to 50%.  He should follow posterior hip precautions for 3 months time and avoid positions of extreme flexion, abduction past the midline and internal rotation.  Aspirin 81 mg twice daily for DVT prophylaxis will be utilized.  He will remain in the hospital where we will monitor cultures and he will receive IV cefazolin for 3 days time until culture results are negative.    POST OPERATIVE PLAN     Assessment/Plan: Ag Evans is a 71 year old male s/p Procedure(s):  Revision Right Total Hip Arthroplasty on 4/19/2022 with Dr. Joy.     Activity: Up with assist and assistive devices as needed until independent. Posterior hip precautions to operative hip x 3 months:  1) No hip flexion beyond 90 degrees  2) No adduction beyond midline  3) No internal rotation beyond neutral   Weight bearing status: Toe Touch Weight Bearing x 6 weeks then 50% WB x 6wks, the full WB thereafter pending clinic visit.  Antibiotics: Cefazolin x 3 days while inpt and until hosp discharge, monitor intra-op cultures  Diet: Begin with clear fluids and progress diet as tolerated. Bowel regimen. Anti-emetics PRN.    DVT prophylaxis: Mechanical while in hospital with Aspirin 162mg daily x 4 weeks, starting morning of POD 1  Elevation: Elevate heels off of bed on pillows   Wound Care: Aquacel x 5-7 days.    Drains: None  Mathur: None  Pain management: Orals PRN, IV for breakthrough only  X-rays: AP Pelvis and lateral in PACU  Physical Therapy: Mobilization, ROM, ADL's, Posterior  hip precautions  Occupational Therapy: ADL's  Labs: Trend Hgb on POD #1, 2  Consults: PT, OT. Hospitalist, appreciate assistance in caring for this patient throughout the perioperative period    Future Appointments   Date Time Provider Department Center   4/21/2022  8:45 AM Melissa Aparicio Pt, PT [facility] [place]   4/21/2022  9:45 AM Ely Cohen OT [facility] [place]   4/21/2022  1:30 PM Melissa Aparicio Pt, PT [facility] [place]   5/19/2022 10:45 AM Baljit Joy MD [facility]   7/12/2022 10:15 AM PH LAB [facility]   7/12/2022 10:30 AM Radha Flores APRN CNP [facility]   10/20/2022  9:45 AM PH LAB [facility]   10/20/2022 10:00 AM Manuel Perez MD [facility]       Disposition: Pending progress with therapies, pain control on orals, and medical stability, anticipate discharge to Home vs TCU on POD #1-2.    ATTESTATION: Dr. Joy was present at all critical portions of this case and immediately available at all times during the duration of the case.     Raj Bunn DO  Adult Joint Reconstruction Fellow  Dept Orthopaedic Surgery, [facility]    Attending MD (Dr. Baljit Joy) Attestation:  I was present during the key portions of the procedure and I was immediately available for the entire procedure between opening and closing.    MD Heidi Sabillon Family Professor  Oncology and Adult Reconstructive Surgery  Dept Orthopaedic Surgery, [facility]

## 2022-04-20 NOTE — PROGRESS NOTES
"Orthopaedic Surgery Progress Note     4/20/2022    E: No acute events overnight.    S: Pain well controlled, c/o lightheadness when OOB with PT    O:   /50   Pulse 54   Temp 96.8  F (36  C)   Resp 16   Ht 1.778 m (5' 10\")   Wt 92.3 kg (203 lb 7.8 oz)   SpO2 95%   BMI 29.20 kg/m      Exam:  Gen: NAD, A/O x 3  Resp: Comfortable, non-labored breathing  RLE:  -Wound dressed, c/d/i  -Sens: intact  -Motor: 5/5 throughout      Recent Labs   Lab 04/20/22  0801 04/19/22  0905     --    POTASSIUM 4.4  --    CHLORIDE 105  --    CO2 26  --    BUN 13  --    CR 0.78  --    * 96     Recent Labs   Lab 04/20/22  0801   WBC 15.1*   HGB 12.4*        No lab results found in last 7 days.      Impression: 71 year old male s/p Status post right total hip arthroplasty revision on 4/19/2022  Plan:    - Activity: Up as tolerated  - Antibiotics: Continue Ancef 2 gram q 8 hours until discharge to home  - Diet: Regular  - DVT prophylaxis: 81 mg ASA BID x 4 weeks  - Wound Care: Keep incision site covered with alginate x 7 days. Okay to remove dressing on OPD # 7 and leave open to air.  - Pain management: Oral pain meds prn  - Physical Therapy: Evaluate and treat  - Occupational Therapy: Evaluate and treat  - Dispo:  Discharge to home when cleared by Medicine and therapy teams  - Follow up: Follow up with Dr Joy as scheduled.    Future Appointments   Date Time Provider Department Center   4/20/2022  1:00 PM Melissa Aparicio Pt, PT [facility] [place]   4/21/2022  9:15 AM Ely Cohen, TERE [facility] [place]   4/21/2022 10:45 AM Clarisse Arguelles, PT [facility] [place]   4/21/2022  2:15 PM Melissa Aparicio Pt, PT [facility] [place]   5/19/2022 10:45 AM Baljit Joy MD [facility]   7/12/2022 10:15 AM PH LAB [facility]   7/12/2022 10:30 AM Radha Flores APRN [facility]   10/20/2022  9:45 AM PH LAB [facility]   10/20/2022 10:00 AM Manuel Perez MD [facility]        Juanis " Solange MANUEL [facility]  Department of Orthopedic Surgery  [facility]  [phone]    For any questions regarding this patient please page me at the above number prior to contacting the ortho resident on call.

## 2022-04-20 NOTE — PLAN OF CARE
Patient vital signs are at baseline: No,  Reason:  low BP  Patient able to ambulate as they were prior to admission or with assist devices provided by therapies during their stay:  Yes  Patient MUST void prior to discharge:  Yes although retaining  Patient able to tolerate oral intake:  Yes  Pain has adequate pain control using Oral analgesics:  Yes  Does patient have an identified :  Yes  Has goal D/C date and time been discussed with patient:  Yes      VS: Soft BP with near syncopal episode when working with therapy. BP when back into /31; 1000mL bolus ordered   O2: >90% on RA   Output: Voiding adequately, although retaining. Encourage double void and monitor PVRs   Last BM: 4/18; +fl   Activity: TTWB RLE; 1A with gait belt and FWW   Up for meals? Yes   Skin: Incision R hip   Pain: 5mg oxycodone q4h   CMS: Intact   Dressing: Alginate dressing CDI   Diet: Regular   LDA: PIV infusing   Equipment: PCDs, IV pole, gait belt, FWW   Plan: TBD   Additional Info: Ancef until discharge while cultures are pending.

## 2022-04-20 NOTE — PLAN OF CARE
Goal Outcome Evaluation:      [id number]  VS: VSS and capno stable.    O2: Wean O2 to RA.  Tolerating well.  Denies SOB and chest pain.  IS encouraged. Able to perform well.     Output: Voids adequate amount but retaining urine.  Last PVR: 442.  Pt denies any lower abdominal pain   Last BM: 4/18 per pt report    Activity: TTWB in RLE x 3 mos.    Skin: CDI except incision    Pain: Pain in R hip managing with scheduled tylenol, PRN oxycodone and ice packs.    CMS: Baseline neuropathy to upper and lower extremities   Dressing: CDI     Diet: Regular    LDA: PIVs (2)on L hand :SL   Equipment: IV pole, abduction pillow, PCDs, capno, walker    Plan: Continue POC   Additional Info:   Pt is [facility]- hearing aids were left at home

## 2022-04-20 NOTE — PROGRESS NOTES
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  He underwent elective right ERIC on 4/19/22.  Currently on surgical french for postop care.    S/p right ERIC:    ---   POD #1  ---   PT/OT following  ---   continue empiric cefazolin  ---   Further care per Ortho    DVT prophylaxis:  ---    mg daily    Acute postop right hip pain  ---   Tylenol, oxycodone and IV Dilaudid    H/o peripheral neuropathy  ---   Continue PTA gabapentin    Presyncope  ---   Pt was attempting to get OOB for therapy when he became lightheaded and dizzy, almost passed out  ---   BP was 103/30  ---   Hgb was 12.4 g this a.m.  ---   Will treat him with LR 1 L bolus over 1 hour    H/o malignant lymphoma, lymphoplasmacytic  ---   Prior to admit was on chemotherapy  ---   Follow-up with own hematologist as previously scheduled       Diet: Advance Diet as Tolerated: Regular Diet Adult  Regular Diet Adult    DVT Prophylaxis:  mg po daily  Mathur Catheter: Not present  Central Lines: None  Cardiac Monitoring: None  Code Status: Full Code      Disposition Plan   Expected Discharge: 04/21/2022   Medically will be ready for discharge later today       Gordo Marcial MD  Hospitalist Service, GOLD TEAM 16  [facility]  Securely message with the Vocera Web Console (learn more here)  Text page via tagUin Paging/Directory   Please see signed in provider for up to date coverage information    __________________________________________________________      Interval History   Uneventful night.  No complaints during my evaluation but apparently had an episode of presyncope with therapy.  Hemoglobin stable.  BP soft.    Data reviewed  today: I reviewed all medications, new labs and imaging results over the last 24 hours.     Physical Exam   Vital Signs: Temp: 96.8  F (36  C) Temp src: Oral BP: 111/50 Pulse: 54   Resp: 16 SpO2: 95 % O2 Device: None (Room air) Oxygen Delivery: 1 LPM  Weight: 203 lbs 7.75 oz  General: aao x 3, NAD.  HEENT:  NC/AT, neck supple  CVS:  NL s 1 and s2, no m/r/g.  Lungs:  CTA B/L.   Abd:  Soft, + bs, NT, no rebound or gaurding, no fluid shift.  Ext: Right hip dressing clear/dry/intact  Lymph:  No edema.  Neuro:  Nonfocal.  Musculoskeletal: No calf tenderness to palpation.    Skin:  No rash.  Psychiatry:  Mood and affect appropriate.      Data   Recent Labs   Lab 04/20/22  0801 04/19/22  0905   WBC 15.1*  --    HGB 12.4*  --    MCV 98  --      --      --    POTASSIUM 4.4  --    CHLORIDE 105  --    CO2 26  --    BUN 13  --    CR 0.78  --    ANIONGAP 6  --    TANNA 8.9  --    * 96   ALBUMIN 3.5  --    PROTTOTAL 6.2*  --    BILITOTAL 0.6  --    ALKPHOS 54  --    ALT 29  --    AST 39  --      Recent Results (from the past 24 hour(s))   XR Pelvis w Hip Port Right 1 View    Narrative    EXAM: XR PELVIS AD HIP PORTABLE RIGHT 1 VIEW  LOCATION: [facility]  DATE/TIME: 4/19/2022 5:39 PM    INDICATION: Status post Hip surgery  COMPARISON: 10/05/2021      Impression    IMPRESSION: Right ERIC with revision of the acetabular component and bone grafting into the acetabular region. Postoperative air is present. No fractures are identified. Left ERIC.     Medications     lactated ringers 75 mL/hr at 04/19/22 1855       acetaminophen  975 mg Oral Q8H     aspirin  162 mg Oral Daily     atorvastatin  20 mg Oral Daily     ceFAZolin  2 g Intravenous Q8H     docusate sodium  250 mg Oral QAM     gabapentin  300 mg Oral TID     lactated ringers  1,000 mL Intravenous Once     polyethylene glycol  17 g Oral Daily     senna-docusate  1 tablet Oral BID     sodium chloride (PF)  3 mL  Intracatheter Q8H

## 2022-04-20 NOTE — PLAN OF CARE
"Pt arrived to unit around 1830, received general anesthesia and a local cocktail. .     VS: VSS. Denies CP/SOB. A/O x4    O2: >90% on RA    Output: Voided in PACU and once in urinal. PVR >400. Voided 150 mLs after that. Encouraged fluids and is going to try and void again.    Last BM: 4/18 per pt report    Activity: TTWB in RLE. Up with assist of 1, GB and walker. Took a few steps in room but became nauseated and \"woozy\" and needed to sit down. Able to reposition self in bed independently.    Up for meals? Declined    Skin: R hip surgical incision    Pain: Pain in R hip managing with scheduled tylenol, PRN oxycodone given. Ice packs.    CMS: Baseline neuropathy in BLE, otherwise intact    Dressing: CDI     Diet: Regular ate most of dinner. Was nauseous, zofran given x1.    LDA: PIV in L hand infusing   Equipment: IV pole, abduction pillow, PCDs, capno, walker    Plan: Potential discharge home tomorrow pending pain and therapies   Additional Info: Wife was at bedside tonight.   Pt is [facility]- hearing aids were left at home          "

## 2022-04-20 NOTE — PROGRESS NOTES
04/20/22 1100   Quick Adds   Type of Visit Initial Occupational Therapy Evaluation   Living Environment   People in Home spouse   Current Living Arrangements condominium   Home Accessibility stairs to enter home;stairs within home   Number of Stairs, Main Entrance 2   Transportation Anticipated family or friend will provide   Living Environment Comments can stay on main level, tub/shower with shower chair. Has RTS on toilet.   Self-Care   Usual Activity Tolerance excellent   Current Activity Tolerance good   Regular Exercise Yes   Activity/Exercise Type strength training;other (see comments)   Exercise Amount/Frequency 3-5 times/wk   Equipment Currently Used at Home none  (3 walkers, FWW, reacher, sock aide)   Fall history within last six months no   Instrumental Activities of Daily Living (IADL)   IADL Comments will have assist with IADLs   General Information   Onset of Illness/Injury or Date of Surgery 04/19/22   Referring Physician Raj Bunn MD   Patient/Family Therapy Goal Statement (OT) Did not endorse   Additional Occupational Profile Info/Pertinent History of Current Problem Revision Right Total Hip Arthroplasty   Existing Precautions/Restrictions fall;no hip IR;no hip ADD past midline;90 degree hip flexion;no pivoting or twisting;weight bearing   Right Lower Extremity (Weight-bearing Status) toe touch weight-bearing (TTWB)   Cognitive Status Examination   Orientation Status orientation to person, place and time   Affect/Mental Status (Cognitive) WNL   Follows Commands WNL   Visual Perception   Visual Impairment/Limitations corrective lenses full-time   Sensory   Sensory Quick Adds No deficits were identified   Pain Assessment   Patient Currently in Pain Yes, see Vital Sign flowsheet   Range of Motion Comprehensive   General Range of Motion bilateral upper extremity ROM WNL   Strength Comprehensive (MMT)   Comment, General Manual Muscle Testing (MMT) Assessment B UE's WFL   Bed Mobility   Comment (Bed  Mobility) Supine<>sit with SBA using leg  after education   Transfers   Transfer Comments CGA sit<>stand using FWW   Bathing Assessment/Intervention   Ely Level (Bathing) maximum assist (25% patient effort)   Lower Body Dressing Assessment/Training   Ely Level (Lower Body Dressing) minimum assist (75% patient effort)   Assistive Devices (Lower Body Dressing) reacher;sock-aid   Position (Lower Body Dressing) edge of bed sitting   Grooming Assessment/Training   Ely Level (Grooming) contact guard assist  (for safety when standing)   Toileting   Ely Level (Toileting) contact guard assist;set up;verbal cues   Assistive Devices (Toileting) raised toilet seat   Clinical Impression   Criteria for Skilled Therapeutic Interventions Met (OT) Yes, treatment indicated   OT Diagnosis Decreased functional mobility and ADLs   OT Problem List-Impairments impacting ADL strength;pain;post-surgical precautions   Assessment of Occupational Performance 3-5 Performance Deficits   Identified Performance Deficits dressing, bathing, toileting, transfers, home mgmt   Planned Therapy Interventions (OT) ADL retraining;transfer training   Clinical Decision Making Complexity (OT) low complexity   Anticipated Equipment Needs Upon Discharge (OT) tub bench;hip kit;raised toilet seat   Risk & Benefits of therapy have been explained evaluation/treatment results reviewed;care plan/treatment goals reviewed;patient;spouse/significant other   OT Discharge Planning   OT Discharge Recommendation (DC Rec) home with assist   OT Rationale for DC Rec Anticipate patient will be ajit to discharge home with assist from wife for ADLs prn/IADLs   OT Brief overview of current status CGA for sit<>stand using FWW, min A using AE for LE dressing, SBA bed mobility using leg . Session limited due to low BP and dizziness this date.   Total Evaluation Time (Minutes)   Total Evaluation Time (Minutes) 8   OT Goals   Therapy  Frequency (OT) Daily   OT Predicted Duration/Target Date for Goal Attainment 04/21/22   OT Goals Lower Body Dressing;Hygiene/Grooming;Toilet Transfer/Toileting;OT Goal 1   OT: Hygiene/Grooming supervision/stand-by assist;using adaptive equipment;within precautions;while standing   OT: Lower Body Dressing Supervision/stand-by assist;using adaptive equipment;within precautions   OT: Toilet Transfer/Toileting Supervision/stand-by assist;toilet transfer;cleaning and garment management;using adaptive equipment;within precautions   OT: Goal 1 Pt will complete tub/shower transfer using DME with CGA, maintaining precautions

## 2022-04-20 NOTE — PLAN OF CARE
VS: Temp: 97.7  F (36.5  C) Temp src: Oral BP: 103/53 Pulse: 64   Resp: 17 SpO2: 95 % O2 Device: None (Room air)    O2: 95% on room air, no supplemental oxygen required. Denies SOB and CP, no cough present. Lung sounds clear.   Output: Pt voids spontaneously without any difficulty. PVRs this shift were 150ml and 65ml when bladder scanned after pt voided 300ml each time.   Last BM: 04/18/2022   Activity: Toe touch weight bearing, assist x1 w/ walker and gait belt. Pt gets up to go to the bathroom.   Skin: R) hip surgical incision   Pain: Pain is well controlled with scheduled tylenol and PRN oxycodone.   Neuro: A & O x4, reports some baseline neuropathy/numbness & tingling in all extremities.   Dressing: CDI   Diet: Regular   LDA: 2 PIVs in the L) hand, both saline locked.   Equipment: Personal belongings, walker, gait belt, call light, PCDs.   Plan: Possible discharge tomorrow.   Additional Info: Pt will be toe touch weight bearing for 3 months post op.

## 2022-04-21 ENCOUNTER — APPOINTMENT (OUTPATIENT)
Dept: PHYSICAL THERAPY | Facility: CLINIC | Age: 72
DRG: 467 | End: 2022-04-21
Attending: ORTHOPAEDIC SURGERY
Payer: COMMERCIAL

## 2022-04-21 ENCOUNTER — APPOINTMENT (OUTPATIENT)
Dept: OCCUPATIONAL THERAPY | Facility: CLINIC | Age: 72
DRG: 467 | End: 2022-04-21
Attending: ORTHOPAEDIC SURGERY
Payer: COMMERCIAL

## 2022-04-21 VITALS
DIASTOLIC BLOOD PRESSURE: 61 MMHG | WEIGHT: 203.48 LBS | RESPIRATION RATE: 16 BRPM | BODY MASS INDEX: 29.13 KG/M2 | TEMPERATURE: 96.7 F | HEART RATE: 63 BPM | HEIGHT: 70 IN | OXYGEN SATURATION: 95 % | SYSTOLIC BLOOD PRESSURE: 132 MMHG

## 2022-04-21 LAB
GLUCOSE BLDC GLUCOMTR-MCNC: 82 MG/DL (ref 70–99)
HGB BLD-MCNC: 10.9 G/DL (ref 13.3–17.7)
HOLD SPECIMEN: NORMAL

## 2022-04-21 PROCEDURE — 99232 SBSQ HOSP IP/OBS MODERATE 35: CPT | Performed by: INTERNAL MEDICINE

## 2022-04-21 PROCEDURE — 97535 SELF CARE MNGMENT TRAINING: CPT | Mod: GO

## 2022-04-21 PROCEDURE — 250N000013 HC RX MED GY IP 250 OP 250 PS 637

## 2022-04-21 PROCEDURE — 97530 THERAPEUTIC ACTIVITIES: CPT | Mod: GP | Performed by: PHYSICAL THERAPIST

## 2022-04-21 PROCEDURE — 36415 COLL VENOUS BLD VENIPUNCTURE: CPT | Performed by: INTERNAL MEDICINE

## 2022-04-21 PROCEDURE — 250N000013 HC RX MED GY IP 250 OP 250 PS 637: Performed by: ORTHOPAEDIC SURGERY

## 2022-04-21 PROCEDURE — 36415 COLL VENOUS BLD VENIPUNCTURE: CPT | Performed by: ORTHOPAEDIC SURGERY

## 2022-04-21 PROCEDURE — 97116 GAIT TRAINING THERAPY: CPT | Mod: GP | Performed by: PHYSICAL THERAPIST

## 2022-04-21 PROCEDURE — 250N000011 HC RX IP 250 OP 636: Performed by: CLINICAL NURSE SPECIALIST

## 2022-04-21 PROCEDURE — 85018 HEMOGLOBIN: CPT | Performed by: INTERNAL MEDICINE

## 2022-04-21 RX ADMIN — POLYETHYLENE GLYCOL 3350 17 G: 17 POWDER, FOR SOLUTION ORAL at 07:51

## 2022-04-21 RX ADMIN — CEFAZOLIN SODIUM 2 G: 2 INJECTION, SOLUTION INTRAVENOUS at 03:56

## 2022-04-21 RX ADMIN — DOCUSATE SODIUM AND SENNOSIDES 1 TABLET: 8.6; 5 TABLET ORAL at 07:51

## 2022-04-21 RX ADMIN — ASPIRIN 162 MG: 81 TABLET ORAL at 07:51

## 2022-04-21 RX ADMIN — DOCUSATE SODIUM 250 MG: 250 CAPSULE, LIQUID FILLED ORAL at 07:51

## 2022-04-21 RX ADMIN — ACETAMINOPHEN 975 MG: 325 TABLET ORAL at 02:04

## 2022-04-21 RX ADMIN — ACETAMINOPHEN 975 MG: 325 TABLET ORAL at 10:37

## 2022-04-21 RX ADMIN — OXYCODONE HYDROCHLORIDE 5 MG: 5 TABLET ORAL at 07:51

## 2022-04-21 RX ADMIN — GABAPENTIN 300 MG: 300 CAPSULE ORAL at 07:51

## 2022-04-21 ASSESSMENT — ACTIVITIES OF DAILY LIVING (ADL)
DEPENDENT_IADLS:: INDEPENDENT
ADLS_ACUITY_SCORE: 7

## 2022-04-21 NOTE — PLAN OF CARE
Patient vital signs are at baseline: Yes  Patient able to ambulate as they were prior to admission or with assist devices provided by therapies during their stay:  Yes  Patient MUST void prior to discharge:  Yes  Patient able to tolerate oral intake:  Yes  Pain has adequate pain control using Oral analgesics:  Yes  Does patient have an identified :  Yes  Has goal D/C date and time been discussed with patient:  Yes    VS: VSS   O2: >90% on RA   Output: Voiding adequately; last PVR 65   Last BM: 4/18; +fl, +BS. Took miralax, senna and dulcolax today; Educated on need for suppository tomorrow if no BM   Activity: TTWB RLE. Ind in room after passing therapies   Up for meals? Yes   Skin: Incision R hip   Pain: 5mg oxycodone q4h   CMS: Baseline neuropathy all extremities   Dressing: Alginate CDI   Diet: Regular   LDA: PIVs removed for discharge   Equipment: PCDs, gait belt, FWW   Plan: Home today   Additional Info: Patient was cleared for discharge to home. Patient and spouse were given medication, discharge and follow up instructions and states no further questions at this time. Pt discharged via [facility] transport with A from wife.     DISCHARGE SUMMARY    Pt discharging to: Home  Transportation: Wife  AVS given and discussed: Yes   Stoplight Tool given and discussed: Yes  Medications given: Yes  Belongings returned: Yes  Comments:

## 2022-04-21 NOTE — CONSULTS
Care Management Initial Consult    General Information  Assessment completed with: Patient,    Type of CM/SW Visit: Offer D/C Planning    Primary Care Provider verified and updated as needed: Yes   Readmission within the last 30 days: no previous admission in last 30 days      Reason for Consult: discharge planning  Advance Care Planning: Advance Care Planning Reviewed: no concerns identified          Communication Assessment  Patient's communication style: spoken language (English or Bilingual)    Hearing Difficulty or Deaf: no   Wear Glasses or Blind: yes    Cognitive  Cognitive/Neuro/Behavioral: WDL  Level of Consciousness: alert  Arousal Level: opens eyes spontaneously  Orientation: oriented x 4     Best Language: 0 - No aphasia  Speech: clear, spontaneous, logical    Living Environment:   People in home: spouse     Current living Arrangements: house      Able to return to prior arrangements: yes       Family/Social Support:  Care provided by: self, spouse/significant other  Provides care for: no one  Marital Status:   Wife  Vickie       Description of Support System: Supportive, Involved    Support Assessment: Adequate family and caregiver support, Adequate social supports    Current Resources:   Patient receiving home care services: No     Community Resources: None  Equipment currently used at home: none  Supplies currently used at home: None    Employment/Financial:  Employment Status:          Financial Concerns: insurance, none, No concerns identified           Lifestyle & Psychosocial Needs:  Social Determinants of Health     Tobacco Use: Low Risk      Smoking Tobacco Use: Never Smoker     Smokeless Tobacco Use: Never Used   Alcohol Use: Not on file   Financial Resource Strain: Not on file   Food Insecurity: Not on file   Transportation Needs: Not on file   Physical Activity: Not on file   Stress: Not on file   Social Connections: Not on file   Intimate Partner Violence: Not on file   Depression:  Not at risk     PHQ-2 Score: 0   Housing Stability: Not on file       Functional Status:  Prior to admission patient needed assistance:   Dependent ADLs:: Independent  Dependent IADLs:: Independent       Mental Health Status:  Mental Health Status: No Current Concerns       Chemical Dependency Status:                Values/Beliefs:  Spiritual, Cultural Beliefs, Jew Practices, Values that affect care: no               Additional Information:  D: Plan of care discussed with Medical Team at Interdisciplinary Rounds, plan for patient to discharge today.   I/A: Chart reviewed; met with patient at bedside to confirm home support, living arrangements and transportation. PT recommends to have Home therapy in place. Ag agrees to this- he does not have a preference for a home agency. A [facility] be [facility] to [facility].   P: Care Coordinator will remain available for discharge needs that may arise.      Addendum: 1140  [facility] can accept the patient for services.     [facility]  [address] [place] [id number]  [phone] (Home Care)  [phone] (Hospice)  RN and PT      Shelby Astudillo RN  Kulwinder RN Care Coordinator     For Weekend & Holiday on call RN Care Coordinator:  (Tasks: Home care, home infusion, medical equipment/oxygen, transportation, IMM & MOON forms, etc.)     Text Paging in [employer] Smart Web is the preferred method of contact for these teams     [place] [facility] [place] ([phone]) Saturday & Sunday; ([id number])  Recognized Holidays  Pager #1: [phone] Units: 4A, 4C, 4E, 5A & 5B   Pager #2: [phone] Units: 6A, 6B, 6C, 6D  Pager #3: [phone] Units: 7A, 7B, 7C, 7D & 5C   Pager #4: [phone] Units: 5 Ortho, 8A, 10 ICU, & [facility]      For Weekend & Holiday on call Social Work:  (Tasks: TCU, transportation, Hospice, adjustment to illness counseling, Health Care Directives, Child Protection and Domestic Violence  concerns, Vulnerable Adult, IMM forms, etc.)     Text Paging in [facility] Smart Web is the preferred method of contact for these teams    [place] (0800 [phone] 1630) Saturday and Sunday  Pager: [phone] Units: 4A, 4C, 4E, 5A and 5B   Pager: [phone] Units: 6A, 6B, 6C, 6D   Pager: [phone]: 7A, 7B, 7C, 7D, and 5C      [facility] ([phone]) Saturday and Sunday  Units: 5 Ortho, 8A, and 10 ICU   Pager: [phone]   ______________________________________________     After hours for all units everyday- (only the  is available after hours until midnight)  Pager [phone]

## 2022-04-21 NOTE — PLAN OF CARE
Physical Therapy Discharge Summary    Reason for therapy discharge:    Discharged to home with home therapy.    Progress towards therapy goal(s). See goals on Care Plan in [facility] electronic health record for goal details.  Goals met    Therapy recommendation(s):    Continued therapy is recommended.  Rationale/Recommendations:  progression of post-op prococal and functional mobility.

## 2022-04-21 NOTE — PLAN OF CARE
Occupational Therapy Discharge Summary    Reason for therapy discharge:    Discharged to home.    Progress towards therapy goal(s). See goals on Care Plan in [facility] electronic health record for goal details.  Most goals met, all goals addressed    Therapy recommendation(s):    Home with assist from wife and use of DME to facilitate IND with ADLs within precautions

## 2022-04-21 NOTE — PROGRESS NOTES
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  He underwent elective right ERIC on 4/19/22.  Currently on surgical french for postop care.    S/p right ERIC:    ---   POD #2  ---   PT/OT following  ---   continue empiric cefazolin  ---   Further care per Ortho    DVT prophylaxis:  ---    mg daily    Acute postop right hip pain  ---   Tylenol and oxycodone    H/o peripheral neuropathy  ---   Continue PTA gabapentin    Presyncope 4/20/22  ---   Pt was attempting to get OOB for therapy when he became lightheaded and dizzy, almost passed out  ---   BP was 103/30  ---   Hgb was 12.4 g   ---   Resolved following treatment w/ one liter LR bolus  ---   Hgb is 10.9 today    Acute blood loss anemia  ---   preop Hgb was 14.6 g on 3/31/2022 ---> 12.4 g on POD #1 ---> 10.9 g today.  ---   Denied lightheadedness, dizziness, CP, or SOB  ---   No indications for transfusion with RBCs    H/o malignant lymphoma, lymphoplasmacytic  ---   Prior to admit was on chemotherapy  ---   Follow-up with own hematologist as previously scheduled       Diet: Advance Diet as Tolerated: Regular Diet Adult  Regular Diet Adult    DVT Prophylaxis:  mg po daily  Mathur Catheter: Not present  Central Lines: None  Cardiac Monitoring: None  Code Status: Full Code      Disposition Plan   Expected Discharge: 04/21/2022   Medically will be ready for discharge today        Gordo Marcial MD  Hospitalist Service, GOLD TEAM 16  [facility]  Securely message with the Vocera Web Console (learn more here)  Text page via [employer] Paging/Directory   Please see signed in provider for up to date coverage  information    __________________________________________________________      Interval History   No complaints.  Uneventful night.  Denies being lightheaded or dizzy.  Denied chest pain or shortness of breath.  Able to ambulate without any difficulties.    Data reviewed today: I reviewed all medications, new labs and imaging results over the last 24 hours.     Physical Exam   Vital Signs: Temp: (!) 96.7  F (35.9  C) Temp src: Oral BP: 132/61 Pulse: 63   Resp: 16 SpO2: 95 % O2 Device: None (Room air)    Weight: 203 lbs 7.75 oz  General: aao x 3, NAD.  HEENT:  NC/AT, neck supple  CVS:  NL s 1 and s2, no m/r/g.  Lungs:  CTA B/L.   Abd:  Soft, + bs, NT, no rebound or gaurding, no fluid shift.  Ext: Right hip dressing clear/dry/intact  Lymph:  No edema.  Neuro:  Nonfocal.  Musculoskeletal: No calf tenderness to palpation.    Skin:  No rash.  Psychiatry:  Mood and affect appropriate.      Data   Recent Labs   Lab 04/21/22  0754 04/21/22  0639 04/20/22  0801 04/19/22  0905   WBC  --   --  15.1*  --    HGB 10.9*  --  12.4*  --    MCV  --   --  98  --    PLT  --   --  183  --    NA  --   --  137  --    POTASSIUM  --   --  4.4  --    CHLORIDE  --   --  105  --    CO2  --   --  26  --    BUN  --   --  13  --    CR  --   --  0.78  --    ANIONGAP  --   --  6  --    TANNA  --   --  8.9  --    GLC  --  82 147* 96   ALBUMIN  --   --  3.5  --    PROTTOTAL  --   --  6.2*  --    BILITOTAL  --   --  0.6  --    ALKPHOS  --   --  54  --    ALT  --   --  29  --    AST  --   --  39  --      No results found for this or any previous visit (from the past 24 hour(s)).  Medications     lactated ringers 75 mL/hr at 04/20/22 1330       acetaminophen  975 mg Oral Q8H     aspirin  162 mg Oral Daily     atorvastatin  20 mg Oral Daily     ceFAZolin  2 g Intravenous Q8H     docusate sodium  250 mg Oral QAM     gabapentin  300 mg Oral TID     polyethylene glycol  17 g Oral Daily     senna-docusate  1 tablet Oral BID     sodium chloride (PF)  3 mL  Intracatheter Q8H

## 2022-04-21 NOTE — PROGRESS NOTES
"Orthopaedic Surgery Progress Note     4/21/2022    E: No acute events overnight.    S: Pain well controlled, anticipating discharge today    O:   /61   Pulse 63   Temp (!) 96.7  F (35.9  C)   Resp 16   Ht 1.778 m (5' 10\")   Wt 92.3 kg (203 lb 7.8 oz)   SpO2 95%   BMI 29.20 kg/m        Exam:  Gen: NAD, A/O x 3  Resp: Comfortable, non-labored breathing    RLE:  -Wound dressed, c/d/i  -Sens: SILT dp/sp/s/s/t  -Motor: 5/5 EHL/FHL/TA/GSc  -Vasc: 2+ pulses, wwp, brisk cap refill    Recent Labs   Lab 04/21/22  0639 04/20/22  0801 04/19/22  0905   NA  --  137  --    POTASSIUM  --  4.4  --    CHLORIDE  --  105  --    CO2  --  26  --    BUN  --  13  --    CR  --  0.78  --    GLC 82 147* 96     Recent Labs   Lab 04/21/22  0754 04/20/22  0801   WBC  --  15.1*   HGB 10.9* 12.4*   PLT  --  183     No lab results found in last 7 days.    Impression: 71 year old male s/p Status post right total hip arthroplasty revision on 4/19/2022  Plan:     - Activity: Up as tolerated  - Antibiotics: Continue Ancef 2 gram q 8 hours until discharge to home  - Diet: Regular  - DVT prophylaxis: 81 mg ASA BID x 4 weeks  - Wound Care: Keep incision site covered with alginate x 7 days. Okay to remove dressing on POD # 7 and leave open to air.  - Pain management: Oral pain meds prn  - Physical Therapy: Evaluate and treat  - Occupational Therapy: Evaluate and treat  - Dispo:  Discharge to home when cleared by Medicine and therapy teams  - Follow up: Follow up with Dr Joy as scheduled.    Future Appointments   Date Time Provider Department Center   5/19/2022 10:45 AM [facility]   7/12/2022 10:15 AM PH LAB [facility]   7/12/2022 10:30 AM Radha Flores APRN [facility]   10/20/2022  9:45 AM PH LAB [facility]   10/20/2022 10:00 AM Manuel Perez MD [facility]     Juanis MANUEL [facility]  Department of Orthopedic Surgery  [facility]  [phone]    For any questions regarding " this patient please page me at the above number prior to contacting the ortho resident on call.

## 2022-04-21 NOTE — PLAN OF CARE
Temp: 98.1  F (36.7  C) Temp src: Oral BP: 108/59 Pulse: 54   Resp: 16 SpO2: 94 % O2 Device: None (Room air)     AOx4. Denies CP & SOB. Baseline neuropathy in all extremities. Voiding without difficulty. LBM 4/18/22. Denies pain this shift. Dressing to R hip CDI. 2 L PIVs SL. Call light within reach and pt able to make needs known. Continue with POC.

## 2022-04-24 LAB
BACTERIA TISS BX CULT: NO GROWTH

## 2022-04-25 LAB
BACTERIA TISS BX CULT: ABNORMAL
BACTERIA TISS BX CULT: ABNORMAL

## 2022-04-25 NOTE — RESULT ENCOUNTER NOTE
Ag, 2 of your cultures taken at the time of surgery are demonstrating presence of a bacteria.  I have consulted with our infectious disease physicians who may be contacting you.  In general, when two tissue specimens show evidence of the same bacteria we tend to treat patients with an antibiotic even though there is no other evidence of an active infection present.    Therefore, as we previously have discussed, this would be a reason for initiating an antibiotic intravenously after recent surgery and while we monitor your cultures which have shown evidence of this bacteria.    I will still plan to see you in your scheduled postoperative visit 4 weeks after surgery.    Regards,    MD Heidi Sabillon Family Professor  Oncology and Adult Reconstructive Surgery  Dept Orthopaedic Surgery, [facility]  [phone] office, [phone] pager  [email or web address]

## 2022-04-26 LAB
BACTERIA TISS BX CULT: ABNORMAL
BACTERIA TISS BX CULT: NORMAL

## 2022-04-26 ASSESSMENT — ENCOUNTER SYMPTOMS
FEVER: 0
DIFFICULTY URINATING: 0
STIFFNESS: 1
DECREASED APPETITE: 0
WEIGHT LOSS: 0
CHILLS: 0
FATIGUE: 1
ALTERED TEMPERATURE REGULATION: 0
WEIGHT GAIN: 0
POLYDIPSIA: 0
INCREASED ENERGY: 1
FLANK PAIN: 0
MUSCLE CRAMPS: 0
HALLUCINATIONS: 0
ARTHRALGIAS: 1
NIGHT SWEATS: 0
BACK PAIN: 0
MUSCLE WEAKNESS: 1
POLYPHAGIA: 0
MYALGIAS: 0
HEMATURIA: 0
DYSURIA: 0
JOINT SWELLING: 0
NECK PAIN: 0

## 2022-04-26 NOTE — TELEPHONE ENCOUNTER
RECORDS RECEIVED FROM:    DATE RECEIVED: 04.27.2022   NOTES (Gather within 2 years) STATUS DETAILS   OFFICE NOTE from referring provider       OFFICE NOTE from other specialist     DISCHARGE SUMMARY from hospital Internal  04.19.2022 Baljit Joy MD   DISCHARGE REPORT from the ER     LABS (any labs) Internal    MEDICATION LIST Internal    IMAGING  (NEED IMAGES AND REPORTS)     Osteomyelitis: Foot imaging      Liver Abscess: Abdominal imaging     Other (anything related to diagnoses

## 2022-04-27 ENCOUNTER — OFFICE VISIT (OUTPATIENT)
Dept: INFECTIOUS DISEASES | Facility: CLINIC | Age: 72
End: 2022-04-27
Attending: INTERNAL MEDICINE
Payer: COMMERCIAL

## 2022-04-27 ENCOUNTER — PRE VISIT (OUTPATIENT)
Dept: INFECTIOUS DISEASES | Facility: CLINIC | Age: 72
End: 2022-04-27
Payer: COMMERCIAL

## 2022-04-27 VITALS
RESPIRATION RATE: 16 BRPM | WEIGHT: 205 LBS | DIASTOLIC BLOOD PRESSURE: 79 MMHG | HEART RATE: 54 BPM | SYSTOLIC BLOOD PRESSURE: 143 MMHG | OXYGEN SATURATION: 98 % | TEMPERATURE: 97.5 F | BODY MASS INDEX: 29.41 KG/M2

## 2022-04-27 DIAGNOSIS — C83.00 MALIGNANT LYMPHOMA, LYMPHOPLASMACYTIC (H): ICD-10-CM

## 2022-04-27 DIAGNOSIS — L08.9 WOUND INFECTION: ICD-10-CM

## 2022-04-27 DIAGNOSIS — R00.1 SINUS BRADYCARDIA: ICD-10-CM

## 2022-04-27 DIAGNOSIS — T84.51XA INFECTION ASSOCIATED WITH INTERNAL RIGHT HIP PROSTHESIS, INITIAL ENCOUNTER (H): ICD-10-CM

## 2022-04-27 DIAGNOSIS — R60.0 BILATERAL LEG EDEMA: ICD-10-CM

## 2022-04-27 DIAGNOSIS — T14.8XXA WOUND INFECTION: ICD-10-CM

## 2022-04-27 LAB — BACTERIA TISS BX CULT: ABNORMAL

## 2022-04-27 PROCEDURE — G0463 HOSPITAL OUTPT CLINIC VISIT: HCPCS

## 2022-04-27 PROCEDURE — 99205 OFFICE O/P NEW HI 60 MIN: CPT | Performed by: INTERNAL MEDICINE

## 2022-04-27 ASSESSMENT — PAIN SCALES - GENERAL: PAINLEVEL: NO PAIN (0)

## 2022-04-27 NOTE — PROGRESS NOTES
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     He saw  Orthopedic, on 11/19/2015 for a few days of right hip pain, radiating to the groin. pain resolved with NSAID in a few days.       He saw Orthopedic, Dr Adam Riggs on 10/8/2020 for dull pain 1/10 in the right buttock down the back of his thigh associated with numbness. Per note, patient was quite active with exercises, golf, pickleball.  He took occasional ibuprofen and used ice at times. X-rays of the pelvis and lateral both hips were obtained and Images showed Left total hip arthroplasty was in good position with no sign of loosening or wear.  Right total hip arthroplasty showed asymmetric femoral ball in the socket consistent with polyethylene wear superiorly and may be early lysis at the lateral shoulder of the right stem, but most of the stem was quite solid. He saw Dr Riggs again on 10/5/21 and still had similar pain -  a dull pain in the right buttock down the back of his thigh associated with numbness. He also had a sensation of the right hip feeling unstable, a few months earlier so he had used a strap support since then, with relief of the symptoms. At that time, he also complained of left knee pain with climbing stairs.Left knee pain has resolved.  Xray showed on 10/5/21 showed Bilateral hip arthroplasties. Mild heterotopic ossification adjacent to the right hip  "arthroplasty. No acute fracture identified. and Xray left knee on 10/5/21 showed No fracture. Chondrocalcinosis, consistent with calcium pyrophosphate deposition disease.  Minimal joint effusion. Mild medial compartment and patellar osteophytosis.      He was referred to Dr Joy on 11/4/2021 for particulate wear debris with osteolysis behind right acetabular cup without evidence of loosening.  Implant was at risk for periprosthetic fracture or loosening and recommended revision hip arthroplasty consisting of intralesional curettage of the acetabular defect with allograft bone impaction grafting,  exchange and revision of acetabular bearing surface and femoral head.  On 3/21/22, per note, he had mild discomfort in right hip but was still independent with activities of daily living.      On 4/19/22 He had Revision Right total hip arthroplasty with head/ liner exchange and acetabular bone grafting. Intra-op findings\" Scarred anatomic tissue planes from previous arthroplasty procedure.  Evidence of polyethylene wear with eccentric wearing of the polyethylene liner.  No evidence of purulence or gross signs of infection.  Large cavitary lesion superior to the acetabular fossa extending from the 10:00 to 2:00 positions, as well as a cavitary lesion consistent with osteolysis medial to the acetabular component.  Evidence of polyethylene wear debris within the acetabular lesions\" . Intraop right hip tissues - 2 aerobic and 2a naerobic cultures grew Cutibacterium acnes. He is now referred to Infectious Diseases for further management. Perioperative, he recieved Cefazolin x 3 days. He is currently not on antibiotic. He denies fever, chills, nightsweats. Pain is improving and he is cutting down on oxycodone. he is taking acetaminophen and gabapentin. He has noticed right leg edema since surgery but he has minimal edema in left leg too.     Of note, patient denied any pain in right hip and told me it was an incidental findings " when he complained of left knee pain last year. So, all the history above was based on chart review.        ROS:  CONSTITUTIONAL:NEGATIVE for fever, chills, change in weight  INTEGUMENTARY/SKIN: papular rashes on right thigh ,right leg is edematous , especially around the surgocal site. not tender    EYES: NEGATIVE for vision changes or irritation  ENT/MOUTH: NEGATIVE for ear, mouth and throat problems  RESP:NEGATIVE for significant cough or SOB  CV: NEGATIVE for chest pain, palpitations . worsening bilateral leg edema since surgery. no calf pain . right leg is more edematous than left leg   GI: NEGATIVE for nausea, abdominal pain, heartburn, or change in bowel habits  : NEGATIVE for urinary frequency, hematuria or dysuria  MUSCULOSKELETAL: NEGATIVE for significant arthralgias or myalgia  NEURO: NEGATIVE for weakness, dizziness or paresthesias  ENDOCRINE: NEGATIVE for temperature intolerance  HEME/ALLERGY/IMMUNE: NEGATIVE for bleeding problems  PSYCHIATRIC: NEGATIVE for changes in mood or affect    Problem list and histories reviewed & adjusted, as indicated.  Additional history: as documented    PAST MEDICAL HISTORY:  Patient  has a past medical history of DJD (degenerative joint disease), lumbar, Malignant lymphoma, lymphoplasmacytic (H) (7/1/2021), and Sinus bradycardia.    He has no past medical history of Cerebral infarction (H), Congestive heart failure (H), COPD (chronic obstructive pulmonary disease) (H), Depressive disorder, Diabetes (H), History of blood transfusion, Hypertension, Sleep apnea, Thyroid disease, or Uncomplicated asthma.    PAST SURGICAL HISTORY:  Patient  has a past surgical history that includes tonsillectomy; appendectomy; surgical history of - ; colonoscopy (01/01/2010); TOTAL HIP ARTHROPLASTY (Left, 04/02/2012); TOTAL HIP ARTHROPLASTY (Right, 11/15/2004); biopsy (mass right side); Bone marrow biopsy, bone specimen, needle/trocar (N/A, 06/10/2021); STOMACH SURGERY PROCEDURE UNLISTED  (Appendix); and Arthroplasty revision hip (Right, 4/19/2022).    CURRENT MEDICATIONS:  Current Outpatient Medications   Medication Sig Dispense Refill     acetaminophen (TYLENOL) 325 MG tablet Take 2 tablets (650 mg) by mouth every 4 hours as needed for other 60 tablet 0     aspirin (ASA) 81 MG EC tablet Take 2 tablets (162 mg) by mouth daily 60 tablet 0     atorvastatin (LIPITOR) 20 MG tablet Take 1 tablet (20 mg) by mouth daily (Patient taking differently: Take 20 mg by mouth every morning) 90 tablet 3     docusate sodium (COLACE) 250 MG capsule Take 250 mg by mouth every morning       gabapentin (NEURONTIN) 300 MG capsule Take 1 capsule (300 mg) by mouth 3 times daily 270 capsule 3     Melatonin 10-10 MG TBCR Take 1 tablet by mouth At Bedtime Brand name: Sleep 3       Multiple Vitamins-Minerals (MULTIVITAMIN PO) Take 1 tablet by mouth every morning       oxyCODONE (ROXICODONE) 5 MG tablet Take 1 tablet (5 mg) by mouth every 4 hours as needed 40 tablet 0     polyethylene glycol (MIRALAX) 17 g packet Take 17 g by mouth daily 7 packet 0     senna-docusate (SENOKOT-S/PERICOLACE) 8.6-50 MG tablet Take 1 tablet by mouth 2 times daily 30 tablet 0     SF 5000 PLUS 1.1 % CREA USE TOOTH PASTE TWICE DAILY AS DIRECTED. NOTHING BY MOUTH FOR 30 MINUTES AFTER USE       VITAMIN D (CHOLECALCIFEROL) PO Take 1 tablet by mouth daily       ALLERGY:  Allergies   Allergen Reactions     Seasonal Allergies      IMMUNIZATION HISTORY:  Immunization History   Administered Date(s) Administered     COVID-19,PF,Moderna 03/04/2021, 04/01/2021, 10/20/2021     DTAP (<7y) 01/26/1996     Flu, Unspecified 11/08/1995, 11/01/2013     HEPA 01/26/1996, 09/19/1996     Historical DTP/aP 01/26/1996     Influenza (High Dose) 3 valent vaccine 10/15/2014, 11/18/2015, 10/24/2016, 09/25/2017, 10/17/2018, 09/25/2019, 09/28/2020, 10/20/2021     Influenza (IIV3) PF 11/08/1995, 11/01/2011, 11/01/2013, 10/15/2014     Influenza, Quad, High Dose, Pf, 65yr+ (Fluzone  HD) 09/28/2020     Pneumo Conj 13-V (2010&after) 07/30/2015     Pneumococcal 23 valent 10/15/2014     TD (ADULT, 7+) 01/26/1996, 01/06/2006     TDAP Vaccine (Adacel) 01/27/2011     Td (Adult), Adsorbed 01/26/1996     Tdap (Adacel,Boostrix) 01/27/2011     Zoster vaccine recombinant adjuvanted (SHINGRIX) 04/27/2018, 07/23/2018     Zoster vaccine, live 03/04/2014     SOCIAL HISTORY:  Patient  reports that he has never smoked. He has never used smokeless tobacco. He reports current alcohol use. He reports that he does not use drugs.  , lives in [place]     FAMILY HISTORY:  Patient's family history includes Breast Cancer in his mother and sister; Osteoporosis in his sister; Other Cancer in his father, mother, sister, sister, and sister; Prostate Cancer in his father.    Labs reviewed in EPIC    OBJECTIVE:                                                    BP (!) 143/79   Pulse 54   Temp 97.5  F (36.4  C)   Resp 16   Wt 93 kg (205 lb)   SpO2 98%   BMI 29.41 kg/m   Body mass index is 29.41 kg/m .   GENERAL: alert, oriented and no distress  EYES: Eyes grossly normal to inspection,  and conjunctivae and sclerae normal  NECK: no adenopathy, no asymmetry, masses, or scars and thyroid normal to palpation  RESP: lungs clear to auscultation - no rales, rhonchi or wheezes  CV: regular rate and rhythm, normal S1 S2, no S3 or S4, no murmur, click or rub, no peripheral edema and peripheral pulses strong  ABDOMEN: soft, nontender, no hepatosplenomegaly, no masses and bowel sounds normal  MS: no gross musculoskeletal defects noted, no edema  SKIN: few papular lesions on right upper thigh , surgical site is covered, dry drainage noted on wound dressing. skin around surgical site is edematous , right leg is more swollen than left. left leg with trace edema   NEURO: Normal strength and tone, mentation intact and speech normal  PSYCH: mentation appears normal, affect normal  LYMPH: no cervical, supraclavicular adenopathy    "    ASSESSMENT AND PLAN:                                                      1. 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   - pain in right hip first noted in 2015 , resolved with NSAID  - pain noted again in 2020 , Xray - asymmetric femoral ball in the socket consistent with polyethylene wear superiorly and may be early lysis at the lateral shoulder of the right stem  - on 4/19/22 - s/p revision of Right total hip arthroplasty with head/ liner exchange and acetabular bone grafting. Intra-op findings\" Scarred anatomic tissue planes from previous arthroplasty procedure.  Evidence of polyethylene wear with eccentric wearing of the polyethylene liner.  No evidence of purulence or gross signs of infection.  Large cavitary lesion superior to the acetabular fossa extending from the 10:00 to 2:00 positions, as well as a cavitary lesion consistent with osteolysis medial to the acetabular component.  Evidence of polyethylene wear debris within the acetabular lesions\" .   - 4/19/22 - Intraop right hip tissues - 2 aerobic and 2 anaerobic cultures grew Cutibacterium acnes. He is now referred to Infectious Diseases for further management. Perioperative, he recieved Cefazolin x 3 days. He is currently not on antibiotic.    2. Left Total hip arthroplasty on 4/2/2012    3. Bilateral leg edema R>>L     4. 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 COVID19 in last winter in [place]    Discussion:  Cutibacterium acnes, a commensal skin bacteria, presented as indolent infection and pain many years after R ERIC, causing prosthetic wear and osteolysis.       Plan/Recommendations:   - Plan to start Penicillin G 5 MU 4x/daily = total 20 MU IV daily ( infusion pump) x 6 weeks, then oral suppressive antibiotic Amoxicillin lifelong   - discussed potential " side effects of antibiotic  - will discuss plan with [facility]  - will need PICC line placement   - weekly lab: CMP, CBC diff, CRP   - advised patient to check his BP and weight at home   - advised patient to monitor leg edema. No evidence of calf/ leg pain , advised patient to folow-up with PCP Dr Antonio   - advised patient to call Orthopedic to discuss when the wound dressing can be removed.       Follow-up with me at [facility] on 5/13/22 at 10 am , advised to call if they have any questions/ concerns     Thank You for allowing me to participate in the care of this patient    Rishabh Flanagan MD, M.Med.Sc  Division of Infectious Diseases and International Medicine  [facility]      Time : 70 min spent with patient, chart review, documentation and coordinating care

## 2022-04-27 NOTE — NURSING NOTE
Chief Complaint   Patient presents with     Wound Infection     New Patient Consult - Wound Infection     BP (!) 143/79   Pulse 54   Temp 97.5  F (36.4  C)   Resp 16   Wt 93 kg (205 lb)   SpO2 98%   BMI 29.41 kg/m    Pt not on BP medication, states that high BP is normal for him in the morning, pt is also recovering from hip injury.  Sharad Hedrick on 4/27/2022 at 10:40 AM

## 2022-04-28 ENCOUNTER — TELEPHONE (OUTPATIENT)
Dept: INFECTIOUS DISEASES | Facility: CLINIC | Age: 72
End: 2022-04-28
Payer: COMMERCIAL

## 2022-04-28 ENCOUNTER — HOME INFUSION (PRE-WILLOW HOME INFUSION) (OUTPATIENT)
Dept: PHARMACY | Facility: CLINIC | Age: 72
End: 2022-04-28

## 2022-04-28 DIAGNOSIS — M17.12 PRIMARY OSTEOARTHRITIS OF LEFT KNEE: Primary | ICD-10-CM

## 2022-04-28 DIAGNOSIS — T84.51XA INFECTION ASSOCIATED WITH INTERNAL RIGHT HIP PROSTHESIS, INITIAL ENCOUNTER (H): ICD-10-CM

## 2022-04-28 RX ORDER — ALBUTEROL SULFATE 0.83 MG/ML
2.5 SOLUTION RESPIRATORY (INHALATION)
Status: CANCELLED | OUTPATIENT
Start: 2022-04-28

## 2022-04-28 RX ORDER — EPINEPHRINE 1 MG/ML
0.3 INJECTION, SOLUTION, CONCENTRATE INTRAVENOUS EVERY 5 MIN PRN
Status: CANCELLED | OUTPATIENT
Start: 2022-04-28

## 2022-04-28 RX ORDER — METHYLPREDNISOLONE SODIUM SUCCINATE 125 MG/2ML
125 INJECTION, POWDER, LYOPHILIZED, FOR SOLUTION INTRAMUSCULAR; INTRAVENOUS
Status: CANCELLED
Start: 2022-04-28

## 2022-04-28 RX ORDER — NALOXONE HYDROCHLORIDE 0.4 MG/ML
0.2 INJECTION, SOLUTION INTRAMUSCULAR; INTRAVENOUS; SUBCUTANEOUS
Status: CANCELLED | OUTPATIENT
Start: 2022-04-28

## 2022-04-28 RX ORDER — HEPARIN SODIUM,PORCINE 10 UNIT/ML
5 VIAL (ML) INTRAVENOUS
Status: CANCELLED | OUTPATIENT
Start: 2022-04-28

## 2022-04-28 RX ORDER — HEPARIN SODIUM (PORCINE) LOCK FLUSH IV SOLN 100 UNIT/ML 100 UNIT/ML
5 SOLUTION INTRAVENOUS
Status: CANCELLED | OUTPATIENT
Start: 2022-04-28

## 2022-04-28 RX ORDER — MEPERIDINE HYDROCHLORIDE 25 MG/ML
25 INJECTION INTRAMUSCULAR; INTRAVENOUS; SUBCUTANEOUS EVERY 30 MIN PRN
Status: CANCELLED | OUTPATIENT
Start: 2022-04-28

## 2022-04-28 RX ORDER — DIPHENHYDRAMINE HYDROCHLORIDE 50 MG/ML
50 INJECTION INTRAMUSCULAR; INTRAVENOUS
Status: CANCELLED
Start: 2022-04-28

## 2022-04-28 RX ORDER — ALBUTEROL SULFATE 90 UG/1
1-2 AEROSOL, METERED RESPIRATORY (INHALATION)
Status: CANCELLED
Start: 2022-04-28

## 2022-04-28 NOTE — TELEPHONE ENCOUNTER
Spoke with patient and wife. Infusion quote given by I is not feasible for patient due to financial concerns. Will continue to explore alternate options.

## 2022-04-28 NOTE — TELEPHONE ENCOUNTER
Have attempted to reach patient x3 today to discuss antibiotic plan. No answer, unable to leave voicemail.

## 2022-05-02 ENCOUNTER — TELEPHONE (OUTPATIENT)
Dept: INTERNAL MEDICINE | Facility: CLINIC | Age: 72
End: 2022-05-02
Payer: COMMERCIAL

## 2022-05-02 RX ORDER — LACTOBACILLUS RHAMNOSUS GG 10B CELL
CAPSULE ORAL
COMMUNITY
Start: 2022-05-01

## 2022-05-02 RX ORDER — MULTIPLE VITAMINS W/ MINERALS TAB 9MG-400MCG
TAB ORAL
COMMUNITY
Start: 2022-04-23 | End: 2022-05-13 | Stop reason: ALTCHOICE

## 2022-05-02 RX ORDER — POLYETHYLENE GLYCOL 3350 17 G/17G
POWDER, FOR SOLUTION ORAL
COMMUNITY
Start: 2022-04-23 | End: 2022-05-13

## 2022-05-02 RX ORDER — FLUTICASONE PROPIONATE 50 MCG
SPRAY, SUSPENSION (ML) NASAL
COMMUNITY
Start: 2022-03-01 | End: 2022-07-12

## 2022-05-02 RX ORDER — SODIUM FLUORIDE 5 MG/ML
PASTE, DENTIFRICE DENTAL
COMMUNITY
Start: 2022-04-23 | End: 2022-07-12

## 2022-05-02 NOTE — TELEPHONE ENCOUNTER
Reason for Call:  Form, our goal is to have forms completed with 72 hours, however, some forms may require a visit or additional information.    Type of letter, form or note:  Home Health Certification    Who is the form from?: Home care    Where did the form come from: form was faxed in    What clinic location was the form placed at?: [facility]    Where the form was placed: Given to ALEXANDER Bowles    What number is listed as a contact on the form?:         Additional comments: [facility]    Call taken on 5/2/2022 at 5:58 PM by Allison Shipman

## 2022-05-03 ENCOUNTER — TELEPHONE (OUTPATIENT)
Dept: INFECTIOUS DISEASES | Facility: CLINIC | Age: 72
End: 2022-05-03
Payer: COMMERCIAL

## 2022-05-03 NOTE — TELEPHONE ENCOUNTER
Patient and wife decided to proceed with [facility] despite costs. Patient set up for PICC placement on 5/5 with first dose after. Will use [facility] following.

## 2022-05-03 NOTE — TELEPHONE ENCOUNTER
[facility] Call Center    Phone Message    May a detailed message be left on voicemail: yes     Reason for Call: Other: Patients wife calling stating patients home infusion is not covered by medicare or [facility]. Vickie states if another code other than 67915 is used it may be covered and she is calling to see if another code could be used to see if it will be covered. Please call to discuss thank you.       Action Taken: Message routed to:  [facility] ([facility] infect, dis    Travel Screening: Not Applicable

## 2022-05-03 NOTE — TELEPHONE ENCOUNTER
"Called vickie, not sure if this is a cpt code, she is unsure. Looked it up, unable to verify. Vickie said she spoke to \"Christopher with customer service\" who gave her this information. She has also spoken to [facility] billing regarding this- encouraged her to speak with them if she finds out more regarding this code.    "

## 2022-05-04 NOTE — TELEPHONE ENCOUNTER
Medication reconciliation completed by RN. Form and chart forwarded to PCP for signatures.    Nadira Sepulveda RN

## 2022-05-05 ENCOUNTER — INFUSION THERAPY VISIT (OUTPATIENT)
Dept: INFUSION THERAPY | Facility: CLINIC | Age: 72
End: 2022-05-05
Attending: INTERNAL MEDICINE
Payer: COMMERCIAL

## 2022-05-05 ENCOUNTER — HOME INFUSION (PRE-WILLOW HOME INFUSION) (OUTPATIENT)
Dept: PHARMACY | Facility: CLINIC | Age: 72
End: 2022-05-05

## 2022-05-05 ENCOUNTER — HOSPITAL ENCOUNTER (OUTPATIENT)
Dept: GENERAL RADIOLOGY | Facility: CLINIC | Age: 72
Discharge: HOME | End: 2022-05-05
Attending: INTERNAL MEDICINE
Payer: COMMERCIAL

## 2022-05-05 ENCOUNTER — HOME INFUSION (PRE-WILLOW HOME INFUSION) (OUTPATIENT)
Dept: PHARMACY | Facility: CLINIC | Age: 72
End: 2022-05-05
Payer: COMMERCIAL

## 2022-05-05 VITALS
RESPIRATION RATE: 16 BRPM | OXYGEN SATURATION: 98 % | DIASTOLIC BLOOD PRESSURE: 63 MMHG | SYSTOLIC BLOOD PRESSURE: 112 MMHG | HEIGHT: 71 IN | TEMPERATURE: 97.6 F | BODY MASS INDEX: 27.9 KG/M2 | WEIGHT: 199.3 LBS | HEART RATE: 57 BPM

## 2022-05-05 DIAGNOSIS — M17.12 PRIMARY OSTEOARTHRITIS OF LEFT KNEE: Primary | ICD-10-CM

## 2022-05-05 PROCEDURE — 272N000022 HC KIT PICC W/CATH & MICROINTRODUCER

## 2022-05-05 PROCEDURE — 36569 INSJ PICC 5 YR+ W/O IMAGING: CPT

## 2022-05-05 PROCEDURE — 250N000009 HC RX 250: Performed by: INTERNAL MEDICINE

## 2022-05-05 PROCEDURE — 999N000065 XR CHEST PORT 1 VIEW

## 2022-05-05 PROCEDURE — 96365 THER/PROPH/DIAG IV INF INIT: CPT | Mod: 59

## 2022-05-05 PROCEDURE — 258N000003 HC RX IP 258 OP 636: Performed by: INTERNAL MEDICINE

## 2022-05-05 RX ORDER — MEPERIDINE HYDROCHLORIDE 25 MG/ML
25 INJECTION INTRAMUSCULAR; INTRAVENOUS; SUBCUTANEOUS EVERY 30 MIN PRN
Status: CANCELLED | OUTPATIENT
Start: 2022-05-05

## 2022-05-05 RX ORDER — MEPERIDINE HYDROCHLORIDE 25 MG/ML
25 INJECTION INTRAMUSCULAR; INTRAVENOUS; SUBCUTANEOUS EVERY 30 MIN PRN
Status: DISCONTINUED | OUTPATIENT
Start: 2022-05-05 | End: 2022-05-05 | Stop reason: HOSPADM

## 2022-05-05 RX ORDER — METHYLPREDNISOLONE SODIUM SUCCINATE 125 MG/2ML
125 INJECTION, POWDER, LYOPHILIZED, FOR SOLUTION INTRAMUSCULAR; INTRAVENOUS
Status: CANCELLED
Start: 2022-05-05

## 2022-05-05 RX ORDER — METHYLPREDNISOLONE SODIUM SUCCINATE 125 MG/2ML
125 INJECTION, POWDER, LYOPHILIZED, FOR SOLUTION INTRAMUSCULAR; INTRAVENOUS
Status: DISCONTINUED | OUTPATIENT
Start: 2022-05-05 | End: 2022-05-05 | Stop reason: HOSPADM

## 2022-05-05 RX ORDER — EPINEPHRINE 1 MG/ML
0.3 INJECTION, SOLUTION INTRAMUSCULAR; SUBCUTANEOUS EVERY 5 MIN PRN
Status: DISCONTINUED | OUTPATIENT
Start: 2022-05-05 | End: 2022-05-05 | Stop reason: HOSPADM

## 2022-05-05 RX ORDER — EPINEPHRINE 1 MG/ML
0.3 INJECTION, SOLUTION INTRAMUSCULAR; SUBCUTANEOUS EVERY 5 MIN PRN
Status: CANCELLED | OUTPATIENT
Start: 2022-05-05

## 2022-05-05 RX ORDER — LIDOCAINE 40 MG/G
CREAM TOPICAL
Status: DISCONTINUED | OUTPATIENT
Start: 2022-05-05 | End: 2022-05-05 | Stop reason: HOSPADM

## 2022-05-05 RX ORDER — ALBUTEROL SULFATE 90 UG/1
1-2 AEROSOL, METERED RESPIRATORY (INHALATION)
Status: CANCELLED
Start: 2022-05-05

## 2022-05-05 RX ORDER — ALBUTEROL SULFATE 90 UG/1
1-2 AEROSOL, METERED RESPIRATORY (INHALATION)
Status: DISCONTINUED | OUTPATIENT
Start: 2022-05-05 | End: 2022-05-05 | Stop reason: HOSPADM

## 2022-05-05 RX ORDER — ALBUTEROL SULFATE 0.83 MG/ML
2.5 SOLUTION RESPIRATORY (INHALATION)
Status: CANCELLED | OUTPATIENT
Start: 2022-05-05

## 2022-05-05 RX ORDER — HEPARIN SODIUM (PORCINE) LOCK FLUSH IV SOLN 100 UNIT/ML 100 UNIT/ML
5 SOLUTION INTRAVENOUS
Status: CANCELLED | OUTPATIENT
Start: 2022-05-05

## 2022-05-05 RX ORDER — NALOXONE HYDROCHLORIDE 0.4 MG/ML
0.2 INJECTION, SOLUTION INTRAMUSCULAR; INTRAVENOUS; SUBCUTANEOUS
Status: CANCELLED | OUTPATIENT
Start: 2022-05-05

## 2022-05-05 RX ORDER — DIPHENHYDRAMINE HYDROCHLORIDE 50 MG/ML
50 INJECTION INTRAMUSCULAR; INTRAVENOUS
Status: CANCELLED
Start: 2022-05-05

## 2022-05-05 RX ORDER — DIPHENHYDRAMINE HYDROCHLORIDE 50 MG/ML
50 INJECTION INTRAMUSCULAR; INTRAVENOUS
Status: DISCONTINUED | OUTPATIENT
Start: 2022-05-05 | End: 2022-05-05 | Stop reason: HOSPADM

## 2022-05-05 RX ORDER — NALOXONE HYDROCHLORIDE 0.4 MG/ML
0.2 INJECTION, SOLUTION INTRAMUSCULAR; INTRAVENOUS; SUBCUTANEOUS
Status: DISCONTINUED | OUTPATIENT
Start: 2022-05-05 | End: 2022-05-05 | Stop reason: HOSPADM

## 2022-05-05 RX ORDER — ALBUTEROL SULFATE 0.83 MG/ML
2.5 SOLUTION RESPIRATORY (INHALATION)
Status: DISCONTINUED | OUTPATIENT
Start: 2022-05-05 | End: 2022-05-05 | Stop reason: HOSPADM

## 2022-05-05 RX ORDER — HEPARIN SODIUM,PORCINE 10 UNIT/ML
5 VIAL (ML) INTRAVENOUS
Status: CANCELLED | OUTPATIENT
Start: 2022-05-05

## 2022-05-05 RX ADMIN — SODIUM CHLORIDE 250 ML: 9 INJECTION, SOLUTION INTRAVENOUS at 12:51

## 2022-05-05 RX ADMIN — PENICILLIN G SODIUM 5 MILLION UNITS: 5000000 INJECTION, POWDER, FOR SOLUTION INTRAMUSCULAR; INTRAVENOUS at 13:08

## 2022-05-05 RX ADMIN — LIDOCAINE HYDROCHLORIDE ANHYDROUS 3 ML: 10 INJECTION, SOLUTION INFILTRATION at 11:15

## 2022-05-05 ASSESSMENT — PAIN SCALES - GENERAL: PAINLEVEL: NO PAIN (0)

## 2022-05-05 NOTE — PROGRESS NOTES
[facility]  Procedure Note         PICC      Ag Evans  MRN# [id number]   May 5, 2022, 12:03 PM Indication: Medication administration           Pause for the cause: Consent for catheter placement procedure signed  Time out completed  Patient ID's verified using two distinct indicators  All necessary equipment is presentcompleted with Patsy Lala at 1108   Type of line to be used: PICC   Full barrier precautions used: Yes   Skin preparation: Chlorehexidine Gluconate 25% with isopropyl alcohol 70%   Date of insertion: May 5, 2022, 11:30 AM   Device type: Single lumen, valved, 4.0   Catheter brand: Bard Solo Power   Lot number: [id number]   Insertion location: Left basilic vein  Vein diameter 0.54   Method of placement: Venipuncture  MST  Ultrasound   Number of attempts: With ultrasound: 1   Without ultrasound: 0   Difficulty threading: Yes (see summary)   Midline IV device: Dressing dry and intact  Transparent semmipermeable dressing applied  Chlorhexidine patch  Catheter securement device - Securacath   Arm circumference: Adults 10 cm above AC   Midline extremity circumference: 30 cm   Internal length: 49 cm   Midline visible catheter length: 3 cm to hub   Total catheter length: 52 cm to hub   Tip termination: Low SVC   Method of verification: Chest x-ray   Midline patency post placement: Positive blood return  Flushes without difficulty  Saline locked   Line flush: Line flush documented on the eMAR yes   Placement verified by: Radiologist   Catheter placed by: MARSHALL Lott-BC   Discontinuation form initiated: No   Patient tolerance: Tolerated well   PICC Insertion Education Complete: Yes      Summary:  This procedure was performed without difficulty and he tolerated the procedure well with no noted immediate complications.  A little resistance when first threading, but then resolved.      Recorded by MARSHALL Lott-BC    Attestation:  Amount of time performed on this procedure: 20  minutes.   OK to use for meds, labs and power injection.

## 2022-05-05 NOTE — PROGRESS NOTES
Infusion Nursing Note:  Ag Evans presents today for 1st dose Penicillin G.    Patient seen by provider today: No   present during visit today: Not Applicable.    Note: Patient had Total R hip revision a couple wks ago. Dx R hip prosthetic joint infection & Osteoarthritis in L knee. Denies pain currently. Using crutches when walking. Just had PICC placement for daily IV antibiotic administration. PICC placement verified by CXR prior to antibiotic. No known drug allergies. Pt denies cardiac, Resp, GI/ or thyroid disease. See ONC flowsheet for full assessment. VSS, afebrile.       Intravenous Access:  PICC.  Site WDL, good blood return, flushes easily.     Treatment Conditions:  Not Applicable.      Post Infusion Assessment:  Patient tolerated infusion without incident.  Patient observed for 30 minutes post antibiotic administration per protocol.  Blood return noted pre and post infusion.  Site patent and intact, free from redness, edema or discomfort.  No evidence of extravasations.  Access discontinued per protocol. PICC flushed with NS. Good blood return.      Discharge Plan:   Copy of AVS reviewed with patient and/or family.  Explained signs/symptoms of drug reaction and when to call or present to Emergency Room if he develops any concerning symptoms. I contacted to notify of completion of 1st dose of Penicillin and that patient tolerated it well. They will contact patient to discuss Home Infusion plan and arrange home visit.   Patient discharged in stable condition accompanied by: wife.  Departure Mode: Ambulatory w/crutches.      Julia Garrett RN

## 2022-05-06 ENCOUNTER — LAB REQUISITION (OUTPATIENT)
Dept: LAB | Facility: CLINIC | Age: 72
End: 2022-05-06
Payer: COMMERCIAL

## 2022-05-06 DIAGNOSIS — Z53.9 DIAGNOSIS NOT YET DEFINED: Primary | ICD-10-CM

## 2022-05-06 DIAGNOSIS — C83.00 SMALL CELL B-CELL LYMPHOMA, UNSPECIFIED SITE (H): ICD-10-CM

## 2022-05-06 DIAGNOSIS — T84.51XA INFECTION AND INFLAMMATORY REACTION DUE TO INTERNAL RIGHT HIP PROSTHESIS, INITIAL ENCOUNTER (H): ICD-10-CM

## 2022-05-06 LAB
ALBUMIN SERPL-MCNC: 4 G/DL (ref 3.4–5)
ALP SERPL-CCNC: 79 U/L (ref 40–150)
ALT SERPL W P-5'-P-CCNC: 29 U/L (ref 0–70)
ANION GAP SERPL CALCULATED.3IONS-SCNC: 5 MMOL/L (ref 3–14)
AST SERPL W P-5'-P-CCNC: 14 U/L (ref 0–45)
BASOPHILS # BLD AUTO: 0.1 10E3/UL (ref 0–0.2)
BASOPHILS NFR BLD AUTO: 2 %
BILIRUB SERPL-MCNC: 0.5 MG/DL (ref 0.2–1.3)
BUN SERPL-MCNC: 17 MG/DL (ref 7–30)
CALCIUM SERPL-MCNC: 9 MG/DL (ref 8.5–10.1)
CHLORIDE BLD-SCNC: 108 MMOL/L (ref 94–109)
CO2 SERPL-SCNC: 26 MMOL/L (ref 20–32)
CREAT SERPL-MCNC: 0.69 MG/DL (ref 0.66–1.25)
CRP SERPL-MCNC: 3.3 MG/L (ref 0–8)
EOSINOPHIL # BLD AUTO: 0.5 10E3/UL (ref 0–0.7)
EOSINOPHIL NFR BLD AUTO: 6 %
ERYTHROCYTE [DISTWIDTH] IN BLOOD BY AUTOMATED COUNT: 13.1 % (ref 10–15)
GFR SERPL CREATININE-BSD FRML MDRD: >90 ML/MIN/1.73M2
GLUCOSE BLD-MCNC: 95 MG/DL (ref 70–99)
HCT VFR BLD AUTO: 37.1 % (ref 40–53)
HGB BLD-MCNC: 12.7 G/DL (ref 13.3–17.7)
IMM GRANULOCYTES # BLD: 0 10E3/UL
IMM GRANULOCYTES NFR BLD: 0 %
LYMPHOCYTES # BLD AUTO: 0.8 10E3/UL (ref 0.8–5.3)
LYMPHOCYTES NFR BLD AUTO: 11 %
MCH RBC QN AUTO: 34.2 PG (ref 26.5–33)
MCHC RBC AUTO-ENTMCNC: 34.2 G/DL (ref 31.5–36.5)
MCV RBC AUTO: 100 FL (ref 78–100)
MONOCYTES # BLD AUTO: 1 10E3/UL (ref 0–1.3)
MONOCYTES NFR BLD AUTO: 12 %
NEUTROPHILS # BLD AUTO: 5.3 10E3/UL (ref 1.6–8.3)
NEUTROPHILS NFR BLD AUTO: 69 %
NRBC # BLD AUTO: 0 10E3/UL
NRBC BLD AUTO-RTO: 0 /100
PLATELET # BLD AUTO: 360 10E3/UL (ref 150–450)
POTASSIUM BLD-SCNC: 4.1 MMOL/L (ref 3.4–5.3)
PROT SERPL-MCNC: 7.1 G/DL (ref 6.8–8.8)
RBC # BLD AUTO: 3.71 10E6/UL (ref 4.4–5.9)
SODIUM SERPL-SCNC: 139 MMOL/L (ref 133–144)
WBC # BLD AUTO: 7.7 10E3/UL (ref 4–11)

## 2022-05-06 PROCEDURE — 80053 COMPREHEN METABOLIC PANEL: CPT | Performed by: INTERNAL MEDICINE

## 2022-05-06 PROCEDURE — 86140 C-REACTIVE PROTEIN: CPT | Performed by: INTERNAL MEDICINE

## 2022-05-06 PROCEDURE — G0180 MD CERTIFICATION HHA PATIENT: HCPCS | Performed by: INTERNAL MEDICINE

## 2022-05-06 PROCEDURE — 85025 COMPLETE CBC W/AUTO DIFF WBC: CPT | Performed by: INTERNAL MEDICINE

## 2022-05-09 DIAGNOSIS — Z53.9 DIAGNOSIS NOT YET DEFINED: Primary | ICD-10-CM

## 2022-05-09 PROCEDURE — G0180 MD CERTIFICATION HHA PATIENT: HCPCS | Performed by: INTERNAL MEDICINE

## 2022-05-11 ENCOUNTER — LAB REQUISITION (OUTPATIENT)
Dept: LAB | Facility: CLINIC | Age: 72
End: 2022-05-11
Payer: COMMERCIAL

## 2022-05-11 LAB
ALBUMIN SERPL-MCNC: 3.7 G/DL (ref 3.4–5)
ALP SERPL-CCNC: 77 U/L (ref 40–150)
ALT SERPL W P-5'-P-CCNC: 23 U/L (ref 0–70)
ANION GAP SERPL CALCULATED.3IONS-SCNC: 6 MMOL/L (ref 3–14)
AST SERPL W P-5'-P-CCNC: 15 U/L (ref 0–45)
BASOPHILS # BLD AUTO: 0.1 10E3/UL (ref 0–0.2)
BASOPHILS NFR BLD AUTO: 2 %
BILIRUB SERPL-MCNC: 0.4 MG/DL (ref 0.2–1.3)
BUN SERPL-MCNC: 17 MG/DL (ref 7–30)
CALCIUM SERPL-MCNC: 8.8 MG/DL (ref 8.5–10.1)
CHLORIDE BLD-SCNC: 108 MMOL/L (ref 94–109)
CO2 SERPL-SCNC: 26 MMOL/L (ref 20–32)
CREAT SERPL-MCNC: 0.7 MG/DL (ref 0.66–1.25)
CRP SERPL-MCNC: <2.9 MG/L (ref 0–8)
EOSINOPHIL # BLD AUTO: 0.5 10E3/UL (ref 0–0.7)
EOSINOPHIL NFR BLD AUTO: 7 %
ERYTHROCYTE [DISTWIDTH] IN BLOOD BY AUTOMATED COUNT: 12.9 % (ref 10–15)
GFR SERPL CREATININE-BSD FRML MDRD: >90 ML/MIN/1.73M2
GLUCOSE BLD-MCNC: 119 MG/DL (ref 70–99)
HCT VFR BLD AUTO: 35.9 % (ref 40–53)
HGB BLD-MCNC: 12.5 G/DL (ref 13.3–17.7)
IMM GRANULOCYTES # BLD: 0 10E3/UL
IMM GRANULOCYTES NFR BLD: 0 %
LYMPHOCYTES # BLD AUTO: 0.9 10E3/UL (ref 0.8–5.3)
LYMPHOCYTES NFR BLD AUTO: 13 %
MCH RBC QN AUTO: 34.5 PG (ref 26.5–33)
MCHC RBC AUTO-ENTMCNC: 34.8 G/DL (ref 31.5–36.5)
MCV RBC AUTO: 99 FL (ref 78–100)
MONOCYTES # BLD AUTO: 0.6 10E3/UL (ref 0–1.3)
MONOCYTES NFR BLD AUTO: 8 %
NEUTROPHILS # BLD AUTO: 4.8 10E3/UL (ref 1.6–8.3)
NEUTROPHILS NFR BLD AUTO: 70 %
NRBC # BLD AUTO: 0 10E3/UL
NRBC BLD AUTO-RTO: 0 /100
PLATELET # BLD AUTO: 272 10E3/UL (ref 150–450)
POTASSIUM BLD-SCNC: 4.1 MMOL/L (ref 3.4–5.3)
PROT SERPL-MCNC: 6.6 G/DL (ref 6.8–8.8)
RBC # BLD AUTO: 3.62 10E6/UL (ref 4.4–5.9)
SODIUM SERPL-SCNC: 140 MMOL/L (ref 133–144)
WBC # BLD AUTO: 6.9 10E3/UL (ref 4–11)

## 2022-05-11 PROCEDURE — 86140 C-REACTIVE PROTEIN: CPT | Mod: ORL | Performed by: INTERNAL MEDICINE

## 2022-05-11 PROCEDURE — 36592 COLLECT BLOOD FROM PICC: CPT | Mod: ORL | Performed by: INTERNAL MEDICINE

## 2022-05-11 PROCEDURE — 85025 COMPLETE CBC W/AUTO DIFF WBC: CPT | Mod: ORL | Performed by: INTERNAL MEDICINE

## 2022-05-11 PROCEDURE — 80053 COMPREHEN METABOLIC PANEL: CPT | Mod: ORL | Performed by: INTERNAL MEDICINE

## 2022-05-11 NOTE — DISCHARGE SUMMARY
Hospital discharge summary    Admission date April 19, 2022, discharge date April 21, 2022.    Admitting diagnosis:  1. History of malignant lymphoma  2. On chemotherapy  3. Peripheral neuropathy  4. Degenerative joint disease of right knee  5. 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  Revision right total hip arthroplasty and placement of new very surfaces was undertaken along with acetabular bone grafting of the osteolytic defect.      Patient was admitted to the hospital postoperatively for routine nursing care and rehabilitation.    Discharged without complication on April 21, 2022    Patient will be seen by me in the office 1 month after discharge.    MD Heidi Sabillon Family Professor  Oncology and Adult Reconstructive Surgery  Dept Orthopaedic Surgery, [facility]  [phone] office, [phone] pager  [email or web address]

## 2022-05-12 ENCOUNTER — HOME INFUSION (PRE-WILLOW HOME INFUSION) (OUTPATIENT)
Dept: PHARMACY | Facility: CLINIC | Age: 72
End: 2022-05-12

## 2022-05-12 DIAGNOSIS — T84.069D PROSTHETIC WEAR FOLLOWING TOTAL HIP ARTHROPLASTY, SUBSEQUENT ENCOUNTER: Primary | ICD-10-CM

## 2022-05-12 DIAGNOSIS — Z96.649 PROSTHETIC WEAR FOLLOWING TOTAL HIP ARTHROPLASTY, SUBSEQUENT ENCOUNTER: Primary | ICD-10-CM

## 2022-05-13 ENCOUNTER — OFFICE VISIT (OUTPATIENT)
Dept: INFECTIOUS DISEASES | Facility: CLINIC | Age: 72
End: 2022-05-13
Payer: COMMERCIAL

## 2022-05-13 VITALS
OXYGEN SATURATION: 97 % | HEART RATE: 55 BPM | SYSTOLIC BLOOD PRESSURE: 151 MMHG | BODY MASS INDEX: 28.15 KG/M2 | WEIGHT: 199 LBS | DIASTOLIC BLOOD PRESSURE: 78 MMHG

## 2022-05-13 DIAGNOSIS — R60.0 LEG EDEMA, RIGHT: ICD-10-CM

## 2022-05-13 DIAGNOSIS — Z96.649 PROSTHETIC WEAR FOLLOWING TOTAL HIP ARTHROPLASTY, SUBSEQUENT ENCOUNTER: Primary | ICD-10-CM

## 2022-05-13 DIAGNOSIS — T84.069D PROSTHETIC WEAR FOLLOWING TOTAL HIP ARTHROPLASTY, SUBSEQUENT ENCOUNTER: Primary | ICD-10-CM

## 2022-05-13 DIAGNOSIS — A49.8 INFECTION DUE TO CUTIBACTERIUM SPECIES: ICD-10-CM

## 2022-05-13 PROCEDURE — 99214 OFFICE O/P EST MOD 30 MIN: CPT | Performed by: INTERNAL MEDICINE

## 2022-05-13 NOTE — PROGRESS NOTES
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     He saw  Orthopedic, on 11/19/2015 for a few days of right hip pain, radiating to the groin. pain resolved with NSAID in a few days.       He saw Orthopedic, Dr Adam Riggs on 10/8/2020 for dull pain 1/10 in the right buttock down the back of his thigh associated with numbness. Per note, patient was quite active with exercises, golf, pickleball.  He took occasional ibuprofen and used ice at times. X-rays of the pelvis and lateral both hips were obtained and Images showed Left total hip arthroplasty was in good position with no sign of loosening or wear.  Right total hip arthroplasty showed asymmetric femoral ball in the socket consistent with polyethylene wear superiorly and may be early lysis at the lateral shoulder of the right stem, but most of the stem was quite solid. He saw Dr Riggs again on 10/5/21 and still had similar pain -  a dull pain in the right buttock down the back of his thigh associated with numbness. He also had a sensation of the right hip feeling unstable, a few months earlier so he had used a strap support since then, with relief of the symptoms. At that time, he also complained of left knee pain with climbing stairs.Left knee pain has resolved.  Xray showed on 10/5/21 showed Bilateral hip arthroplasties. Mild heterotopic ossification adjacent to the right hip  "arthroplasty. No acute fracture identified. and Xray left knee on 10/5/21 showed No fracture. Chondrocalcinosis, consistent with calcium pyrophosphate deposition disease.  Minimal joint effusion. Mild medial compartment and patellar osteophytosis.      He was referred to Dr Joy on 11/4/2021 for particulate wear debris with osteolysis behind right acetabular cup without evidence of loosening.  Implant was at risk for periprosthetic fracture or loosening and recommended revision hip arthroplasty consisting of intralesional curettage of the acetabular defect with allograft bone impaction grafting,  exchange and revision of acetabular bearing surface and femoral head.  On 3/21/22, per note, he had mild discomfort in right hip but was still independent with activities of daily living.      On 4/19/22 He had Revision Right total hip arthroplasty with head/ liner exchange and acetabular bone grafting. Intra-op findings\" Scarred anatomic tissue planes from previous arthroplasty procedure.  Evidence of polyethylene wear with eccentric wearing of the polyethylene liner.  No evidence of purulence or gross signs of infection.  Large cavitary lesion superior to the acetabular fossa extending from the 10:00 to 2:00 positions, as well as a cavitary lesion consistent with osteolysis medial to the acetabular component.  Evidence of polyethylene wear debris within the acetabular lesions\" . Intraop right hip tissues - 2 aerobic and 2a naerobic cultures grew Cutibacterium acnes. He is now referred to Infectious Diseases for further management. Perioperative, he recieved Cefazolin x 3 days. He is currently not on antibiotic. He denies fever, chills, nightsweats. Pain is improving and he is cutting down on oxycodone. he is taking acetaminophen and gabapentin. He has noticed right leg edema since surgery but he has minimal edema in left leg too.     Of note, patient denied any pain in right hip and told me it was an incidental findings " when he complained of left knee pain last year. So, all the history above was based on chart review.        ROS:  CONSTITUTIONAL:NEGATIVE for fever, chills, change in weight  INTEGUMENTARY/SKIN: papular rashes on right thigh ,right leg is edematous , especially around the surgocal site. not tender    EYES: NEGATIVE for vision changes or irritation  ENT/MOUTH: NEGATIVE for ear, mouth and throat problems  RESP:NEGATIVE for significant cough or SOB  CV: NEGATIVE for chest pain, palpitations . worsening bilateral leg edema since surgery. no calf pain . right leg is more edematous than left leg   GI: NEGATIVE for nausea, abdominal pain, heartburn, or change in bowel habits  : NEGATIVE for urinary frequency, hematuria or dysuria  MUSCULOSKELETAL: NEGATIVE for significant arthralgias or myalgia  NEURO: NEGATIVE for weakness, dizziness or paresthesias  ENDOCRINE: NEGATIVE for temperature intolerance  HEME/ALLERGY/IMMUNE: NEGATIVE for bleeding problems  PSYCHIATRIC: NEGATIVE for changes in mood or affect    Clinic visit on 5/13/22   Ag feels good. no pain. tolerates Penicillin well. no pain around PICC .   Uses walker to move around.     Problem list and histories reviewed & adjusted, as indicated.  Additional history: as documented    PAST MEDICAL HISTORY:  Patient  has a past medical history of DJD (degenerative joint disease), lumbar, Malignant lymphoma, lymphoplasmacytic (H) (7/1/2021), and Sinus bradycardia.    He has no past medical history of Cerebral infarction (H), Congestive heart failure (H), COPD (chronic obstructive pulmonary disease) (H), Depressive disorder, Diabetes (H), History of blood transfusion, Hypertension, Sleep apnea, Thyroid disease, or Uncomplicated asthma.    PAST SURGICAL HISTORY:  Patient  has a past surgical history that includes tonsillectomy; appendectomy; surgical history of - ; colonoscopy (01/01/2010); TOTAL HIP ARTHROPLASTY (Left, 04/02/2012); TOTAL HIP ARTHROPLASTY (Right,  11/15/2004); biopsy (mass right side); Bone marrow biopsy, bone specimen, needle/trocar (N/A, 06/10/2021); STOMACH SURGERY PROCEDURE UNLISTED (Appendix); and Arthroplasty revision hip (Right, 4/19/2022).    CURRENT MEDICATIONS:  Current Outpatient Medications   Medication Sig Dispense Refill     aspirin (ASA) 81 MG EC tablet Take 2 tablets (162 mg) by mouth daily 60 tablet 0     atorvastatin (LIPITOR) 20 MG tablet Take 1 tablet (20 mg) by mouth daily (Patient taking differently: Take 20 mg by mouth every morning) 90 tablet 3     docusate sodium (COLACE) 250 MG capsule Take 250 mg by mouth every morning       fluticasone (FLONASE) 50 MCG/ACT nasal spray        gabapentin (NEURONTIN) 300 MG capsule Take 1 capsule (300 mg) by mouth 3 times daily 270 capsule 3     lactobacillus rhamnosus, GG, (CULTURELL) capsule        Melatonin 10-10 MG TBCR Take 1 tablet by mouth At Bedtime Brand name: Sleep 3       Multiple Vitamins-Minerals (MULTIVITAMIN PO) Take 1 tablet by mouth every morning       SF 5000 PLUS 1.1 % CREA USE TOOTH PASTE TWICE DAILY AS DIRECTED. NOTHING BY MOUTH FOR 30 MINUTES AFTER USE       sodium fluoride (SF 5000 PLUS) 1.1 % CREA [id number]  SF 5000 Plus 1.1% topical cream Apply 1 application topical 2 times a day       VITAMIN D (CHOLECALCIFEROL) PO Take 1 tablet by mouth daily       polyethylene glycol (MIRALAX) 17 g packet Take 17 g by mouth daily 7 packet 0     ALLERGY:  Allergies   Allergen Reactions     Seasonal Allergies      IMMUNIZATION HISTORY:  Immunization History   Administered Date(s) Administered     COVID-19,PF,Moderna 03/04/2021, 04/01/2021, 10/20/2021     DTAP (<7y) 01/26/1996     Flu, Unspecified 11/08/1995, 11/01/2013     HEPA 01/26/1996, 09/19/1996     Historical DTP/aP 01/26/1996     Influenza (High Dose) 3 valent vaccine 10/15/2014, 11/18/2015, 10/24/2016, 09/25/2017, 10/17/2018, 09/25/2019, 09/28/2020, 10/20/2021     Influenza (IIV3) PF 11/08/1995, 11/01/2011, 11/01/2013, 10/15/2014      Influenza, Quad, High Dose, Pf, 65yr+ (Fluzone HD) 09/28/2020, 10/20/2021     Pneumo Conj 13-V (2010&after) 07/30/2015     Pneumococcal 23 valent 10/15/2014     TD (ADULT, 7+) 01/26/1996, 01/06/2006     TDAP Vaccine (Adacel) 01/27/2011     Td (Adult), Adsorbed 01/26/1996     Tdap (Adacel,Boostrix) 01/27/2011     Zoster vaccine recombinant adjuvanted (SHINGRIX) 04/27/2018, 07/23/2018     Zoster vaccine, live 03/04/2014     SOCIAL HISTORY:  Patient  reports that he has never smoked. He has never used smokeless tobacco. He reports current alcohol use. He reports that he does not use drugs.  , lives in [place]     FAMILY HISTORY:  Patient's family history includes Breast Cancer in his mother and sister; Osteoporosis in his sister; Other Cancer in his father, mother, sister, sister, and sister; Prostate Cancer in his father.    Labs reviewed in EPIC    OBJECTIVE:                                                    BP (!) 151/78 (BP Location: Right arm, Patient Position: Sitting, Cuff Size: Adult Large)   Pulse 55   Wt 90.3 kg (199 lb)   SpO2 97%   BMI 28.15 kg/m   Body mass index is 28.15 kg/m .   GENERAL: alert, oriented and no distress  EYES: Eyes grossly normal to inspection,  and conjunctivae and sclerae normal  NECK: no adenopathy, no asymmetry, masses, or scars and thyroid normal to palpation  Resp: no cough. breathing comfortably on room air   CV: regular rate and rhythm, normal S1 S2, no murmur, click or rub, trace peripheral edema ( right leg) and no edema on the left leg   ABDOMEN: soft, nontender  MS: no gross musculoskeletal defects noted, no edema  SKIN: surgical site is covered, dry drainage noted on wound dressing. skin around surgical site edema- resolved, non tender   NEURO: Normal strength and tone, mentation intact and speech normal  PSYCH: mentation appears normal, affect normal  PICC : left arm : good        ASSESSMENT AND PLAN:                                                      1.  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   - pain in right hip first noted in 2015 , resolved with NSAID  - pain noted again in 2020 , Xray - asymmetric femoral ball in the socket consistent with polyethylene wear superiorly and may be early lysis at the lateral shoulder of the right stem  - on 4/19/22 - s/p revision of Right total hip arthroplasty with head/ liner exchange and acetabular bone grafting. Intra-op findings\" Scarred anatomic tissue planes from previous arthroplasty procedure.  Evidence of polyethylene wear with eccentric wearing of the polyethylene liner.  No evidence of purulence or gross signs of infection.  Large cavitary lesion superior to the acetabular fossa extending from the 10:00 to 2:00 positions, as well as a cavitary lesion consistent with osteolysis medial to the acetabular component.  Evidence of polyethylene wear debris within the acetabular lesions\" .   - 4/19/22 - Intraop right hip tissues - 2 aerobic and 2 anaerobic cultures grew Cutibacterium acnes. He is now referred to Infectious Diseases for further management. Perioperative, he recieved Cefazolin x 3 days. He is currently not on antibiotic.  - started Pen G on 5/5/22 ( duration 6 weeks) --> 6/16/22    2. Left Total hip arthroplasty on 4/2/2012    3. Bilateral leg edema R>>L     4. 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 COVID19 in last winter in [place]    Discussion:  Cutibacterium acnes, a commensal skin bacteria, presented as indolent infection and pain many years after R ERIC, causing prosthetic wear and osteolysis.       Plan/Recommendations:   - Penicillin G 5 MU 4x/daily = total 20 MU IV daily ( infusion pump) x 6 weeks, ( 6/16/22) then oral suppressive antibiotic Amoxicillin lifelong   - discussed potential side effects of " antibiotic  - weekly lab: CMP, CBC diff, CRP       Follow-up with me at [facility] on 6/10/22 , advised to call if they have any questions/ concerns     Rishabh Flanagan MD, M.Med.Sc  Division of Infectious Diseases and International Medicine  [facility]      Time : 30 min spent with patient, chart review, documentation and coordinating care

## 2022-05-13 NOTE — NURSING NOTE
"Ag Evans's goals for this visit include:   Chief Complaint   Patient presents with     RECHECK     Follow-up Infection associated with internal right hip prosthesis,       PCP: Jose Antonio    Referring Provider:  Referred Self, MD  No address on file      Initial BP (!) 151/78 (BP Location: Right arm, Patient Position: Sitting, Cuff Size: Adult Large)   Pulse 55   Wt 90.3 kg (199 lb)   SpO2 97%   BMI 28.15 kg/m   Estimated body mass index is 28.15 kg/m  as calculated from the following:    Height as of 5/5/22: 1.791 m (5' 10.5\").    Weight as of this encounter: 90.3 kg (199 lb).    Medication Reconciliation: complete    Do you need any medication refills at today's visit? none    HERMAN Bull  Rheumatology/Infectious disease  [facility]   [phone]    "

## 2022-05-14 ASSESSMENT — ACTIVITIES OF DAILY LIVING (ADL)
ADL_SUBSCALE_SCORE: 47.06
ADL_SUM: 36
ADL_MEAN: 2.12

## 2022-05-16 NOTE — NURSING NOTE
"Ag Evans's goals for this visit include:   Chief Complaint   Patient presents with     RECHECK     Follow-up on Prosthetic wear following total hip arthroplasty       PCP: Jose Antonio    Referring Provider:  No referring provider defined for this encounter.      Initial /75 (BP Location: Right arm, Patient Position: Sitting, Cuff Size: Adult Regular)   Pulse 53   Wt 90.3 kg (199 lb)   SpO2 97%   BMI 28.15 kg/m   Estimated body mass index is 28.15 kg/m  as calculated from the following:    Height as of 5/5/22: 1.791 m (5' 10.5\").    Weight as of this encounter: 90.3 kg (199 lb).    Medication Reconciliation: complete    Do you need any medication refills at today's visit? none    HERMAN Bull  Rheumatology/Infectious disease  [facility]   [phone]    "

## 2022-05-18 ENCOUNTER — LAB REQUISITION (OUTPATIENT)
Dept: LAB | Facility: CLINIC | Age: 72
End: 2022-05-18
Payer: COMMERCIAL

## 2022-05-18 DIAGNOSIS — T84.51XA INFECTION AND INFLAMMATORY REACTION DUE TO INTERNAL RIGHT HIP PROSTHESIS, INITIAL ENCOUNTER (H): ICD-10-CM

## 2022-05-18 LAB
ALBUMIN SERPL-MCNC: 3.8 G/DL (ref 3.4–5)
ALP SERPL-CCNC: 75 U/L (ref 40–150)
ALT SERPL W P-5'-P-CCNC: 24 U/L (ref 0–70)
ANION GAP SERPL CALCULATED.3IONS-SCNC: 6 MMOL/L (ref 3–14)
AST SERPL W P-5'-P-CCNC: 13 U/L (ref 0–45)
BASOPHILS # BLD AUTO: 0.1 10E3/UL (ref 0–0.2)
BASOPHILS NFR BLD AUTO: 1 %
BILIRUB SERPL-MCNC: 0.5 MG/DL (ref 0.2–1.3)
BUN SERPL-MCNC: 21 MG/DL (ref 7–30)
CALCIUM SERPL-MCNC: 9.2 MG/DL (ref 8.5–10.1)
CHLORIDE BLD-SCNC: 108 MMOL/L (ref 94–109)
CO2 SERPL-SCNC: 26 MMOL/L (ref 20–32)
CREAT SERPL-MCNC: 0.72 MG/DL (ref 0.66–1.25)
CRP SERPL-MCNC: <2.9 MG/L (ref 0–8)
EOSINOPHIL # BLD AUTO: 0.5 10E3/UL (ref 0–0.7)
EOSINOPHIL NFR BLD AUTO: 7 %
ERYTHROCYTE [DISTWIDTH] IN BLOOD BY AUTOMATED COUNT: 12.8 % (ref 10–15)
GFR SERPL CREATININE-BSD FRML MDRD: >90 ML/MIN/1.73M2
GLUCOSE BLD-MCNC: 116 MG/DL (ref 70–99)
HCT VFR BLD AUTO: 38.3 % (ref 40–53)
HGB BLD-MCNC: 13.1 G/DL (ref 13.3–17.7)
IMM GRANULOCYTES # BLD: 0 10E3/UL
IMM GRANULOCYTES NFR BLD: 0 %
LYMPHOCYTES # BLD AUTO: 0.8 10E3/UL (ref 0.8–5.3)
LYMPHOCYTES NFR BLD AUTO: 12 %
MCH RBC QN AUTO: 34 PG (ref 26.5–33)
MCHC RBC AUTO-ENTMCNC: 34.2 G/DL (ref 31.5–36.5)
MCV RBC AUTO: 100 FL (ref 78–100)
MONOCYTES # BLD AUTO: 0.7 10E3/UL (ref 0–1.3)
MONOCYTES NFR BLD AUTO: 10 %
NEUTROPHILS # BLD AUTO: 4.8 10E3/UL (ref 1.6–8.3)
NEUTROPHILS NFR BLD AUTO: 70 %
NRBC # BLD AUTO: 0 10E3/UL
NRBC BLD AUTO-RTO: 0 /100
PLATELET # BLD AUTO: 219 10E3/UL (ref 150–450)
POTASSIUM BLD-SCNC: 3.9 MMOL/L (ref 3.4–5.3)
PROT SERPL-MCNC: 6.8 G/DL (ref 6.8–8.8)
RBC # BLD AUTO: 3.85 10E6/UL (ref 4.4–5.9)
SODIUM SERPL-SCNC: 140 MMOL/L (ref 133–144)
WBC # BLD AUTO: 6.8 10E3/UL (ref 4–11)

## 2022-05-18 PROCEDURE — 85025 COMPLETE CBC W/AUTO DIFF WBC: CPT | Performed by: INTERNAL MEDICINE

## 2022-05-18 PROCEDURE — 80053 COMPREHEN METABOLIC PANEL: CPT | Performed by: INTERNAL MEDICINE

## 2022-05-18 PROCEDURE — 36591 DRAW BLOOD OFF VENOUS DEVICE: CPT | Performed by: INTERNAL MEDICINE

## 2022-05-18 PROCEDURE — 86140 C-REACTIVE PROTEIN: CPT | Performed by: INTERNAL MEDICINE

## 2022-05-19 ENCOUNTER — HOME INFUSION (PRE-WILLOW HOME INFUSION) (OUTPATIENT)
Dept: PHARMACY | Facility: CLINIC | Age: 72
End: 2022-05-19

## 2022-05-19 ENCOUNTER — OFFICE VISIT (OUTPATIENT)
Dept: ORTHOPEDICS | Facility: CLINIC | Age: 72
End: 2022-05-19
Payer: COMMERCIAL

## 2022-05-19 ENCOUNTER — ANCILLARY PROCEDURE (OUTPATIENT)
Dept: GENERAL RADIOLOGY | Facility: CLINIC | Age: 72
End: 2022-05-19
Attending: ORTHOPAEDIC SURGERY
Payer: COMMERCIAL

## 2022-05-19 DIAGNOSIS — Z96.649 PROSTHETIC WEAR FOLLOWING TOTAL HIP ARTHROPLASTY, SUBSEQUENT ENCOUNTER: Primary | ICD-10-CM

## 2022-05-19 DIAGNOSIS — T84.069D PROSTHETIC WEAR FOLLOWING TOTAL HIP ARTHROPLASTY, SUBSEQUENT ENCOUNTER: ICD-10-CM

## 2022-05-19 DIAGNOSIS — Z96.649 PROSTHETIC WEAR FOLLOWING TOTAL HIP ARTHROPLASTY, SUBSEQUENT ENCOUNTER: ICD-10-CM

## 2022-05-19 DIAGNOSIS — T84.069D PROSTHETIC WEAR FOLLOWING TOTAL HIP ARTHROPLASTY, SUBSEQUENT ENCOUNTER: Primary | ICD-10-CM

## 2022-05-19 PROCEDURE — 73502 X-RAY EXAM HIP UNI 2-3 VIEWS: CPT | Performed by: RADIOLOGY

## 2022-05-19 PROCEDURE — 99024 POSTOP FOLLOW-UP VISIT: CPT | Performed by: ORTHOPAEDIC SURGERY

## 2022-05-19 RX ORDER — PENICILLIN G POTASSIUM 20000000 [IU]/1
INJECTION, POWDER, FOR SOLUTION INTRAVENOUS
COMMUNITY
Start: 2022-05-13 | End: 2022-07-12

## 2022-05-19 NOTE — PROGRESS NOTES
[facility]  Orthopaedic Surgery, Joint Replacement Consultation  by Baljit Joy M.D.    Ag Evans MRN# [id number]   Age: 71 year old YOB: 1950     Requesting physician: No ref. provider found  Jose Antonio       Background history:  DX:  1. Status post bilateral total hip arthroplasty  2. History of lymphoma    TREATMENTS:  1. 2005, Right, ERIC, Dr. Jose العلي, [facility]   2. 2011, Left ERIC, Dr. Macario, [facility]   3. 4/19/2022, Revision R ERIC (Rufino) [facility] C Acnes 2/5 cultures     Returns for post-op check.  States he is recuperating well.  Maintaining toe-touch weightbearing status in the right lower extremity.  No fevers chills or active drainage from the incision.    Examination:  Incision is well-healed.  Bandages are removed.  No pain with motion of the hip joint.    Radiographs demonstrate implant satisfactory position without complication.    intraop cultures noted above.  Saw ID Dr Carrillo Mcleod who advised Penicillin G 5 MU 4x/daily = total 20 MU IV daily ( infusion pump) x 6 weeks, ( 6/16/22) then oral suppressive antibiotic Amoxicillin lifelong     Impression and plan:  1.  Maintain toe-touch weightbearing status for 3 months total after surgery.  On July 19, 2022 he may progress to full weightbearing by slowly using a cane in the left hand and discontinue any several weeks later when he is secure ambulating independently.  He may resume driving when he is able to ambulate dependently.  2.  Follow-up in October 2022 for reevaluation with CT scan of pelvis before hand.  Patient also notes he is undergoing scans for his cancer follow-up at that time and I suggested that he coordinate the CT scan performed for his follow-up cancer surveillance at the same time that we perform CT scan of his right pelvis.  He will be seeing his oncologist in November.    Baljit Joy MD  [facility] Family Professor  Oncology and Adult Reconstructive Surgery  Dept  Orthopaedic Surgery, [facility]  [phone] office, [phone] pager  [email or web address]

## 2022-05-19 NOTE — LETTER
5/19/2022         RE: Ag Evans  [address]   [place] [address]        Dear Colleague,    Thank you for referring your patient, Ag Evans, to the [facility] [place]. Please see a copy of my visit note below.        [facility]  Orthopaedic Surgery, Joint Replacement Consultation  by Baljit Joy M.D.    Ag Evans MRN# [id number]   Age: 71 year old YOB: 1950     Requesting physician: No ref. provider found  Jose Antonio       Background history:  DX:  1. Status post bilateral total hip arthroplasty  2. History of lymphoma    TREATMENTS:  1. 2005, Right, ERIC, Dr. Jose العلي, [facility]   2. 2011, Left ERIC, Dr. Macario, [facility]   3. 4/19/2022, Revision R ERIC (Rufino) [facility] C Acnes 2/5 cultures     Returns for post-op check.  States he is recuperating well.  Maintaining toe-touch weightbearing status in the right lower extremity.  No fevers chills or active drainage from the incision.    Examination:  Incision is well-healed.  Bandages are removed.  No pain with motion of the hip joint.    Radiographs demonstrate implant satisfactory position without complication.    intraop cultures noted above.  Saw ID Dr Carrillo Mcleod who advised Penicillin G 5 MU 4x/daily = total 20 MU IV daily ( infusion pump) x 6 weeks, ( 6/16/22) then oral suppressive antibiotic Amoxicillin lifelong     Impression and plan:  1.  Maintain toe-touch weightbearing status for 3 months total after surgery.  On July 19, 2022 he may progress to full weightbearing by slowly using a cane in the left hand and discontinue any several weeks later when he is secure ambulating independently.  He may resume driving when he is able to ambulate dependently.  2.  Follow-up in October 2022 for reevaluation with CT scan of pelvis before hand.  Patient also notes he is undergoing scans for his cancer follow-up at that time and I suggested that he coordinate the CT scan  performed for his follow-up cancer surveillance at the same time that we perform CT scan of his right pelvis.  He will be seeing his oncologist in November.    MD Heidi Sabillon Family Professor  Oncology and Adult Reconstructive Surgery  Dept Orthopaedic Surgery, [facility]  [phone] office, [phone] pager  [email or web address]

## 2022-05-23 ENCOUNTER — HOME INFUSION (PRE-WILLOW HOME INFUSION) (OUTPATIENT)
Dept: PHARMACY | Facility: CLINIC | Age: 72
End: 2022-05-23

## 2022-05-24 ENCOUNTER — TELEPHONE (OUTPATIENT)
Dept: INTERNAL MEDICINE | Facility: CLINIC | Age: 72
End: 2022-05-24

## 2022-05-24 NOTE — TELEPHONE ENCOUNTER
Reason for Call:  Form, our goal is to have forms completed with 72 hours, however, some forms may require a visit or additional information.    Type of letter, form or note:  Home Health Certification    Who is the form from?: Home care    Where did the form come from: form was faxed in    What clinic location was the form placed at?: [facility]    Where the form was placed: Given to ALEXANDER Bowles    What number is listed as a contact on the form?: [phone]       Additional comments: [facility]    Call taken on 5/24/2022 at 3:15 PM by Allison Shipman

## 2022-05-25 ENCOUNTER — LAB REQUISITION (OUTPATIENT)
Dept: LAB | Facility: CLINIC | Age: 72
End: 2022-05-25
Payer: COMMERCIAL

## 2022-05-25 DIAGNOSIS — T84.51XS INFECTION AND INFLAMMATORY REACTION DUE TO INTERNAL RIGHT HIP PROSTHESIS, SEQUELA: ICD-10-CM

## 2022-05-25 LAB
ALBUMIN SERPL-MCNC: 3.6 G/DL (ref 3.4–5)
ALP SERPL-CCNC: 75 U/L (ref 40–150)
ALT SERPL W P-5'-P-CCNC: 24 U/L (ref 0–70)
ANION GAP SERPL CALCULATED.3IONS-SCNC: 5 MMOL/L (ref 3–14)
AST SERPL W P-5'-P-CCNC: 15 U/L (ref 0–45)
BASOPHILS # BLD AUTO: 0.1 10E3/UL (ref 0–0.2)
BASOPHILS NFR BLD AUTO: 1 %
BILIRUB SERPL-MCNC: 0.4 MG/DL (ref 0.2–1.3)
BUN SERPL-MCNC: 18 MG/DL (ref 7–30)
CALCIUM SERPL-MCNC: 8.9 MG/DL (ref 8.5–10.1)
CHLORIDE BLD-SCNC: 108 MMOL/L (ref 94–109)
CO2 SERPL-SCNC: 27 MMOL/L (ref 20–32)
CREAT SERPL-MCNC: 0.68 MG/DL (ref 0.66–1.25)
CRP SERPL-MCNC: <2.9 MG/L (ref 0–8)
EOSINOPHIL # BLD AUTO: 0.4 10E3/UL (ref 0–0.7)
EOSINOPHIL NFR BLD AUTO: 5 %
ERYTHROCYTE [DISTWIDTH] IN BLOOD BY AUTOMATED COUNT: 12.7 % (ref 10–15)
GFR SERPL CREATININE-BSD FRML MDRD: >90 ML/MIN/1.73M2
GLUCOSE BLD-MCNC: 99 MG/DL (ref 70–99)
HCT VFR BLD AUTO: 38.3 % (ref 40–53)
HGB BLD-MCNC: 13.3 G/DL (ref 13.3–17.7)
IMM GRANULOCYTES # BLD: 0 10E3/UL
IMM GRANULOCYTES NFR BLD: 0 %
LYMPHOCYTES # BLD AUTO: 0.7 10E3/UL (ref 0.8–5.3)
LYMPHOCYTES NFR BLD AUTO: 9 %
MCH RBC QN AUTO: 34.4 PG (ref 26.5–33)
MCHC RBC AUTO-ENTMCNC: 34.7 G/DL (ref 31.5–36.5)
MCV RBC AUTO: 99 FL (ref 78–100)
MONOCYTES # BLD AUTO: 0.7 10E3/UL (ref 0–1.3)
MONOCYTES NFR BLD AUTO: 9 %
NEUTROPHILS # BLD AUTO: 5.9 10E3/UL (ref 1.6–8.3)
NEUTROPHILS NFR BLD AUTO: 76 %
NRBC # BLD AUTO: 0 10E3/UL
NRBC BLD AUTO-RTO: 0 /100
PLATELET # BLD AUTO: 195 10E3/UL (ref 150–450)
POTASSIUM BLD-SCNC: 4.4 MMOL/L (ref 3.4–5.3)
PROT SERPL-MCNC: 6.8 G/DL (ref 6.8–8.8)
RBC # BLD AUTO: 3.87 10E6/UL (ref 4.4–5.9)
SODIUM SERPL-SCNC: 140 MMOL/L (ref 133–144)
WBC # BLD AUTO: 7.8 10E3/UL (ref 4–11)

## 2022-05-25 PROCEDURE — 36592 COLLECT BLOOD FROM PICC: CPT | Performed by: INTERNAL MEDICINE

## 2022-05-25 PROCEDURE — 86140 C-REACTIVE PROTEIN: CPT | Performed by: INTERNAL MEDICINE

## 2022-05-25 PROCEDURE — 80053 COMPREHEN METABOLIC PANEL: CPT | Performed by: INTERNAL MEDICINE

## 2022-05-25 PROCEDURE — 85025 COMPLETE CBC W/AUTO DIFF WBC: CPT | Performed by: INTERNAL MEDICINE

## 2022-05-25 RX ORDER — ACETAMINOPHEN 325 MG/1
325-650 TABLET ORAL EVERY 6 HOURS PRN
COMMUNITY
End: 2022-11-03

## 2022-05-25 RX ORDER — OXYCODONE HYDROCHLORIDE 5 MG/1
5 TABLET ORAL EVERY 4 HOURS PRN
COMMUNITY
End: 2022-06-10

## 2022-05-25 RX ORDER — AMOXICILLIN 250 MG
1 CAPSULE ORAL 2 TIMES DAILY
COMMUNITY
End: 2022-06-10

## 2022-05-26 ENCOUNTER — HOME INFUSION (PRE-WILLOW HOME INFUSION) (OUTPATIENT)
Dept: PHARMACY | Facility: CLINIC | Age: 72
End: 2022-05-26
Payer: COMMERCIAL

## 2022-05-27 ENCOUNTER — HOME INFUSION (PRE-WILLOW HOME INFUSION) (OUTPATIENT)
Dept: PHARMACY | Facility: CLINIC | Age: 72
End: 2022-05-27
Payer: COMMERCIAL

## 2022-05-27 DIAGNOSIS — Z53.9 DIAGNOSIS NOT YET DEFINED: Primary | ICD-10-CM

## 2022-05-27 PROCEDURE — G0180 MD CERTIFICATION HHA PATIENT: HCPCS | Performed by: INTERNAL MEDICINE

## 2022-05-27 NOTE — PROGRESS NOTES
This is a recent snapshot of the patient's [facility] medical record.  For current drug dose and complete information and questions, call [phone] or In Basket pool, [facility] home infusion ([id number])  CSN Number:  [id number]

## 2022-06-01 ENCOUNTER — HOME INFUSION (PRE-WILLOW HOME INFUSION) (OUTPATIENT)
Dept: PHARMACY | Facility: CLINIC | Age: 72
End: 2022-06-01

## 2022-06-01 ENCOUNTER — LAB REQUISITION (OUTPATIENT)
Dept: LAB | Facility: CLINIC | Age: 72
End: 2022-06-01
Payer: COMMERCIAL

## 2022-06-01 DIAGNOSIS — T84.51XA INFECTION AND INFLAMMATORY REACTION DUE TO INTERNAL RIGHT HIP PROSTHESIS, INITIAL ENCOUNTER (H): ICD-10-CM

## 2022-06-01 LAB
ALBUMIN SERPL-MCNC: 3.3 G/DL (ref 3.4–5)
ALP SERPL-CCNC: 70 U/L (ref 40–150)
ALT SERPL W P-5'-P-CCNC: 21 U/L (ref 0–70)
ANION GAP SERPL CALCULATED.3IONS-SCNC: 5 MMOL/L (ref 3–14)
AST SERPL W P-5'-P-CCNC: 15 U/L (ref 0–45)
BASOPHILS # BLD AUTO: 0 10E3/UL (ref 0–0.2)
BASOPHILS NFR BLD AUTO: 1 %
BILIRUB SERPL-MCNC: 0.5 MG/DL (ref 0.2–1.3)
BUN SERPL-MCNC: 20 MG/DL (ref 7–30)
CALCIUM SERPL-MCNC: 8.8 MG/DL (ref 8.5–10.1)
CHLORIDE BLD-SCNC: 108 MMOL/L (ref 94–109)
CO2 SERPL-SCNC: 28 MMOL/L (ref 20–32)
CREAT SERPL-MCNC: 0.7 MG/DL (ref 0.66–1.25)
CRP SERPL-MCNC: 3.3 MG/L (ref 0–8)
EOSINOPHIL # BLD AUTO: 0.2 10E3/UL (ref 0–0.7)
EOSINOPHIL NFR BLD AUTO: 4 %
ERYTHROCYTE [DISTWIDTH] IN BLOOD BY AUTOMATED COUNT: 12.4 % (ref 10–15)
GFR SERPL CREATININE-BSD FRML MDRD: >90 ML/MIN/1.73M2
GLUCOSE BLD-MCNC: 94 MG/DL (ref 70–99)
HCT VFR BLD AUTO: 26 % (ref 40–53)
HGB BLD-MCNC: 8.8 G/DL (ref 13.3–17.7)
IMM GRANULOCYTES # BLD: 0 10E3/UL
IMM GRANULOCYTES NFR BLD: 0 %
LYMPHOCYTES # BLD AUTO: 0.5 10E3/UL (ref 0.8–5.3)
LYMPHOCYTES NFR BLD AUTO: 10 %
MCH RBC QN AUTO: 34.4 PG (ref 26.5–33)
MCHC RBC AUTO-ENTMCNC: 33.8 G/DL (ref 31.5–36.5)
MCV RBC AUTO: 102 FL (ref 78–100)
MONOCYTES # BLD AUTO: 0.6 10E3/UL (ref 0–1.3)
MONOCYTES NFR BLD AUTO: 12 %
NEUTROPHILS # BLD AUTO: 3.6 10E3/UL (ref 1.6–8.3)
NEUTROPHILS NFR BLD AUTO: 73 %
NRBC # BLD AUTO: 0 10E3/UL
NRBC BLD AUTO-RTO: 0 /100
PLATELET # BLD AUTO: 133 10E3/UL (ref 150–450)
POTASSIUM BLD-SCNC: 3.8 MMOL/L (ref 3.4–5.3)
PROT SERPL-MCNC: 6.3 G/DL (ref 6.8–8.8)
RBC # BLD AUTO: 2.56 10E6/UL (ref 4.4–5.9)
SODIUM SERPL-SCNC: 141 MMOL/L (ref 133–144)
WBC # BLD AUTO: 5 10E3/UL (ref 4–11)

## 2022-06-01 PROCEDURE — 86140 C-REACTIVE PROTEIN: CPT | Performed by: INTERNAL MEDICINE

## 2022-06-01 PROCEDURE — 85025 COMPLETE CBC W/AUTO DIFF WBC: CPT | Performed by: INTERNAL MEDICINE

## 2022-06-01 PROCEDURE — 80053 COMPREHEN METABOLIC PANEL: CPT | Performed by: INTERNAL MEDICINE

## 2022-06-01 NOTE — PROGRESS NOTES
This is a recent snapshot of the patient's [facility] medical record.  For current drug dose and complete information and questions, call [phone] or In Basket pool, [facility]v home infusion ([id number])  CSN Number:  [id number]

## 2022-06-02 ENCOUNTER — HOME INFUSION (PRE-WILLOW HOME INFUSION) (OUTPATIENT)
Dept: PHARMACY | Facility: CLINIC | Age: 72
End: 2022-06-02
Payer: COMMERCIAL

## 2022-06-03 ENCOUNTER — LAB REQUISITION (OUTPATIENT)
Dept: LAB | Facility: CLINIC | Age: 72
End: 2022-06-03
Payer: COMMERCIAL

## 2022-06-03 ENCOUNTER — HOME INFUSION (PRE-WILLOW HOME INFUSION) (OUTPATIENT)
Dept: PHARMACY | Facility: CLINIC | Age: 72
End: 2022-06-03

## 2022-06-03 DIAGNOSIS — C83.00 SMALL CELL B-CELL LYMPHOMA, UNSPECIFIED SITE (H): ICD-10-CM

## 2022-06-03 DIAGNOSIS — T84.51XA INFECTION AND INFLAMMATORY REACTION DUE TO INTERNAL RIGHT HIP PROSTHESIS, INITIAL ENCOUNTER (H): ICD-10-CM

## 2022-06-03 LAB
ERYTHROCYTE [DISTWIDTH] IN BLOOD BY AUTOMATED COUNT: 12.2 % (ref 10–15)
HCT VFR BLD AUTO: 37.7 % (ref 40–53)
HGB BLD-MCNC: 13.1 G/DL (ref 13.3–17.7)
MCH RBC QN AUTO: 34.3 PG (ref 26.5–33)
MCHC RBC AUTO-ENTMCNC: 34.7 G/DL (ref 31.5–36.5)
MCV RBC AUTO: 99 FL (ref 78–100)
PLATELET # BLD AUTO: 212 10E3/UL (ref 150–450)
RBC # BLD AUTO: 3.82 10E6/UL (ref 4.4–5.9)
WBC # BLD AUTO: 6.7 10E3/UL (ref 4–11)

## 2022-06-03 PROCEDURE — 36415 COLL VENOUS BLD VENIPUNCTURE: CPT | Performed by: INTERNAL MEDICINE

## 2022-06-03 PROCEDURE — 85027 COMPLETE CBC AUTOMATED: CPT | Performed by: INTERNAL MEDICINE

## 2022-06-03 NOTE — PROGRESS NOTES
This is a recent snapshot of the patient's [facility] medical record.  For current drug dose and complete information and questions, call [phone] or In Basket pool, fv home infusion ([id number])  CSN Number:  [id number]

## 2022-06-06 NOTE — PROGRESS NOTES
This is a recent snapshot of the patient's [facility] medical record.  For current drug dose and complete information and questions, call [phone] or In Basket pool, fv home infusion ([id number])  CSN Number:  [id number]

## 2022-06-07 NOTE — PROGRESS NOTES
This is a recent snapshot of the patient's [facility] medical record.  For current drug dose and complete information and questions, call [phone] or In Basket pool, fv home infusion ([id number])  CSN Number:  [id number]

## 2022-06-08 ENCOUNTER — LAB REQUISITION (OUTPATIENT)
Dept: LAB | Facility: CLINIC | Age: 72
End: 2022-06-08
Payer: COMMERCIAL

## 2022-06-08 DIAGNOSIS — T84.51XA INFECTION AND INFLAMMATORY REACTION DUE TO INTERNAL RIGHT HIP PROSTHESIS, INITIAL ENCOUNTER (H): ICD-10-CM

## 2022-06-08 LAB
ALBUMIN SERPL-MCNC: 3.5 G/DL (ref 3.4–5)
ALP SERPL-CCNC: 67 U/L (ref 40–150)
ALT SERPL W P-5'-P-CCNC: 23 U/L (ref 0–70)
ANION GAP SERPL CALCULATED.3IONS-SCNC: 4 MMOL/L (ref 3–14)
AST SERPL W P-5'-P-CCNC: 13 U/L (ref 0–45)
BASOPHILS # BLD AUTO: 0.1 10E3/UL (ref 0–0.2)
BASOPHILS NFR BLD AUTO: 1 %
BILIRUB SERPL-MCNC: 0.2 MG/DL (ref 0.2–1.3)
BUN SERPL-MCNC: 23 MG/DL (ref 7–30)
CALCIUM SERPL-MCNC: 8.8 MG/DL (ref 8.5–10.1)
CHLORIDE BLD-SCNC: 109 MMOL/L (ref 94–109)
CO2 SERPL-SCNC: 27 MMOL/L (ref 20–32)
CREAT SERPL-MCNC: 0.74 MG/DL (ref 0.66–1.25)
CRP SERPL-MCNC: <2.9 MG/L (ref 0–8)
EOSINOPHIL # BLD AUTO: 0.2 10E3/UL (ref 0–0.7)
EOSINOPHIL NFR BLD AUTO: 3 %
ERYTHROCYTE [DISTWIDTH] IN BLOOD BY AUTOMATED COUNT: 12.2 % (ref 10–15)
GFR SERPL CREATININE-BSD FRML MDRD: >90 ML/MIN/1.73M2
GLUCOSE BLD-MCNC: 91 MG/DL (ref 70–99)
HCT VFR BLD AUTO: 36.8 % (ref 40–53)
HGB BLD-MCNC: 12.9 G/DL (ref 13.3–17.7)
IMM GRANULOCYTES # BLD: 0 10E3/UL
IMM GRANULOCYTES NFR BLD: 0 %
LYMPHOCYTES # BLD AUTO: 0.7 10E3/UL (ref 0.8–5.3)
LYMPHOCYTES NFR BLD AUTO: 11 %
MCH RBC QN AUTO: 34.1 PG (ref 26.5–33)
MCHC RBC AUTO-ENTMCNC: 35.1 G/DL (ref 31.5–36.5)
MCV RBC AUTO: 97 FL (ref 78–100)
MONOCYTES # BLD AUTO: 0.8 10E3/UL (ref 0–1.3)
MONOCYTES NFR BLD AUTO: 13 %
NEUTROPHILS # BLD AUTO: 4.7 10E3/UL (ref 1.6–8.3)
NEUTROPHILS NFR BLD AUTO: 72 %
NRBC # BLD AUTO: 0 10E3/UL
NRBC BLD AUTO-RTO: 0 /100
PLATELET # BLD AUTO: 218 10E3/UL (ref 150–450)
POTASSIUM BLD-SCNC: 3.9 MMOL/L (ref 3.4–5.3)
PROT SERPL-MCNC: 6.5 G/DL (ref 6.8–8.8)
RBC # BLD AUTO: 3.78 10E6/UL (ref 4.4–5.9)
SODIUM SERPL-SCNC: 140 MMOL/L (ref 133–144)
WBC # BLD AUTO: 6.5 10E3/UL (ref 4–11)

## 2022-06-08 PROCEDURE — 86140 C-REACTIVE PROTEIN: CPT | Performed by: INTERNAL MEDICINE

## 2022-06-08 PROCEDURE — 80053 COMPREHEN METABOLIC PANEL: CPT | Performed by: INTERNAL MEDICINE

## 2022-06-08 PROCEDURE — 85025 COMPLETE CBC W/AUTO DIFF WBC: CPT | Performed by: INTERNAL MEDICINE

## 2022-06-09 ENCOUNTER — HOME INFUSION (PRE-WILLOW HOME INFUSION) (OUTPATIENT)
Dept: PHARMACY | Facility: CLINIC | Age: 72
End: 2022-06-09

## 2022-06-10 ENCOUNTER — OFFICE VISIT (OUTPATIENT)
Dept: INFECTIOUS DISEASES | Facility: CLINIC | Age: 72
End: 2022-06-10
Payer: COMMERCIAL

## 2022-06-10 VITALS
HEART RATE: 53 BPM | SYSTOLIC BLOOD PRESSURE: 137 MMHG | DIASTOLIC BLOOD PRESSURE: 75 MMHG | WEIGHT: 199 LBS | OXYGEN SATURATION: 97 % | BODY MASS INDEX: 28.15 KG/M2

## 2022-06-10 DIAGNOSIS — A49.8 INFECTION DUE TO CUTIBACTERIUM SPECIES: Primary | ICD-10-CM

## 2022-06-10 DIAGNOSIS — T84.51XA INFECTION ASSOCIATED WITH INTERNAL RIGHT HIP PROSTHESIS, INITIAL ENCOUNTER (H): ICD-10-CM

## 2022-06-10 DIAGNOSIS — K59.00 CONSTIPATION, UNSPECIFIED CONSTIPATION TYPE: ICD-10-CM

## 2022-06-10 PROCEDURE — 99214 OFFICE O/P EST MOD 30 MIN: CPT | Performed by: INTERNAL MEDICINE

## 2022-06-10 RX ORDER — DOCUSATE SODIUM 250 MG
250 CAPSULE ORAL DAILY PRN
Start: 2022-06-10 | End: 2022-11-03

## 2022-06-10 RX ORDER — AMOXICILLIN 500 MG/1
500 CAPSULE ORAL 3 TIMES DAILY
Qty: 270 CAPSULE | Refills: 1 | Status: SHIPPED | OUTPATIENT
Start: 2022-06-18 | End: 2022-09-16

## 2022-06-10 NOTE — PROGRESS NOTES
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     He saw  Orthopedic, on 11/19/2015 for a few days of right hip pain, radiating to the groin. pain resolved with NSAID in a few days.       He saw Orthopedic, Dr Adam Riggs on 10/8/2020 for dull pain 1/10 in the right buttock down the back of his thigh associated with numbness. Per note, patient was quite active with exercises, golf, pickleball.  He took occasional ibuprofen and used ice at times. X-rays of the pelvis and lateral both hips were obtained and Images showed Left total hip arthroplasty was in good position with no sign of loosening or wear.  Right total hip arthroplasty showed asymmetric femoral ball in the socket consistent with polyethylene wear superiorly and may be early lysis at the lateral shoulder of the right stem, but most of the stem was quite solid. He saw Dr Riggs again on 10/5/21 and still had similar pain -  a dull pain in the right buttock down the back of his thigh associated with numbness. He also had a sensation of the right hip feeling unstable, a few months earlier so he had used a strap support since then, with relief of the symptoms. At that time, he also complained of left knee pain with climbing stairs.Left knee pain has resolved.  Xray showed on 10/5/21 showed Bilateral hip arthroplasties. Mild heterotopic ossification adjacent to the right hip  "arthroplasty. No acute fracture identified. and Xray left knee on 10/5/21 showed No fracture. Chondrocalcinosis, consistent with calcium pyrophosphate deposition disease.  Minimal joint effusion. Mild medial compartment and patellar osteophytosis.      He was referred to Dr Joy on 11/4/2021 for particulate wear debris with osteolysis behind right acetabular cup without evidence of loosening.  Implant was at risk for periprosthetic fracture or loosening and recommended revision hip arthroplasty consisting of intralesional curettage of the acetabular defect with allograft bone impaction grafting,  exchange and revision of acetabular bearing surface and femoral head.  On 3/21/22, per note, he had mild discomfort in right hip but was still independent with activities of daily living.      On 4/19/22 He had Revision Right total hip arthroplasty with head/ liner exchange and acetabular bone grafting. Intra-op findings\" Scarred anatomic tissue planes from previous arthroplasty procedure.  Evidence of polyethylene wear with eccentric wearing of the polyethylene liner.  No evidence of purulence or gross signs of infection.  Large cavitary lesion superior to the acetabular fossa extending from the 10:00 to 2:00 positions, as well as a cavitary lesion consistent with osteolysis medial to the acetabular component.  Evidence of polyethylene wear debris within the acetabular lesions\" . Intraop right hip tissues - 2 aerobic and 2a naerobic cultures grew Cutibacterium acnes. He is now referred to Infectious Diseases for further management. Perioperative, he recieved Cefazolin x 3 days. He is currently not on antibiotic. He denies fever, chills, nightsweats. Pain is improving and he is cutting down on oxycodone. he is taking acetaminophen and gabapentin. He has noticed right leg edema since surgery but he has minimal edema in left leg too.     Of note, patient denied any pain in right hip and told me it was an incidental findings " when he complained of left knee pain last year. So, all the history above was based on chart review.        ROS:  CONSTITUTIONAL:NEGATIVE for fever, chills, change in weight  INTEGUMENTARY/SKIN: papular rashes on right thigh ,right leg is edematous , especially around the surgocal site. not tender    EYES: NEGATIVE for vision changes or irritation  ENT/MOUTH: NEGATIVE for ear, mouth and throat problems  RESP:NEGATIVE for significant cough or SOB  CV: NEGATIVE for chest pain, palpitations . worsening bilateral leg edema since surgery. no calf pain . right leg is more edematous than left leg   GI: NEGATIVE for nausea, abdominal pain, heartburn, or change in bowel habits  : NEGATIVE for urinary frequency, hematuria or dysuria  MUSCULOSKELETAL: NEGATIVE for significant arthralgias or myalgia  NEURO: NEGATIVE for weakness, dizziness or paresthesias  ENDOCRINE: NEGATIVE for temperature intolerance  HEME/ALLERGY/IMMUNE: NEGATIVE for bleeding problems  PSYCHIATRIC: NEGATIVE for changes in mood or affect    Clinic visit on 5/13/22   Ag feels good. no pain. tolerates Penicillin well. no pain around PICC .   Uses walker to move around.     Problem list and histories reviewed & adjusted, as indicated.  Additional history: as documented    PAST MEDICAL HISTORY:  Patient  has a past medical history of DJD (degenerative joint disease), lumbar, Malignant lymphoma, lymphoplasmacytic (H) (7/1/2021), and Sinus bradycardia.    He has no past medical history of Cerebral infarction (H), Congestive heart failure (H), COPD (chronic obstructive pulmonary disease) (H), Depressive disorder, Diabetes (H), History of blood transfusion, Hypertension, Sleep apnea, Thyroid disease, or Uncomplicated asthma.    PAST SURGICAL HISTORY:  Patient  has a past surgical history that includes tonsillectomy; appendectomy; surgical history of - ; colonoscopy (01/01/2010); TOTAL HIP ARTHROPLASTY (Left, 04/02/2012); TOTAL HIP ARTHROPLASTY (Right,  11/15/2004); biopsy (mass right side); Bone marrow biopsy, bone specimen, needle/trocar (N/A, 06/10/2021); STOMACH SURGERY PROCEDURE UNLISTED (Appendix); and Arthroplasty revision hip (Right, 4/19/2022).    CURRENT MEDICATIONS:  Current Outpatient Medications   Medication Sig Dispense Refill     [START ON 6/18/2022] amoxicillin (AMOXIL) 500 MG capsule Take 1 capsule (500 mg) by mouth 3 times daily for 90 days 270 capsule 1     atorvastatin (LIPITOR) 20 MG tablet Take 1 tablet (20 mg) by mouth daily (Patient taking differently: Take 20 mg by mouth every morning) 90 tablet 3     BD POSIFLUSH 0.9 % flush        docusate sodium (COLACE) 250 MG capsule Take 1 capsule (250 mg) by mouth daily as needed for constipation       gabapentin (NEURONTIN) 300 MG capsule Take 1 capsule (300 mg) by mouth 3 times daily 270 capsule 3     lactobacillus rhamnosus, GG, (CULTURELL) capsule        Melatonin 10-10 MG TBCR Take 1 tablet by mouth At Bedtime Brand name: Sleep 3       Multiple Vitamins-Minerals (MULTIVITAMIN PO) Take 1 tablet by mouth every morning       penicillin G potassium 500,000 units/mL injection        VITAMIN D (CHOLECALCIFEROL) PO Take 1 tablet by mouth daily       acetaminophen (TYLENOL) 325 MG tablet Take 325-650 mg by mouth every 6 hours as needed       aspirin (ASA) 81 MG EC tablet Take 2 tablets (162 mg) by mouth daily 60 tablet 0     fluticasone (FLONASE) 50 MCG/ACT nasal spray        sodium fluoride (SF 5000 PLUS) 1.1 % CREA [id number]  SF 5000 Plus 1.1% topical cream Apply 1 application topical 2 times a day       ALLERGY:  Allergies   Allergen Reactions     Seasonal Allergies      Aluminum Rash     IMMUNIZATION HISTORY:  Immunization History   Administered Date(s) Administered     COVID-19,PF,Moderna 03/04/2021, 04/01/2021, 10/20/2021     DTAP (<7y) 01/26/1996     Flu, Unspecified 11/08/1995, 11/01/2013     HEPA 01/26/1996, 09/19/1996     Historical DTP/aP 01/26/1996     Influenza (High Dose) 3 valent vaccine  10/15/2014, 11/18/2015, 10/24/2016, 09/25/2017, 10/17/2018, 09/25/2019, 09/28/2020, 10/20/2021     Influenza (IIV3) PF 11/08/1995, 11/01/2011, 11/01/2013, 10/15/2014     Influenza, Quad, High Dose, Pf, 65yr+ (Fluzone HD) 09/28/2020, 10/20/2021     Pneumo Conj 13-V (2010&after) 07/30/2015     Pneumococcal 23 valent 10/15/2014     TD (ADULT, 7+) 01/26/1996, 01/06/2006     TDAP Vaccine (Adacel) 01/27/2011     Td (Adult), Adsorbed 01/26/1996     Tdap (Adacel,Boostrix) 01/27/2011     Zoster vaccine recombinant adjuvanted (SHINGRIX) 04/27/2018, 07/23/2018     Zoster vaccine, live 03/04/2014     SOCIAL HISTORY:  Patient  reports that he has never smoked. He has never used smokeless tobacco. He reports current alcohol use. He reports that he does not use drugs.  , lives in [place]     FAMILY HISTORY:  Patient's family history includes Breast Cancer in his mother and sister; Osteoporosis in his sister; Other Cancer in his father, mother, sister, sister, and sister; Prostate Cancer in his father.    Labs reviewed in EPIC    OBJECTIVE:                                                    /75 (BP Location: Right arm, Patient Position: Sitting, Cuff Size: Adult Regular)   Pulse 53   Wt 90.3 kg (199 lb)   SpO2 97%   BMI 28.15 kg/m   Body mass index is 28.15 kg/m .   GENERAL: alert, oriented and no distress  EYES: Eyes grossly normal to inspection,  and conjunctivae and sclerae normal  NECK: no adenopathy, no asymmetry, masses, or scars and thyroid normal to palpation  Resp: no cough. breathing comfortably on room air   MS: no gross musculoskeletal defects noted, no edema  SKIN: surgical site looks good. fuly healed  NEURO: Normal strength and tone, mentation intact and speech normal  PSYCH: mentation appears normal, affect normal  PICC : left arm : good        ASSESSMENT AND PLAN:                                                      1. 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   - pain in right hip first noted in 2015 , resolved with NSAID  - pain noted again in 2020 , Xray - asymmetric femoral ball in the socket consistent with polyethylene wear superiorly and may be early lysis at the lateral shoulder of the right stem  - on 4/19/22 - s/p revision of Right total hip arthroplasty with head/ liner exchange and acetabular bone grafting. Intra-op findings\" Scarred anatomic tissue planes from previous arthroplasty procedure.  Evidence of polyethylene wear with eccentric wearing of the polyethylene liner.  No evidence of purulence or gross signs of infection.  Large cavitary lesion superior to the acetabular fossa extending from the 10:00 to 2:00 positions, as well as a cavitary lesion consistent with osteolysis medial to the acetabular component.  Evidence of polyethylene wear debris within the acetabular lesions\" .   - 4/19/22 - Intraop right hip tissues - 2 aerobic and 2 anaerobic cultures grew Cutibacterium acnes. He is now referred to Infectious Diseases for further management. Perioperative, he recieved Cefazolin x 3 days. He is currently not on antibiotic.  - started Pen G on 5/5/22 ( duration 6 weeks) --> 6/16/22    2. Left Total hip arthroplasty on 4/2/2012    3. Bilateral leg edema R>>L     4. 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 COVID19 in last winter in [place]    Discussion:  Cutibacterium acnes, a commensal skin bacteria, presented as indolent infection and pain many years after R ERIC, causing prosthetic wear and osteolysis.       Plan/Recommendations:   - Penicillin G 5 MU 4x/daily = total 20 MU IV daily ( infusion pump) x 6 weeks, ( 6/16/22) then oral suppressive antibiotic Amoxicillin 500 mg po TID lifelong    - discussed potential side effects of antibiotic  - weekly lab: CMP, CBC diff, CRP while on IV antibiotic "     Follow-up with me on 7/17 at 11 am at [facility].  advised to call if they have any questions/ concerns     Rishabh Flanagan MD, M.Med.Sc  Division of Infectious Diseases and International Medicine  [facility]      Time : 30 min spent with patient, chart review, documentation and coordinating care

## 2022-06-16 ENCOUNTER — HOME INFUSION (PRE-WILLOW HOME INFUSION) (OUTPATIENT)
Dept: PHARMACY | Facility: CLINIC | Age: 72
End: 2022-06-16

## 2022-06-16 NOTE — PROGRESS NOTES
This is a recent snapshot of the patient's [facility] medical record.  For current drug dose and complete information and questions, call [phone] or In Basket pool, fv home infusion ([id number])  CSN Number:  [id number]

## 2022-06-20 ENCOUNTER — HOME INFUSION (PRE-WILLOW HOME INFUSION) (OUTPATIENT)
Dept: PHARMACY | Facility: CLINIC | Age: 72
End: 2022-06-20

## 2022-06-29 NOTE — PROGRESS NOTES
This is a recent snapshot of the patient's [facility] medical record.  For current drug dose and complete information and questions, call [phone] or In Basket pool, fv home infusion ([id number])  CSN Number:  [id number]

## 2022-07-11 NOTE — PROGRESS NOTES
Hematology/Oncology Visit    Oncologist: Dr. Perez  Diagnosis: Waldenstrom/lymphoplasmacytic lymphoma  Reason for visit: 3 month follow-up    Cancer and Treatment Hx:  Apr 2021: presented with peripheral neuropathy in hands/feet. M-spike 1.1, IgM 1075, Serum viscosity 2.1.   Jun 2021: BMBx demonstrated 50-60% involvement by B-cell lymphoma, plasma cells 5% by IHC.  Jun- Oct 2021: completed 4 cycles of bendamustine + rituxan    Interval History:  Ag presents with his wife, Gunjan. He has been feeling well post surgery on R hip. Currently using crutches but can transition to cane soon. He has been doing well from an oncologic stand point. Neuropathy in first 3 fingers bilaterally and bilateral feet/ankles is stable, controlled with current gabapentin use. He denies any night sweats, lymphadenopathy, unintentional weight loss, early satiety, or fatigue. Planning a month long trip to [place] in September. Wondering about timing of COVID booster.     Medications, imaging, and labs:  Reviewed pertinent medications, no refills needed today.    Reviewed labs from 7/12/22:   07/12/22    WBC 5.6   Hemoglobin 13.7   Platelet Count 189   Absolute Neutrophils 4.1      06/03/21 08/18/21 09/15/21 10/13/21 12/29/21 03/31/22   IGM 1,979 (H) 1,184 (H) 962 (H) 862 (H) 458 (H) 357 (H)   *IgM is pending from 7/12/22    Reviewed read of CT CAP from 7/7/21:  IMPRESSION:  1.  Mildly prominent right hilar lymph node measures up to 1.1 cm and  could be reactive. No other evidence for lymphadenopathy is seen in  the chest, abdomen or pelvis.  2.  Expansile lucency in the supra-acetabular right ilium has a  pattern which is concerning for granulomatous reaction from small  particle disease, especially given the asymmetric wear of the lining  of the right acetabular prosthesis. This lucent area surrounds the  fixation screw for the right acetabular prosthetic component and could  be associated with loosening of the prosthesis. Bilateral  "hip  arthroplasties do cause streak artifact limiting evaluation of the pelvis.  3.  Lucency in the right ilium adjacent to the SI joint has a benign  appearance and could represent a benign hemangioma.  4.  Evidence of chronic granulomatous of the right lung.  5.  Coronary artery calcifications.  6.  Small fat-containing left inguinal hernia.  7.  No definite findings of lymphoma are otherwise seen.    Physical Exam:  GEN: pleasantly conversant male no acute distress  SKIN: generally intact, no visible rash, bruising, or sores   ENT: eyes non-icteric, no notable oral sores  LYMPH: no notable cervical, supraclavicular, or axillary lymphadenopathy  RESP: clear to auscultation bilaterally, on room air  CARDS: regular rate and rhythm, no notable murmurs   GI: abd soft without notable hepatosplenomegaly, non-tender to palpation  MSK: +1 edema BLE R > L  NEURO: alert and oriented without obvious focal deficit, gait stable with crutches  /78 (BP Location: Right arm, Patient Position: Sitting, Cuff Size: Adult Regular)   Pulse 57   Temp 97.4  F (36.3  C) (Temporal)   Resp 20   Ht 1.791 m (5' 10.5\")   Wt 94.5 kg (208 lb 5 oz)   SpO2 99%   BMI 29.47 kg/m       Wt Readings from Last 4 Encounters:   07/12/22 94.5 kg (208 lb 5 oz)   06/10/22 90.3 kg (199 lb)   05/13/22 90.3 kg (199 lb)   05/05/22 90.4 kg (199 lb 4.8 oz)     Assessment and Plan:  Lymphoplasmacytic lymphoma  Waldenstroms  - Completed 4 cycles of BR in Oct 2021  - IgM is pending from today but has been down-trending since treated  - No acute symptomology to suggest disease progression  - Oncology follow-up in 3 months with labs few days prior    Peripheral neuropathy  - Stable on gabapentin TID    R hip prosthetic joint infection  - Status post revision R total hip arthroplasty with bone grafting April 2022  - Will remain on lifelong Amoxicillin for prophylaxis    BLE edema R > L  - Likely secondary to recent surgery and non-weight bearing RLE  - No " pain or other findings to suggest DVT but patient will monitor  - Compressing stockings and elevation as able      The total time of this encounter amounted to 30 minutes today. This time includes face-to-face time spent with the patient, prep work, ordering tests, and performing post-visit documentation.  - Radha Flores, CNP

## 2022-07-12 ENCOUNTER — LAB (OUTPATIENT)
Dept: LAB | Facility: CLINIC | Age: 72
End: 2022-07-12
Payer: COMMERCIAL

## 2022-07-12 ENCOUNTER — ONCOLOGY VISIT (OUTPATIENT)
Dept: ONCOLOGY | Facility: CLINIC | Age: 72
End: 2022-07-12
Payer: COMMERCIAL

## 2022-07-12 VITALS
TEMPERATURE: 97.4 F | SYSTOLIC BLOOD PRESSURE: 122 MMHG | HEART RATE: 57 BPM | WEIGHT: 208.31 LBS | DIASTOLIC BLOOD PRESSURE: 78 MMHG | RESPIRATION RATE: 20 BRPM | HEIGHT: 71 IN | BODY MASS INDEX: 29.16 KG/M2 | OXYGEN SATURATION: 99 %

## 2022-07-12 DIAGNOSIS — C88.00 WALDENSTROM MACROGLOBULINEMIA: Primary | ICD-10-CM

## 2022-07-12 DIAGNOSIS — C88.00 WALDENSTROM MACROGLOBULINEMIA: ICD-10-CM

## 2022-07-12 DIAGNOSIS — G62.9 PERIPHERAL POLYNEUROPATHY: ICD-10-CM

## 2022-07-12 DIAGNOSIS — R60.0 BILATERAL LOWER EXTREMITY EDEMA: ICD-10-CM

## 2022-07-12 DIAGNOSIS — C83.00 MALIGNANT LYMPHOMA, LYMPHOPLASMACYTIC (H): ICD-10-CM

## 2022-07-12 LAB
BASOPHILS # BLD AUTO: 0.1 10E3/UL (ref 0–0.2)
BASOPHILS NFR BLD AUTO: 1 %
EOSINOPHIL # BLD AUTO: 0.1 10E3/UL (ref 0–0.7)
EOSINOPHIL NFR BLD AUTO: 2 %
ERYTHROCYTE [DISTWIDTH] IN BLOOD BY AUTOMATED COUNT: 12.1 % (ref 10–15)
HCT VFR BLD AUTO: 40.1 % (ref 40–53)
HGB BLD-MCNC: 13.7 G/DL (ref 13.3–17.7)
IMM GRANULOCYTES # BLD: 0 10E3/UL
IMM GRANULOCYTES NFR BLD: 0 %
LYMPHOCYTES # BLD AUTO: 0.6 10E3/UL (ref 0.8–5.3)
LYMPHOCYTES NFR BLD AUTO: 11 %
MCH RBC QN AUTO: 33.4 PG (ref 26.5–33)
MCHC RBC AUTO-ENTMCNC: 34.2 G/DL (ref 31.5–36.5)
MCV RBC AUTO: 98 FL (ref 78–100)
MONOCYTES # BLD AUTO: 0.7 10E3/UL (ref 0–1.3)
MONOCYTES NFR BLD AUTO: 13 %
NEUTROPHILS # BLD AUTO: 4.1 10E3/UL (ref 1.6–8.3)
NEUTROPHILS NFR BLD AUTO: 73 %
NRBC # BLD AUTO: 0 10E3/UL
NRBC BLD AUTO-RTO: 0 /100
PLATELET # BLD AUTO: 189 10E3/UL (ref 150–450)
RBC # BLD AUTO: 4.1 10E6/UL (ref 4.4–5.9)
TOTAL PROTEIN SERUM FOR ELP: 6.5 G/DL (ref 6.4–8.3)
WBC # BLD AUTO: 5.6 10E3/UL (ref 4–11)

## 2022-07-12 PROCEDURE — 82784 ASSAY IGA/IGD/IGG/IGM EACH: CPT | Performed by: INTERNAL MEDICINE

## 2022-07-12 PROCEDURE — 84155 ASSAY OF PROTEIN SERUM: CPT | Performed by: INTERNAL MEDICINE

## 2022-07-12 PROCEDURE — 84165 PROTEIN E-PHORESIS SERUM: CPT | Performed by: PATHOLOGY

## 2022-07-12 PROCEDURE — 85025 COMPLETE CBC W/AUTO DIFF WBC: CPT | Performed by: INTERNAL MEDICINE

## 2022-07-12 PROCEDURE — 36415 COLL VENOUS BLD VENIPUNCTURE: CPT

## 2022-07-12 PROCEDURE — 99214 OFFICE O/P EST MOD 30 MIN: CPT | Performed by: NURSE PRACTITIONER

## 2022-07-12 ASSESSMENT — PAIN SCALES - GENERAL: PAINLEVEL: NO PAIN (0)

## 2022-07-13 LAB
ALBUMIN SERPL ELPH-MCNC: 4.5 G/DL (ref 3.7–5.1)
ALPHA1 GLOB SERPL ELPH-MCNC: 0.2 G/DL (ref 0.2–0.4)
ALPHA2 GLOB SERPL ELPH-MCNC: 0.5 G/DL (ref 0.5–0.9)
B-GLOBULIN SERPL ELPH-MCNC: 0.6 G/DL (ref 0.6–1)
GAMMA GLOB SERPL ELPH-MCNC: 0.7 G/DL (ref 0.7–1.6)
IGG SERPL-MCNC: 598 MG/DL (ref 610–1616)
IGM SERPL-MCNC: 254 MG/DL (ref 35–242)
M PROTEIN SERPL ELPH-MCNC: 0.2 G/DL
PROT PATTERN SERPL ELPH-IMP: ABNORMAL

## 2022-07-15 ENCOUNTER — VIRTUAL VISIT (OUTPATIENT)
Dept: INFECTIOUS DISEASES | Facility: CLINIC | Age: 72
End: 2022-07-15
Payer: COMMERCIAL

## 2022-07-15 DIAGNOSIS — A49.8 INFECTION DUE TO CUTIBACTERIUM SPECIES: Primary | ICD-10-CM

## 2022-07-15 DIAGNOSIS — T84.51XA INFECTION ASSOCIATED WITH INTERNAL RIGHT HIP PROSTHESIS, INITIAL ENCOUNTER (H): ICD-10-CM

## 2022-07-15 PROCEDURE — 99213 OFFICE O/P EST LOW 20 MIN: CPT | Mod: 95 | Performed by: INTERNAL MEDICINE

## 2022-07-15 NOTE — PROGRESS NOTES
Ag is a 71 year old who is being evaluated via a billable video visit.      How would you like to obtain your AVS? MyChart  If the video visit is dropped, the invitation should be resent by: Text to cell phone: [phone]  Will anyone else be joining your video visit? Yes: Vickie. How would they like to receive their invitation? Text to cell phone: [phone]    Video-Visit Details  Type of service:  Video Visit  Video Start Time: 10.52 am   Video End Time: 10.58 am   Originating Location (pt. Location): Home  Distant Location (provider location):  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     He saw  Orthopedic, on 11/19/2015 for a few days of right hip pain, radiating to the groin. pain resolved with NSAID in a few days.       He saw Orthopedic, Dr Adam Riggs on 10/8/2020 for dull pain 1/10 in the right buttock down the back of his thigh associated with numbness. Per note, patient was quite active with exercises, golf, pickleball.  He took occasional ibuprofen and used ice at times. X-rays of the pelvis and lateral both hips were obtained and Images showed Left total hip arthroplasty was in good position with no sign of loosening or wear.  Right total hip arthroplasty showed asymmetric femoral ball in the socket consistent with polyethylene wear  "superiorly and may be early lysis at the lateral shoulder of the right stem, but most of the stem was quite solid. He saw Dr Riggs again on 10/5/21 and still had similar pain -  a dull pain in the right buttock down the back of his thigh associated with numbness. He also had a sensation of the right hip feeling unstable, a few months earlier so he had used a strap support since then, with relief of the symptoms. At that time, he also complained of left knee pain with climbing stairs.Left knee pain has resolved.  Xray showed on 10/5/21 showed Bilateral hip arthroplasties. Mild heterotopic ossification adjacent to the right hip arthroplasty. No acute fracture identified. and Xray left knee on 10/5/21 showed No fracture. Chondrocalcinosis, consistent with calcium pyrophosphate deposition disease.  Minimal joint effusion. Mild medial compartment and patellar osteophytosis.      He was referred to Dr Joy on 11/4/2021 for particulate wear debris with osteolysis behind right acetabular cup without evidence of loosening.  Implant was at risk for periprosthetic fracture or loosening and recommended revision hip arthroplasty consisting of intralesional curettage of the acetabular defect with allograft bone impaction grafting,  exchange and revision of acetabular bearing surface and femoral head.  On 3/21/22, per note, he had mild discomfort in right hip but was still independent with activities of daily living.      On 4/19/22 He had Revision Right total hip arthroplasty with head/ liner exchange and acetabular bone grafting. Intra-op findings\" Scarred anatomic tissue planes from previous arthroplasty procedure.  Evidence of polyethylene wear with eccentric wearing of the polyethylene liner.  No evidence of purulence or gross signs of infection.  Large cavitary lesion superior to the acetabular fossa extending from the 10:00 to 2:00 positions, as well as a cavitary lesion consistent with osteolysis medial to the " "acetabular component.  Evidence of polyethylene wear debris within the acetabular lesions\" . Intraop right hip tissues - 2 aerobic and 2anaerobic cultures grew Cutibacterium acnes.  Perioperative, he recieved Cefazolin x 3 days. He denies fever, chills, nightsweats. Pain is improving and he is cutting down on oxycodone. he is taking acetaminophen and gabapentin. He has noticed right leg edema since surgery but he has minimal edema in left leg too.     Of note, patient denied any pain in right hip and told me it was an incidental findings when he complained of left knee pain last year. So, all the history above was based on chart review.        ROS:  CONSTITUTIONAL:NEGATIVE for fever, chills, change in weight  INTEGUMENTARY/SKIN: papular rashes on right thigh ,right leg is edematous , especially around the surgocal site. not tender    EYES: NEGATIVE for vision changes or irritation  ENT/MOUTH: NEGATIVE for ear, mouth and throat problems  RESP:NEGATIVE for significant cough or SOB  CV: NEGATIVE for chest pain, palpitations . worsening bilateral leg edema since surgery. no calf pain . right leg is more edematous than left leg   GI: NEGATIVE for nausea, abdominal pain, heartburn, or change in bowel habits  : NEGATIVE for urinary frequency, hematuria or dysuria  MUSCULOSKELETAL: NEGATIVE for significant arthralgias or myalgia  NEURO: NEGATIVE for weakness, dizziness or paresthesias  ENDOCRINE: NEGATIVE for temperature intolerance  HEME/ALLERGY/IMMUNE: NEGATIVE for bleeding problems  PSYCHIATRIC: NEGATIVE for changes in mood or affect    Clinic visit on 5/13/22   Ag feels good. no pain. tolerates Penicillin well. no pain around PICC .   Uses walker to move around.     Video visit on 7/15/22  Ag is doing well. no pain in right hip. He walks with a cane , able to walk  more than 1.5 miles a day, wonders when he can get rid of the cane   s/p 6 weeks of penG and now on Amoxillin 500 mg PO TID . soft stools but no " diarrhea, no abdominal pain. Has very good appetite.      Problem list and histories reviewed & adjusted, as indicated.  Additional history: as documented    PAST MEDICAL HISTORY:  Patient  has a past medical history of DJD (degenerative joint disease), lumbar, Malignant lymphoma, lymphoplasmacytic (H) (7/1/2021), and Sinus bradycardia.    He has no past medical history of Cerebral infarction (H), Congestive heart failure (H), COPD (chronic obstructive pulmonary disease) (H), Depressive disorder, Diabetes (H), History of blood transfusion, Hypertension, Sleep apnea, Thyroid disease, or Uncomplicated asthma.    PAST SURGICAL HISTORY:  Patient  has a past surgical history that includes tonsillectomy; appendectomy; surgical history of - ; colonoscopy (01/01/2010); TOTAL HIP ARTHROPLASTY (Left, 04/02/2012); TOTAL HIP ARTHROPLASTY (Right, 11/15/2004); biopsy (mass right side); Bone marrow biopsy, bone specimen, needle/trocar (N/A, 06/10/2021); STOMACH SURGERY PROCEDURE UNLISTED (Appendix); and Arthroplasty revision hip (Right, 4/19/2022).    CURRENT MEDICATIONS:  Current Outpatient Medications   Medication Sig Dispense Refill     amoxicillin (AMOXIL) 500 MG capsule Take 1 capsule (500 mg) by mouth 3 times daily for 90 days 270 capsule 1     atorvastatin (LIPITOR) 20 MG tablet Take 1 tablet (20 mg) by mouth daily (Patient taking differently: Take 20 mg by mouth every morning) 90 tablet 3     docusate sodium (COLACE) 250 MG capsule Take 1 capsule (250 mg) by mouth daily as needed for constipation       gabapentin (NEURONTIN) 300 MG capsule Take 1 capsule (300 mg) by mouth 3 times daily 270 capsule 3     lactobacillus rhamnosus, GG, (CULTURELL) capsule        Melatonin 10-10 MG TBCR Take 1 tablet by mouth At Bedtime Brand name: Sleep 3       Multiple Vitamins-Minerals (MULTIVITAMIN PO) Take 1 tablet by mouth every morning       VITAMIN D (CHOLECALCIFEROL) PO Take 1 tablet by mouth daily       acetaminophen (TYLENOL) 325 MG  tablet Take 325-650 mg by mouth every 6 hours as needed       ALLERGY:  Allergies   Allergen Reactions     Seasonal Allergies      Aluminum Rash     IMMUNIZATION HISTORY:  Immunization History   Administered Date(s) Administered     COVID-19,PF,Moderna 03/04/2021, 04/01/2021, 10/20/2021     DTAP (<7y) 01/26/1996     Flu, Unspecified 11/08/1995, 11/01/2013     HEPA 01/26/1996, 09/19/1996     Historical DTP/aP 01/26/1996     Influenza (High Dose) 3 valent vaccine 10/15/2014, 11/18/2015, 10/24/2016, 09/25/2017, 10/17/2018, 09/25/2019, 09/28/2020, 10/20/2021     Influenza (IIV3) PF 11/08/1995, 11/01/2011, 11/01/2013, 10/15/2014     Influenza, Quad, High Dose, Pf, 65yr+ (Fluzone HD) 09/28/2020, 10/20/2021     Pneumo Conj 13-V (2010&after) 07/30/2015     Pneumococcal 23 valent 10/15/2014     TD (ADULT, 7+) 01/26/1996, 01/06/2006     TDAP Vaccine (Adacel) 01/27/2011     Td (Adult), Adsorbed 01/26/1996     Tdap (Adacel,Boostrix) 01/27/2011     Zoster vaccine recombinant adjuvanted (SHINGRIX) 04/27/2018, 07/23/2018     Zoster vaccine, live 03/04/2014     SOCIAL HISTORY:  Patient  reports that he has never smoked. He has never used smokeless tobacco. He reports current alcohol use. He reports that he does not use drugs.  , lives in [place]     FAMILY HISTORY:  Patient's family history includes Breast Cancer in his mother and sister; Osteoporosis in his sister; Other Cancer in his father, mother, sister, sister, and sister; Prostate Cancer in his father.    Labs reviewed in EPIC    OBJECTIVE:                                                    There were no vitals taken for this visit. There is no height or weight on file to calculate BMI.   GENERAL: alert, oriented and no distress  EYES: Eyes grossly normal to inspection,  and conjunctivae and sclerae normal  NECK: supple   Resp: no cough. breathing comfortably on room air   MS: no gross musculoskeletal defects noted, no edema  NEURO: Normal strength and tone,  "mentation intact and speech normal  PSYCH: mentation appears normal, affect normal       ASSESSMENT AND PLAN:                                                      1. 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   - pain in right hip first noted in 2015, resolved with NSAID  - pain noted again in 2020, Xray - asymmetric femoral ball in the socket consistent with polyethylene wear superiorly and may be early lysis at the lateral shoulder of the right stem  - on 4/19/22 - s/p revision of Right total hip arthroplasty with head/ liner exchange and acetabular bone grafting. Intra-op findings\" Scarred anatomic tissue planes from previous arthroplasty procedure.  Evidence of polyethylene wear with eccentric wearing of the polyethylene liner.  No evidence of purulence or gross signs of infection.  Large cavitary lesion superior to the acetabular fossa extending from the 10:00 to 2:00 positions, as well as a cavitary lesion consistent with osteolysis medial to the acetabular component.  Evidence of polyethylene wear debris within the acetabular lesions\" .   - 4/19/22 - Intraop right hip tissues - 2 aerobic and 2 anaerobic cultures grew Cutibacterium acnes.   - completed 6 weeks of Pen G ( 5/5/22 - 6/16/22)   - on Amoxicillin 500 mg PO TID ( suppressive antibiotic)     2. 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        Plan/Recommendations:   - continue suppressive antibiotic Amoxicillin 500 mg po TID , at least for 3 months / possibly lifelong   - continue probiotic daily   - advised to call if he has any questions/ concerns     video f/u in 3 months     Rishabh " Anastasiya Flanagan MD, M.Med.Sc  Division of Infectious Diseases and International Medicine  [facility]      Time : 20 min spent with patient, chart review, documentation and coordinating care

## 2022-08-02 NOTE — PROGRESS NOTES
This is a recent snapshot of the patient's [facility] medical record.  For current drug dose and complete information and questions, call [phone] or In Basket pool, [facility] home infusion ([id number])  CSN Number:  [id number]

## 2022-08-29 DIAGNOSIS — I51.5 CARDIAC CALCIFICATION (H): ICD-10-CM

## 2022-09-01 NOTE — TELEPHONE ENCOUNTER
Atorvastatin Calcium 20 MG Oral Tablet      Last Written Prescription Date:  7-23-21  Last Fill Quantity: 90,   # refills: 3  Last Office Visit : 7-23-21  Future Office visit:  none    Routing refill request to provider for review/approval because:  Overdue LDL  Last fill initial rx  Last clinic note: RTC- return to clinic as needed,   ? RF provider

## 2022-09-05 RX ORDER — ATORVASTATIN CALCIUM 20 MG/1
TABLET, FILM COATED ORAL
Qty: 90 TABLET | Refills: 3 | Status: SHIPPED | OUTPATIENT
Start: 2022-09-05 | End: 2023-08-28

## 2022-09-11 ENCOUNTER — HEALTH MAINTENANCE LETTER (OUTPATIENT)
Age: 72
End: 2022-09-11

## 2022-10-15 ASSESSMENT — ACTIVITIES OF DAILY LIVING (ADL)
ADL_MEAN: 0
ADL_SUBSCALE_SCORE: 100
ADL_SUM: 0

## 2022-10-15 ASSESSMENT — HOOS S4: HOW SEVERE IS YOUR HIP JOINT STIFFNESS AFTER FIRST WAKENING IN THE MORNING?: MODERATE

## 2022-10-17 ENCOUNTER — OFFICE VISIT (OUTPATIENT)
Dept: ORTHOPEDICS | Facility: CLINIC | Age: 72
End: 2022-10-17
Payer: COMMERCIAL

## 2022-10-17 ENCOUNTER — LAB (OUTPATIENT)
Dept: LAB | Facility: CLINIC | Age: 72
End: 2022-10-17
Payer: COMMERCIAL

## 2022-10-17 ENCOUNTER — ANCILLARY PROCEDURE (OUTPATIENT)
Dept: CT IMAGING | Facility: CLINIC | Age: 72
End: 2022-10-17
Attending: ORTHOPAEDIC SURGERY
Payer: COMMERCIAL

## 2022-10-17 DIAGNOSIS — Z13.220 SCREENING FOR HYPERLIPIDEMIA: ICD-10-CM

## 2022-10-17 DIAGNOSIS — T84.069D PROSTHETIC WEAR FOLLOWING TOTAL HIP ARTHROPLASTY, SUBSEQUENT ENCOUNTER: ICD-10-CM

## 2022-10-17 DIAGNOSIS — Z96.649 PROSTHETIC WEAR FOLLOWING TOTAL HIP ARTHROPLASTY, SUBSEQUENT ENCOUNTER: Primary | ICD-10-CM

## 2022-10-17 DIAGNOSIS — Z96.649 PROSTHETIC WEAR FOLLOWING TOTAL HIP ARTHROPLASTY, SUBSEQUENT ENCOUNTER: ICD-10-CM

## 2022-10-17 DIAGNOSIS — T84.069D PROSTHETIC WEAR FOLLOWING TOTAL HIP ARTHROPLASTY, SUBSEQUENT ENCOUNTER: Primary | ICD-10-CM

## 2022-10-17 DIAGNOSIS — C88.00 WALDENSTROM MACROGLOBULINEMIA: ICD-10-CM

## 2022-10-17 DIAGNOSIS — E78.5 HYPERLIPIDEMIA LDL GOAL <130: ICD-10-CM

## 2022-10-17 LAB
BASOPHILS # BLD AUTO: 0 10E3/UL (ref 0–0.2)
BASOPHILS NFR BLD AUTO: 1 %
CHOLEST SERPL-MCNC: 164 MG/DL
CRP SERPL-MCNC: 5.11 MG/L
EOSINOPHIL # BLD AUTO: 0.1 10E3/UL (ref 0–0.7)
EOSINOPHIL NFR BLD AUTO: 2 %
ERYTHROCYTE [DISTWIDTH] IN BLOOD BY AUTOMATED COUNT: 12.5 % (ref 10–15)
ERYTHROCYTE [SEDIMENTATION RATE] IN BLOOD BY WESTERGREN METHOD: 18 MM/HR (ref 0–20)
HCT VFR BLD AUTO: 42.4 % (ref 40–53)
HDLC SERPL-MCNC: 41 MG/DL
HGB BLD-MCNC: 14.6 G/DL (ref 13.3–17.7)
IMM GRANULOCYTES # BLD: 0 10E3/UL
IMM GRANULOCYTES NFR BLD: 0 %
LDLC SERPL CALC-MCNC: 100 MG/DL
LYMPHOCYTES # BLD AUTO: 0.8 10E3/UL (ref 0.8–5.3)
LYMPHOCYTES NFR BLD AUTO: 15 %
MCH RBC QN AUTO: 33.1 PG (ref 26.5–33)
MCHC RBC AUTO-ENTMCNC: 34.4 G/DL (ref 31.5–36.5)
MCV RBC AUTO: 96 FL (ref 78–100)
MONOCYTES # BLD AUTO: 0.6 10E3/UL (ref 0–1.3)
MONOCYTES NFR BLD AUTO: 12 %
NEUTROPHILS # BLD AUTO: 3.7 10E3/UL (ref 1.6–8.3)
NEUTROPHILS NFR BLD AUTO: 70 %
NONHDLC SERPL-MCNC: 123 MG/DL
NRBC # BLD AUTO: 0 10E3/UL
NRBC BLD AUTO-RTO: 0 /100
PLATELET # BLD AUTO: 261 10E3/UL (ref 150–450)
RBC # BLD AUTO: 4.41 10E6/UL (ref 4.4–5.9)
TOTAL PROTEIN SERUM FOR ELP: 6.5 G/DL (ref 6.4–8.3)
TRIGL SERPL-MCNC: 113 MG/DL
WBC # BLD AUTO: 5.2 10E3/UL (ref 4–11)

## 2022-10-17 PROCEDURE — 84165 PROTEIN E-PHORESIS SERUM: CPT

## 2022-10-17 PROCEDURE — 85025 COMPLETE CBC W/AUTO DIFF WBC: CPT | Performed by: PATHOLOGY

## 2022-10-17 PROCEDURE — 85652 RBC SED RATE AUTOMATED: CPT | Performed by: PATHOLOGY

## 2022-10-17 PROCEDURE — 99000 SPECIMEN HANDLING OFFICE-LAB: CPT | Performed by: PATHOLOGY

## 2022-10-17 PROCEDURE — 82784 ASSAY IGA/IGD/IGG/IGM EACH: CPT | Mod: 90 | Performed by: PATHOLOGY

## 2022-10-17 PROCEDURE — 36415 COLL VENOUS BLD VENIPUNCTURE: CPT | Performed by: PATHOLOGY

## 2022-10-17 PROCEDURE — 86140 C-REACTIVE PROTEIN: CPT | Performed by: PATHOLOGY

## 2022-10-17 PROCEDURE — 72192 CT PELVIS W/O DYE: CPT | Mod: GC | Performed by: RADIOLOGY

## 2022-10-17 PROCEDURE — 84155 ASSAY OF PROTEIN SERUM: CPT | Mod: 90 | Performed by: PATHOLOGY

## 2022-10-17 PROCEDURE — 99213 OFFICE O/P EST LOW 20 MIN: CPT | Performed by: ORTHOPAEDIC SURGERY

## 2022-10-17 PROCEDURE — 80061 LIPID PANEL: CPT | Mod: 90 | Performed by: PATHOLOGY

## 2022-10-17 RX ORDER — AMOXICILLIN 500 MG/1
CAPSULE ORAL
COMMUNITY
Start: 2022-06-20 | End: 2022-12-07

## 2022-10-17 RX ORDER — SODIUM FLUORIDE 6 MG/ML
PASTE, DENTIFRICE DENTAL
COMMUNITY
Start: 2022-07-21

## 2022-10-17 RX ORDER — CX-024414 0.2 MG/ML
INJECTION, SUSPENSION INTRAMUSCULAR
COMMUNITY
Start: 2022-07-18 | End: 2022-11-03

## 2022-10-17 NOTE — NURSING NOTE
Chief Complaint   Patient presents with     RECHECK     Return to review CT and lab results, Right ERIC revision 04/19/2022.       72 year old  1950    There were no vitals taken for this visit.    Past Surgical History:   Procedure Laterality Date     APPENDECTOMY       ARTHROPLASTY REVISION HIP Right 4/19/2022    Procedure: Revision Right Total Hip Arthroplasty;  Surgeon: Baljit Joy MD;  Location: [facility] OR     BIOPSY  mass right side     BONE MARROW BIOPSY, BONE SPECIMEN, NEEDLE/TROCAR N/A 06/10/2021    Procedure: BIOPSY, BONE MARROW;  Surgeon: Josephine Santamaria MD;  Location:  GI     COLONOSCOPY  01/01/2010    benign with hyperplastic polyps     ND STOMACH SURGERY PROCEDURE UNLISTED  Appendix    1971     ND TOTAL HIP ARTHROPLASTY Left 04/02/2012    Dr Roderick Santos     ND TOTAL HIP ARTHROPLASTY Right 11/15/2004    Dr. Jose Martinez     SURGICAL HISTORY OF -       Removal of a benign soft tissue mass right abdominal wall     TONSILLECTOMY         Pain Assessment  Patient Currently in Pain: [facility] [address] [place] [id number] [facility]        Allergies   Allergen Reactions     Seasonal Allergies      Aluminum Rash           Current Outpatient Medications   Medication     amoxicillin (AMOXIL) 500 MG capsule     gabapentin (NEURONTIN) 300 MG capsule     lactobacillus rhamnosus, GG, (CULTURELL) capsule     Melatonin 10-10 MG TBCR     MODERNA COVID-19 VACCINE 100 MCG/0.5ML injection     Multiple Vitamins-Minerals (MULTIVITAMIN PO)     acetaminophen (TYLENOL) 325 MG tablet     atorvastatin (LIPITOR) 20 MG tablet     docusate sodium (COLACE) 250 MG capsule     PREVIDENT 5000 BOOSTER PLUS 1.1 % PSTE dental paste     VITAMIN D (CHOLECALCIFEROL) PO     No current facility-administered medications for this visit.             Questionnaires:    HOOS Hip Dysfunction & Osteoarthritis Outcome Questionnaire    Hip Dysfunction & Osteoarthritis Outcome Score (HOOS), English Version LK 2.0  (Lenard HEREDIA, Brut BARKLEY, Elbert SANTO, 2003) 10/15/2022   S1. Do you feel grinding, hear clicking or any other type of noise from your hip? Never   S2. Difficulties spreading legs wide apart None   S3. Difficulties to stride out when walking None   S4. How severe is your hip joint stiffness after first wakening in the morning? Moderate   S5. How severe is your hip stiffness after sitting, lying or resting LATER IN THE DAY? None   Symptom Count 5   Symptom Sum 2   Symptom Mean 0.4   Symptom Subscale Score 90   P1. How often is your hip painful? Never   P2. Straightening your hip fully None   P3. Bending your hip FULLY Moderate   P4. Walking on a flat surface None   P5. Going up or down stairs None   P6. At night while in bed None   P7. Sitting or lying None   P8. Standing upright None   P9. Walking on a hard surface (asphalt, concrete, etc.) None   P10. Walking on an uneven surface None   Pain Count 10   Pain Sum 2   Pain Mean 0.2   Pain Subscale Score 95   A1. Descending stairs None   A2. Ascending stairs None   A3. Rising from sitting None   A4. Standing None   A5. Bending to the floor/ an object None   A6. Walking on a flat surface None   A7. Getting in/out of car None   A8. Going shopping None   A9. Putting on socks/stockings None   A10. Rising from bed None   A11. Taking off socks/stockings None   A12. Lying in bed (turning over, maintaining hip position) None   A13. Getting in/out of bed None   A14. Sitting None   A15. Getting on/off toilet None   A16. Heavy domestic duties (moving heavy boxes, scrubbing floors, etc.) None   A17. Light domestic duties (cooking, dusting, etc.) None   ADL Count 17   ADL Sum 0   ADL Mean 0   ADL Subscale Score 100   SP1. Squatting None   SP2. Running None   SP3. Twisting/pivoting on loaded leg None   SP4. Walking on uneven surface None   Sports/Rec Count 4   Sports/Rec Sum 0   Sports/Rec Mean 0   Sports/Rec Subscale Score 100   Q1. How often are you aware of your hip problem?  Never   Q2. Have you modified you life style to avoid activities potentially damaging to your hip? Totally   Q3. How much are you troubled with lack of confidence in your hip? Not at all   Q4. In general, how much difficulty do you have with your hip? None   QOL Count 4   QOL Sum 4   QOL Mean 1   Quality of Life Subscale Score 75        Promis 10 Assessment    PROMIS 10 10/15/2022   In general, would you say your health is: Good   In general, would you say your quality of life is: Very good   In general, how would you rate your physical health? Good   In general, how would you rate your mental health, including your mood and your ability to think? Excellent   In general, how would you rate your satisfaction with your social activities and relationships? Excellent   In general, please rate how well you carry out your usual social activities and roles Excellent   To what extent are you able to carry out your everyday physical activities such as walking, climbing stairs, carrying groceries, or moving a chair? Completely   How often have you been bothered by emotional problems such as feeling anxious, depressed or irritable? Never   How would you rate your fatigue on average? Moderate   How would you rate your pain on average?   0 = No Pain  to  10 = Worst Imaginable Pain 0   In general, would you say your health is: 3   In general, would you say your quality of life is: 4   In general, how would you rate your physical health? 3   In general, how would you rate your mental health, including your mood and your ability to think? 5   In general, how would you rate your satisfaction with your social activities and relationships? 5   In general, please rate how well you carry out your usual social activities and roles. (This includes activities at home, at work and in your community, and responsibilities as a parent, child, spouse, employee, friend, etc.) 5   To what extent are you able to carry out your everyday physical  activities such as walking, climbing stairs, carrying groceries, or moving a chair? 5   In the past 7 days, how often have you been bothered by emotional problems such as feeling anxious, depressed, or irritable? 1   In the past 7 days, how would you rate your fatigue on average? 3   In the past 7 days, how would you rate your pain on average, where 0 means no pain, and 10 means worst imaginable pain? 0   Global Mental Health Score 19   Global Physical Health Score 16   PROMIS TOTAL - SUBSCORES 35   Some recent data might be hidden

## 2022-10-17 NOTE — PROGRESS NOTES
[facility]  Orthopaedic Surgery, Joint Replacement Consultation  by Baljit Joy M.D.    Ag Evans MRN# [id number]   Age: 71 year old YOB: 1950     Requesting physician: No ref. provider found  Jose Antonio       Background history:  DX:  1. Status post bilateral total hip arthroplasty  2. History of lymphoma  3. Unexpected positive culture right revision ERIC, C acnes x2.    TREATMENTS:  1. 2005, Right, ERIC, Dr. Jose العلي, [facility]   2. 2011, Left ERIC, Dr. Macario, [facility]   3. 4/19/2022, Revision R ERIC (Rufino) [facility] C Acnes 2/5 cultures , PCN G x 6 weeks > Amoxicillin 500 TID x 6 mo    Returns for 6 month post-op check.  States he is recuperating well.  Traveled to [place] and was walking independently for extensive tours without limp or cane.  No problems with fever warmth or redness about his incision.    Examination:  No pain with motion of the hip joint.    Radiographs demonstrate implant satisfactory position without complication.  CT scan notes presence of his bone graft with some areas of incorporation and some areas of lucency.    intraop cultures noted above.  Saw ID Dr Carrillo Small who advised Penicillin G 5 MU 4x/daily = total 20 MU IV daily ( infusion pump) x 6 weeks, ( 6/16/22) then oral suppressive antibiotic Amoxicillin lifelong     Impression and plan:  1.  I think would be reasonable to discontinue his oral antibiotic therapy given that preoperatively he had no suspicion of or history of prosthetic joint infection and had 2 positive unexpected C acnes cultures with 3 negative specimen cultures.  Per our institutional P JI guidelines, therapy is discontinued after 6 weeks.  Therefore, I will confer with Dr. Lamar Lamb to see whether or not there are other extenuating circumstances that warrant a longer duration of treatment.  I informed the patient that he may undergo his dental procedure but should have oral amoxicillin coverage for 24  hours during that time.  2.  Follow-up in 6 to 12 months for radiographs and CT scan of his hip to assess for bony ingrowth of the bone graft.  Patient also notes he is undergoing scans for his cancer follow-up at that time and I suggested that he coordinate the CT scan performed for his follow-up cancer surveillance at the same time that we perform CT scan of his right pelvis.      I had discussion with patient regarding various exercises and activities that would or would not be appropriate for him.    Baljit Joy MD  [facility] Family Professor  Oncology and Adult Reconstructive Surgery  Dept Orthopaedic Surgery, [facility]  [phone] office, [phone] pager  [email or web address]    Total combined visit time and work time before and after clinic visit = 20 min

## 2022-10-17 NOTE — LETTER
10/17/2022         RE: Ag Evans  [address]   [facility] [address]        Dear Colleague,    Thank you for referring your patient, Ag Evans, to the [facility] [place]. Please see a copy of my visit note below.        [facility]  Orthopaedic Surgery, Joint Replacement Consultation  by Baljit Joy M.D.    Ag Evans MRN# [id number]   Age: 71 year old YOB: 1950     Requesting physician: No ref. provider found  Jose Antonio       Background history:  DX:  1. Status post bilateral total hip arthroplasty  2. History of lymphoma  3. Unexpected positive culture right revision ERIC, C acnes x2.    TREATMENTS:  1. 2005, Right, ERIC, Dr. Jose العلي, [facility]   2. 2011, Left ERIC, Dr. Macario, [facility]   3. 4/19/2022, Revision R ERIC (Rufino) [facility] C Acnes 2/5 cultures , PCN G x 6 weeks > Amoxicillin 500 TID x 6 mo    Returns for 6 month post-op check.  States he is recuperating well.  Traveled to [place] and was walking independently for extensive tours without limp or cane.  No problems with fever warmth or redness about his incision.    Examination:  No pain with motion of the hip joint.    Radiographs demonstrate implant satisfactory position without complication.  CT scan notes presence of his bone graft with some areas of incorporation and some areas of lucency.    intraop cultures noted above.  Saw ID Dr Carrillo Small who advised Penicillin G 5 MU 4x/daily = total 20 MU IV daily ( infusion pump) x 6 weeks, ( 6/16/22) then oral suppressive antibiotic Amoxicillin lifelong     Impression and plan:  1.  I think would be reasonable to discontinue his oral antibiotic therapy given that preoperatively he had no suspicion of or history of prosthetic joint infection and had 2 positive unexpected C acnes cultures with 3 negative specimen cultures.  Per our institutional P JI guidelines, therapy is discontinued after 6 weeks.  Therefore, I  will confer with Dr. Lamar Lamb to see whether or not there are other extenuating circumstances that warrant a longer duration of treatment.  I informed the patient that he may undergo his dental procedure but should have oral amoxicillin coverage for 24 hours during that time.  2.  Follow-up in 6 to 12 months for radiographs and CT scan of his hip to assess for bony ingrowth of the bone graft.  Patient also notes he is undergoing scans for his cancer follow-up at that time and I suggested that he coordinate the CT scan performed for his follow-up cancer surveillance at the same time that we perform CT scan of his right pelvis.      I had discussion with patient regarding various exercises and activities that would or would not be appropriate for him.    MD Heidi Sabillon Family Professor  Oncology and Adult Reconstructive Surgery  Dept Orthopaedic Surgery, [facility]  [phone] office, [phone] pager  [email or web address]    Total combined visit time and work time before and after clinic visit = 20 min

## 2022-10-18 ENCOUNTER — MYC MEDICAL ADVICE (OUTPATIENT)
Dept: ONCOLOGY | Facility: CLINIC | Age: 72
End: 2022-10-18

## 2022-10-18 LAB
ALBUMIN SERPL ELPH-MCNC: 4.2 G/DL (ref 3.7–5.1)
ALPHA1 GLOB SERPL ELPH-MCNC: 0.3 G/DL (ref 0.2–0.4)
ALPHA2 GLOB SERPL ELPH-MCNC: 0.7 G/DL (ref 0.5–0.9)
B-GLOBULIN SERPL ELPH-MCNC: 0.7 G/DL (ref 0.6–1)
GAMMA GLOB SERPL ELPH-MCNC: 0.7 G/DL (ref 0.7–1.6)
IGG SERPL-MCNC: 672 MG/DL (ref 610–1616)
IGM SERPL-MCNC: 251 MG/DL (ref 35–242)
M PROTEIN SERPL ELPH-MCNC: 0.2 G/DL
PROT PATTERN SERPL ELPH-IMP: ABNORMAL

## 2022-10-19 NOTE — TELEPHONE ENCOUNTER
This doesn't need to come my way as a mychart message (apologies)    Just confirm that the labs were drawn and if so, okay to cancel upcoming to avoid duplication.    Manuel Perez MD.

## 2022-10-20 ENCOUNTER — TELEPHONE (OUTPATIENT)
Dept: INFECTIOUS DISEASES | Facility: CLINIC | Age: 72
End: 2022-10-20

## 2022-10-20 NOTE — TELEPHONE ENCOUNTER
SAVANNAH DAS 10/20 to let patient know about his appt with Dr. Vizcaino on 11/9 per Dr. Vizcaino's request to call and remind him.

## 2022-10-31 NOTE — PROGRESS NOTES
This is a recent snapshot of the patient's [facility] medical record.  For current drug dose and complete information and questions, call [phone] or In Basket pool, [facility] home infusion ([id number])  CSN Number:  [id number]

## 2022-11-03 ENCOUNTER — ONCOLOGY VISIT (OUTPATIENT)
Dept: ONCOLOGY | Facility: CLINIC | Age: 72
End: 2022-11-03
Payer: COMMERCIAL

## 2022-11-03 VITALS
WEIGHT: 208.9 LBS | TEMPERATURE: 97.8 F | SYSTOLIC BLOOD PRESSURE: 130 MMHG | OXYGEN SATURATION: 99 % | BODY MASS INDEX: 29.25 KG/M2 | DIASTOLIC BLOOD PRESSURE: 70 MMHG | HEIGHT: 71 IN | HEART RATE: 66 BPM

## 2022-11-03 DIAGNOSIS — C88.00 WALDENSTROM MACROGLOBULINEMIA: Primary | ICD-10-CM

## 2022-11-03 PROCEDURE — 99213 OFFICE O/P EST LOW 20 MIN: CPT | Performed by: INTERNAL MEDICINE

## 2022-11-03 ASSESSMENT — PAIN SCALES - GENERAL: PAINLEVEL: NO PAIN (0)

## 2022-11-03 NOTE — LETTER
"    11/3/2022         RE: Ag Evans  [address] [facility] [address]        Dear Colleague,    Thank you for referring your patient, Ag Evans, to the [facility] [place]. Please see a copy of my visit note below.    [facility] Hematology / Oncology  Progress Note  Name: Ag Evans  :  1950    MRN:  [id number]    --------------------    Assessment / Plan:  Waldenstromg / Lymphoplasmacytic Lymphoma:  # Status post BR x 4 cycles; VGPR.    Ag remains clinically well.  His IgM continues to show a nice response to treatment.  He remains in remission.  He feels recovered from chemotherapy and his disease.  His neuropathy remains stable; continue gabapentin at current dosing; anticipate this will be indefinite.  Return to clinic 4 months upon return from [place] with Radha and then 8 months with me.  COVID vaccinated and planning boosters and flu vaccination.  Planning ongoing cancer surveillance through his primary care provider.    Manuel Perez MD    --------------------    Interval History:  Ag returns for follow up of Waldenstroms accompanied by his wife.  All in all, he remains quite well.  No health concerns today.  No unexplained fevers, chills or sweats.  No worsening adenopathy.  Great energy.  Plans to go golfing today.  Stable appetite, energy and weight.  COVID vaccinated.  Plans on getting a flu shot.  Neuropathy remains stable and controlled by gabapentin.    --------------------    Physical Exam:  VS: /70   Pulse 66   Temp 97.8  F (36.6  C) (Temporal)   Ht 1.791 m (5' 10.5\")   Wt 94.8 kg (208 lb 14.4 oz)   SpO2 99%   BMI 29.55 kg/m    GEN: Well appearing.    Labs / Imaging / Path:  We reviewed CBC, LDH, SPEP, IgM.      Again, thank you for allowing me to participate in the care of your patient.        Sincerely,        Manuel Perez MD    "

## 2022-11-03 NOTE — PATIENT INSTRUCTIONS
1) LOC 4 months w/ labs (CBC, LDH, IgM).  2) [facility] 8 months w/ labs (CBC, LDH, IgM).    Manuel Perez MD.    Today:  Follow up in 4 months with labs    Please follow up with Radha Flores CNP.  Please schedule labs 1-3 days prior to follow up appointment.    Lab Date/Time: 2/27/23 @8:45[facility]    Office visit follow up with Radha Flores CNP [facility]    [facility]    Office visit follow up with Dr. Perez Date/[facility]: [facility]         If you have any questions or concerns please feel free to call.    If you need to reschedule please call:  Clinic or Lab Appointment - [phone]  Infusion - [phone]  Imaging - [phone]    NIKKI Connelly [facility]  [phone]           Lamar Plaza   MRN: [id number]    Department:  [facility] Emergency Department   Date of Visit:  8/17/2018           Disclosure     Insurance plans vary and the physician(s) referred by the ER may not be covered by your plan.  Please contact your CARE PHYSICIAN AT ONCE OR RETURN IMMEDIATELY TO THE EMERGENCY DEPARTMENT. If you have been prescribed any medication(s), please fill your prescription right away and begin taking the medication(s) as directed.   If you believe that any of the medications

## 2022-11-03 NOTE — PROGRESS NOTES
"[facility] Hematology / Oncology  Progress Note  Name: Ag Evans  :  1950    MRN:  [id number]    --------------------    Assessment / Plan:  Waldenstromg / Lymphoplasmacytic Lymphoma:  # Status post BR x 4 cycles; VGPR.    Ag remains clinically well.  His IgM continues to show a nice response to treatment.  He remains in remission.  He feels recovered from chemotherapy and his disease.  His neuropathy remains stable; continue gabapentin at current dosing; anticipate this will be indefinite.  Return to clinic 4 months upon return from [place] with Radha and then 8 months with me.  COVID vaccinated and planning boosters and flu vaccination.  Planning ongoing cancer surveillance through his primary care provider.    Manuel Perez MD    --------------------    Interval History:  Ag returns for follow up of Waldenstroms accompanied by his wife.  All in all, he remains quite well.  No health concerns today.  No unexplained fevers, chills or sweats.  No worsening adenopathy.  Great energy.  Plans to go golfing today.  Stable appetite, energy and weight.  COVID vaccinated.  Plans on getting a flu shot.  Neuropathy remains stable and controlled by gabapentin.    --------------------    Physical Exam:  VS: /70   Pulse 66   Temp 97.8  F (36.6  C) (Temporal)   Ht 1.791 m (5' 10.5\")   Wt 94.8 kg (208 lb 14.4 oz)   SpO2 99%   BMI 29.55 kg/m    GEN: Well appearing.    Labs / Imaging / Path:  We reviewed CBC, LDH, SPEP, IgM.  "

## 2022-11-09 ENCOUNTER — VIRTUAL VISIT (OUTPATIENT)
Dept: INFECTIOUS DISEASES | Facility: CLINIC | Age: 72
End: 2022-11-09
Attending: INTERNAL MEDICINE
Payer: COMMERCIAL

## 2022-11-09 DIAGNOSIS — Z96.649 PROSTHETIC WEAR FOLLOWING TOTAL HIP ARTHROPLASTY, SUBSEQUENT ENCOUNTER: ICD-10-CM

## 2022-11-09 DIAGNOSIS — T84.069D PROSTHETIC WEAR FOLLOWING TOTAL HIP ARTHROPLASTY, SUBSEQUENT ENCOUNTER: ICD-10-CM

## 2022-11-09 DIAGNOSIS — A49.8 INFECTION DUE TO CUTIBACTERIUM SPECIES: Primary | ICD-10-CM

## 2022-11-09 PROCEDURE — 99214 OFFICE O/P EST MOD 30 MIN: CPT | Mod: 95 | Performed by: INTERNAL MEDICINE

## 2022-11-09 PROCEDURE — G0463 HOSPITAL OUTPT CLINIC VISIT: HCPCS | Mod: PN,RTG | Performed by: INTERNAL MEDICINE

## 2022-11-09 NOTE — LETTER
11/9/2022       RE: Ag Evans  [address]   [facility] [address]     Dear Colleague,    Thank you for referring your patient, Ag Evans, to the [facility] INFECTIOUS DISEASE CLINIC [place] at [facility]. Please see a copy of my visit note below.    Ag is a 72 year old who is being evaluated via a billable video visit.    Patient denies any changes since echeck-in regarding medication and allergies and states all information entered during echeck-in remains accurate.  How would you like to obtain your AVS? MyChart  If the video visit is dropped, the invitation should be resent by: Text to cell phone: [phone]  Will anyone else be joining your video visit? No    Video-Visit Details  Video Start Time: 9.12 am  Video End Time: 9.28 am  Originating Location (pt. Location): Home    Distant Location (provider location):  On-site  Platform used for Video Visit: [facility]    INFECTIOUS DISEASES PROGRESS NOTE    Consult requested by: Baljit Joy MD  Reason for Consult : Right hip PJI   Date of Consult: 4/27/22    Infectious Diseases Clinic     HISTORY OF PRESENT ILLNESS:                                                    Ag Evans is a 71 year old male, with history of peripheral neuropathy and later diagnosed with Waldenstrom/Lymphoplasmacytic Lymphoma s/p 4 cycles of chemotherapy  Rituximab/Bendamustine in July 2021 - completed in October 2021, sinus bradycardia, DJD, COVID19 in last winter in [place], Left Total hip arthroplasty on 4/2/2012  and Right Total hip arthroplasty in 2004.     He saw  Orthopedic, on 11/19/2015 for a few days of right hip pain, radiating to the groin. pain resolved with NSAID in a few days.       He saw Orthopedic, Dr Adam Riggs on 10/8/2020 for dull pain 1/10 in the right buttock down the back of his thigh associated with numbness. Per note, patient was quite active with exercises, golf, pickleball.  He took  "occasional ibuprofen and used ice at times. X-rays of the pelvis and lateral both hips were obtained and Images showed Left total hip arthroplasty was in good position with no sign of loosening or wear.  Right total hip arthroplasty showed asymmetric femoral ball in the socket consistent with polyethylene wear superiorly and may be early lysis at the lateral shoulder of the right stem, but most of the stem was quite solid. He saw Dr Riggs again on 10/5/21 and still had similar pain -  a dull pain in the right buttock down the back of his thigh associated with numbness. He also had a sensation of the right hip feeling unstable, a few months earlier so he had used a strap support since then, with relief of the symptoms. At that time, he also complained of left knee pain with climbing stairs.Left knee pain has resolved.  Xray showed on 10/5/21 showed Bilateral hip arthroplasties. Mild heterotopic ossification adjacent to the right hip arthroplasty. No acute fracture identified. and Xray left knee on 10/5/21 showed No fracture. Chondrocalcinosis, consistent with calcium pyrophosphate deposition disease.  Minimal joint effusion. Mild medial compartment and patellar osteophytosis.      He was referred to Dr Joy on 11/4/2021 for particulate wear debris with osteolysis behind right acetabular cup without evidence of loosening.  Implant was at risk for periprosthetic fracture or loosening and recommended revision hip arthroplasty consisting of intralesional curettage of the acetabular defect with allograft bone impaction grafting,  exchange and revision of acetabular bearing surface and femoral head.  On 3/21/22, per note, he had mild discomfort in right hip but was still independent with activities of daily living.      On 4/19/22 He had Revision Right total hip arthroplasty with head/ liner exchange and acetabular bone grafting. Intra-op findings\" Scarred anatomic tissue planes from previous arthroplasty procedure.  " "Evidence of polyethylene wear with eccentric wearing of the polyethylene liner.  No evidence of purulence or gross signs of infection.  Large cavitary lesion superior to the acetabular fossa extending from the 10:00 to 2:00 positions, as well as a cavitary lesion consistent with osteolysis medial to the acetabular component.  Evidence of polyethylene wear debris within the acetabular lesions\" . Intraop right hip tissues - 2 aerobic and 2anaerobic cultures grew Cutibacterium acnes.  Perioperative, he recieved Cefazolin x 3 days. He denies fever, chills, nightsweats. Pain is improving and he is cutting down on oxycodone. he is taking acetaminophen and gabapentin. He has noticed right leg edema since surgery but he has minimal edema in left leg too.     Of note, patient denied any pain in right hip and told me it was an incidental findings when he complained of left knee pain last year. So, all the history above was based on chart review.        ROS:  CONSTITUTIONAL:NEGATIVE for fever, chills, change in weight  INTEGUMENTARY/SKIN: papular rashes on right thigh ,right leg is edematous , especially around the surgocal site. not tender    EYES: NEGATIVE for vision changes or irritation  ENT/MOUTH: NEGATIVE for ear, mouth and throat problems  RESP:NEGATIVE for significant cough or SOB  CV: NEGATIVE for chest pain, palpitations . worsening bilateral leg edema since surgery. no calf pain . right leg is more edematous than left leg   GI: NEGATIVE for nausea, abdominal pain, heartburn, or change in bowel habits  : NEGATIVE for urinary frequency, hematuria or dysuria  MUSCULOSKELETAL: NEGATIVE for significant arthralgias or myalgia  NEURO: NEGATIVE for weakness, dizziness or paresthesias  ENDOCRINE: NEGATIVE for temperature intolerance  HEME/ALLERGY/IMMUNE: NEGATIVE for bleeding problems  PSYCHIATRIC: NEGATIVE for changes in mood or affect    Clinic visit on 5/13/22   Ag feels good. no pain. tolerates Penicillin well. no " pain around PICC .   Uses walker to move around.     Video visit on 7/15/22  Ag is doing well. no pain in right hip. He walks with a cane , able to walk  more than 1.5 miles a day, wonders when he can get rid of the cane   s/p 6 weeks of penG and now on Amoxillin 500 mg PO TID. soft stools but no diarrhea, no abdominal pain. Has very good appetite.     Video visit on 11/9/22   Ag is seen via video ( wife is present during this visit too). He feels well and tolerates amoxicillin well. no diarrhea and he is taking probiotic daily. he denies any pain in right hip and able to walk 2-3 miles without any problems.     He had dental work recently - crown procedure and 4 teeth extractions and will have more procedures in the next few weeks.   Ag saw Dr Joy on 10/17/22 and Dr Joy felt that antibiotic can be stopped given that preoperatively, there was no suspicion of or history of prosthetic joint infection and had 2 positive unexpected C acnes cultures with 3 negative specimen cultures  Ag also shares that he had a mass on the right hip surgical site for some time and later found to be abscess. This was drained about 10 years ago. ( ~2012) . He is concerned that this might have caused his right hip infection.  I review and discuss with Ag and wife the intraop findings,. intra op cultures and Cutibacterium infection. This is usually a very slow, indolent infection.      Problem list and histories reviewed & adjusted, as indicated.  Additional history: as documented    PAST MEDICAL HISTORY:  Patient  has a past medical history of DJD (degenerative joint disease), lumbar, Malignant lymphoma, lymphoplasmacytic (H) (7/1/2021), and Sinus bradycardia.    He has no past medical history of Cerebral infarction (H), Congestive heart failure (H), COPD (chronic obstructive pulmonary disease) (H), Depressive disorder, Diabetes (H), History of blood transfusion, Hypertension, Sleep apnea, Thyroid disease, or  Uncomplicated asthma.    PAST SURGICAL HISTORY:  Patient  has a past surgical history that includes tonsillectomy; appendectomy; surgical history of - ; colonoscopy (01/01/2010); TOTAL HIP ARTHROPLASTY (Left, 04/02/2012); TOTAL HIP ARTHROPLASTY (Right, 11/15/2004); biopsy (mass right side); Bone marrow biopsy, bone specimen, needle/trocar (N/A, 06/10/2021); STOMACH SURGERY PROCEDURE UNLISTED (Appendix); and Arthroplasty revision hip (Right, 4/19/2022).    CURRENT MEDICATIONS:  Current Outpatient Medications   Medication Sig Dispense Refill     amoxicillin (AMOXIL) 500 MG capsule        atorvastatin (LIPITOR) 20 MG tablet TAKE 1 TABLET (20MG) BY MOUTH ONCE DAILY 90 tablet 3     gabapentin (NEURONTIN) 300 MG capsule Take 1 capsule (300 mg) by mouth 3 times daily 270 capsule 3     lactobacillus rhamnosus, GG, (CULTURELL) capsule        Melatonin 10-10 MG TBCR Take 1 tablet by mouth At Bedtime Brand name: Sleep 3       Multiple Vitamins-Minerals (MULTIVITAMIN PO) Take 1 tablet by mouth every morning       PREVIDENT 5000 BOOSTER PLUS 1.1 % PSTE dental paste USE THIS TOOTHPASTE IN PLACE OF YOUR REGULAR TOOTHPASTE BEFORE BEDTIME. BRUSH NORMAL, THEN SWISH WITH THE REMAINING FOAM FOR AT LEAST 30 SECONDS. SPIT OUT EXCESS AND THEN AVOID RINSING.       ALLERGY:  Allergies   Allergen Reactions     Seasonal Allergies      Aluminum Rash     IMMUNIZATION HISTORY:  Immunization History   Administered Date(s) Administered     COVID-19,PF,Moderna 03/04/2021, 04/01/2021, 10/20/2021     COVID-19,PF,Moderna Booster 07/18/2022     DTAP (<7y) 01/26/1996     Flu, Unspecified 11/08/1995, 11/01/2013     HEPA 01/26/1996, 09/19/1996     Historical DTP/aP 01/26/1996     Influenza (High Dose) 3 valent vaccine 10/15/2014, 11/18/2015, 10/24/2016, 09/25/2017, 10/17/2018, 09/25/2019, 09/28/2020, 10/20/2021     Influenza (IIV3) PF 11/08/1995, 11/01/2011, 11/01/2013, 10/15/2014     Influenza, Quad, High Dose, Pf, 65yr+ (Fluzone HD) 09/28/2020,  10/20/2021, 11/04/2022     Pneumo Conj 13-V (2010&after) 07/30/2015     Pneumococcal 23 valent 10/15/2014     TD (ADULT, 7+) 01/26/1996, 01/06/2006     TDAP Vaccine (Adacel) 01/27/2011     TDAP Vaccine (Boostrix) 08/09/2022     Td (Adult), Adsorbed 01/26/1996     Tdap (Adacel,Boostrix) 01/27/2011     Zoster vaccine recombinant adjuvanted (SHINGRIX) 04/27/2018, 07/23/2018     Zoster vaccine, live 03/04/2014     SOCIAL HISTORY:  Patient  reports that he has never smoked. He has never used smokeless tobacco. He reports current alcohol use. He reports that he does not use drugs.  , lives in [place]     FAMILY HISTORY:  Patient's family history includes Breast Cancer in his mother and sister; Osteoporosis in his sister; Other Cancer in his father, mother, sister, sister, and sister; Prostate Cancer in his father.    Labs reviewed in EPIC    OBJECTIVE:                                                    There were no vitals taken for this visit. There is no height or weight on file to calculate BMI.   GENERAL: alert, oriented and no distress  EYES: Eyes grossly normal to inspection,  and conjunctivae and sclerae normal  NECK: supple   Resp: no cough. breathing comfortably on room air   MS: no gross musculoskeletal defects noted, no edema  NEURO: Normal strength and tone, mentation intact and speech normal  PSYCH: mentation appears normal, affect normal       ASSESSMENT AND PLAN:                                                      1. 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   - he said he had a mass on the right hip surgical site for some time and later found to be abscess. This was drained about 10 years ago. ( ~2012) .  - pain in right hip first noted in 2015, resolved with NSAID  - pain noted again in 2020, Xray - asymmetric femoral ball in the socket consistent with polyethylene wear superiorly and may be early lysis at the lateral shoulder  "of the right stem  - on 4/19/22 - s/p revision of Right total hip arthroplasty with head/ liner exchange and acetabular bone grafting. Intra-op findings\" Scarred anatomic tissue planes from previous arthroplasty procedure.  Evidence of polyethylene wear with eccentric wearing of the polyethylene liner.  No evidence of purulence or gross signs of infection.  Large cavitary lesion superior to the acetabular fossa extending from the 10:00 to 2:00 positions, as well as a cavitary lesion consistent with osteolysis medial to the acetabular component.  Evidence of polyethylene wear debris within the acetabular lesions\" .    - 4/19/22 - Intraop right hip tissues - 2 aerobic and 2 anaerobic cultures grew Cutibacterium acnes.   - completed 6 weeks of Pen G ( 5/5/22 - 6/16/22) then  Amoxicillin 500 mg PO TID ( suppressive antibiotic)     2. 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 Cutibacterium acnes, a commensal skin bacteria, presented as indolent infection and pain many years after R ERIC, causing prosthetic wear and osteolysis. patient wishes to continue antibiotic for now, concerns that the infection may recur once he stops antibiotic. Plan to continue suppressive antibiotic Amoxicillin 500 mg po TID for now   - had dental procedures recently and will have some more procedures in the next few weeks   - continue probiotic daily   - advised to call if he has any questions/ concerns     video f/u on Jan 4 at 8 am     Rishabh Flanagan MD, M.Med.Sc  Division of Infectious Diseases and International Medicine  [facility]      Time : 35 min spent with " patient, chart review, documentation and coordinating care on 11/9/22

## 2022-11-09 NOTE — PROGRESS NOTES
Ag is a 72 year old who is being evaluated via a billable video visit.    Patient denies any changes since echeck-in regarding medication and allergies and states all information entered during echeck-in remains accurate.  How would you like to obtain your AVS? MyChart  If the video visit is dropped, the invitation should be resent by: Text to cell phone: [phone]  Will anyone else be joining your video visit? No    Video-Visit Details  Video Start Time: 9.12 am  Video End Time: 9.28 am  Originating Location (pt. 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     He saw  Orthopedic, on 11/19/2015 for a few days of right hip pain, radiating to the groin. pain resolved with NSAID in a few days.       He saw Orthopedic, Dr Adam Riggs on 10/8/2020 for dull pain 1/10 in the right buttock down the back of his thigh associated with numbness. Per note, patient was quite active with exercises, golf, pickleball.  He took occasional ibuprofen and used ice at times. X-rays of the pelvis and lateral both hips were obtained and Images showed Left total hip arthroplasty was in good position with no sign of loosening or wear.  Right total hip arthroplasty showed asymmetric femoral ball in the socket consistent with polyethylene wear superiorly  "and may be early lysis at the lateral shoulder of the right stem, but most of the stem was quite solid. He saw Dr Riggs again on 10/5/21 and still had similar pain -  a dull pain in the right buttock down the back of his thigh associated with numbness. He also had a sensation of the right hip feeling unstable, a few months earlier so he had used a strap support since then, with relief of the symptoms. At that time, he also complained of left knee pain with climbing stairs.Left knee pain has resolved.  Xray showed on 10/5/21 showed Bilateral hip arthroplasties. Mild heterotopic ossification adjacent to the right hip arthroplasty. No acute fracture identified. and Xray left knee on 10/5/21 showed No fracture. Chondrocalcinosis, consistent with calcium pyrophosphate deposition disease.  Minimal joint effusion. Mild medial compartment and patellar osteophytosis.      He was referred to Dr Joy on 11/4/2021 for particulate wear debris with osteolysis behind right acetabular cup without evidence of loosening.  Implant was at risk for periprosthetic fracture or loosening and recommended revision hip arthroplasty consisting of intralesional curettage of the acetabular defect with allograft bone impaction grafting,  exchange and revision of acetabular bearing surface and femoral head.  On 3/21/22, per note, he had mild discomfort in right hip but was still independent with activities of daily living.      On 4/19/22 He had Revision Right total hip arthroplasty with head/ liner exchange and acetabular bone grafting. Intra-op findings\" Scarred anatomic tissue planes from previous arthroplasty procedure.  Evidence of polyethylene wear with eccentric wearing of the polyethylene liner.  No evidence of purulence or gross signs of infection.  Large cavitary lesion superior to the acetabular fossa extending from the 10:00 to 2:00 positions, as well as a cavitary lesion consistent with osteolysis medial to the acetabular " "component.  Evidence of polyethylene wear debris within the acetabular lesions\" . Intraop right hip tissues - 2 aerobic and 2anaerobic cultures grew Cutibacterium acnes.  Perioperative, he recieved Cefazolin x 3 days. He denies fever, chills, nightsweats. Pain is improving and he is cutting down on oxycodone. he is taking acetaminophen and gabapentin. He has noticed right leg edema since surgery but he has minimal edema in left leg too.     Of note, patient denied any pain in right hip and told me it was an incidental findings when he complained of left knee pain last year. So, all the history above was based on chart review.        ROS:  CONSTITUTIONAL:NEGATIVE for fever, chills, change in weight  INTEGUMENTARY/SKIN: papular rashes on right thigh ,right leg is edematous , especially around the surgocal site. not tender    EYES: NEGATIVE for vision changes or irritation  ENT/MOUTH: NEGATIVE for ear, mouth and throat problems  RESP:NEGATIVE for significant cough or SOB  CV: NEGATIVE for chest pain, palpitations . worsening bilateral leg edema since surgery. no calf pain . right leg is more edematous than left leg   GI: NEGATIVE for nausea, abdominal pain, heartburn, or change in bowel habits  : NEGATIVE for urinary frequency, hematuria or dysuria  MUSCULOSKELETAL: NEGATIVE for significant arthralgias or myalgia  NEURO: NEGATIVE for weakness, dizziness or paresthesias  ENDOCRINE: NEGATIVE for temperature intolerance  HEME/ALLERGY/IMMUNE: NEGATIVE for bleeding problems  PSYCHIATRIC: NEGATIVE for changes in mood or affect    Clinic visit on 5/13/22   Ag feels good. no pain. tolerates Penicillin well. no pain around PICC .   Uses walker to move around.     Video visit on 7/15/22  Ag is doing well. no pain in right hip. He walks with a cane , able to walk  more than 1.5 miles a day, wonders when he can get rid of the cane   s/p 6 weeks of penG and now on Amoxillin 500 mg PO TID. soft stools but no diarrhea, no " abdominal pain. Has very good appetite.     Video visit on 11/9/22   Ag is seen via video ( wife is present during this visit too). He feels well and tolerates amoxicillin well. no diarrhea and he is taking probiotic daily. he denies any pain in right hip and able to walk 2-3 miles without any problems.     He had dental work recently - crown procedure and 4 teeth extractions and will have more procedures in the next few weeks.   Ag saw Dr Joy on 10/17/22 and Dr Joy felt that antibiotic can be stopped given that preoperatively, there was no suspicion of or history of prosthetic joint infection and had 2 positive unexpected C acnes cultures with 3 negative specimen cultures  Ag also shares that he had a mass on the right hip surgical site for some time and later found to be abscess. This was drained about 10 years ago. ( ~2012) . He is concerned that this might have caused his right hip infection.  I review and discuss with Ag and wife the intraop findings,. intra op cultures and Cutibacterium infection. This is usually a very slow, indolent infection.      Problem list and histories reviewed & adjusted, as indicated.  Additional history: as documented    PAST MEDICAL HISTORY:  Patient  has a past medical history of DJD (degenerative joint disease), lumbar, Malignant lymphoma, lymphoplasmacytic (H) (7/1/2021), and Sinus bradycardia.    He has no past medical history of Cerebral infarction (H), Congestive heart failure (H), COPD (chronic obstructive pulmonary disease) (H), Depressive disorder, Diabetes (H), History of blood transfusion, Hypertension, Sleep apnea, Thyroid disease, or Uncomplicated asthma.    PAST SURGICAL HISTORY:  Patient  has a past surgical history that includes tonsillectomy; appendectomy; surgical history of - ; colonoscopy (01/01/2010); TOTAL HIP ARTHROPLASTY (Left, 04/02/2012); TOTAL HIP ARTHROPLASTY (Right, 11/15/2004); biopsy (mass right side); Bone marrow biopsy, bone  specimen, needle/trocar (N/A, 06/10/2021); STOMACH SURGERY PROCEDURE UNLISTED (Appendix); and Arthroplasty revision hip (Right, 4/19/2022).    CURRENT MEDICATIONS:  Current Outpatient Medications   Medication Sig Dispense Refill     amoxicillin (AMOXIL) 500 MG capsule        atorvastatin (LIPITOR) 20 MG tablet TAKE 1 TABLET (20MG) BY MOUTH ONCE DAILY 90 tablet 3     gabapentin (NEURONTIN) 300 MG capsule Take 1 capsule (300 mg) by mouth 3 times daily 270 capsule 3     lactobacillus rhamnosus, GG, (CULTURELL) capsule        Melatonin 10-10 MG TBCR Take 1 tablet by mouth At Bedtime Brand name: Sleep 3       Multiple Vitamins-Minerals (MULTIVITAMIN PO) Take 1 tablet by mouth every morning       PREVIDENT 5000 BOOSTER PLUS 1.1 % PSTE dental paste USE THIS TOOTHPASTE IN PLACE OF YOUR REGULAR TOOTHPASTE BEFORE BEDTIME. BRUSH NORMAL, THEN SWISH WITH THE REMAINING FOAM FOR AT LEAST 30 SECONDS. SPIT OUT EXCESS AND THEN AVOID RINSING.       ALLERGY:  Allergies   Allergen Reactions     Seasonal Allergies      Aluminum Rash     IMMUNIZATION HISTORY:  Immunization History   Administered Date(s) Administered     COVID-19,PF,Moderna 03/04/2021, 04/01/2021, 10/20/2021     COVID-19,PF,Moderna Booster 07/18/2022     DTAP (<7y) 01/26/1996     Flu, Unspecified 11/08/1995, 11/01/2013     HEPA 01/26/1996, 09/19/1996     Historical DTP/aP 01/26/1996     Influenza (High Dose) 3 valent vaccine 10/15/2014, 11/18/2015, 10/24/2016, 09/25/2017, 10/17/2018, 09/25/2019, 09/28/2020, 10/20/2021     Influenza (IIV3) PF 11/08/1995, 11/01/2011, 11/01/2013, 10/15/2014     Influenza, Quad, High Dose, Pf, 65yr+ (Fluzone HD) 09/28/2020, 10/20/2021, 11/04/2022     Pneumo Conj 13-V (2010&after) 07/30/2015     Pneumococcal 23 valent 10/15/2014     TD (ADULT, 7+) 01/26/1996, 01/06/2006     TDAP Vaccine (Adacel) 01/27/2011     TDAP Vaccine (Boostrix) 08/09/2022     Td (Adult), Adsorbed 01/26/1996     Tdap (Adacel,Boostrix) 01/27/2011     Zoster vaccine  "recombinant adjuvanted (SHINGRIX) 04/27/2018, 07/23/2018     Zoster vaccine, live 03/04/2014     SOCIAL HISTORY:  Patient  reports that he has never smoked. He has never used smokeless tobacco. He reports current alcohol use. He reports that he does not use drugs.  , lives in [place]     FAMILY HISTORY:  Patient's family history includes Breast Cancer in his mother and sister; Osteoporosis in his sister; Other Cancer in his father, mother, sister, sister, and sister; Prostate Cancer in his father.    Labs reviewed in EPIC    OBJECTIVE:                                                    There were no vitals taken for this visit. There is no height or weight on file to calculate BMI.   GENERAL: alert, oriented and no distress  EYES: Eyes grossly normal to inspection,  and conjunctivae and sclerae normal  NECK: supple   Resp: no cough. breathing comfortably on room air   MS: no gross musculoskeletal defects noted, no edema  NEURO: Normal strength and tone, mentation intact and speech normal  PSYCH: mentation appears normal, affect normal       ASSESSMENT AND PLAN:                                                      1. 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   - he said he had a mass on the right hip surgical site for some time and later found to be abscess. This was drained about 10 years ago. ( ~2012) .  - pain in right hip first noted in 2015, resolved with NSAID  - pain noted again in 2020, Xray - asymmetric femoral ball in the socket consistent with polyethylene wear superiorly and may be early lysis at the lateral shoulder of the right stem  - on 4/19/22 - s/p revision of Right total hip arthroplasty with head/ liner exchange and acetabular bone grafting. Intra-op findings\" Scarred anatomic tissue planes from previous arthroplasty procedure.  Evidence of polyethylene wear with eccentric wearing of the polyethylene liner.  No evidence " "of purulence or gross signs of infection.  Large cavitary lesion superior to the acetabular fossa extending from the 10:00 to 2:00 positions, as well as a cavitary lesion consistent with osteolysis medial to the acetabular component.  Evidence of polyethylene wear debris within the acetabular lesions\" .    - 4/19/22 - Intraop right hip tissues - 2 aerobic and 2 anaerobic cultures grew Cutibacterium acnes.   - completed 6 weeks of Pen G ( 5/5/22 - 6/16/22) then  Amoxicillin 500 mg PO TID ( suppressive antibiotic)     2. 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 Cutibacterium acnes, a commensal skin bacteria, presented as indolent infection and pain many years after R ERIC, causing prosthetic wear and osteolysis. patient wishes to continue antibiotic for now, concerns that the infection may recur once he stops antibiotic. Plan to continue suppressive antibiotic Amoxicillin 500 mg po TID for now   - had dental procedures recently and will have some more procedures in the next few weeks   - continue probiotic daily   - advised to call if he has any questions/ concerns     video f/u on Jan 4 at 8 am     Rishabh Flanagan MD, M.Med.Sc  Division of Infectious Diseases and International Medicine  [facility]      Time : 35 min spent with patient, chart review, documentation and coordinating care on 11/9/22      "

## 2022-12-01 ENCOUNTER — MYC MEDICAL ADVICE (OUTPATIENT)
Dept: INTERNAL MEDICINE | Facility: CLINIC | Age: 72
End: 2022-12-01

## 2022-12-01 ENCOUNTER — TELEPHONE (OUTPATIENT)
Dept: NEUROLOGY | Facility: CLINIC | Age: 72
End: 2022-12-01

## 2022-12-01 DIAGNOSIS — G62.9 NEUROPATHY: ICD-10-CM

## 2022-12-01 NOTE — TELEPHONE ENCOUNTER
Called and spoke with patient. We received refill request for his gabapentin. In the last note, Dr. Ruiz writes that gabapentin can be refilled by PCP if there are no changes. Asked patient if there have been changes or if he needs to see Dr. Ruiz. Patient declined, and he stated he would ask his PCP to refill his gabapentin.

## 2022-12-05 RX ORDER — GABAPENTIN 300 MG/1
300 CAPSULE ORAL 3 TIMES DAILY
Qty: 270 CAPSULE | Refills: 3 | Status: SHIPPED | OUTPATIENT
Start: 2022-12-05 | End: 2023-08-28

## 2022-12-07 DIAGNOSIS — Z96.649 PROSTHETIC WEAR FOLLOWING TOTAL HIP ARTHROPLASTY, SUBSEQUENT ENCOUNTER: ICD-10-CM

## 2022-12-07 DIAGNOSIS — A49.8 INFECTION DUE TO CUTIBACTERIUM SPECIES: Primary | ICD-10-CM

## 2022-12-07 DIAGNOSIS — T84.51XA INFECTION ASSOCIATED WITH INTERNAL RIGHT HIP PROSTHESIS, INITIAL ENCOUNTER (H): ICD-10-CM

## 2022-12-07 DIAGNOSIS — T84.069D PROSTHETIC WEAR FOLLOWING TOTAL HIP ARTHROPLASTY, SUBSEQUENT ENCOUNTER: ICD-10-CM

## 2022-12-07 RX ORDER — AMOXICILLIN 500 MG/1
500 CAPSULE ORAL 3 TIMES DAILY
Qty: 90 CAPSULE | Refills: 0 | Status: SHIPPED | OUTPATIENT
Start: 2022-12-07 | End: 2023-01-06

## 2023-01-04 ENCOUNTER — VIRTUAL VISIT (OUTPATIENT)
Dept: INFECTIOUS DISEASES | Facility: CLINIC | Age: 73
End: 2023-01-04
Attending: INTERNAL MEDICINE
Payer: COMMERCIAL

## 2023-01-04 DIAGNOSIS — Z96.649 PROSTHETIC WEAR FOLLOWING TOTAL HIP ARTHROPLASTY, SUBSEQUENT ENCOUNTER: ICD-10-CM

## 2023-01-04 DIAGNOSIS — T84.069D PROSTHETIC WEAR FOLLOWING TOTAL HIP ARTHROPLASTY, SUBSEQUENT ENCOUNTER: ICD-10-CM

## 2023-01-04 DIAGNOSIS — A49.8 INFECTION DUE TO CUTIBACTERIUM SPECIES: Primary | ICD-10-CM

## 2023-01-04 PROCEDURE — 99213 OFFICE O/P EST LOW 20 MIN: CPT | Mod: 95 | Performed by: INTERNAL MEDICINE

## 2023-01-04 NOTE — LETTER
1/4/2023       RE: Ag Evans  [address]   [facility] [address]     Dear Colleague,    Thank you for referring your patient, Ag Evans, to the [facility] [place] at [facility]. Please see a copy of my visit note below.    Ag is a 72 year old who is being evaluated via a billable video visit.      How would you like to obtain your AVS? MyChart  If the video visit is dropped, the invitation should be resent by: Text to cell phone: [phone]  Will anyone else be joining your video visit? No    Ag is a 72 year old who is being evaluated via a billable video visit.    Patient denies any changes since echeck-in regarding medication and allergies and states all information entered during echeck-in remains accurate.  How would you like to obtain your AVS? MyChart  If the video visit is dropped, the invitation should be resent by: Text to cell phone: [phone]  Will anyone else be joining your video visit? No    Video-Visit Details  Video Start Time: 8.09 am  Video End Time: 8.20 am  Originating Location (pt. 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     He saw  Orthopedic, on 11/19/2015 for a few days of right hip pain, radiating to the  groin. pain resolved with NSAID in a few days.       He saw Orthopedic, Dr Adam Riggs on 10/8/2020 for dull pain 1/10 in the right buttock down the back of his thigh associated with numbness. Per note, patient was quite active with exercises, golf, pickleball.  He took occasional ibuprofen and used ice at times. X-rays of the pelvis and lateral both hips were obtained and Images showed Left total hip arthroplasty was in good position with no sign of loosening or wear.  Right total hip arthroplasty showed asymmetric femoral ball in the socket consistent with polyethylene wear superiorly and may be early lysis at the lateral shoulder of the right stem, but most of the stem was quite solid. He saw Dr Riggs again on 10/5/21 and still had similar pain -  a dull pain in the right buttock down the back of his thigh associated with numbness. He also had a sensation of the right hip feeling unstable, a few months earlier so he had used a strap support since then, with relief of the symptoms. At that time, he also complained of left knee pain with climbing stairs.Left knee pain has resolved.  Xray showed on 10/5/21 showed Bilateral hip arthroplasties. Mild heterotopic ossification adjacent to the right hip arthroplasty. No acute fracture identified. and Xray left knee on 10/5/21 showed No fracture. Chondrocalcinosis, consistent with calcium pyrophosphate deposition disease.  Minimal joint effusion. Mild medial compartment and patellar osteophytosis.      He was referred to Dr Joy on 11/4/2021 for particulate wear debris with osteolysis behind right acetabular cup without evidence of loosening.  Implant was at risk for periprosthetic fracture or loosening and recommended revision hip arthroplasty consisting of intralesional curettage of the acetabular defect with allograft bone impaction grafting,  exchange and revision of acetabular bearing surface and femoral head.  On 3/21/22, per note, he had mild discomfort in  "right hip but was still independent with activities of daily living.      On 4/19/22 He had Revision Right total hip arthroplasty with head/ liner exchange and acetabular bone grafting. Intra-op findings\" Scarred anatomic tissue planes from previous arthroplasty procedure.  Evidence of polyethylene wear with eccentric wearing of the polyethylene liner.  No evidence of purulence or gross signs of infection.  Large cavitary lesion superior to the acetabular fossa extending from the 10:00 to 2:00 positions, as well as a cavitary lesion consistent with osteolysis medial to the acetabular component.  Evidence of polyethylene wear debris within the acetabular lesions\" . Intraop right hip tissues - 2 aerobic and 2anaerobic cultures grew Cutibacterium acnes.  Perioperative, he recieved Cefazolin x 3 days. He denies fever, chills, nightsweats. Pain is improving and he is cutting down on oxycodone. he is taking acetaminophen and gabapentin. He has noticed right leg edema since surgery but he has minimal edema in left leg too.     Of note, patient denied any pain in right hip and told me it was an incidental findings when he complained of left knee pain last year. So, all the history above was based on chart review.        ROS:  CONSTITUTIONAL:NEGATIVE for fever, chills, change in weight  INTEGUMENTARY/SKIN: papular rashes on right thigh ,right leg is edematous , especially around the surgocal site. not tender    EYES: NEGATIVE for vision changes or irritation  ENT/MOUTH: NEGATIVE for ear, mouth and throat problems  RESP:NEGATIVE for significant cough or SOB  CV: NEGATIVE for chest pain, palpitations . worsening bilateral leg edema since surgery. no calf pain . right leg is more edematous than left leg   GI: NEGATIVE for nausea, abdominal pain, heartburn, or change in bowel habits  : NEGATIVE for urinary frequency, hematuria or dysuria  MUSCULOSKELETAL: NEGATIVE for significant arthralgias or myalgia  NEURO: NEGATIVE for " "weakness, dizziness or paresthesias  ENDOCRINE: NEGATIVE for temperature intolerance  HEME/ALLERGY/IMMUNE: NEGATIVE for bleeding problems  PSYCHIATRIC: NEGATIVE for changes in mood or affect    Clinic visit on 5/13/22   Ag feels good. no pain. tolerates Penicillin well. no pain around PICC .   Uses walker to move around.     Video visit on 7/15/22  Ag is doing well. no pain in right hip. He walks with a cane , able to walk  more than 1.5 miles a day, wonders when he can get rid of the cane   s/p 6 weeks of penG and now on Amoxillin 500 mg PO TID. soft stools but no diarrhea, no abdominal pain. Has very good appetite.     Video visit on 11/9/22   Ag is seen via video ( wife is present during this visit too). He feels well and tolerates amoxicillin well. no diarrhea and he is taking probiotic daily. he denies any pain in right hip and able to walk 2-3 miles without any problems.     He had dental work recently - crown procedure and 4 teeth extractions and will have more procedures in the next few weeks.   Ag saw Dr Joy on 10/17/22 and Dr Joy felt that antibiotic can be stopped given that preoperatively, there was no suspicion of or history of prosthetic joint infection and had 2 positive unexpected C acnes cultures with 3 negative specimen cultures  Ag also shares that he had a mass on the right hip surgical site for some time and later found to be abscess. This was drained about 10 years ago. ( ~2012) . He is concerned that this might have caused his right hip infection.  I review and discuss with Ag and wife the intraop findings,. intra op cultures and Cutibacterium infection. This is usually a very slow, indolent infection.      Video visit on 1/4/23  Ag feels fine and is \"able to do plenty of exercise\". he is able to walk and bike with occasional soreness in his right hip resolved with PRN Naproxen. no swelling/ redness around right hip surgical site. he is taking Amoxicillin BID for the " past 1 week as his supply is running low. no diarhhea and tolerates it well. Is done with his dental procedure     Problem list and histories reviewed & adjusted, as indicated.  Additional history: as documented    PAST MEDICAL HISTORY:  Patient  has a past medical history of DJD (degenerative joint disease), lumbar, Malignant lymphoma, lymphoplasmacytic (H) (7/1/2021), and Sinus bradycardia.    He has no past medical history of Cerebral infarction (H), Congestive heart failure (H), COPD (chronic obstructive pulmonary disease) (H), Depressive disorder, Diabetes (H), History of blood transfusion, Hypertension, Sleep apnea, Thyroid disease, or Uncomplicated asthma.    PAST SURGICAL HISTORY:  Patient  has a past surgical history that includes tonsillectomy; appendectomy; surgical history of - ; colonoscopy (01/01/2010); TOTAL HIP ARTHROPLASTY (Left, 04/02/2012); TOTAL HIP ARTHROPLASTY (Right, 11/15/2004); biopsy (mass right side); Bone marrow biopsy, bone specimen, needle/trocar (N/A, 06/10/2021); STOMACH SURGERY PROCEDURE UNLISTED (Appendix); and Arthroplasty revision hip (Right, 4/19/2022).    CURRENT MEDICATIONS:  Current Outpatient Medications   Medication Sig Dispense Refill     amoxicillin (AMOXIL) 500 MG capsule Take 1 capsule (500 mg) by mouth 3 times daily for 30 days 90 capsule 0     atorvastatin (LIPITOR) 20 MG tablet TAKE 1 TABLET (20MG) BY MOUTH ONCE DAILY 90 tablet 3     gabapentin (NEURONTIN) 300 MG capsule Take 1 capsule (300 mg) by mouth 3 times daily 270 capsule 3     lactobacillus rhamnosus, GG, (CULTURELL) capsule        Melatonin 10-10 MG TBCR Take 1 tablet by mouth At Bedtime Brand name: Sleep 3       Multiple Vitamins-Minerals (MULTIVITAMIN PO) Take 1 tablet by mouth every morning       PREVIDENT 5000 BOOSTER PLUS 1.1 % PSTE dental paste USE THIS TOOTHPASTE IN PLACE OF YOUR REGULAR TOOTHPASTE BEFORE BEDTIME. BRUSH NORMAL, THEN SWISH WITH THE REMAINING FOAM FOR AT LEAST 30 SECONDS. SPIT OUT EXCESS  AND THEN AVOID RINSING.       ALLERGY:  Allergies   Allergen Reactions     Seasonal Allergies      Aluminum Rash     IMMUNIZATION HISTORY:  Immunization History   Administered Date(s) Administered     COVID-19 Vaccine 18+ (Moderna) 03/04/2021, 04/01/2021, 10/20/2021     COVID-19 Vaccine Bivalent Booster 18+ (Moderna) 11/28/2022     COVID-19,PF,Moderna Booster 07/18/2022     DTAP (<7y) 01/26/1996     Flu, Unspecified 11/08/1995, 11/01/2013     HEPA 01/26/1996, 09/19/1996     Historical DTP/aP 01/26/1996     Influenza (High Dose) 3 valent vaccine 10/15/2014, 11/18/2015, 10/24/2016, 09/25/2017, 10/17/2018, 09/25/2019, 09/28/2020, 10/20/2021     Influenza (IIV3) PF 11/08/1995, 11/01/2011, 11/01/2013, 10/15/2014     Influenza Vaccine 65+ (Fluzone HD) 09/28/2020, 10/20/2021, 11/04/2022     Pneumo Conj 13-V (2010&after) 07/30/2015     Pneumococcal 23 valent 10/15/2014     TD (ADULT, 7+) 01/26/1996, 01/06/2006     TDAP Vaccine (Adacel) 01/27/2011     TDAP Vaccine (Boostrix) 08/09/2022     Td (Adult), Adsorbed 01/26/1996     Tdap (Adacel,Boostrix) 01/27/2011     Zoster vaccine recombinant adjuvanted (SHINGRIX) 04/27/2018, 07/23/2018     Zoster vaccine, live 03/04/2014     SOCIAL HISTORY:  Patient  reports that he has never smoked. He has never used smokeless tobacco. He reports current alcohol use. He reports that he does not use drugs.  , lives in [place]     FAMILY HISTORY:  Patient's family history includes Breast Cancer in his mother and sister; Osteoporosis in his sister; Other Cancer in his father, mother, sister, sister, and sister; Prostate Cancer in his father.    Labs reviewed in EPIC    OBJECTIVE:                                                    There were no vitals taken for this visit. There is no height or weight on file to calculate BMI.   GENERAL: alert, oriented and no distress  EYES: Eyes grossly normal to inspection,  and conjunctivae and sclerae normal  NECK: supple   Resp: no cough. breathing  "comfortably on room air   MS: no gross musculoskeletal defects noted, no edema  NEURO: Normal strength and tone, mentation intact and speech normal  PSYCH: mentation appears normal, affect normal       ASSESSMENT AND PLAN:                                                      1. 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   - he said he had a mass on the right hip surgical site for some time and later found to be abscess. This was drained about 10 years ago. ( ~2012) .  - pain in right hip first noted in 2015, resolved with NSAID  - pain noted again in 2020, Xray - asymmetric femoral ball in the socket consistent with polyethylene wear superiorly and may be early lysis at the lateral shoulder of the right stem  - on 4/19/22 - s/p revision of Right total hip arthroplasty with head/ liner exchange and acetabular bone grafting. Intra-op findings\" Scarred anatomic tissue planes from previous arthroplasty procedure.  Evidence of polyethylene wear with eccentric wearing of the polyethylene liner.  No evidence of purulence or gross signs of infection.  Large cavitary lesion superior to the acetabular fossa extending from the 10:00 to 2:00 positions, as well as a cavitary lesion consistent with osteolysis medial to the acetabular component.  Evidence of polyethylene wear debris within the acetabular lesions\" .    - 4/19/22 - Intraop right hip tissues - 2 aerobic and 2 anaerobic cultures grew Cutibacterium acnes.   - completed 6 weeks of Pen G ( 5/5/22 - 6/16/22) then  Amoxicillin 500 mg PO TID ( suppressive antibiotic)     2. 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 Cutibacterium acnes, a commensal skin bacteria, presented as indolent infection and pain many years after R ERIC, causing prosthetic wear and osteolysis.S/p 6 weeks of PenG and 6 months of Amoxicillin.   - OK to stop Amoxicillin. discussed symptoms/ signs of infection.  advised him to call if he has any questions/ concerns       Rishabh Flanagan MD, M.Med.Sc  Division of Infectious Diseases and International Medicine  [facility]      Time : 20 min spent with patient, chart review, documentation and coordinating care on 1/4/23

## 2023-01-04 NOTE — PROGRESS NOTES
Ag is a 72 year old who is being evaluated via a billable video visit.      How would you like to obtain your AVS? MyChart  If the video visit is dropped, the invitation should be resent by: Text to cell phone: [phone]  Will anyone else be joining your video visit? No    Ag is a 72 year old who is being evaluated via a billable video visit.    Patient denies any changes since echeck-in regarding medication and allergies and states all information entered during echeck-in remains accurate.  How would you like to obtain your AVS? MyChart  If the video visit is dropped, the invitation should be resent by: Text to cell phone: [phone]  Will anyone else be joining your video visit? No    Video-Visit Details  Video Start Time: 8.09 am  Video End Time: 8.20 am  Originating Location (pt. 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     He saw  Orthopedic, on 11/19/2015 for a few days of right hip pain, radiating to the groin. pain resolved with NSAID in a few days.       He saw Orthopedic, Dr Adam Riggs on 10/8/2020 for dull pain 1/10 in the right buttock down the back of his thigh associated with numbness. Per note, patient was quite active with exercises, golf, pickleball.  He took occasional ibuprofen and used ice at times.  "X-rays of the pelvis and lateral both hips were obtained and Images showed Left total hip arthroplasty was in good position with no sign of loosening or wear.  Right total hip arthroplasty showed asymmetric femoral ball in the socket consistent with polyethylene wear superiorly and may be early lysis at the lateral shoulder of the right stem, but most of the stem was quite solid. He saw Dr Riggs again on 10/5/21 and still had similar pain -  a dull pain in the right buttock down the back of his thigh associated with numbness. He also had a sensation of the right hip feeling unstable, a few months earlier so he had used a strap support since then, with relief of the symptoms. At that time, he also complained of left knee pain with climbing stairs.Left knee pain has resolved.  Xray showed on 10/5/21 showed Bilateral hip arthroplasties. Mild heterotopic ossification adjacent to the right hip arthroplasty. No acute fracture identified. and Xray left knee on 10/5/21 showed No fracture. Chondrocalcinosis, consistent with calcium pyrophosphate deposition disease.  Minimal joint effusion. Mild medial compartment and patellar osteophytosis.      He was referred to Dr Joy on 11/4/2021 for particulate wear debris with osteolysis behind right acetabular cup without evidence of loosening.  Implant was at risk for periprosthetic fracture or loosening and recommended revision hip arthroplasty consisting of intralesional curettage of the acetabular defect with allograft bone impaction grafting,  exchange and revision of acetabular bearing surface and femoral head.  On 3/21/22, per note, he had mild discomfort in right hip but was still independent with activities of daily living.      On 4/19/22 He had Revision Right total hip arthroplasty with head/ liner exchange and acetabular bone grafting. Intra-op findings\" Scarred anatomic tissue planes from previous arthroplasty procedure.  Evidence of polyethylene wear with eccentric " "wearing of the polyethylene liner.  No evidence of purulence or gross signs of infection.  Large cavitary lesion superior to the acetabular fossa extending from the 10:00 to 2:00 positions, as well as a cavitary lesion consistent with osteolysis medial to the acetabular component.  Evidence of polyethylene wear debris within the acetabular lesions\" . Intraop right hip tissues - 2 aerobic and 2anaerobic cultures grew Cutibacterium acnes.  Perioperative, he recieved Cefazolin x 3 days. He denies fever, chills, nightsweats. Pain is improving and he is cutting down on oxycodone. he is taking acetaminophen and gabapentin. He has noticed right leg edema since surgery but he has minimal edema in left leg too.     Of note, patient denied any pain in right hip and told me it was an incidental findings when he complained of left knee pain last year. So, all the history above was based on chart review.        ROS:  CONSTITUTIONAL:NEGATIVE for fever, chills, change in weight  INTEGUMENTARY/SKIN: papular rashes on right thigh ,right leg is edematous , especially around the surgocal site. not tender    EYES: NEGATIVE for vision changes or irritation  ENT/MOUTH: NEGATIVE for ear, mouth and throat problems  RESP:NEGATIVE for significant cough or SOB  CV: NEGATIVE for chest pain, palpitations . worsening bilateral leg edema since surgery. no calf pain . right leg is more edematous than left leg   GI: NEGATIVE for nausea, abdominal pain, heartburn, or change in bowel habits  : NEGATIVE for urinary frequency, hematuria or dysuria  MUSCULOSKELETAL: NEGATIVE for significant arthralgias or myalgia  NEURO: NEGATIVE for weakness, dizziness or paresthesias  ENDOCRINE: NEGATIVE for temperature intolerance  HEME/ALLERGY/IMMUNE: NEGATIVE for bleeding problems  PSYCHIATRIC: NEGATIVE for changes in mood or affect    Clinic visit on 5/13/22   Ag feels good. no pain. tolerates Penicillin well. no pain around PICC .   Uses walker to move " "around.     Video visit on 7/15/22  Ag is doing well. no pain in right hip. He walks with a cane , able to walk  more than 1.5 miles a day, wonders when he can get rid of the cane   s/p 6 weeks of penG and now on Amoxillin 500 mg PO TID. soft stools but no diarrhea, no abdominal pain. Has very good appetite.     Video visit on 11/9/22   Ag is seen via video ( wife is present during this visit too). He feels well and tolerates amoxicillin well. no diarrhea and he is taking probiotic daily. he denies any pain in right hip and able to walk 2-3 miles without any problems.     He had dental work recently - crown procedure and 4 teeth extractions and will have more procedures in the next few weeks.   Ag saw Dr Joy on 10/17/22 and Dr Joy felt that antibiotic can be stopped given that preoperatively, there was no suspicion of or history of prosthetic joint infection and had 2 positive unexpected C acnes cultures with 3 negative specimen cultures  Ag also shares that he had a mass on the right hip surgical site for some time and later found to be abscess. This was drained about 10 years ago. ( ~2012) . He is concerned that this might have caused his right hip infection.  I review and discuss with Ag and wife the intraop findings,. intra op cultures and Cutibacterium infection. This is usually a very slow, indolent infection.      Video visit on 1/4/23  Ag feels fine and is \"able to do plenty of exercise\". he is able to walk and bike with occasional soreness in his right hip resolved with PRN Naproxen. no swelling/ redness around right hip surgical site. he is taking Amoxicillin BID for the past 1 week as his supply is running low. no diarhhea and tolerates it well. Is done with his dental procedure     Problem list and histories reviewed & adjusted, as indicated.  Additional history: as documented    PAST MEDICAL HISTORY:  Patient  has a past medical history of DJD (degenerative joint disease), lumbar, " Malignant lymphoma, lymphoplasmacytic (H) (7/1/2021), and Sinus bradycardia.    He has no past medical history of Cerebral infarction (H), Congestive heart failure (H), COPD (chronic obstructive pulmonary disease) (H), Depressive disorder, Diabetes (H), History of blood transfusion, Hypertension, Sleep apnea, Thyroid disease, or Uncomplicated asthma.    PAST SURGICAL HISTORY:  Patient  has a past surgical history that includes tonsillectomy; appendectomy; surgical history of - ; colonoscopy (01/01/2010); TOTAL HIP ARTHROPLASTY (Left, 04/02/2012); TOTAL HIP ARTHROPLASTY (Right, 11/15/2004); biopsy (mass right side); Bone marrow biopsy, bone specimen, needle/trocar (N/A, 06/10/2021); STOMACH SURGERY PROCEDURE UNLISTED (Appendix); and Arthroplasty revision hip (Right, 4/19/2022).    CURRENT MEDICATIONS:  Current Outpatient Medications   Medication Sig Dispense Refill     amoxicillin (AMOXIL) 500 MG capsule Take 1 capsule (500 mg) by mouth 3 times daily for 30 days 90 capsule 0     atorvastatin (LIPITOR) 20 MG tablet TAKE 1 TABLET (20MG) BY MOUTH ONCE DAILY 90 tablet 3     gabapentin (NEURONTIN) 300 MG capsule Take 1 capsule (300 mg) by mouth 3 times daily 270 capsule 3     lactobacillus rhamnosus, GG, (CULTURELL) capsule        Melatonin 10-10 MG TBCR Take 1 tablet by mouth At Bedtime Brand name: Sleep 3       Multiple Vitamins-Minerals (MULTIVITAMIN PO) Take 1 tablet by mouth every morning       PREVIDENT 5000 BOOSTER PLUS 1.1 % PSTE dental paste USE THIS TOOTHPASTE IN PLACE OF YOUR REGULAR TOOTHPASTE BEFORE BEDTIME. BRUSH NORMAL, THEN SWISH WITH THE REMAINING FOAM FOR AT LEAST 30 SECONDS. SPIT OUT EXCESS AND THEN AVOID RINSING.       ALLERGY:  Allergies   Allergen Reactions     Seasonal Allergies      Aluminum Rash     IMMUNIZATION HISTORY:  Immunization History   Administered Date(s) Administered     COVID-19 Vaccine 18+ (Moderna) 03/04/2021, 04/01/2021, 10/20/2021     COVID-19 Vaccine Bivalent Booster 18+ (Moderna)  11/28/2022     COVID-19,PF,Moderna Booster 07/18/2022     DTAP (<7y) 01/26/1996     Flu, Unspecified 11/08/1995, 11/01/2013     HEPA 01/26/1996, 09/19/1996     Historical DTP/aP 01/26/1996     Influenza (High Dose) 3 valent vaccine 10/15/2014, 11/18/2015, 10/24/2016, 09/25/2017, 10/17/2018, 09/25/2019, 09/28/2020, 10/20/2021     Influenza (IIV3) PF 11/08/1995, 11/01/2011, 11/01/2013, 10/15/2014     Influenza Vaccine 65+ (Fluzone HD) 09/28/2020, 10/20/2021, 11/04/2022     Pneumo Conj 13-V (2010&after) 07/30/2015     Pneumococcal 23 valent 10/15/2014     TD (ADULT, 7+) 01/26/1996, 01/06/2006     TDAP Vaccine (Adacel) 01/27/2011     TDAP Vaccine (Boostrix) 08/09/2022     Td (Adult), Adsorbed 01/26/1996     Tdap (Adacel,Boostrix) 01/27/2011     Zoster vaccine recombinant adjuvanted (SHINGRIX) 04/27/2018, 07/23/2018     Zoster vaccine, live 03/04/2014     SOCIAL HISTORY:  Patient  reports that he has never smoked. He has never used smokeless tobacco. He reports current alcohol use. He reports that he does not use drugs.  , lives in [place]     FAMILY HISTORY:  Patient's family history includes Breast Cancer in his mother and sister; Osteoporosis in his sister; Other Cancer in his father, mother, sister, sister, and sister; Prostate Cancer in his father.    Labs reviewed in EPIC    OBJECTIVE:                                                    There were no vitals taken for this visit. There is no height or weight on file to calculate BMI.   GENERAL: alert, oriented and no distress  EYES: Eyes grossly normal to inspection,  and conjunctivae and sclerae normal  NECK: supple   Resp: no cough. breathing comfortably on room air   MS: no gross musculoskeletal defects noted, no edema  NEURO: Normal strength and tone, mentation intact and speech normal  PSYCH: mentation appears normal, affect normal       ASSESSMENT AND PLAN:                                                      1. Right hip Prosthetic joint infection  "(Cutibacterium acnes)  - history of DJD and s/p initial Right ERIC in 2004.   - he said he had a mass on the right hip surgical site for some time and later found to be abscess. This was drained about 10 years ago. ( ~2012) .  - pain in right hip first noted in 2015, resolved with NSAID  - pain noted again in 2020, Xray - asymmetric femoral ball in the socket consistent with polyethylene wear superiorly and may be early lysis at the lateral shoulder of the right stem  - on 4/19/22 - s/p revision of Right total hip arthroplasty with head/ liner exchange and acetabular bone grafting. Intra-op findings\" Scarred anatomic tissue planes from previous arthroplasty procedure.  Evidence of polyethylene wear with eccentric wearing of the polyethylene liner.  No evidence of purulence or gross signs of infection.  Large cavitary lesion superior to the acetabular fossa extending from the 10:00 to 2:00 positions, as well as a cavitary lesion consistent with osteolysis medial to the acetabular component.  Evidence of polyethylene wear debris within the acetabular lesions\" .    - 4/19/22 - Intraop right hip tissues - 2 aerobic and 2 anaerobic cultures grew Cutibacterium acnes.   - completed 6 weeks of Pen G ( 5/5/22 - 6/16/22) then  Amoxicillin 500 mg PO TID ( suppressive antibiotic)     2. 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 Cutibacterium acnes, a commensal skin bacteria, presented as indolent infection and pain many years after R ERIC, causing prosthetic wear and osteolysis.S/p 6 weeks of PenG and 6 months of Amoxicillin.   - OK to stop Amoxicillin. discussed symptoms/ signs of infection.  " advised him to call if he has any questions/ concerns       Rishabh Flanagan MD, M.Med.Sc  Division of Infectious Diseases and International Medicine  [facility]      Time : 20 min spent with patient, chart review, documentation and coordinating care on 1/4/23

## 2023-01-22 ENCOUNTER — HEALTH MAINTENANCE LETTER (OUTPATIENT)
Age: 73
End: 2023-01-22

## 2023-02-23 NOTE — PROGRESS NOTES
This is a recent snapshot of the patient's [facility] medical record.  For current drug dose and complete information and questions, call [phone] or In Basket pool, [facility] ([id number])  CSN Number:  [id number]

## 2023-03-02 ENCOUNTER — MYC MEDICAL ADVICE (OUTPATIENT)
Dept: INTERNAL MEDICINE | Facility: CLINIC | Age: 73
End: 2023-03-02
Payer: COMMERCIAL

## 2023-03-15 ENCOUNTER — TELEPHONE (OUTPATIENT)
Dept: ONCOLOGY | Facility: CLINIC | Age: 73
End: 2023-03-15
Payer: COMMERCIAL

## 2023-03-15 NOTE — TELEPHONE ENCOUNTER
I have attempted to contact this patient by phone with the following results: LM- rescheduled to JES[facility]S per Peyman message pasted below. Informed him of time and date and he can come here to use the ipad if he would like. Please re-try in a day or 2.    Rubina Lester  [facility]  [phone]        ----- Message -----   From: Shyanne Tao DO   Sent: 3/13/2023   8:38 PM CDT   To: Lucy Benitez, MARSHALL, Tiara Hull RN   Subject: reschedule pt                                     Hello,     This is a patient of Dr. Perez's who was supposed to have JESÚS follow up. Pt is currently scheduled for follow up with me on 3/28. Please reschedule to JES[facility]S.     Thanks,   SK

## 2023-03-16 NOTE — TELEPHONE ENCOUNTER
I have attempted to contact this patient by phone with the following results: LM-relayed message regarding the change and told him he could come to the clinic to use the ipad if he needed to.  Rubina Batista  [facility]  [phone]

## 2023-03-23 ENCOUNTER — LAB (OUTPATIENT)
Dept: LAB | Facility: OTHER | Age: 73
End: 2023-03-23
Payer: COMMERCIAL

## 2023-03-23 DIAGNOSIS — C88.00 WALDENSTROM MACROGLOBULINEMIA: ICD-10-CM

## 2023-03-23 LAB
BASOPHILS # BLD AUTO: 0 10E3/UL (ref 0–0.2)
BASOPHILS NFR BLD AUTO: 1 %
EOSINOPHIL # BLD AUTO: 0.1 10E3/UL (ref 0–0.7)
EOSINOPHIL NFR BLD AUTO: 2 %
ERYTHROCYTE [DISTWIDTH] IN BLOOD BY AUTOMATED COUNT: 12.9 % (ref 10–15)
HCT VFR BLD AUTO: 41.9 % (ref 40–53)
HGB BLD-MCNC: 14.2 G/DL (ref 13.3–17.7)
LDH SERPL L TO P-CCNC: 223 U/L (ref 0–250)
LYMPHOCYTES # BLD AUTO: 1.2 10E3/UL (ref 0.8–5.3)
LYMPHOCYTES NFR BLD AUTO: 17 %
MCH RBC QN AUTO: 32.9 PG (ref 26.5–33)
MCHC RBC AUTO-ENTMCNC: 33.9 G/DL (ref 31.5–36.5)
MCV RBC AUTO: 97 FL (ref 78–100)
MONOCYTES # BLD AUTO: 1 10E3/UL (ref 0–1.3)
MONOCYTES NFR BLD AUTO: 14 %
NEUTROPHILS # BLD AUTO: 4.8 10E3/UL (ref 1.6–8.3)
NEUTROPHILS NFR BLD AUTO: 67 %
PLATELET # BLD AUTO: 198 10E3/UL (ref 150–450)
RBC # BLD AUTO: 4.32 10E6/UL (ref 4.4–5.9)
WBC # BLD AUTO: 7.2 10E3/UL (ref 4–11)

## 2023-03-23 PROCEDURE — 85025 COMPLETE CBC W/AUTO DIFF WBC: CPT

## 2023-03-23 PROCEDURE — 82784 ASSAY IGA/IGD/IGG/IGM EACH: CPT

## 2023-03-23 PROCEDURE — 36415 COLL VENOUS BLD VENIPUNCTURE: CPT

## 2023-03-23 PROCEDURE — 83615 LACTATE (LD) (LDH) ENZYME: CPT

## 2023-03-24 LAB — IGM SERPL-MCNC: 206 MG/DL (ref 35–242)

## 2023-03-27 NOTE — PROGRESS NOTES
Virtual Visit Details    Type of service:  Video Visit   Video Start Time: 1:35pm  Video End Time: 1:45pm    Originating Location (pt. Location): Home    Distant Location (provider location):  On-site  Platform used for Video Visit: [facility]      Oncology/Hematology Visit Note  Mar 28, 2023    Oncologist: Dr. Perez  Diagnosis: Waldenstrom/lymphoplasmacytic lymphoma     Cancer and Treatment Hx:  Apr 2021: presented with peripheral neuropathy in hands/feet. M-spike 1.1, IgM 1075, Serum viscosity 2.1.   Jun 2021: BMBx demonstrated 50-60% involvement by B-cell lymphoma, plasma cells 5% by IHC.  Jun- Oct 2021: completed 4 cycles of bendamustine + rituxan    Interval History:  Feeling really good. Infection from hip all recovered. No pain. Played 3 hours of pickle ball today. Gabapentin helping neuropathy, still present. Feet tend to be the worst. Great energy levels. No B symptoms, respiratory concerns, adenopathy.     Review of Systems:  Patient denies fevers, chills, night sweats, unexplained weight changes, headaches, dizziness, vision or hearing changes, new lumps or bumps, chest pain, shortness of breath, cough, abdominal pain, nausea, vomiting, changes to bowel or bladder, swelling of extremities, bleeding issues, or rash.    Current Outpatient Medications   Medication Sig Dispense Refill     atorvastatin (LIPITOR) 20 MG tablet TAKE 1 TABLET (20MG) BY MOUTH ONCE DAILY 90 tablet 3     gabapentin (NEURONTIN) 300 MG capsule Take 1 capsule (300 mg) by mouth 3 times daily 270 capsule 3     lactobacillus rhamnosus, GG, (CULTURELL) capsule        Melatonin 10-10 MG TBCR Take 1 tablet by mouth At Bedtime Brand name: Sleep 3       Multiple Vitamins-Minerals (MULTIVITAMIN PO) Take 1 tablet by mouth every morning       PREVIDENT 5000 BOOSTER PLUS 1.1 % PSTE dental paste USE THIS TOOTHPASTE IN PLACE OF YOUR REGULAR TOOTHPASTE BEFORE BEDTIME. BRUSH NORMAL, THEN SWISH WITH THE REMAINING FOAM FOR AT LEAST 30 SECONDS. SPIT OUT  EXCESS AND THEN AVOID RINSING.         Past Medical History  Past Medical History:   Diagnosis Date     DJD (degenerative joint disease), lumbar      Malignant lymphoma, lymphoplasmacytic (H) 7/1/2021     Sinus bradycardia      Past Surgical History:   Procedure Laterality Date     APPENDECTOMY       ARTHROPLASTY REVISION HIP Right 4/19/2022    Procedure: Revision Right Total Hip Arthroplasty;  Surgeon: Baljit Joy MD;  Location: [facility] OR     BIOPSY  mass right side     BONE MARROW BIOPSY, BONE SPECIMEN, NEEDLE/TROCAR N/A 06/10/2021    Procedure: BIOPSY, BONE MARROW;  Surgeon: Josephine Santamaria MD;  Location:  GI     COLONOSCOPY  01/01/2010    benign with hyperplastic polyps     DC STOMACH SURGERY PROCEDURE UNLISTED  Appendix    1971     DC TOTAL HIP ARTHROPLASTY Left 04/02/2012    Dr Roderick Santos     DC TOTAL HIP ARTHROPLASTY Right 11/15/2004    Dr. Jose Martinez     SURGICAL HISTORY OF -       Removal of a benign soft tissue mass right abdominal wall     TONSILLECTOMY       Allergies   Allergen Reactions     Seasonal Allergies      Aluminum Rash     Social History   Social History     Tobacco Use     Smoking status: Never     Smokeless tobacco: Never   Substance Use Topics     Alcohol use: Yes     Comment: intermittent with food     Drug use: No      Past medical history and social history were reviewed.    Physical Examination:  There were no vitals taken for this visit.  Wt Readings from Last 10 Encounters:   11/03/22 94.8 kg (208 lb 14.4 oz)   07/12/22 94.5 kg (208 lb 5 oz)   06/10/22 90.3 kg (199 lb)   05/13/22 90.3 kg (199 lb)   05/05/22 90.4 kg (199 lb 4.8 oz)   04/27/22 93 kg (205 lb)   04/19/22 92.3 kg (203 lb 7.8 oz)   04/16/22 90.7 kg (200 lb)   04/08/22 94.1 kg (207 lb 8 oz)   04/07/22 94.4 kg (208 lb 3.2 oz)     Video physical exam  General: Patient appears well in no acute distress.   Skin: No visualized rash or lesions on visualized skin  Eyes: EOMI, no erythema, sclera icterus or discharge  noted  Resp: Appears to be breathing comfortably without accessory muscle usage, speaking in full sentences, no cough  MSK: Appears to have normal range of motion based on visualized movements  Neurologic: No apparent tremors, facial movements symmetric  Psych: affect bright, alert and oriented    Laboratory Data:   Latest Reference Range & Units 03/23/23 09:09   Lactate Dehydrogenase 0 - 250 U/L 223   WBC 4.0 - 11.0 10e3/uL 7.2   Hemoglobin 13.3 - 17.7 g/dL 14.2   Hematocrit 40.0 - 53.0 % 41.9   Platelet Count 150 - 450 10e3/uL 198   RBC Count 4.40 - 5.90 10e6/uL 4.32 (L)   MCV 78 - 100 fL 97   MCH 26.5 - 33.0 pg 32.9   MCHC 31.5 - 36.5 g/dL 33.9   RDW 10.0 - 15.0 % 12.9   % Neutrophils % 67   % Lymphocytes % 17   % Monocytes % 14   % Eosinophils % 2   % Basophils % 1   Absolute Basophils 0.0 - 0.2 10e3/uL 0.0   Absolute Eosinophils 0.0 - 0.7 10e3/uL 0.1   Absolute Lymphocytes 0.8 - 5.3 10e3/uL 1.2   Absolute Monocytes 0.0 - 1.3 10e3/uL 1.0   Absolute Neutrophils 1.6 - 8.3 10e3/uL 4.8   IGM 35 - 242 mg/dL 206   (L): Data is abnormally low      Assessment and Plan:    # Waldenstrom/ Lymphoplasmacytic Lymphoma  Diagnosed June 2021, s/p 4 cycles of BR with VGPR. Reviewed labs today and his IgM is now normalized. CBC WNL and LDH WNL. He is feeling very well.     Will continue monitoring with plan to see Dr. Perez in July 2023 as scheduled with labs prior. Reviewed monitoring for B symptoms, anemia symptoms, or worsening neuropathy.     Continue Gabapentin TID for neuropathy, stable.    15 minutes spent on the date of the encounter doing chart review, review of test results, interpretation of tests, patient visit and documentation     Desean Tinajero PA-C  Department of Hematology and Oncology  [facility]

## 2023-03-28 ENCOUNTER — VIRTUAL VISIT (OUTPATIENT)
Dept: ONCOLOGY | Facility: CLINIC | Age: 73
End: 2023-03-28
Attending: PHYSICIAN ASSISTANT
Payer: COMMERCIAL

## 2023-03-28 DIAGNOSIS — C88.00 WALDENSTROM MACROGLOBULINEMIA: Primary | ICD-10-CM

## 2023-03-28 DIAGNOSIS — C83.00 MALIGNANT LYMPHOMA, LYMPHOPLASMACYTIC (H): ICD-10-CM

## 2023-03-28 PROCEDURE — G0463 HOSPITAL OUTPT CLINIC VISIT: HCPCS | Mod: PN,GT | Performed by: PHYSICIAN ASSISTANT

## 2023-03-28 PROCEDURE — 99213 OFFICE O/P EST LOW 20 MIN: CPT | Mod: VID | Performed by: PHYSICIAN ASSISTANT

## 2023-03-28 NOTE — NURSING NOTE
Is the patient currently in the state of [place]? YES    Visit mode:VIDEO    If the visit is dropped, the patient can be reconnected by: VIDEO VISIT: Send to e-mail at: [email or web address]    Will anyone else be joining the visit? NO      How would you like to obtain your AVS? MyChart    Are changes needed to the allergy or medication list? Yes, pt also takes a stool softener.    Reason for visit: return video visit    Avani Hernandez, SHASHA

## 2023-03-28 NOTE — LETTER
3/28/2023         RE: Ag Evans  [address] [facility] [address]        Dear Colleague,    Thank you for referring your patient, Ag Evans, to the [facility] CANCER [facility]. Please see a copy of my visit note below.    Virtual Visit Details    Type of service:  Video Visit   Video Start Time: 1:35pm  Video End Time: 1:45pm    Originating Location (pt. Location): Home    Distant Location (provider location):  On-site  Platform used for Video Visit: [facility]      Oncology/Hematology Visit Note  Mar 28, 2023    Oncologist: Dr. Perez  Diagnosis: Waldenstrom/lymphoplasmacytic lymphoma     Cancer and Treatment Hx:  Apr 2021: presented with peripheral neuropathy in hands/feet. M-spike 1.1, IgM 1075, Serum viscosity 2.1.   Jun 2021: BMBx demonstrated 50-60% involvement by B-cell lymphoma, plasma cells 5% by IHC.  Jun- Oct 2021: completed 4 cycles of bendamustine + rituxan    Interval History:  Feeling really good. Infection from hip all recovered. No pain. Played 3 hours of pickle ball today. Gabapentin helping neuropathy, still present. Feet tend to be the worst. Great energy levels. No B symptoms, respiratory concerns, adenopathy.     Review of Systems:  Patient denies fevers, chills, night sweats, unexplained weight changes, headaches, dizziness, vision or hearing changes, new lumps or bumps, chest pain, shortness of breath, cough, abdominal pain, nausea, vomiting, changes to bowel or bladder, swelling of extremities, bleeding issues, or rash.    Current Outpatient Medications   Medication Sig Dispense Refill     atorvastatin (LIPITOR) 20 MG tablet TAKE 1 TABLET (20MG) BY MOUTH ONCE DAILY 90 tablet 3     gabapentin (NEURONTIN) 300 MG capsule Take 1 capsule (300 mg) by mouth 3 times daily 270 capsule 3     lactobacillus rhamnosus, GG, (CULTURELL) capsule        Melatonin 10-10 MG TBCR Take 1 tablet by mouth At Bedtime Brand name: Sleep 3       Multiple Vitamins-Minerals (MULTIVITAMIN PO)  Take 1 tablet by mouth every morning       PREVIDENT 5000 BOOSTER PLUS 1.1 % PSTE dental paste USE THIS TOOTHPASTE IN PLACE OF YOUR REGULAR TOOTHPASTE BEFORE BEDTIME. BRUSH NORMAL, THEN SWISH WITH THE REMAINING FOAM FOR AT LEAST 30 SECONDS. SPIT OUT EXCESS AND THEN AVOID RINSING.         Past Medical History  Past Medical History:   Diagnosis Date     DJD (degenerative joint disease), lumbar      Malignant lymphoma, lymphoplasmacytic (H) 7/1/2021     Sinus bradycardia      Past Surgical History:   Procedure Laterality Date     APPENDECTOMY       ARTHROPLASTY REVISION HIP Right 4/19/2022    Procedure: Revision Right Total Hip Arthroplasty;  Surgeon: Baljit Joy MD;  [facility]: [facility]     BIOPSY  mass right side     BONE MARROW BIOPSY, BONE SPECIMEN, NEEDLE/TROCAR N/A 06/10/2021    Procedure: BIOPSY, BONE MARROW;  Surgeon: Josephine Santamaria MD;  Location:  GI     COLONOSCOPY  01/01/2010    benign with hyperplastic polyps     MS STOMACH SURGERY PROCEDURE UNLISTED  Appendix    1971     MS TOTAL HIP ARTHROPLASTY Left 04/02/2012    Dr Roderick Santos     MS TOTAL HIP ARTHROPLASTY Right 11/15/2004    Dr. Jose Martinez     SURGICAL HISTORY OF -       Removal of a benign soft tissue mass right abdominal wall     TONSILLECTOMY       Allergies   Allergen Reactions     Seasonal Allergies      Aluminum Rash     Social History   Social History     Tobacco Use     Smoking status: Never     Smokeless tobacco: Never   Substance Use Topics     Alcohol use: Yes     Comment: intermittent with food     Drug use: No      Past medical history and social history were reviewed.    Physical Examination:  There were no vitals taken for this visit.  Wt Readings from Last 10 Encounters:   11/03/22 94.8 kg (208 lb 14.4 oz)   07/12/22 94.5 kg (208 lb 5 oz)   06/10/22 90.3 kg (199 lb)   05/13/22 90.3 kg (199 lb)   05/05/22 90.4 kg (199 lb 4.8 oz)   04/27/22 93 kg (205 lb)   04/19/22 92.3 kg (203 lb 7.8 oz)   04/16/22 90.7 kg (200 lb)    04/08/22 94.1 kg (207 lb 8 oz)   04/07/22 94.4 kg (208 lb 3.2 oz)     Video physical exam  General: Patient appears well in no acute distress.   Skin: No visualized rash or lesions on visualized skin  Eyes: EOMI, no erythema, sclera icterus or discharge noted  Resp: Appears to be breathing comfortably without accessory muscle usage, speaking in full sentences, no cough  MSK: Appears to have normal range of motion based on visualized movements  Neurologic: No apparent tremors, facial movements symmetric  Psych: affect bright, alert and oriented    Laboratory Data:   Latest Reference Range & Units 03/23/23 09:09   Lactate Dehydrogenase 0 - 250 U/L 223   WBC 4.0 - 11.0 10e3/uL 7.2   Hemoglobin 13.3 - 17.7 g/dL 14.2   Hematocrit 40.0 - 53.0 % 41.9   Platelet Count 150 - 450 10e3/uL 198   RBC Count 4.40 - 5.90 10e6/uL 4.32 (L)   MCV 78 - 100 fL 97   MCH 26.5 - 33.0 pg 32.9   MCHC 31.5 - 36.5 g/dL 33.9   RDW 10.0 - 15.0 % 12.9   % Neutrophils % 67   % Lymphocytes % 17   % Monocytes % 14   % Eosinophils % 2   % Basophils % 1   Absolute Basophils 0.0 - 0.2 10e3/uL 0.0   Absolute Eosinophils 0.0 - 0.7 10e3/uL 0.1   Absolute Lymphocytes 0.8 - 5.3 10e3/uL 1.2   Absolute Monocytes 0.0 - 1.3 10e3/uL 1.0   Absolute Neutrophils 1.6 - 8.3 10e3/uL 4.8   IGM 35 - 242 mg/dL 206   (L): Data is abnormally low      Assessment and Plan:    # Waldenstrom/ Lymphoplasmacytic Lymphoma  Diagnosed June 2021, s/p 4 cycles of BR with VGPR. Reviewed labs today and his IgM is now normalized. CBC WNL and LDH WNL. He is feeling very well.     Will continue monitoring with plan to see Dr. Perez in July 2023 as scheduled with labs prior. Reviewed monitoring for B symptoms, anemia symptoms, or worsening neuropathy.     Continue Gabapentin TID for neuropathy, stable.    15 minutes spent on the date of the encounter doing chart review, review of test results, interpretation of tests, patient visit and documentation     Desean Tinajero  SANDHYA  Department of Hematology and Oncology  [facility]         Again, thank you for allowing me to participate in the care of your patient.        Sincerely,        KAI Paul

## 2023-03-28 NOTE — LETTER
3/28/2023         RE: Ag Evans  [address] [facility] [address]        Dear Colleague,    Thank you for referring your patient, Ag Evans, to the [facility]ER [facility]. Please see a copy of my visit note below.    Virtual Visit Details    Type of service:  Video Visit   Video Start Time: 1:35pm  Video End Time: 1:45pm    Originating Location (pt. Location): Home    Distant Location (provider location):  On-site  Platform used for Video Visit: [facility]      Oncology/Hematology Visit Note  Mar 28, 2023    Oncologist: Dr. Perez  Diagnosis: Waldenstrom/lymphoplasmacytic lymphoma     Cancer and Treatment Hx:  Apr 2021: presented with peripheral neuropathy in hands/feet. M-spike 1.1, IgM 1075, Serum viscosity 2.1.   Jun 2021: BMBx demonstrated 50-60% involvement by B-cell lymphoma, plasma cells 5% by IHC.  Jun- Oct 2021: completed 4 cycles of bendamustine + rituxan    Interval History:  Feeling really good. Infection from hip all recovered. No pain. Played 3 hours of pickle ball today. Gabapentin helping neuropathy, still present. Feet tend to be the worst. Great energy levels. No B symptoms, respiratory concerns, adenopathy.     Review of Systems:  Patient denies fevers, chills, night sweats, unexplained weight changes, headaches, dizziness, vision or hearing changes, new lumps or bumps, chest pain, shortness of breath, cough, abdominal pain, nausea, vomiting, changes to bowel or bladder, swelling of extremities, bleeding issues, or rash.    Current Outpatient Medications   Medication Sig Dispense Refill     atorvastatin (LIPITOR) 20 MG tablet TAKE 1 TABLET (20MG) BY MOUTH ONCE DAILY 90 tablet 3     gabapentin (NEURONTIN) 300 MG capsule Take 1 capsule (300 mg) by mouth 3 times daily 270 capsule 3     lactobacillus rhamnosus, GG, (CULTURELL) capsule        Melatonin 10-10 MG TBCR Take 1 tablet by mouth At Bedtime Brand name: Sleep 3       Multiple Vitamins-Minerals (MULTIVITAMIN PO)  Take 1 tablet by mouth every morning       PREVIDENT 5000 BOOSTER PLUS 1.1 % PSTE dental paste USE THIS TOOTHPASTE IN PLACE OF YOUR REGULAR TOOTHPASTE BEFORE BEDTIME. BRUSH NORMAL, THEN SWISH WITH THE REMAINING FOAM FOR AT LEAST 30 SECONDS. SPIT OUT EXCESS AND THEN AVOID RINSING.         Past Medical History  Past Medical History:   Diagnosis Date     DJD (degenerative joint disease), lumbar      Malignant lymphoma, lymphoplasmacytic (H) 7/1/2021     Sinus bradycardia      Past Surgical History:   Procedure Laterality Date     APPENDECTOMY       ARTHROPLASTY REVISION HIP Right 4/19/2022    Procedure: Revision Right Total Hip Arthroplasty;  Surgeon: Baljit Joy MD;  [facility]     BIOPSY  mass right side     BONE MARROW BIOPSY, BONE SPECIMEN, NEEDLE/TROCAR N/A 06/10/2021    Procedure: BIOPSY, BONE MARROW;  Surgeon: Josephine Santamaria MD;  Location:  GI     COLONOSCOPY  01/01/2010    benign with hyperplastic polyps     WA STOMACH SURGERY PROCEDURE UNLISTED  Appendix    1971     WA TOTAL HIP ARTHROPLASTY Left 04/02/2012    Dr Roderick Santos     WA TOTAL HIP ARTHROPLASTY Right 11/15/2004    Dr. Jose Martinez     SURGICAL HISTORY OF -       Removal of a benign soft tissue mass right abdominal wall     TONSILLECTOMY       Allergies   Allergen Reactions     Seasonal Allergies      Aluminum Rash     Social History   Social History     Tobacco Use     Smoking status: Never     Smokeless tobacco: Never   Substance Use Topics     Alcohol use: Yes     Comment: intermittent with food     Drug use: No      Past medical history and social history were reviewed.    Physical Examination:  There were no vitals taken for this visit.  Wt Readings from Last 10 Encounters:   11/03/22 94.8 kg (208 lb 14.4 oz)   07/12/22 94.5 kg (208 lb 5 oz)   06/10/22 90.3 kg (199 lb)   05/13/22 90.3 kg (199 lb)   05/05/22 90.4 kg (199 lb 4.8 oz)   04/27/22 93 kg (205 lb)   04/19/22 92.3 kg (203 lb 7.8 oz)   04/16/22 90.7 kg (200 lb)    04/08/22 94.1 kg (207 lb 8 oz)   04/07/22 94.4 kg (208 lb 3.2 oz)     Video physical exam  General: Patient appears well in no acute distress.   Skin: No visualized rash or lesions on visualized skin  Eyes: EOMI, no erythema, sclera icterus or discharge noted  Resp: Appears to be breathing comfortably without accessory muscle usage, speaking in full sentences, no cough  MSK: Appears to have normal range of motion based on visualized movements  Neurologic: No apparent tremors, facial movements symmetric  Psych: affect bright, alert and oriented    Laboratory Data:   Latest Reference Range & Units 03/23/23 09:09   Lactate Dehydrogenase 0 - 250 U/L 223   WBC 4.0 - 11.0 10e3/uL 7.2   Hemoglobin 13.3 - 17.7 g/dL 14.2   Hematocrit 40.0 - 53.0 % 41.9   Platelet Count 150 - 450 10e3/uL 198   RBC Count 4.40 - 5.90 10e6/uL 4.32 (L)   MCV 78 - 100 fL 97   MCH 26.5 - 33.0 pg 32.9   MCHC 31.5 - 36.5 g/dL 33.9   RDW 10.0 - 15.0 % 12.9   % Neutrophils % 67   % Lymphocytes % 17   % Monocytes % 14   % Eosinophils % 2   % Basophils % 1   Absolute Basophils 0.0 - 0.2 10e3/uL 0.0   Absolute Eosinophils 0.0 - 0.7 10e3/uL 0.1   Absolute Lymphocytes 0.8 - 5.3 10e3/uL 1.2   Absolute Monocytes 0.0 - 1.3 10e3/uL 1.0   Absolute Neutrophils 1.6 - 8.3 10e3/uL 4.8   IGM 35 - 242 mg/dL 206   (L): Data is abnormally low      Assessment and Plan:    # Waldenstrom/ Lymphoplasmacytic Lymphoma  Diagnosed June 2021, s/p 4 cycles of BR with VGPR. Reviewed labs today and his IgM is now normalized. CBC WNL and LDH WNL. He is feeling very well.     Will continue monitoring with plan to see Dr. Perez in July 2023 as scheduled with labs prior. Reviewed monitoring for B symptoms, anemia symptoms, or worsening neuropathy.     Continue Gabapentin TID for neuropathy, stable.    15 minutes spent on the date of the encounter doing chart review, review of test results, interpretation of tests, patient visit and documentation     Desean Tinajero  SANDHYA  Department of Hematology and Oncology  [facility]         Again, thank you for allowing me to participate in the care of your patient.        Sincerely,        KAI Paul

## 2023-04-10 NOTE — PROGRESS NOTES
This is a recent snapshot of the patient's [facility] medical record.  For current drug dose and complete information and questions, call [phone] or In Basket pool, fv home infusion ([id number])  CSN Number:  [id number]

## 2023-04-12 ENCOUNTER — MYC MEDICAL ADVICE (OUTPATIENT)
Dept: INTERNAL MEDICINE | Facility: CLINIC | Age: 73
End: 2023-04-12
Payer: COMMERCIAL

## 2023-04-12 DIAGNOSIS — Z96.641 STATUS POST RIGHT HIP REPLACEMENT: Primary | ICD-10-CM

## 2023-04-12 RX ORDER — AMOXICILLIN 500 MG/1
CAPSULE ORAL
Qty: 8 CAPSULE | Refills: 1 | Status: SHIPPED | OUTPATIENT
Start: 2023-04-12 | End: 2024-08-01

## 2023-04-25 NOTE — PROGRESS NOTES
This is a recent snapshot of the patient's [facility] medical record.  For current drug dose and complete information and questions, call [phone] or In Basket pool, fv home infusion ([id number])  CSN Number:  [id number]

## 2023-05-18 NOTE — PROGRESS NOTES
This is a recent snapshot of the patient's [facility] medical record.  For current drug dose and complete information and questions, call [phone] or In Basket pool, fv home infusion ([id number])  CSN Number:  [id number]

## 2023-05-30 NOTE — PROGRESS NOTES
This is a recent snapshot of the patient's [facility] medical record.  For current drug dose and complete information and questions, call [phone] or In Basket pool, fv home infusion ([id number])  CSN Number:  [id number]

## 2023-06-05 NOTE — PROGRESS NOTES
This is a recent snapshot of the patient's [facility] medical record.  For current drug dose and complete information and questions, call [phone] or In Basket pool, fv home infusion ([id number])   CSN Number:  [id number]

## 2023-07-05 ENCOUNTER — LAB (OUTPATIENT)
Dept: LAB | Facility: OTHER | Age: 73
End: 2023-07-05
Payer: COMMERCIAL

## 2023-07-05 DIAGNOSIS — C88.00 WALDENSTROM'S MACROGLOBULINEMIA: ICD-10-CM

## 2023-07-05 DIAGNOSIS — C88.00 WALDENSTROM MACROGLOBULINEMIA: ICD-10-CM

## 2023-07-05 DIAGNOSIS — Z12.5 SCREENING FOR PROSTATE CANCER: ICD-10-CM

## 2023-07-05 LAB
BASOPHILS # BLD AUTO: 0.1 10E3/UL (ref 0–0.2)
BASOPHILS NFR BLD AUTO: 1 %
EOSINOPHIL # BLD AUTO: 0.1 10E3/UL (ref 0–0.7)
EOSINOPHIL NFR BLD AUTO: 2 %
ERYTHROCYTE [DISTWIDTH] IN BLOOD BY AUTOMATED COUNT: 12.2 % (ref 10–15)
HCT VFR BLD AUTO: 43.7 % (ref 40–53)
HGB BLD-MCNC: 14.9 G/DL (ref 13.3–17.7)
IMM GRANULOCYTES # BLD: 0 10E3/UL
IMM GRANULOCYTES NFR BLD: 0 %
LDH SERPL L TO P-CCNC: 224 U/L (ref 0–250)
LYMPHOCYTES # BLD AUTO: 1 10E3/UL (ref 0.8–5.3)
LYMPHOCYTES NFR BLD AUTO: 15 %
MCH RBC QN AUTO: 32.8 PG (ref 26.5–33)
MCHC RBC AUTO-ENTMCNC: 34.1 G/DL (ref 31.5–36.5)
MCV RBC AUTO: 96 FL (ref 78–100)
MONOCYTES # BLD AUTO: 0.7 10E3/UL (ref 0–1.3)
MONOCYTES NFR BLD AUTO: 11 %
NEUTROPHILS # BLD AUTO: 4.7 10E3/UL (ref 1.6–8.3)
NEUTROPHILS NFR BLD AUTO: 72 %
PLATELET # BLD AUTO: 200 10E3/UL (ref 150–450)
RBC # BLD AUTO: 4.54 10E6/UL (ref 4.4–5.9)
WBC # BLD AUTO: 6.5 10E3/UL (ref 4–11)

## 2023-07-05 PROCEDURE — 83615 LACTATE (LD) (LDH) ENZYME: CPT

## 2023-07-05 PROCEDURE — 82784 ASSAY IGA/IGD/IGG/IGM EACH: CPT

## 2023-07-05 PROCEDURE — 84153 ASSAY OF PSA TOTAL: CPT

## 2023-07-05 PROCEDURE — 85025 COMPLETE CBC W/AUTO DIFF WBC: CPT

## 2023-07-05 PROCEDURE — 36415 COLL VENOUS BLD VENIPUNCTURE: CPT

## 2023-07-06 ENCOUNTER — ONCOLOGY VISIT (OUTPATIENT)
Dept: ONCOLOGY | Facility: CLINIC | Age: 73
End: 2023-07-06
Attending: PHYSICIAN ASSISTANT
Payer: COMMERCIAL

## 2023-07-06 VITALS
HEIGHT: 71 IN | WEIGHT: 208.8 LBS | TEMPERATURE: 97.3 F | OXYGEN SATURATION: 95 % | BODY MASS INDEX: 29.23 KG/M2 | HEART RATE: 59 BPM | SYSTOLIC BLOOD PRESSURE: 118 MMHG | DIASTOLIC BLOOD PRESSURE: 76 MMHG | RESPIRATION RATE: 12 BRPM

## 2023-07-06 DIAGNOSIS — Z12.5 SCREENING FOR PROSTATE CANCER: ICD-10-CM

## 2023-07-06 DIAGNOSIS — C88.00 WALDENSTROM'S MACROGLOBULINEMIA: Primary | ICD-10-CM

## 2023-07-06 DIAGNOSIS — Z12.11 SCREENING FOR COLON CANCER: ICD-10-CM

## 2023-07-06 LAB
IGM SERPL-MCNC: 202 MG/DL (ref 35–242)
PSA SERPL DL<=0.01 NG/ML-MCNC: 4.34 NG/ML (ref 0–6.5)

## 2023-07-06 PROCEDURE — 99214 OFFICE O/P EST MOD 30 MIN: CPT | Performed by: INTERNAL MEDICINE

## 2023-07-06 ASSESSMENT — PAIN SCALES - GENERAL: PAINLEVEL: NO PAIN (0)

## 2023-07-06 NOTE — PROGRESS NOTES
[facility] Hematology / Oncology  Progress Note  Name: Ag Evans  :  1950    MRN:  [id number]    --------------------    Assessment / Plan:  Waldenstrom / Lymphoplasmacytic Lymphoma:  # Status post BR x 4 cycles; VGPR.    Clinically, Ag remains well from a [facility] standpoint.  His blood counts are normal.  His IgM continues to show a nice response to treatment and he remains in remission.  He feels recovered from chemotherapy.  His neuropathy remains stable and he continues on gabapentin 300 mg 3 times daily with the anticipation that this will likely be indefinite.  He has had a couple falls and as such we reviewed the role of using a cane as well as considering physical therapy/Occupational Therapy, but for now I think it makes good sense to just slow down and be a little bit more sure of himself says all of these falls involve changing heights.  His vision has been okay.  From a routine health maintenance standpoint, he will be due for a colonoscopy which would likely be the last one before he ages out at 74.  I added on a prostate cancer marker today.  He does not have a history of lung cancer risk with smoking.  From an immunization standpoint, he will look at a flu vaccination as well as considering a COVID booster in the late winter.  We will plan to see him back in  when he returns from [place].    Manuel Perez MD    --------------------    Interval History:  Ag returns for follow up of [facility] accompanied by his wife.  All in all, he remains quite well.  No major health concerns.  His neuropathy is stable and well controlled with gabapentin.  He has had some falls since our last visit.  All of them involved changing heights such as falling off a ledge/sidewalk as well as taking steps up stairs.  His vision has been good.  No orthostatic complaints.  No unexplained fevers, chills or sweats.  No worsening adenopathy.    --------------------    Physical  "Exam:  VS: /76 (BP Location: Right arm, Patient Position: Sitting, Cuff Size: Adult Regular)   Pulse 59   Temp 97.3  F (36.3  C) (Temporal)   Resp 12   Ht 1.791 m (5' 10.5\")   Wt 94.7 kg (208 lb 12.8 oz)   SpO2 95%   BMI 29.54 kg/m    GEN: Well appearing.    Labs / Imaging / Path:  We reviewed CBC, LDH, IgM, PSA.    "

## 2023-07-06 NOTE — PATIENT INSTRUCTIONS
1) Colonoscopy per Ag's schedule.  2) [facility] March / April (return from [place]) w/ labs (CBC, LDH, IgM).    Manuel Perez MD.    Today:  Please follow up as scheduled below:    Please follow up with Dr. Perez.  Please complete labs and colonoscopy prior to follow up appointment. Someone will call you to schedule the colonoscopy.    Lab Date/Time: 04/11/24 @ 10:00 at [place]    Office visit follow up with Dr. Perez on Date/Time: 04/18/24 @ 10:30 am in [place]    If you have any questions or concerns please feel free to call.    If you need to reschedule please call:  Clinic or Lab Appointment - [phone]  Infusion - [phone]  Imaging - [phone]    Rubina Batista [facility]   Oncology/Hematology  [facility]  [phone]    After Hours Oncology Nurse Line - [phone]

## 2023-07-06 NOTE — LETTER
2023         RE: Ag Evans  [address] [facility] [address]        Dear Colleague,    Thank you for referring your patient, Ag Evans, to the [facility] [place]. Please see a copy of my visit note below.    [facility] Hematology / Oncology  Progress Note  Name: Ag Evans  :  1950    MRN:  [id number]    --------------------    Assessment / Plan:  Waldenstrom / Lymphoplasmacytic Lymphoma:  # Status post BR x 4 cycles; VGPR.    Clinically, Ag remains well from a [facility] standpoint.  His blood counts are normal.  His IgM continues to show a nice response to treatment and he remains in remission.  He feels recovered from chemotherapy.  His neuropathy remains stable and he continues on gabapentin 300 mg 3 times daily with the anticipation that this will likely be indefinite.  He has had a couple falls and as such we reviewed the role of using a cane as well as considering physical therapy/Occupational Therapy, but for now I think it makes good sense to just slow down and be a little bit more sure of himself says all of these falls involve changing heights.  His vision has been okay.  From a routine health maintenance standpoint, he will be due for a colonoscopy which would likely be the last one before he ages out at 74.  I added on a prostate cancer marker today.  He does not have a history of lung cancer risk with smoking.  From an immunization standpoint, he will look at a flu vaccination as well as considering a COVID booster in the late winter.  We will plan to see him back in  when he returns from [place].    Manuel Perez MD    --------------------    Interval History:  Ag returns for follow up of Waldenstroms accompanied by his wife.  All in all, he remains quite well.  No major health concerns.  His neuropathy is stable and well controlled with gabapentin.  He has had some falls since our last visit.  All of them involved  "changing heights such as falling off a ledge/sidewalk as well as taking steps up stairs.  His vision has been good.  No orthostatic complaints.  No unexplained fevers, chills or sweats.  No worsening adenopathy.    --------------------    Physical Exam:  VS: /76 (BP Location: Right arm, Patient Position: Sitting, Cuff Size: Adult Regular)   Pulse 59   Temp 97.3  F (36.3  C) (Temporal)   Resp 12   Ht 1.791 m (5' 10.5\")   Wt 94.7 kg (208 lb 12.8 oz)   SpO2 95%   BMI 29.54 kg/m    GEN: Well appearing.    Labs / Imaging / Path:  We reviewed CBC, LDH, IgM, PSA.        Again, thank you for allowing me to participate in the care of your patient.        Sincerely,        Manuel Perez MD    "

## 2023-07-10 ENCOUNTER — TELEPHONE (OUTPATIENT)
Dept: GASTROENTEROLOGY | Facility: CLINIC | Age: 73
End: 2023-07-10
Payer: COMMERCIAL

## 2023-07-10 NOTE — TELEPHONE ENCOUNTER
"Endoscopy Scheduling Screen    Have you had a positive Covid test in the last 14 days?  No    Are you active on MyChart?   Yes    What insurance is in the chart?  BC/BS: Schedule in [facility] unless patient meets exclusion criteria. MEDICARE    Ordering/Referring Provider: LORENZO KENNEY   (If ordering provider performs procedure, schedule with ordering provider unless otherwise instructed. )    BMI: Estimated body mass index is 29.54 kg/m  as calculated from the following:    Height as of 7/6/23: 1.791 m (5' 10.5\").    Weight as of 7/6/23: 94.7 kg (208 lb 12.8 oz).     Sedation Ordered  moderate sedation.   If patient BMI > 50 do not schedule in ASC.    Are you taking any prescription medications for pain?   No    Are you taking methadone or Suboxone?  No    Do you have a history of malignant hyperthermia or adverse reaction to anesthesia?  No    (Females) Are you currently pregnant?   No     Have you been diagnosed or told you have pulmonary hypertension?   No    Do you have an LVAD?  No    Have you been told you have moderate to severe sleep apnea?  No    Have you been told you have COPD, asthma, or any other lung disease?  No    Do you have any heart conditions?  No     Have you ever had or are you awaiting a heart or lung transplant?   No    Have you had a stroke or transient ischemic attack (TIA aka \"mini stroke\" in the last 6 months?   No    Have you been diagnosed with or been told you have cirrhosis of the liver?   No    Are you currently on dialysis?   No    Do you need assistance transferring?   No    BMI: Estimated body mass index is 29.54 kg/m  as calculated from the following:    Height as of 7/6/23: 1.791 m (5' 10.5\").    Weight as of 7/6/23: 94.7 kg (208 lb 12.8 oz).     Is patients BMI > 40 and scheduling location [facility]?  No    Do you take the medication Phentermine, Ozempic or Wegovy?  No    Do you take the medication Naltrexone?  No    Do you take blood thinners?  No      Prep   Are you currently on " dialysis or do you have chronic kidney disease?  No    Do you have a diagnosis of diabetes?  No    Do you have a diagnosis of cystic fibrosis (CF)?  No    On a regular basis do you go 3 -5 days between bowel movements?  No    BMI > 40?  No    Preferred Pharmacy:    [facility] [address] [place], [place] - [address] [address]  [address] [facility] [address]  Phone: [phone] Fax: [phone]      Final Scheduling Details   Colonoscopy prep sent?  MiraLAX (No Mag Citrate)    Procedure scheduled  Colonoscopy    Surgeon:  ISABEL     Date of procedure:  08/30/23     Schedule PAC:   No    Location  [facility]    Sedation   Moderate Sedation    Patient Reminders:    You will receive a call from a Nurse to review instructions and health history.  This assessment must be completed prior to your procedure.  Failure to complete the Nurse assessment may result in the procedure being cancelled.       On the day of your procedure, please designate an adult(s) who can drive you home stay with you for the next 24 hours. The medicines used in the exam will make you sleepy. You will not be able to drive.       You cannot take public transportation, ride share services, or non-medical taxi service without a responsible caregiver.  Medical transport services are allowed with the requirement that a responsible caregiver will receive you at your destination.  We require that drivers and caregivers are confirmed prior to your procedure.

## 2023-07-11 NOTE — NURSING NOTE
DISCHARGE PLAN:  Next appointments: See patient instruction section  Departure Mode:   Accompanied by:    minutes for nursing discharge (face to face time)     Ag Evans is here today for hematology follow up.  Patient was not seen by writing nurse at time of appointment.   Appointments scheduled for labs and follow up with Dr. Perez. Pt will receive a call for the colonoscopy schedulers. I mailed out AVS and calendars to patient. See patient instructions and Oncologist's Progress note for further details. Questions and concerns addressed to patient's satisfaction. Patient verbalized and demonstrated understanding of plan.  Contact information provided and patient is encouraged to call with any that arise in the interim of care.    Rubina Batista  [facility]  [phone]  7/11/2023, 8:47 AM

## 2023-08-03 ENCOUNTER — TELEPHONE (OUTPATIENT)
Dept: GASTROENTEROLOGY | Facility: CLINIC | Age: 73
End: 2023-08-03
Payer: COMMERCIAL

## 2023-08-03 NOTE — TELEPHONE ENCOUNTER
Caller:   Reason for Reschedule/Cancellation (please be detailed, any staff messages or encounters to note?): CONFLICT      Prior to reschedule please review:  Ordering Provider:     LORENZO KENNEY     Sedation per order: CS  Does patient have any ASC Exclusions, please identify?:       Notes on Cancelled Procedure:  Procedure: Lower Endoscopy [Colonoscopy]   Date: 8/30  Location: [facility]; [address] , [place] [address]  Surgeon: LONG      Rescheduled: Yes  Procedure: Lower Endoscopy [Colonoscopy]   Date: 9/25  Location: [facility]; [address] [place] [address]  Surgeon: DOMINICK  Sedation Level Scheduled  MAC,  Reason for Sedation Level PH  Prep/Instructions updated and sent: Y     Send In - basket message to Panc - Qamar Pool if EUS  procedure is canceled or rescheduled: [ N/A, YES or NO]

## 2023-08-26 ENCOUNTER — MYC MEDICAL ADVICE (OUTPATIENT)
Dept: INTERNAL MEDICINE | Facility: CLINIC | Age: 73
End: 2023-08-26
Payer: COMMERCIAL

## 2023-08-26 DIAGNOSIS — I51.5 CARDIAC CALCIFICATION (H): ICD-10-CM

## 2023-08-26 DIAGNOSIS — G62.9 NEUROPATHY: ICD-10-CM

## 2023-08-28 RX ORDER — GABAPENTIN 300 MG/1
CAPSULE ORAL
Qty: 360 CAPSULE | Refills: 3 | Status: SHIPPED | OUTPATIENT
Start: 2023-08-28 | End: 2024-09-26

## 2023-08-28 RX ORDER — ATORVASTATIN CALCIUM 20 MG/1
TABLET, FILM COATED ORAL
Qty: 90 TABLET | Refills: 3 | Status: SHIPPED | OUTPATIENT
Start: 2023-08-28 | End: 2024-09-27

## 2023-09-18 RX ORDER — MAG HYDROX/ALUMINUM HYD/SIMETH 400-400-40
450 SUSPENSION, ORAL (FINAL DOSE FORM) ORAL DAILY
COMMUNITY
End: 2024-05-09

## 2023-09-18 RX ORDER — DOCUSATE SODIUM 250 MG
250 CAPSULE ORAL DAILY PRN
COMMUNITY

## 2023-09-25 ENCOUNTER — ANESTHESIA EVENT (OUTPATIENT)
Dept: GASTROENTEROLOGY | Facility: CLINIC | Age: 73
End: 2023-09-25
Payer: COMMERCIAL

## 2023-09-25 ENCOUNTER — ANESTHESIA (OUTPATIENT)
Dept: GASTROENTEROLOGY | Facility: CLINIC | Age: 73
End: 2023-09-25
Payer: COMMERCIAL

## 2023-09-25 ENCOUNTER — SURGERY (OUTPATIENT)
Age: 73
End: 2023-09-25
Payer: COMMERCIAL

## 2023-09-25 ENCOUNTER — HOSPITAL ENCOUNTER (OUTPATIENT)
Facility: CLINIC | Age: 73
Discharge: HOME | End: 2023-09-25
Attending: FAMILY MEDICINE | Admitting: FAMILY MEDICINE
Payer: COMMERCIAL

## 2023-09-25 VITALS
SYSTOLIC BLOOD PRESSURE: 119 MMHG | OXYGEN SATURATION: 96 % | TEMPERATURE: 97.6 F | RESPIRATION RATE: 16 BRPM | DIASTOLIC BLOOD PRESSURE: 73 MMHG | HEART RATE: 41 BPM

## 2023-09-25 LAB — COLONOSCOPY: NORMAL

## 2023-09-25 PROCEDURE — 258N000003 HC RX IP 258 OP 636: Performed by: NURSE ANESTHETIST, CERTIFIED REGISTERED

## 2023-09-25 PROCEDURE — 45378 DIAGNOSTIC COLONOSCOPY: CPT | Performed by: FAMILY MEDICINE

## 2023-09-25 PROCEDURE — 250N000009 HC RX 250: Performed by: NURSE ANESTHETIST, CERTIFIED REGISTERED

## 2023-09-25 PROCEDURE — 370N000017 HC ANESTHESIA TECHNICAL FEE, PER MIN: Performed by: FAMILY MEDICINE

## 2023-09-25 PROCEDURE — G0121 COLON CA SCRN NOT HI RSK IND: HCPCS | Performed by: FAMILY MEDICINE

## 2023-09-25 PROCEDURE — 250N000011 HC RX IP 250 OP 636: Performed by: NURSE ANESTHETIST, CERTIFIED REGISTERED

## 2023-09-25 RX ORDER — PROPOFOL 10 MG/ML
INJECTION, EMULSION INTRAVENOUS CONTINUOUS PRN
Status: DISCONTINUED | OUTPATIENT
Start: 2023-09-25 | End: 2023-09-25

## 2023-09-25 RX ORDER — PROPOFOL 10 MG/ML
INJECTION, EMULSION INTRAVENOUS PRN
Status: DISCONTINUED | OUTPATIENT
Start: 2023-09-25 | End: 2023-09-25

## 2023-09-25 RX ORDER — ONDANSETRON 4 MG/1
4 TABLET, ORALLY DISINTEGRATING ORAL EVERY 30 MIN PRN
Status: DISCONTINUED | OUTPATIENT
Start: 2023-09-25 | End: 2023-09-25 | Stop reason: HOSPADM

## 2023-09-25 RX ORDER — LIDOCAINE HYDROCHLORIDE 20 MG/ML
INJECTION, SOLUTION INFILTRATION; PERINEURAL PRN
Status: DISCONTINUED | OUTPATIENT
Start: 2023-09-25 | End: 2023-09-25

## 2023-09-25 RX ORDER — ONDANSETRON 2 MG/ML
4 INJECTION INTRAMUSCULAR; INTRAVENOUS EVERY 30 MIN PRN
Status: DISCONTINUED | OUTPATIENT
Start: 2023-09-25 | End: 2023-09-25 | Stop reason: HOSPADM

## 2023-09-25 RX ORDER — SODIUM CHLORIDE, SODIUM LACTATE, POTASSIUM CHLORIDE, CALCIUM CHLORIDE 600; 310; 30; 20 MG/100ML; MG/100ML; MG/100ML; MG/100ML
INJECTION, SOLUTION INTRAVENOUS CONTINUOUS
Status: DISCONTINUED | OUTPATIENT
Start: 2023-09-25 | End: 2023-09-25 | Stop reason: HOSPADM

## 2023-09-25 RX ADMIN — SODIUM CHLORIDE, POTASSIUM CHLORIDE, SODIUM LACTATE AND CALCIUM CHLORIDE: 600; 310; 30; 20 INJECTION, SOLUTION INTRAVENOUS at 13:45

## 2023-09-25 RX ADMIN — PROPOFOL 70 MG: 10 INJECTION, EMULSION INTRAVENOUS at 13:56

## 2023-09-25 RX ADMIN — LIDOCAINE HYDROCHLORIDE 50 MG: 20 INJECTION, SOLUTION INFILTRATION; PERINEURAL at 13:56

## 2023-09-25 RX ADMIN — PROPOFOL 200 MCG/KG/MIN: 10 INJECTION, EMULSION INTRAVENOUS at 13:56

## 2023-09-25 ASSESSMENT — LIFESTYLE VARIABLES: TOBACCO_USE: 0

## 2023-09-25 ASSESSMENT — COPD QUESTIONNAIRES: COPD: 0

## 2023-09-25 ASSESSMENT — ACTIVITIES OF DAILY LIVING (ADL)
ADLS_ACUITY_SCORE: 35
ADLS_ACUITY_SCORE: 35

## 2023-09-25 ASSESSMENT — ENCOUNTER SYMPTOMS
ORTHOPNEA: 0
SEIZURES: 0
DYSRHYTHMIAS: 0

## 2023-09-25 NOTE — DISCHARGE INSTRUCTIONS
[facility]    Home Care Following Endoscopy          Activity:  You have just undergone an endoscopic procedure usually performed with conscious sedation.    Do not work or operate machinery (including a car) for at least 12 hours.      Diet:  Return to the diet you were on before your procedure but eat lightly for the first 12-24 hours.  Drink plenty of water.  Resume any regular medications unless otherwise advised by your physician.   Pain:  You may take Tylenol as needed for pain.  Expected Recovery:  You can expect some mild abdominal fullness and/or discomfort due to the air used to inflate your intestinal tract. I encourage you to walk and attempt to pass this air as soon as possible.    Call Your Physician if You Have:  After Colonoscopy:  Worsening persisting abdominal pain which is worse with activity.  Fevers (>101 degrees F), chills or shakes.  Passage of continued blood with bowel movements.     Any questions or concerns about your recovery, please call [phone] or after hours [phone] ([phone]) Nurse Advice Line.

## 2023-09-25 NOTE — ANESTHESIA POSTPROCEDURE EVALUATION
Patient: Ag Evans    Procedure: Procedure(s):  Colonoscopy       Anesthesia Type:  MAC    Note:  Disposition: Outpatient   Postop Pain Control: Uneventful            Sign Out: Well controlled pain   PONV: No   Neuro/Psych: Uneventful            Sign Out: Acceptable/Baseline neuro status   Airway/Respiratory: Uneventful            Sign Out: Acceptable/Baseline resp. status   CV/Hemodynamics: Uneventful            Sign Out: Acceptable CV status   Other NRE: NONE   DID A NON-ROUTINE EVENT OCCUR? No    Event details/Postop Comments:  Pt was happy with anesthesia care.  No complications.  I will follow up with the pt if needed.           Last vitals:  Vitals:    09/25/23 1240 09/25/23 1302   BP: (!) 159/69 (!) 159/69   Pulse: (!) 47    Temp:  97.6  F (36.4  C)   SpO2: 98%        Electronically Signed By: KALEB Sorto CRNA  September 25, 2023  2:35 PM

## 2023-09-25 NOTE — ANESTHESIA CARE TRANSFER NOTE
Patient: Ag Evans    Procedure: Procedure(s):  Colonoscopy       Diagnosis: Screening for colon cancer [Z12.11]  Diagnosis Additional Information: No value filed.    Anesthesia Type:   MAC     Note:    Oropharynx: oropharynx clear of all foreign objects and spontaneously breathing  Level of Consciousness: drowsy  Oxygen Supplementation: face mask    Independent Airway: airway patency satisfactory and stable  Dentition: dentition unchanged  Vital Signs Stable: post-procedure vital signs reviewed and stable  Report to RN Given: handoff report given  Patient transferred to: Phase II    Handoff Report: Identifed the Patient, Identified the Reponsible Provider, Reviewed the pertinent medical history, Discussed the surgical course, Reviewed Intra-OP anesthesia mangement and issues during anesthesia, Set expectations for post-procedure period and Allowed opportunity for questions and acknowledgement of understanding      Vitals:  Vitals Value Taken Time   BP 99/58 09/25/23 1430   Temp     Pulse 38 09/25/23 1430   Resp     SpO2 98 % 09/25/23 1434   Vitals shown include unvalidated device data.    Electronically Signed By: KALEB Sorto CRNA  September 25, 2023  2:35 PM

## 2023-09-25 NOTE — H&P
"Pre-Endoscopy History and Physical     Ag Evans MRN# [id number]   YOB: 1950 Age: 73 year old     Date of Procedure: (Not on file)  Primary care provider: Jose Antonio  Type of Endoscopy: colonoscopy  Type of Anesthesia Anticipated: MAC     HPI:    Ag is a 73 year old male who was referred to me for colonoscopy.    Ag is feeling well today. We discussed today's colonoscopy in the pre-op area.     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   Estimated body mass index is 29.54 kg/m  as calculated from the following:    Height as of 7/6/23: 1.791 m (5' 10.5\").    Weight as of 7/6/23: 94.7 kg (208 lb 12.8 oz).    GENERAL APPEARANCE: alert and oriented and NAD  See anesthesia exam      Assessment/Plan   ASA Class 3 - Severe systemic disease, but not incapacitating    Plan for colonoscopy. No medical contraindications to proceed, or further work up needed. The risks and benefits of the procedure and the sedation options and risks were discussed with the patient. These include infection, bleeding, and small risk of colon perforation (1/1000 to 1/70612 depending on patient characteristics and type of procedure). Ag was also explained to alternatives for colo-rectal screening. All questions were answered and informed " consent was obtained.      Thank you kindly for the referral and opportunity to provide CRC screening      Signed Electronically by: Axel Ceron MD  September 25, 2023

## 2023-09-25 NOTE — ANESTHESIA PREPROCEDURE EVALUATION
Anesthesia Pre-Procedure Evaluation    Patient: Ag Evans   MRN: [id number] : 1950        Procedure : Procedure(s):  Colonoscopy          Past Medical History:   Diagnosis Date    DJD (degenerative joint disease), lumbar     Malignant lymphoma, lymphoplasmacytic (H) 2021    Sinus bradycardia       Past Surgical History:   Procedure Laterality Date    APPENDECTOMY      ARTHROPLASTY REVISION HIP Right 2022    Procedure: Revision Right Total Hip Arthroplasty;  Surgeon: Baljit Joy MD;  Location: [facility]    BIOPSY  mass right side    BONE MARROW BIOPSY, BONE SPECIMEN, NEEDLE/TROCAR N/A 06/10/2021    Procedure: BIOPSY, BONE MARROW;  Surgeon: Josephine Santamaria MD;  Location:  GI    COLONOSCOPY  2010    benign with hyperplastic polyps    FL STOMACH SURGERY PROCEDURE UNLISTED  Appendix    1971    FL TOTAL HIP ARTHROPLASTY Left 2012    Dr Roderick Santos    FL TOTAL HIP ARTHROPLASTY Right 11/15/2004    Dr. Jose Martinez    SURGICAL HISTORY OF -       Removal of a benign soft tissue mass right abdominal wall    TONSILLECTOMY        Allergies   Allergen Reactions    Seasonal Allergies     Aluminum Rash      Social History     Tobacco Use    Smoking status: Never    Smokeless tobacco: Never   Substance Use Topics    Alcohol use: Yes     Comment: intermittent with food      Wt Readings from Last 1 Encounters:   23 94.7 kg (208 lb 12.8 oz)        Anesthesia Evaluation   Pt has had prior anesthetic. Type: General and MAC.    No history of anesthetic complications       ROS/MED HX  ENT/Pulmonary:     (+)           allergic rhinitis,                         (-) tobacco use, asthma, COPD, sleep apnea and recent URI   Neurologic:     (+)    peripheral neuropathy, - after cancer treatment, in fingers and toes .                        (-) no seizures and no CVA   Cardiovascular: Comment: Marked Sinus Saman    (+)  - -   -  - -                                 Previous cardiac testing   Echo:  Date: 2012 Results:   1) LVEF 55%. Normal LV size and systolic function.Normal LV     diastolic function. No regional wall motion abnormalities.     2) Normal RV size and function.     3) Mild biatrial enlargement.    4) No significant valvular abnormalities other than mild sclerosis of     the aortic valve.    5) Normal IVC. IVC is responsive to inspiration indicating normal     RA pressure.     6) No prior studies for comparison.   Stress Test:  Date: Results:    ECG Reviewed:  Date: Results:    Cath:  Date: Results:   (-) angina, hypertension, CAD, CHF, TRAYLOR, orthopnea/PND, syncope, arrhythmias, irregular heartbeat/palpitations, valvular problems/murmurs and angina   METS/Exercise Tolerance: >4 METS    Hematologic: Comments: Monoclonal Gammopathy      Musculoskeletal:   (+)  arthritis,             GI/Hepatic:     (+)        bowel prep,         (-) GERD and liver disease   Renal/Genitourinary:    (-) renal disease   Endo:    (-) Type II DM, thyroid disease and chronic steroid usage   Psychiatric/Substance Use:    (-) alcohol abuse history   Infectious Disease:  - neg infectious disease ROS     Malignancy:   (+) Malignancy, History of Lymphoma/Leukemia.Lymph CA Remission status post.      Other:  - neg other ROS          Physical Exam    Airway        Mallampati: II   TM distance: > 3 FB   Neck ROM: full   Mouth opening: > 3 cm    Respiratory Devices and Support         Dental           Cardiovascular   cardiovascular exam normal       Rhythm and rate: regular and normal     Pulmonary   pulmonary exam normal        breath sounds clear to auscultation           OUTSIDE LABS:  CBC:   Lab Results   Component Value Date    WBC 6.5 07/05/2023    WBC 7.2 03/23/2023    HGB 14.9 07/05/2023    HGB 14.2 03/23/2023    HCT 43.7 07/05/2023    HCT 41.9 03/23/2023     07/05/2023     03/23/2023     BMP:   Lab Results   Component Value Date     06/08/2022     06/01/2022    POTASSIUM 3.9 06/08/2022     POTASSIUM 3.8 06/01/2022    CHLORIDE 109 06/08/2022    CHLORIDE 108 06/01/2022    CO2 27 06/08/2022    CO2 28 06/01/2022    BUN 23 06/08/2022    BUN 20 06/01/2022    CR 0.74 06/08/2022    CR 0.70 06/01/2022    GLC 91 06/08/2022    GLC 94 06/01/2022     COAGS:   Lab Results   Component Value Date    INR 2.1 04/16/2012     POC: No results found for: BGM, HCG, HCGS  HEPATIC:   Lab Results   Component Value Date    ALBUMIN 3.5 06/08/2022    PROTTOTAL 6.5 (L) 06/08/2022    ALT 23 06/08/2022    AST 13 06/08/2022    ALKPHOS 67 06/08/2022    BILITOTAL 0.2 06/08/2022     OTHER:   Lab Results   Component Value Date    A1C 5.8 (H) 08/26/2021    TANNA 8.8 06/08/2022    TSH 2.01 04/13/2021    CRP <2.9 06/08/2022    SED 18 10/17/2022       Anesthesia Plan    ASA Status:  2    NPO Status:  NPO Appropriate    Anesthesia Type: MAC.     - Reason for MAC: straight local not clinically adequate   Induction: Intravenous, Propofol.   Maintenance: TIVA.   Techniques and Equipment:       - Blood: T&S     Consents    Anesthesia Plan(s) and associated risks, benefits, and realistic alternatives discussed. Questions answered and patient/representative(s) expressed understanding.     - Discussed:     - Discussed with:  Patient      - Extended Intubation/Ventilatory Support Discussed: No.      - Patient is DNR/DNI Status: No     Use of blood products discussed: No .     Postoperative Care       PONV prophylaxis: Background Propofol Infusion     Comments:    Other Comments: The risks and benefits of anesthesia, and the alternatives where applicable, have been discussed with the patient, and they wish to proceed.               KALEB Sorto CRNA

## 2023-10-24 ENCOUNTER — OFFICE VISIT (OUTPATIENT)
Dept: INTERNAL MEDICINE | Facility: CLINIC | Age: 73
End: 2023-10-24
Payer: COMMERCIAL

## 2023-10-24 VITALS
OXYGEN SATURATION: 99 % | DIASTOLIC BLOOD PRESSURE: 80 MMHG | TEMPERATURE: 96.7 F | HEART RATE: 44 BPM | WEIGHT: 208.5 LBS | RESPIRATION RATE: 16 BRPM | SYSTOLIC BLOOD PRESSURE: 138 MMHG | BODY MASS INDEX: 29.19 KG/M2 | HEIGHT: 71 IN

## 2023-10-24 DIAGNOSIS — C83.00 MALIGNANT LYMPHOMA, LYMPHOPLASMACYTIC (H): ICD-10-CM

## 2023-10-24 DIAGNOSIS — Z01.818 PRE-OP EXAM: Primary | ICD-10-CM

## 2023-10-24 DIAGNOSIS — T45.1X5A CHEMOTHERAPY-INDUCED NEUTROPENIA (H): ICD-10-CM

## 2023-10-24 DIAGNOSIS — D70.1 CHEMOTHERAPY-INDUCED NEUTROPENIA (H): ICD-10-CM

## 2023-10-24 DIAGNOSIS — E78.5 HYPERLIPIDEMIA LDL GOAL <130: ICD-10-CM

## 2023-10-24 DIAGNOSIS — G62.0 DRUG-INDUCED POLYNEUROPATHY (H): ICD-10-CM

## 2023-10-24 DIAGNOSIS — H25.013 CORTICAL AGE-RELATED CATARACT OF BOTH EYES: ICD-10-CM

## 2023-10-24 PROCEDURE — 90662 IIV NO PRSV INCREASED AG IM: CPT | Performed by: INTERNAL MEDICINE

## 2023-10-24 PROCEDURE — 99213 OFFICE O/P EST LOW 20 MIN: CPT | Mod: 25 | Performed by: INTERNAL MEDICINE

## 2023-10-24 PROCEDURE — 91320 SARSCV2 VAC 30MCG TRS-SUC IM: CPT | Performed by: INTERNAL MEDICINE

## 2023-10-24 PROCEDURE — 90480 ADMN SARSCOV2 VAC 1/ONLY CMP: CPT | Performed by: INTERNAL MEDICINE

## 2023-10-24 PROCEDURE — G0008 ADMIN INFLUENZA VIRUS VAC: HCPCS | Mod: 59 | Performed by: INTERNAL MEDICINE

## 2023-10-24 RX ORDER — RESPIRATORY SYNCYTIAL VIRUS VACCINE 120MCG/0.5
0.5 KIT INTRAMUSCULAR ONCE
Qty: 1 EACH | Refills: 0 | Status: CANCELLED | OUTPATIENT
Start: 2023-10-24 | End: 2023-10-24

## 2023-10-24 ASSESSMENT — PAIN SCALES - GENERAL: PAINLEVEL: NO PAIN (0)

## 2023-10-24 NOTE — H&P (VIEW-ONLY)
[address] [address]  Phone: [phone]  Primary Provider: Jose Antonio  Pre-op Performing Provider: JOSE ANTONIO    PREOPERATIVE EVALUATION:  Today's date: 10/24/2023    Ag is a 73 year old male who presents for a preoperative evaluation.      10/24/2023     9:19 AM   Additional Questions   Roomed by Shelley MARTIN     Surgical Information:  Surgery/Procedure:   Phacoemulsification with standard intraocular lens implant Left     Surgery Location: [facility]  Surgeon: Dr. Lewis  Surgery Date: 11/2/2023 and then right eye on 11/16/2023  Time of Surgery: 1:30 am  Where patient plans to recover: At home with family  Fax number for surgical facility: Note does not need to be faxed, will be available electronically in Epic.    Assessment & Plan     The proposed surgical procedure is considered LOW risk.    Problem List Items Addressed This Visit       Hyperlipidemia LDL goal <130    Malignant lymphoma, lymphoplasmacytic (H)    Chemotherapy-induced neutropenia     Drug-induced polyneuropathy (H24)     Other Visit Diagnoses       Pre-op exam    -  Primary    Cortical age-related cataract of both eyes              Patient with a complex medical history of lymphoma, chemo induced neutropenia which has improved, drug-induced polyneuropathy which is still persistent.  He needs to have cataract surgery.  He is low risk for cataract surgery as he is doing well.  He will hold his medications and supplements on the day of the procedure.  He should do fine as long as he does not develop an infection or cough.                 RECOMMENDATION:  APPROVAL GIVEN to proceed with proposed procedure, without further diagnostic evaluation.            Subjective       HPI related to upcoming procedure:   Phacoemulsification with standard intraocular lens implant Left           10/17/2023     3:18 PM   Preop Questions   1. Have you ever had a heart  attack or stroke? No   2. Have you ever had surgery on your heart or blood vessels, such as a stent placement, a coronary artery bypass, or surgery on an artery in your head, neck, heart, or legs? No   3. Do you have chest pain with activity? No   4. Do you have a history of  heart failure? No   5. Do you currently have a cold, bronchitis or symptoms of other infection? No   6. Do you have a cough, shortness of breath, or wheezing? No   7. Do you or anyone in your family have previous history of blood clots? No   8. Do you or does anyone in your family have a serious bleeding problem such as prolonged bleeding following surgeries or cuts? No   9. Have you ever had problems with anemia or been told to take iron pills? No   10. Have you had any abnormal blood loss such as black, tarry or bloody stools? No   11. Have you ever had a blood transfusion? No   12. Are you willing to have a blood transfusion if it is medically needed before, during, or after your surgery? Yes   13. Have you or any of your relatives ever had problems with anesthesia? No   14. Do you have sleep apnea, excessive snoring or daytime drowsiness? No   15. Do you have any artifical heart valves or other implanted medical devices like a pacemaker, defibrillator, or continuous glucose monitor? No   16. Do you have artificial joints? YES - bilateral hips   17. Are you allergic to latex? No       Health Care Directive:  Patient does not have a Health Care Directive or Living Will: Discussed advance care planning with patient; however, patient declined at this time.    Preoperative Review of :   reviewed - no record of controlled substances prescribed.      Status of Chronic Conditions:  See problem list for active medical problems.  Problems all longstanding and stable, except as noted/documented.  See ROS for pertinent symptoms related to these conditions.    Review of Systems  CONSTITUTIONAL: NEGATIVE for fever, chills, change in  weight  ENT/MOUTH: NEGATIVE for ear, mouth and throat problems  RESP: NEGATIVE for significant cough or SOB  CV: NEGATIVE for chest pain, palpitations or peripheral edema    Patient Active Problem List    Diagnosis Date Noted    Bilateral lower extremity edema 07/12/2022     Priority: Medium    Waldenstrom macroglobulinemia (H) 07/12/2022     Priority: Medium    Peripheral polyneuropathy 07/12/2022     Priority: Medium    S/P revision of total hip 04/19/2022     Priority: Medium    Primary osteoarthritis of left knee 10/05/2021     Priority: Medium    Pseudogout of knee, left 10/05/2021     Priority: Medium    Malignant lymphoma, lymphoplasmacytic (H) 07/01/2021     Priority: Medium    Chemotherapy-induced neutropenia  07/01/2021     Priority: Medium    Prosthetic wear following total hip arthroplasty  10/08/2020     Priority: Medium    Sinus bradycardia 07/30/2015     Priority: Medium    Hyperlipidemia LDL goal <130 07/30/2015     Priority: Medium    CARDIOVASCULAR SCREENING; LDL GOAL LESS THAN 130 07/30/2015     Priority: Medium    Advanced directives, counseling/discussion 07/30/2015     Priority: Medium     Discussed 7/30/2015. Has a current advanced directive completed and will bring a  to the clinic.  Rafat Moss MD        DJD (degenerative joint disease), lumbar      Priority: Medium    Incomplete RBBB 03/06/2012     Priority: Medium    History of total hip replacement 02/12/2006     Priority: Medium     Overview:   bilat   ; Hip joint replacement by other means      Family history of malignant neoplasm of prostate 09/11/2003     Priority: Medium      Past Medical History:   Diagnosis Date    DJD (degenerative joint disease), lumbar     Malignant lymphoma, lymphoplasmacytic (H) 7/1/2021    Sinus bradycardia      Past Surgical History:   Procedure Laterality Date    APPENDECTOMY      ARTHROPLASTY REVISION HIP Right 4/19/2022    Procedure: Revision Right Total Hip Arthroplasty;  Surgeon: Baljit Joy  MD;  Location: [facility]    BIOPSY  mass right side    BONE MARROW BIOPSY, BONE SPECIMEN, NEEDLE/TROCAR N/A 06/10/2021    Procedure: BIOPSY, BONE MARROW;  Surgeon: Josephine Santamaria MD;  Location:  GI    COLONOSCOPY  01/01/2010    benign with hyperplastic polyps    COLONOSCOPY N/A 9/25/2023    Procedure: Colonoscopy;  Surgeon: Axel Ceron MD;  Location: [facility]    [place] STOMACH SURGERY PROCEDURE UNLISTED  Appendix    1971    [place] TOTAL HIP ARTHROPLASTY Left 04/02/2012    Dr Roderick Santos    [place] TOTAL HIP ARTHROPLASTY Right 11/15/2004    Dr. Jose Martinez    SURGICAL HISTORY OF -       Removal of a benign soft tissue mass right abdominal wall    TONSILLECTOMY       Current Outpatient Medications   Medication Sig Dispense Refill    amoxicillin (AMOXIL) 500 MG capsule Take 4 pills an hour before dentist 8 capsule 1    atorvastatin (LIPITOR) 20 MG tablet TAKE 1 TABLET (20MG) BY MOUTH ONCE DAILY 90 tablet 3    docusate sodium (COLACE) 250 MG capsule Take 250 mg by mouth daily as needed for constipation      gabapentin (NEURONTIN) 300 MG capsule 300mg AM and noon, 600mg at night. 360 capsule 3    lactobacillus rhamnosus, GG, (CULTURELL) capsule       Melatonin 10-10 MG TBCR Take 1 tablet by mouth At Bedtime Brand name: Sleep 3      Multiple Vitamins-Minerals (MULTIVITAMIN PO) Take 1 tablet by mouth every morning      PREVIDENT 5000 BOOSTER PLUS 1.1 % PSTE dental paste USE THIS TOOTHPASTE IN PLACE OF YOUR REGULAR TOOTHPASTE BEFORE BEDTIME. BRUSH NORMAL, THEN SWISH WITH THE REMAINING FOAM FOR AT LEAST 30 SECONDS. SPIT OUT EXCESS AND THEN AVOID RINSING.      saw palmetto 450 MG CAPS capsule Take 450 mg by mouth daily      Turmeric (QC TUMERIC COMPLEX PO) Take 1 tablet by mouth daily         Allergies   Allergen Reactions    Seasonal Allergies     Aluminum Rash        Social History     Tobacco Use    Smoking status: Never    Smokeless tobacco: Never   Substance Use Topics    Alcohol use: Yes     Comment: intermittent  "with food       History   Drug Use No         Objective     /80 (BP Location: Left arm, Patient Position: Chair)   Pulse (!) 44   Temp (!) 96.7  F (35.9  C) (Temporal)   Resp 16   Ht 1.797 m (5' 10.75\")   Wt 94.6 kg (208 lb 8 oz)   SpO2 99%   BMI 29.29 kg/m      Physical Exam  GENERAL APPEARANCE: healthy, alert and no distress  HENT: ear canals and TM's normal and nose and mouth without ulcers or lesions  RESP: lungs clear to auscultation - no rales, rhonchi or wheezes  CV: regular rate and rhythm, normal S1 S2, no S3 or S4 and no murmur, click or rub   ABDOMEN: soft, nontender, no HSM or masses and bowel sounds normal  NEURO: Normal strength and tone, sensory exam grossly normal, mentation intact and speech normal    Recent Labs   Lab Test 07/05/23  0847 03/23/23  0909 07/12/22  0948 06/08/22  1400 06/03/22  1402 06/01/22  0950   HGB 14.9 14.2   < > 12.9*   < > 8.8*    198   < > 218   < > 133*   NA  --   --   --  140  --  141   POTASSIUM  --   --   --  3.9  --  3.8   CR  --   --   --  0.74  --  0.70    < > = values in this interval not displayed.        Diagnostics:  No labs were ordered during this visit.   No EKG required for low risk surgery (cataract, skin procedure, breast biopsy, etc).    Revised Cardiac Risk Index (RCRI):  The patient has the following serious cardiovascular risks for perioperative complications:   - No serious cardiac risks = 0 points     RCRI Interpretation: 1 point: Class II (low risk - 0.9% complication rate)         Signed Electronically by: Jose Antonio MD  Copy of this evaluation report is provided to requesting physician.      "

## 2023-10-24 NOTE — PROGRESS NOTES
[address] [address]  Phone: [phone]  Primary Provider: Jose Antonio  Pre-op Performing Provider: JOSE ANTONIO    PREOPERATIVE EVALUATION:  Today's date: 10/24/2023    Ag is a 73 year old male who presents for a preoperative evaluation.      10/24/2023     9:19 AM   Additional Questions   Roomed by Shelley MARTIN     Surgical Information:  Surgery/Procedure:   Phacoemulsification with standard intraocular lens implant Left     Surgery Location: [facility]  Surgeon: Dr. Lewis  Surgery Date: 11/2/2023 and then right eye on 11/16/2023  Time of Surgery: 1:30 am  Where patient plans to recover: At home with family  Fax number for surgical facility: Note does not need to be faxed, will be available electronically in Epic.    Assessment & Plan     The proposed surgical procedure is considered LOW risk.    Problem List Items Addressed This Visit       Hyperlipidemia LDL goal <130    Malignant lymphoma, lymphoplasmacytic (H)    Chemotherapy-induced neutropenia     Drug-induced polyneuropathy (H24)     Other Visit Diagnoses       Pre-op exam    -  Primary    Cortical age-related cataract of both eyes              Patient with a complex medical history of lymphoma, chemo induced neutropenia which has improved, drug-induced polyneuropathy which is still persistent.  He needs to have cataract surgery.  He is low risk for cataract surgery as he is doing well.  He will hold his medications and supplements on the day of the procedure.  He should do fine as long as he does not develop an infection or cough.                 RECOMMENDATION:  APPROVAL GIVEN to proceed with proposed procedure, without further diagnostic evaluation.            Subjective       HPI related to upcoming procedure:   Phacoemulsification with standard intraocular lens implant Left           10/17/2023     3:18 PM   Preop Questions   1. Have you ever had a heart  attack or stroke? No   2. Have you ever had surgery on your heart or blood vessels, such as a stent placement, a coronary artery bypass, or surgery on an artery in your head, neck, heart, or legs? No   3. Do you have chest pain with activity? No   4. Do you have a history of  heart failure? No   5. Do you currently have a cold, bronchitis or symptoms of other infection? No   6. Do you have a cough, shortness of breath, or wheezing? No   7. Do you or anyone in your family have previous history of blood clots? No   8. Do you or does anyone in your family have a serious bleeding problem such as prolonged bleeding following surgeries or cuts? No   9. Have you ever had problems with anemia or been told to take iron pills? No   10. Have you had any abnormal blood loss such as black, tarry or bloody stools? No   11. Have you ever had a blood transfusion? No   12. Are you willing to have a blood transfusion if it is medically needed before, during, or after your surgery? Yes   13. Have you or any of your relatives ever had problems with anesthesia? No   14. Do you have sleep apnea, excessive snoring or daytime drowsiness? No   15. Do you have any artifical heart valves or other implanted medical devices like a pacemaker, defibrillator, or continuous glucose monitor? No   16. Do you have artificial joints? YES - bilateral hips   17. Are you allergic to latex? No       Health Care Directive:  Patient does not have a Health Care Directive or Living Will: Discussed advance care planning with patient; however, patient declined at this time.    Preoperative Review of :   reviewed - no record of controlled substances prescribed.      Status of Chronic Conditions:  See problem list for active medical problems.  Problems all longstanding and stable, except as noted/documented.  See ROS for pertinent symptoms related to these conditions.    Review of Systems  CONSTITUTIONAL: NEGATIVE for fever, chills, change in  weight  ENT/MOUTH: NEGATIVE for ear, mouth and throat problems  RESP: NEGATIVE for significant cough or SOB  CV: NEGATIVE for chest pain, palpitations or peripheral edema    Patient Active Problem List    Diagnosis Date Noted    Bilateral lower extremity edema 07/12/2022     Priority: Medium    Waldenstrom macroglobulinemia (H) 07/12/2022     Priority: Medium    Peripheral polyneuropathy 07/12/2022     Priority: Medium    S/P revision of total hip 04/19/2022     Priority: Medium    Primary osteoarthritis of left knee 10/05/2021     Priority: Medium    Pseudogout of knee, left 10/05/2021     Priority: Medium    Malignant lymphoma, lymphoplasmacytic (H) 07/01/2021     Priority: Medium    Chemotherapy-induced neutropenia  07/01/2021     Priority: Medium    Prosthetic wear following total hip arthroplasty  10/08/2020     Priority: Medium    Sinus bradycardia 07/30/2015     Priority: Medium    Hyperlipidemia LDL goal <130 07/30/2015     Priority: Medium    CARDIOVASCULAR SCREENING; LDL GOAL LESS THAN 130 07/30/2015     Priority: Medium    Advanced directives, counseling/discussion 07/30/2015     Priority: Medium     Discussed 7/30/2015. Has a current advanced directive completed and will bring a  to the clinic.  Rafat Moss MD        DJD (degenerative joint disease), lumbar      Priority: Medium    Incomplete RBBB 03/06/2012     Priority: Medium    History of total hip replacement 02/12/2006     Priority: Medium     Overview:   bilat   ; Hip joint replacement by other means      Family history of malignant neoplasm of prostate 09/11/2003     Priority: Medium      Past Medical History:   Diagnosis Date    DJD (degenerative joint disease), lumbar     Malignant lymphoma, lymphoplasmacytic (H) 7/1/2021    Sinus bradycardia      Past Surgical History:   Procedure Laterality Date    APPENDECTOMY      ARTHROPLASTY REVISION HIP Right 4/19/2022    Procedure: Revision Right Total Hip Arthroplasty;  Surgeon: Baljit Joy  MD;  Location: [facility]    BIOPSY  mass right side    BONE MARROW BIOPSY, BONE SPECIMEN, NEEDLE/TROCAR N/A 06/10/2021    Procedure: BIOPSY, BONE MARROW;  Surgeon: Josephine Santamaria MD;  Location:  GI    COLONOSCOPY  01/01/2010    benign with hyperplastic polyps    COLONOSCOPY N/A 9/25/2023    Procedure: Colonoscopy;  Surgeon: Axel Ceron MD;  Location: [facility]    SD STOMACH SURGERY PROCEDURE UNLISTED  Appendix    1971    SD TOTAL HIP ARTHROPLASTY Left 04/02/2012    Dr Roderick Santos    SD TOTAL HIP ARTHROPLASTY Right 11/15/2004    Dr. Jose Martinez    SURGICAL HISTORY OF -       Removal of a benign soft tissue mass right abdominal wall    TONSILLECTOMY       Current Outpatient Medications   Medication Sig Dispense Refill    amoxicillin (AMOXIL) 500 MG capsule Take 4 pills an hour before dentist 8 capsule 1    atorvastatin (LIPITOR) 20 MG tablet TAKE 1 TABLET (20MG) BY MOUTH ONCE DAILY 90 tablet 3    docusate sodium (COLACE) 250 MG capsule Take 250 mg by mouth daily as needed for constipation      gabapentin (NEURONTIN) 300 MG capsule 300mg AM and noon, 600mg at night. 360 capsule 3    lactobacillus rhamnosus, GG, (CULTURELL) capsule       Melatonin 10-10 MG TBCR Take 1 tablet by mouth At Bedtime Brand name: Sleep 3      Multiple Vitamins-Minerals (MULTIVITAMIN PO) Take 1 tablet by mouth every morning      PREVIDENT 5000 BOOSTER PLUS 1.1 % PSTE dental paste USE THIS TOOTHPASTE IN PLACE OF YOUR REGULAR TOOTHPASTE BEFORE BEDTIME. BRUSH NORMAL, THEN SWISH WITH THE REMAINING FOAM FOR AT LEAST 30 SECONDS. SPIT OUT EXCESS AND THEN AVOID RINSING.      saw palmetto 450 MG CAPS capsule Take 450 mg by mouth daily      Turmeric (QC TUMERIC COMPLEX PO) Take 1 tablet by mouth daily         Allergies   Allergen Reactions    Seasonal Allergies     Aluminum Rash        Social History     Tobacco Use    Smoking status: Never    Smokeless tobacco: Never   Substance Use Topics    Alcohol use: Yes     Comment: intermittent  "with food       History   Drug Use No         Objective     /80 (BP Location: Left arm, Patient Position: Chair)   Pulse (!) 44   Temp (!) 96.7  F (35.9  C) (Temporal)   Resp 16   Ht 1.797 m (5' 10.75\")   Wt 94.6 kg (208 lb 8 oz)   SpO2 99%   BMI 29.29 kg/m      Physical Exam  GENERAL APPEARANCE: healthy, alert and no distress  HENT: ear canals and TM's normal and nose and mouth without ulcers or lesions  RESP: lungs clear to auscultation - no rales, rhonchi or wheezes  CV: regular rate and rhythm, normal S1 S2, no S3 or S4 and no murmur, click or rub   ABDOMEN: soft, nontender, no HSM or masses and bowel sounds normal  NEURO: Normal strength and tone, sensory exam grossly normal, mentation intact and speech normal    Recent Labs   Lab Test 07/05/23  0847 03/23/23  0909 07/12/22  0948 06/08/22  1400 06/03/22  1402 06/01/22  0950   HGB 14.9 14.2   < > 12.9*   < > 8.8*    198   < > 218   < > 133*   NA  --   --   --  140  --  141   POTASSIUM  --   --   --  3.9  --  3.8   CR  --   --   --  0.74  --  0.70    < > = values in this interval not displayed.        Diagnostics:  No labs were ordered during this visit.   No EKG required for low risk surgery (cataract, skin procedure, breast biopsy, etc).    Revised Cardiac Risk Index (RCRI):  The patient has the following serious cardiovascular risks for perioperative complications:   - No serious cardiac risks = 0 points     RCRI Interpretation: 1 point: Class II (low risk - 0.9% complication rate)         Signed Electronically by: Jose Antonio MD  Copy of this evaluation report is provided to requesting physician.      "

## 2023-10-24 NOTE — H&P (VIEW-ONLY)
[address] [address]  Phone: [phone]  Primary Provider: Jose Antonio  Pre-op Performing Provider: JOSE ANTONIO    PREOPERATIVE EVALUATION:  Today's date: 10/24/2023    Ag is a 73 year old male who presents for a preoperative evaluation.      10/24/2023     9:19 AM   Additional Questions   Roomed by Shelley MARTIN     Surgical Information:  Surgery/Procedure:   Phacoemulsification with standard intraocular lens implant Left     Surgery Location: [facility]  Surgeon: Dr. Lewis  Surgery Date: 11/2/2023 and then right eye on 11/16/2023  Time of Surgery: 1:30 am  Where patient plans to recover: At home with family  Fax number for surgical facility: Note does not need to be faxed, will be available electronically in Epic.    Assessment & Plan     The proposed surgical procedure is considered LOW risk.    Problem List Items Addressed This Visit       Hyperlipidemia LDL goal <130    Malignant lymphoma, lymphoplasmacytic (H)    Chemotherapy-induced neutropenia     Drug-induced polyneuropathy (H24)     Other Visit Diagnoses       Pre-op exam    -  Primary    Cortical age-related cataract of both eyes              Patient with a complex medical history of lymphoma, chemo induced neutropenia which has improved, drug-induced polyneuropathy which is still persistent.  He needs to have cataract surgery.  He is low risk for cataract surgery as he is doing well.  He will hold his medications and supplements on the day of the procedure.  He should do fine as long as he does not develop an infection or cough.                 RECOMMENDATION:  APPROVAL GIVEN to proceed with proposed procedure, without further diagnostic evaluation.            Subjective       HPI related to upcoming procedure:   Phacoemulsification with standard intraocular lens implant Left           10/17/2023     3:18 PM   Preop Questions   1. Have you ever had a heart  attack or stroke? No   2. Have you ever had surgery on your heart or blood vessels, such as a stent placement, a coronary artery bypass, or surgery on an artery in your head, neck, heart, or legs? No   3. Do you have chest pain with activity? No   4. Do you have a history of  heart failure? No   5. Do you currently have a cold, bronchitis or symptoms of other infection? No   6. Do you have a cough, shortness of breath, or wheezing? No   7. Do you or anyone in your family have previous history of blood clots? No   8. Do you or does anyone in your family have a serious bleeding problem such as prolonged bleeding following surgeries or cuts? No   9. Have you ever had problems with anemia or been told to take iron pills? No   10. Have you had any abnormal blood loss such as black, tarry or bloody stools? No   11. Have you ever had a blood transfusion? No   12. Are you willing to have a blood transfusion if it is medically needed before, during, or after your surgery? Yes   13. Have you or any of your relatives ever had problems with anesthesia? No   14. Do you have sleep apnea, excessive snoring or daytime drowsiness? No   15. Do you have any artifical heart valves or other implanted medical devices like a pacemaker, defibrillator, or continuous glucose monitor? No   16. Do you have artificial joints? YES - bilateral hips   17. Are you allergic to latex? No       Health Care Directive:  Patient does not have a Health Care Directive or Living Will: Discussed advance care planning with patient; however, patient declined at this time.    Preoperative Review of :   reviewed - no record of controlled substances prescribed.      Status of Chronic Conditions:  See problem list for active medical problems.  Problems all longstanding and stable, except as noted/documented.  See ROS for pertinent symptoms related to these conditions.    Review of Systems  CONSTITUTIONAL: NEGATIVE for fever, chills, change in  weight  ENT/MOUTH: NEGATIVE for ear, mouth and throat problems  RESP: NEGATIVE for significant cough or SOB  CV: NEGATIVE for chest pain, palpitations or peripheral edema    Patient Active Problem List    Diagnosis Date Noted    Bilateral lower extremity edema 07/12/2022     Priority: Medium    Waldenstrom macroglobulinemia (H) 07/12/2022     Priority: Medium    Peripheral polyneuropathy 07/12/2022     Priority: Medium    S/P revision of total hip 04/19/2022     Priority: Medium    Primary osteoarthritis of left knee 10/05/2021     Priority: Medium    Pseudogout of knee, left 10/05/2021     Priority: Medium    Malignant lymphoma, lymphoplasmacytic (H) 07/01/2021     Priority: Medium    Chemotherapy-induced neutropenia  07/01/2021     Priority: Medium    Prosthetic wear following total hip arthroplasty  10/08/2020     Priority: Medium    Sinus bradycardia 07/30/2015     Priority: Medium    Hyperlipidemia LDL goal <130 07/30/2015     Priority: Medium    CARDIOVASCULAR SCREENING; LDL GOAL LESS THAN 130 07/30/2015     Priority: Medium    Advanced directives, counseling/discussion 07/30/2015     Priority: Medium     Discussed 7/30/2015. Has a current advanced directive completed and will bring a  to the clinic.  Rafat Moss MD        DJD (degenerative joint disease), lumbar      Priority: Medium    Incomplete RBBB 03/06/2012     Priority: Medium    History of total hip replacement 02/12/2006     Priority: Medium     Overview:   bilat   ; Hip joint replacement by other means      Family history of malignant neoplasm of prostate 09/11/2003     Priority: Medium      Past Medical History:   Diagnosis Date    DJD (degenerative joint disease), lumbar     Malignant lymphoma, lymphoplasmacytic (H) 7/1/2021    Sinus bradycardia      Past Surgical History:   Procedure Laterality Date    APPENDECTOMY      ARTHROPLASTY REVISION HIP Right 4/19/2022    Procedure: Revision Right Total Hip Arthroplasty;  Surgeon: Baljit Joy  MD;  [facility]    BIOPSY  mass right side    BONE MARROW BIOPSY, BONE SPECIMEN, NEEDLE/TROCAR N/A 06/10/2021    Procedure: BIOPSY, BONE MARROW;  Surgeon: Josephine Santamaria MD;  Location:  GI    COLONOSCOPY  01/01/2010    benign with hyperplastic polyps    COLONOSCOPY N/A 9/25/2023    Procedure: Colonoscopy;  Surgeon: Axel Ceron MD;  Location: [facility]    TX STOMACH SURGERY PROCEDURE UNLISTED  Appendix    1971    TX TOTAL HIP ARTHROPLASTY Left 04/02/2012    Dr Roderick Santos    TX TOTAL HIP ARTHROPLASTY Right 11/15/2004    Dr. Jose Martinez    SURGICAL HISTORY OF -       Removal of a benign soft tissue mass right abdominal wall    TONSILLECTOMY       Current Outpatient Medications   Medication Sig Dispense Refill    amoxicillin (AMOXIL) 500 MG capsule Take 4 pills an hour before dentist 8 capsule 1    atorvastatin (LIPITOR) 20 MG tablet TAKE 1 TABLET (20MG) BY MOUTH ONCE DAILY 90 tablet 3    docusate sodium (COLACE) 250 MG capsule Take 250 mg by mouth daily as needed for constipation      gabapentin (NEURONTIN) 300 MG capsule 300mg AM and noon, 600mg at night. 360 capsule 3    lactobacillus rhamnosus, GG, (CULTURELL) capsule       Melatonin 10-10 MG TBCR Take 1 tablet by mouth At Bedtime Brand name: Sleep 3      Multiple Vitamins-Minerals (MULTIVITAMIN PO) Take 1 tablet by mouth every morning      PREVIDENT 5000 BOOSTER PLUS 1.1 % PSTE dental paste USE THIS TOOTHPASTE IN PLACE OF YOUR REGULAR TOOTHPASTE BEFORE BEDTIME. BRUSH NORMAL, THEN SWISH WITH THE REMAINING FOAM FOR AT LEAST 30 SECONDS. SPIT OUT EXCESS AND THEN AVOID RINSING.      saw palmetto 450 MG CAPS capsule Take 450 mg by mouth daily      Turmeric (QC TUMERIC COMPLEX PO) Take 1 tablet by mouth daily         Allergies   Allergen Reactions    Seasonal Allergies     Aluminum Rash        Social History     Tobacco Use    Smoking status: Never    Smokeless tobacco: Never   Substance Use Topics    Alcohol use: Yes     Comment: intermittent  "with food       History   Drug Use No         Objective     /80 (BP Location: Left arm, Patient Position: Chair)   Pulse (!) 44   Temp (!) 96.7  F (35.9  C) (Temporal)   Resp 16   Ht 1.797 m (5' 10.75\")   Wt 94.6 kg (208 lb 8 oz)   SpO2 99%   BMI 29.29 kg/m      Physical Exam  GENERAL APPEARANCE: healthy, alert and no distress  HENT: ear canals and TM's normal and nose and mouth without ulcers or lesions  RESP: lungs clear to auscultation - no rales, rhonchi or wheezes  CV: regular rate and rhythm, normal S1 S2, no S3 or S4 and no murmur, click or rub   ABDOMEN: soft, nontender, no HSM or masses and bowel sounds normal  NEURO: Normal strength and tone, sensory exam grossly normal, mentation intact and speech normal    Recent Labs   Lab Test 07/05/23  0847 03/23/23  0909 07/12/22  0948 06/08/22  1400 06/03/22  1402 06/01/22  0950   HGB 14.9 14.2   < > 12.9*   < > 8.8*    198   < > 218   < > 133*   NA  --   --   --  140  --  141   POTASSIUM  --   --   --  3.9  --  3.8   CR  --   --   --  0.74  --  0.70    < > = values in this interval not displayed.        Diagnostics:  No labs were ordered during this visit.   No EKG required for low risk surgery (cataract, skin procedure, breast biopsy, etc).    Revised Cardiac Risk Index (RCRI):  The patient has the following serious cardiovascular risks for perioperative complications:   - No serious cardiac risks = 0 points     RCRI Interpretation: 1 point: Class II (low risk - 0.9% complication rate)         Signed Electronically by: Jose Antonio MD  Copy of this evaluation report is provided to requesting physician.      "

## 2023-11-02 ENCOUNTER — ANESTHESIA (OUTPATIENT)
Dept: SURGERY | Facility: CLINIC | Age: 73
End: 2023-11-02
Payer: COMMERCIAL

## 2023-11-02 ENCOUNTER — ANESTHESIA EVENT (OUTPATIENT)
Dept: SURGERY | Facility: CLINIC | Age: 73
End: 2023-11-02
Payer: COMMERCIAL

## 2023-11-02 ENCOUNTER — HOSPITAL ENCOUNTER (OUTPATIENT)
Facility: CLINIC | Age: 73
Discharge: HOME | End: 2023-11-02
Attending: INTERNAL MEDICINE | Admitting: INTERNAL MEDICINE
Payer: COMMERCIAL

## 2023-11-02 VITALS
SYSTOLIC BLOOD PRESSURE: 123 MMHG | OXYGEN SATURATION: 96 % | DIASTOLIC BLOOD PRESSURE: 70 MMHG | RESPIRATION RATE: 16 BRPM | TEMPERATURE: 97.6 F | HEART RATE: 42 BPM

## 2023-11-02 PROCEDURE — 761N000008 HC RECOVERY CATRACT PACKAGE: Performed by: INTERNAL MEDICINE

## 2023-11-02 PROCEDURE — 250N000009 HC RX 250: Performed by: INTERNAL MEDICINE

## 2023-11-02 PROCEDURE — 250N000011 HC RX IP 250 OP 636: Performed by: NURSE ANESTHETIST, CERTIFIED REGISTERED

## 2023-11-02 PROCEDURE — 360N000007 HC CATARACT SURGICAL PACKAGE: Performed by: INTERNAL MEDICINE

## 2023-11-02 PROCEDURE — 370N000004 HC ANESTHESIA CATARACT PACKAGE: Performed by: INTERNAL MEDICINE

## 2023-11-02 PROCEDURE — 250N000011 HC RX IP 250 OP 636: Mod: JZ | Performed by: INTERNAL MEDICINE

## 2023-11-02 PROCEDURE — V2632 POST CHMBR INTRAOCULAR LENS: HCPCS | Performed by: INTERNAL MEDICINE

## 2023-11-02 DEVICE — EYE IMP IOL AMO PCL TECNIS ZCB00 20.0: Type: IMPLANTABLE DEVICE | Site: EYE | Status: FUNCTIONAL

## 2023-11-02 RX ORDER — DICLOFENAC SODIUM 1 MG/ML
1 SOLUTION/ DROPS OPHTHALMIC
Status: COMPLETED | OUTPATIENT
Start: 2023-11-02 | End: 2023-11-02

## 2023-11-02 RX ORDER — PHENYLEPHRINE HYDROCHLORIDE 25 MG/ML
1 SOLUTION/ DROPS OPHTHALMIC
Status: COMPLETED | OUTPATIENT
Start: 2023-11-02 | End: 2023-11-02

## 2023-11-02 RX ORDER — CYCLOPENTOLATE HYDROCHLORIDE 10 MG/ML
1 SOLUTION/ DROPS OPHTHALMIC
Status: COMPLETED | OUTPATIENT
Start: 2023-11-02 | End: 2023-11-02

## 2023-11-02 RX ORDER — PREDNISOLONE/MOXIFLO/NEPAFENAC 1-0.5-0.1%
SUSPENSION, DROPS(FINAL DOSAGE FORM)(ML) OPHTHALMIC (EYE) PRN
Status: DISCONTINUED | OUTPATIENT
Start: 2023-11-02 | End: 2023-11-02 | Stop reason: HOSPADM

## 2023-11-02 RX ORDER — PROPARACAINE HYDROCHLORIDE 5 MG/ML
1 SOLUTION/ DROPS OPHTHALMIC ONCE
Status: COMPLETED | OUTPATIENT
Start: 2023-11-02 | End: 2023-11-02

## 2023-11-02 RX ORDER — PROPARACAINE HYDROCHLORIDE 5 MG/ML
1 SOLUTION/ DROPS OPHTHALMIC ONCE
Status: DISCONTINUED | OUTPATIENT
Start: 2023-11-02 | End: 2023-11-02 | Stop reason: HOSPADM

## 2023-11-02 RX ORDER — LIDOCAINE 40 MG/G
CREAM TOPICAL
Status: DISCONTINUED | OUTPATIENT
Start: 2023-11-02 | End: 2023-11-02 | Stop reason: HOSPADM

## 2023-11-02 RX ORDER — MOXIFLOXACIN 5 MG/ML
1 SOLUTION/ DROPS OPHTHALMIC
Status: COMPLETED | OUTPATIENT
Start: 2023-11-02 | End: 2023-11-02

## 2023-11-02 RX ADMIN — DICLOFENAC SODIUM 1 DROP: 1 SOLUTION/ DROPS OPHTHALMIC at 12:45

## 2023-11-02 RX ADMIN — DICLOFENAC SODIUM 1 DROP: 1 SOLUTION/ DROPS OPHTHALMIC at 12:38

## 2023-11-02 RX ADMIN — PHENYLEPHRINE HYDROCHLORIDE 1 DROP: 25 SOLUTION/ DROPS OPHTHALMIC at 12:38

## 2023-11-02 RX ADMIN — PHENYLEPHRINE HYDROCHLORIDE 1 DROP: 25 SOLUTION/ DROPS OPHTHALMIC at 12:32

## 2023-11-02 RX ADMIN — MIDAZOLAM 2 MG: 1 INJECTION INTRAMUSCULAR; INTRAVENOUS at 12:52

## 2023-11-02 RX ADMIN — PROPARACAINE HYDROCHLORIDE 1 DROP: 5 SOLUTION/ DROPS OPHTHALMIC at 12:30

## 2023-11-02 RX ADMIN — CYCLOPENTOLATE HYDROCHLORIDE 1 DROP: 10 SOLUTION/ DROPS OPHTHALMIC at 12:32

## 2023-11-02 RX ADMIN — MOXIFLOXACIN 1 DROP: 5 SOLUTION/ DROPS OPHTHALMIC at 12:38

## 2023-11-02 RX ADMIN — CYCLOPENTOLATE HYDROCHLORIDE 1 DROP: 10 SOLUTION/ DROPS OPHTHALMIC at 12:37

## 2023-11-02 RX ADMIN — PHENYLEPHRINE HYDROCHLORIDE 1 DROP: 25 SOLUTION/ DROPS OPHTHALMIC at 12:45

## 2023-11-02 RX ADMIN — CYCLOPENTOLATE HYDROCHLORIDE 1 DROP: 10 SOLUTION/ DROPS OPHTHALMIC at 12:45

## 2023-11-02 RX ADMIN — DICLOFENAC SODIUM 1 DROP: 1 SOLUTION/ DROPS OPHTHALMIC at 12:32

## 2023-11-02 RX ADMIN — MOXIFLOXACIN 1 DROP: 5 SOLUTION/ DROPS OPHTHALMIC at 12:45

## 2023-11-02 RX ADMIN — MOXIFLOXACIN 1 DROP: 5 SOLUTION/ DROPS OPHTHALMIC at 12:32

## 2023-11-02 ASSESSMENT — ENCOUNTER SYMPTOMS
SEIZURES: 0
DYSRHYTHMIAS: 0
ORTHOPNEA: 0

## 2023-11-02 ASSESSMENT — LIFESTYLE VARIABLES: TOBACCO_USE: 0

## 2023-11-02 ASSESSMENT — COPD QUESTIONNAIRES: COPD: 0

## 2023-11-02 ASSESSMENT — ACTIVITIES OF DAILY LIVING (ADL): ADLS_ACUITY_SCORE: 35

## 2023-11-02 NOTE — OP NOTE
[facility]  Ophthalmology Operative Note    PREOPERATIVE DIAGNOSIS: Cataract, Left eye.     POSTOPERATIVE DIAGNOSIS: Cataract, Left eye.     OPERATION: Cataract extraction with placement of posterior chamber intraocular lens in the Left eye.     ANESTHESIA: MAC combined with topical     INDICATIONS FOR PROCEDURE: Ag Evans was seen in the [facility] for decreased visual acuity in the Left eye. The patient was found to have a visually significant cataract in the Left eye. The risks, benefits, alternatives and goals of cataract extraction were discussed with the patient, and after adequate discussion the patient understood and agreed to these, and a signed informed consent was obtained prior to the procedure.     DESCRIPTION OF PROCEDURE: After proper patient identification, topical anesthesia was applied to the Left eye. The patient was brought to the operating room and the Left eye was prepped and draped in the usual sterile fashion for intraocular surgery. A lid speculum was placed in the Left eye. A paracentesis was then created and the anterior chamber was filled with 1% non-preserved lidocaine followed by Endocoat. A clear corneal incision was then created temporally using a 2.4mm keratome. A continuous curvilinear capsulorrhexis was then created using a cystotome and Utrata forceps. Hydrodissection was carried out with BSS on a cannula and the lens rotated freely within the capsular bag. Phacoemulsification was then carried out using the divide and conquer technique. Residual cortical material was removed using the I&A handpiece. The capsular bag was then filled with Healon and a ZCB00 20.0 diopter intraocular lens was then injected into the capsular bag. The lens showed good centration and stability. Residual viscoelastic was removed using the I&A handpiece. The wound was then hydrated and the anterior chamber reformed. Intracameral Moxifloxacin was then  injected into the anterior chamber. The wounds were then checked and found to be sealed. Topical Prednisolone drops were placed in the patient's Left eye followed by a Schultz shield over the top of this. The patient tolerated the procedure well without complications and was told to follow up in the clinic in the next postoperative day.     Implant Name Type Inv. Item Serial No.  Lot No. LRB No. Used Action   EYE IMP IOL HANSEL PCL TECNIS ZCB00 20.0 - [id number] Lens/Eye Implant EYE IMP IOL HANSEL PCL TECNIS ZCB00 20.0 [id number] [employer]  Left 1 Implanted       Edwin Lewis MD

## 2023-11-02 NOTE — ANESTHESIA PREPROCEDURE EVALUATION
Anesthesia Pre-Procedure Evaluation    Patient: Ag Evans   MRN: [id number] : 1950        Procedure : Procedure(s):  Pjacoemulsification with standard intraocular lens implant          Past Medical History:   Diagnosis Date    DJD (degenerative joint disease), lumbar     Malignant lymphoma, lymphoplasmacytic (H) 2021    Sinus bradycardia       Past Surgical History:   Procedure Laterality Date    APPENDECTOMY      ARTHROPLASTY REVISION HIP Right 2022    Procedure: Revision Right Total Hip Arthroplasty;  Surgeon: Baljit Joy MD;  Location: [facility]    BIOPSY  mass right side    BONE MARROW BIOPSY, BONE SPECIMEN, NEEDLE/TROCAR N/A 06/10/2021    Procedure: BIOPSY, BONE MARROW;  Surgeon: Josephine Santamaria MD;  Location:  GI    COLONOSCOPY  2010    benign with hyperplastic polyps    COLONOSCOPY N/A 2023    Procedure: Colonoscopy;  Surgeon: Axel Ceron MD;  Location:  GI    SD STOMACH SURGERY PROCEDURE UNLISTED  Appendix    1971    [place] TOTAL HIP ARTHROPLASTY Left 2012    Dr Roderick Santos    SD TOTAL HIP ARTHROPLASTY Right 11/15/2004    Dr. Jose Martinez    SURGICAL HISTORY OF -       Removal of a benign soft tissue mass right abdominal wall    TONSILLECTOMY        Allergies   Allergen Reactions    Seasonal Allergies     Aluminum Rash      Social History     Tobacco Use    Smoking status: Never    Smokeless tobacco: Never   Substance Use Topics    Alcohol use: Yes     Comment: intermittent with food      Wt Readings from Last 1 Encounters:   10/24/23 94.6 kg (208 lb 8 oz)        Anesthesia Evaluation   Pt has had prior anesthetic. Type: General and MAC.    No history of anesthetic complications       ROS/MED HX  ENT/Pulmonary:     (+)           allergic rhinitis,                         (-) tobacco use, asthma, COPD, sleep apnea and recent URI   Neurologic:     (+)    peripheral neuropathy, - after cancer treatment, in fingers and toes .                        (-) no  seizures and no CVA   Cardiovascular: Comment: Marked Sinus Saman    (+)  - -   -  - -                                 Previous cardiac testing   Echo: Date: 2012 Results:   1) LVEF 55%. Normal LV size and systolic function.Normal LV     diastolic function. No regional wall motion abnormalities.     2) Normal RV size and function.     3) Mild biatrial enlargement.    4) No significant valvular abnormalities other than mild sclerosis of     the aortic valve.    5) Normal IVC. IVC is responsive to inspiration indicating normal     RA pressure.     6) No prior studies for comparison.   Stress Test:  Date: Results:    ECG Reviewed:  Date: Results:    Cath:  Date: Results:   (-) angina, hypertension, CAD, CHF, TRAYLOR, orthopnea/PND, syncope, arrhythmias, irregular heartbeat/palpitations, valvular problems/murmurs and angina   METS/Exercise Tolerance: >4 METS    Hematologic: Comments: Monoclonal Gammopathy      Musculoskeletal:   (+)  arthritis,             GI/Hepatic:    (-) GERD and liver disease   Renal/Genitourinary:    (-) renal disease   Endo:    (-) Type II DM, thyroid disease and chronic steroid usage   Psychiatric/Substance Use:    (-) alcohol abuse history   Infectious Disease:  - neg infectious disease ROS     Malignancy:   (+) Malignancy, History of Lymphoma/Leukemia.Lymph CA Remission status post.      Other:  - neg other ROS          Physical Exam    Airway        Mallampati: II   TM distance: > 3 FB   Neck ROM: full   Mouth opening: > 3 cm    Respiratory Devices and Support         Dental           Cardiovascular   cardiovascular exam normal       Rhythm and rate: regular and normal     Pulmonary   pulmonary exam normal        breath sounds clear to auscultation           OUTSIDE LABS:  CBC:   Lab Results   Component Value Date    WBC 6.5 07/05/2023    WBC 7.2 03/23/2023    HGB 14.9 07/05/2023    HGB 14.2 03/23/2023    HCT 43.7 07/05/2023    HCT 41.9 03/23/2023     07/05/2023     03/23/2023  "    BMP:   Lab Results   Component Value Date     06/08/2022     06/01/2022    POTASSIUM 3.9 06/08/2022    POTASSIUM 3.8 06/01/2022    CHLORIDE 109 06/08/2022    CHLORIDE 108 06/01/2022    CO2 27 06/08/2022    CO2 28 06/01/2022    BUN 23 06/08/2022    BUN 20 06/01/2022    CR 0.74 06/08/2022    CR 0.70 06/01/2022    GLC 91 06/08/2022    GLC 94 06/01/2022     COAGS:   Lab Results   Component Value Date    INR 2.1 04/16/2012     POC: No results found for: \"BGM\", \"HCG\", \"HCGS\"  HEPATIC:   Lab Results   Component Value Date    ALBUMIN 3.5 06/08/2022    PROTTOTAL 6.5 (L) 06/08/2022    ALT 23 06/08/2022    AST 13 06/08/2022    ALKPHOS 67 06/08/2022    BILITOTAL 0.2 06/08/2022     OTHER:   Lab Results   Component Value Date    A1C 5.8 (H) 08/26/2021    TANNA 8.8 06/08/2022    TSH 2.01 04/13/2021    CRP <2.9 06/08/2022    SED 18 10/17/2022       Anesthesia Plan    ASA Status:  2    NPO Status:  NPO Appropriate    Anesthesia Type: MAC.     - Reason for MAC: straight local not clinically adequate   Induction: Intravenous.      Techniques and Equipment:       - Blood: T&S     Consents    Anesthesia Plan(s) and associated risks, benefits, and realistic alternatives discussed. Questions answered and patient/representative(s) expressed understanding.     - Discussed:     - Discussed with:  Patient      - Extended Intubation/Ventilatory Support Discussed: No.      - Patient is DNR/DNI Status: No     Use of blood products discussed: No .     Postoperative Care            Comments:    Other Comments: The risks and benefits of anesthesia, and the alternatives where applicable, have been discussed with the patient, and they wish to proceed.               Willy Carias, APRN CRNA  "

## 2023-11-02 NOTE — ANESTHESIA POSTPROCEDURE EVALUATION
Patient: Ag Evans    Procedure: Procedure(s):  Phacoemulsification with standard intraocular lens implant       Anesthesia Type:  MAC    Note:  Disposition: Outpatient   Postop Pain Control: Uneventful            Sign Out: Well controlled pain   PONV: No   Neuro/Psych: Uneventful            Sign Out: Acceptable/Baseline neuro status   Airway/Respiratory: Uneventful            Sign Out: Acceptable/Baseline resp. status   CV/Hemodynamics: Uneventful            Sign Out: Acceptable CV status   Other NRE: NONE   DID A NON-ROUTINE EVENT OCCUR? No    Event details/Postop Comments:  Pt was happy with anesthesia care.  No complications.  I will follow up with the pt if needed.           Last vitals:  Vitals Value Taken Time   /73 11/02/23 1340   Temp     Pulse 41 11/02/23 1340   Resp     SpO2 98 % 11/02/23 1348   Vitals shown include unfiled device data.    Electronically Signed By: KALEB Quezada CRNA  November 2, 2023  2:01 PM

## 2023-11-02 NOTE — ANESTHESIA CARE TRANSFER NOTE
Patient: Ag Evans    Procedure: Procedure(s):  Phacoemulsification with standard intraocular lens implant       Diagnosis: Cataract [H26.9]  Diagnosis Additional Information: No value filed.    Anesthesia Type:   MAC     Note:    Oropharynx: oropharynx clear of all foreign objects and spontaneously breathing  Level of Consciousness: drowsy  Oxygen Supplementation: face mask    Independent Airway: airway patency satisfactory and stable  Dentition: dentition unchanged  Vital Signs Stable: post-procedure vital signs reviewed and stable  Report to RN Given: handoff report given  Patient transferred to: Phase II    Handoff Report: Identifed the Patient, Identified the Reponsible Provider, Reviewed the pertinent medical history, Discussed the surgical course, Reviewed Intra-OP anesthesia mangement and issues during anesthesia, Set expectations for post-procedure period and Allowed opportunity for questions and acknowledgement of understanding      Vitals:  Vitals Value Taken Time   BP     Temp     Pulse     Resp     SpO2         Electronically Signed By: KALEB Quezada CRNA  November 2, 2023  1:11 PM

## 2023-11-02 NOTE — INTERVAL H&P NOTE
"I have reviewed the surgical (or preoperative) H&P that is linked to this encounter, and examined the patient. There are no significant changes    Clinical Conditions Present on Arrival:  Clinically Significant Risk Factors Present on Admission                  # Overweight: Estimated body mass index is 29.29 kg/m  as calculated from the following:    Height as of 10/24/23: 1.797 m (5' 10.75\").    Weight as of 10/24/23: 94.6 kg (208 lb 8 oz).       "

## 2023-11-15 ENCOUNTER — ANESTHESIA EVENT (OUTPATIENT)
Dept: SURGERY | Facility: CLINIC | Age: 73
End: 2023-11-15
Payer: COMMERCIAL

## 2023-11-15 ASSESSMENT — ENCOUNTER SYMPTOMS
SEIZURES: 0
DYSRHYTHMIAS: 0
ORTHOPNEA: 0

## 2023-11-15 ASSESSMENT — COPD QUESTIONNAIRES: COPD: 0

## 2023-11-15 ASSESSMENT — LIFESTYLE VARIABLES: TOBACCO_USE: 0

## 2023-11-15 NOTE — ANESTHESIA PREPROCEDURE EVALUATION
Anesthesia Pre-Procedure Evaluation    Patient: Ag Evans   MRN: [id number] : 1950        Procedure : Procedure(s):  Phacoemulsification with standard intraocular lens implant          Past Medical History:   Diagnosis Date     DJD (degenerative joint disease), lumbar      Malignant lymphoma, lymphoplasmacytic (H) 2021     Sinus bradycardia       Past Surgical History:   Procedure Laterality Date     APPENDECTOMY       ARTHROPLASTY REVISION HIP Right 2022    Procedure: Revision Right Total Hip Arthroplasty;  Surgeon: Baljit Joy MD;  [facility]     BIOPSY  mass right side     BONE MARROW BIOPSY, BONE SPECIMEN, NEEDLE/TROCAR N/A 06/10/2021    Procedure: BIOPSY, BONE MARROW;  Surgeon: Josephine Santamaria MD;  Location:  GI     COLONOSCOPY  2010    benign with hyperplastic polyps     COLONOSCOPY N/A 2023    Procedure: Colonoscopy;  Surgeon: Axel Ceron MD;  Location: [facility]     PHACOEMULSIFICATION WITH STANDARD INTRAOCULAR LENS IMPLANT Left 2023    Procedure: Phacoemulsification with standard intraocular lens implant, Left;  Surgeon: Odilon Lewis MD;  Location: [facility]     [place] STOMACH SURGERY PROCEDURE UNLISTED  Appendix    1971     [place] TOTAL HIP ARTHROPLASTY Left 2012    Dr Roderick Santos     [place] TOTAL HIP ARTHROPLASTY Right 11/15/2004    Dr. Jose Martinez     SURGICAL HISTORY OF -       Removal of a benign soft tissue mass right abdominal wall     TONSILLECTOMY        Allergies   Allergen Reactions     Seasonal Allergies      Aluminum Rash      Social History     Tobacco Use     Smoking status: Never     Smokeless tobacco: Never   Substance Use Topics     Alcohol use: Yes     Comment: intermittent with food      Wt Readings from Last 1 Encounters:   10/24/23 94.6 kg (208 lb 8 oz)        Anesthesia Evaluation   Pt has had prior anesthetic. Type: General and MAC.    No history of anesthetic complications       ROS/MED HX  ENT/Pulmonary:     (+)  allergic rhinitis,  (-) tobacco use, asthma, COPD, sleep apnea and recent URI   Neurologic:     (+) peripheral neuropathy, - after cancer treatment, in fingers and toes .  (-) no seizures and no CVA   Cardiovascular: Comment: Marked Sinus Saman    (+) hypertension-----Previous cardiac testing   Echo: Date: 2021 Results:     ______________________________________________________________________________  Interpretation Summary     1. Left ventricular systolic function is normal. The visual ejection fraction  is 60-65%.  2. Global peak LV longitudinal strain is averaged at -20.6%. This is within  reported normal limits (normal <-18%).  3. No regional wall motion abnormalities noted.  4. The right ventricle is normal in structure, function and size.  5. No evidence for significant valvular pathology.  Stress Test: Date: Results:    ECG Reviewed: Date: 4/8/22 Results:  Marked SB-HR 49  Cath: Date: Results:   (-) angina, CAD, CHF, TRAYLOR, orthopnea/PND, syncope, arrhythmias, irregular heartbeat/palpitations, valvular problems/murmurs and angina   METS/Exercise Tolerance: >4 METS    Hematologic: Comments: Monoclonal Gammopathy      Musculoskeletal: Comment: DJD  (+) arthritis,     GI/Hepatic:  - neg GI/hepatic ROS  (-) GERD and liver disease   Renal/Genitourinary:  - neg Renal ROS  (-) renal disease   Endo:  - neg endo ROS  (-) Type II DM, thyroid disease and chronic steroid usage   Psychiatric/Substance Use:  - neg psychiatric ROS  (-) alcohol abuse history   Infectious Disease:  - neg infectious disease ROS     Malignancy:   (+) Malignancy, History of Lymphoma/Leukemia.Lymph CA Remission status post.        Other:  - neg other ROS          Physical Exam    Airway  airway exam normal      Mallampati: II   TM distance: > 3 FB   Neck ROM: full   Mouth opening: > 3 cm    Respiratory Devices and Support         Dental       (+) Minor Abnormalities - some fillings, tiny chips      Cardiovascular   cardiovascular exam normal      "  Rhythm and rate: regular and normal     Pulmonary   pulmonary exam normal        breath sounds clear to auscultation           OUTSIDE LABS:  CBC:   Lab Results   Component Value Date    WBC 6.5 07/05/2023    WBC 7.2 03/23/2023    HGB 14.9 07/05/2023    HGB 14.2 03/23/2023    HCT 43.7 07/05/2023    HCT 41.9 03/23/2023     07/05/2023     03/23/2023     BMP:   Lab Results   Component Value Date     06/08/2022     06/01/2022    POTASSIUM 3.9 06/08/2022    POTASSIUM 3.8 06/01/2022    CHLORIDE 109 06/08/2022    CHLORIDE 108 06/01/2022    CO2 27 06/08/2022    CO2 28 06/01/2022    BUN 23 06/08/2022    BUN 20 06/01/2022    CR 0.74 06/08/2022    CR 0.70 06/01/2022    GLC 91 06/08/2022    GLC 94 06/01/2022     COAGS:   Lab Results   Component Value Date    INR 2.1 04/16/2012     POC: No results found for: \"BGM\", \"HCG\", \"HCGS\"  HEPATIC:   Lab Results   Component Value Date    ALBUMIN 3.5 06/08/2022    PROTTOTAL 6.5 (L) 06/08/2022    ALT 23 06/08/2022    AST 13 06/08/2022    ALKPHOS 67 06/08/2022    BILITOTAL 0.2 06/08/2022     OTHER:   Lab Results   Component Value Date    A1C 5.8 (H) 08/26/2021    TANNA 8.8 06/08/2022    TSH 2.01 04/13/2021    CRP <2.9 06/08/2022    SED 18 10/17/2022       Anesthesia Plan    ASA Status:  2   NPO Status:  NPO Appropriate    Anesthesia Type: MAC.     - Reason for MAC: straight local not clinically adequate              Consents    Anesthesia Plan(s) and associated risks, benefits, and realistic alternatives discussed. Questions answered and patient/representative(s) expressed understanding.    - Discussed:     - Discussed with:  Patient    Use of blood products discussed: No .     Postoperative Care    Pain management: IV analgesics.        Comments:    Other Comments: The risks and benefits of anesthesia, and the alternatives where applicable, have been discussed with the patient, and they wish to proceed.            KALEB Powell CRNA  "

## 2023-11-16 ENCOUNTER — ANESTHESIA (OUTPATIENT)
Dept: SURGERY | Facility: CLINIC | Age: 73
End: 2023-11-16
Payer: COMMERCIAL

## 2023-11-16 ENCOUNTER — HOSPITAL ENCOUNTER (OUTPATIENT)
Facility: CLINIC | Age: 73
Discharge: HOME | End: 2023-11-16
Attending: INTERNAL MEDICINE | Admitting: INTERNAL MEDICINE
Payer: COMMERCIAL

## 2023-11-16 VITALS
RESPIRATION RATE: 18 BRPM | SYSTOLIC BLOOD PRESSURE: 113 MMHG | DIASTOLIC BLOOD PRESSURE: 73 MMHG | TEMPERATURE: 97.6 F | OXYGEN SATURATION: 96 % | HEART RATE: 54 BPM

## 2023-11-16 PROCEDURE — 761N000008 HC RECOVERY CATRACT PACKAGE: Performed by: INTERNAL MEDICINE

## 2023-11-16 PROCEDURE — 360N000007 HC CATARACT SURGICAL PACKAGE: Performed by: INTERNAL MEDICINE

## 2023-11-16 PROCEDURE — 250N000009 HC RX 250: Performed by: NURSE ANESTHETIST, CERTIFIED REGISTERED

## 2023-11-16 PROCEDURE — 250N000011 HC RX IP 250 OP 636: Performed by: NURSE ANESTHETIST, CERTIFIED REGISTERED

## 2023-11-16 PROCEDURE — V2632 POST CHMBR INTRAOCULAR LENS: HCPCS | Performed by: INTERNAL MEDICINE

## 2023-11-16 PROCEDURE — 370N000004 HC ANESTHESIA CATARACT PACKAGE: Performed by: INTERNAL MEDICINE

## 2023-11-16 PROCEDURE — 250N000009 HC RX 250: Performed by: INTERNAL MEDICINE

## 2023-11-16 PROCEDURE — 250N000011 HC RX IP 250 OP 636: Mod: JZ | Performed by: INTERNAL MEDICINE

## 2023-11-16 DEVICE — EYE IMP IOL AMO PCL TECNIS ZCB00 20.5: Type: IMPLANTABLE DEVICE | Site: EYE | Status: FUNCTIONAL

## 2023-11-16 RX ORDER — PHENYLEPHRINE HYDROCHLORIDE 25 MG/ML
1 SOLUTION/ DROPS OPHTHALMIC
Status: COMPLETED | OUTPATIENT
Start: 2023-11-16 | End: 2023-11-16

## 2023-11-16 RX ORDER — GLYCOPYRROLATE 0.2 MG/ML
INJECTION, SOLUTION INTRAMUSCULAR; INTRAVENOUS PRN
Status: DISCONTINUED | OUTPATIENT
Start: 2023-11-16 | End: 2023-11-16

## 2023-11-16 RX ORDER — PROPARACAINE HYDROCHLORIDE 5 MG/ML
1 SOLUTION/ DROPS OPHTHALMIC ONCE
Status: COMPLETED | OUTPATIENT
Start: 2023-11-16 | End: 2023-11-16

## 2023-11-16 RX ORDER — DICLOFENAC SODIUM 1 MG/ML
1 SOLUTION/ DROPS OPHTHALMIC
Status: COMPLETED | OUTPATIENT
Start: 2023-11-16 | End: 2023-11-16

## 2023-11-16 RX ORDER — FENTANYL CITRATE 50 UG/ML
INJECTION, SOLUTION INTRAMUSCULAR; INTRAVENOUS PRN
Status: DISCONTINUED | OUTPATIENT
Start: 2023-11-16 | End: 2023-11-16

## 2023-11-16 RX ORDER — PREDNISOLONE/MOXIFLO/NEPAFENAC 1-0.5-0.1%
SUSPENSION, DROPS(FINAL DOSAGE FORM)(ML) OPHTHALMIC (EYE) PRN
Status: DISCONTINUED | OUTPATIENT
Start: 2023-11-16 | End: 2023-11-16 | Stop reason: HOSPADM

## 2023-11-16 RX ORDER — PROPARACAINE HYDROCHLORIDE 5 MG/ML
1 SOLUTION/ DROPS OPHTHALMIC ONCE
Status: DISCONTINUED | OUTPATIENT
Start: 2023-11-16 | End: 2023-11-16 | Stop reason: HOSPADM

## 2023-11-16 RX ORDER — CYCLOPENTOLATE HYDROCHLORIDE 10 MG/ML
1 SOLUTION/ DROPS OPHTHALMIC
Status: COMPLETED | OUTPATIENT
Start: 2023-11-16 | End: 2023-11-16

## 2023-11-16 RX ORDER — MOXIFLOXACIN 5 MG/ML
1 SOLUTION/ DROPS OPHTHALMIC
Status: COMPLETED | OUTPATIENT
Start: 2023-11-16 | End: 2023-11-16

## 2023-11-16 RX ADMIN — PROPARACAINE HYDROCHLORIDE 1 DROP: 5 SOLUTION/ DROPS OPHTHALMIC at 09:01

## 2023-11-16 RX ADMIN — PHENYLEPHRINE HYDROCHLORIDE 1 DROP: 25 SOLUTION/ DROPS OPHTHALMIC at 09:17

## 2023-11-16 RX ADMIN — CYCLOPENTOLATE HYDROCHLORIDE 1 DROP: 10 SOLUTION/ DROPS OPHTHALMIC at 09:18

## 2023-11-16 RX ADMIN — FENTANYL CITRATE 25 MCG: 50 INJECTION INTRAMUSCULAR; INTRAVENOUS at 10:21

## 2023-11-16 RX ADMIN — CYCLOPENTOLATE HYDROCHLORIDE 1 DROP: 10 SOLUTION/ DROPS OPHTHALMIC at 09:09

## 2023-11-16 RX ADMIN — MOXIFLOXACIN 1 DROP: 5 SOLUTION/ DROPS OPHTHALMIC at 09:02

## 2023-11-16 RX ADMIN — MIDAZOLAM 1 MG: 1 INJECTION INTRAMUSCULAR; INTRAVENOUS at 10:20

## 2023-11-16 RX ADMIN — CYCLOPENTOLATE HYDROCHLORIDE 1 DROP: 10 SOLUTION/ DROPS OPHTHALMIC at 09:02

## 2023-11-16 RX ADMIN — MOXIFLOXACIN 1 DROP: 5 SOLUTION/ DROPS OPHTHALMIC at 09:17

## 2023-11-16 RX ADMIN — MOXIFLOXACIN 1 DROP: 5 SOLUTION/ DROPS OPHTHALMIC at 09:09

## 2023-11-16 RX ADMIN — MIDAZOLAM 1 MG: 1 INJECTION INTRAMUSCULAR; INTRAVENOUS at 10:23

## 2023-11-16 RX ADMIN — PHENYLEPHRINE HYDROCHLORIDE 1 DROP: 25 SOLUTION/ DROPS OPHTHALMIC at 09:02

## 2023-11-16 RX ADMIN — DICLOFENAC SODIUM 1 DROP: 1 SOLUTION/ DROPS OPHTHALMIC at 09:17

## 2023-11-16 RX ADMIN — GLYCOPYRROLATE 0.2 MG: 0.2 INJECTION, SOLUTION INTRAMUSCULAR; INTRAVENOUS at 10:21

## 2023-11-16 RX ADMIN — PHENYLEPHRINE HYDROCHLORIDE 1 DROP: 25 SOLUTION/ DROPS OPHTHALMIC at 09:09

## 2023-11-16 RX ADMIN — GLYCOPYRROLATE 0.2 MG: 0.2 INJECTION, SOLUTION INTRAMUSCULAR; INTRAVENOUS at 10:24

## 2023-11-16 RX ADMIN — DICLOFENAC SODIUM 1 DROP: 1 SOLUTION/ DROPS OPHTHALMIC at 09:09

## 2023-11-16 RX ADMIN — LIDOCAINE HYDROCHLORIDE 1 ML: 10 INJECTION, SOLUTION EPIDURAL; INFILTRATION; INTRACAUDAL; PERINEURAL at 09:17

## 2023-11-16 RX ADMIN — DICLOFENAC SODIUM 1 DROP: 1 SOLUTION/ DROPS OPHTHALMIC at 09:02

## 2023-11-16 ASSESSMENT — ACTIVITIES OF DAILY LIVING (ADL)
ADLS_ACUITY_SCORE: 33
ADLS_ACUITY_SCORE: 35

## 2023-11-16 NOTE — ANESTHESIA CARE TRANSFER NOTE
Patient: Ag Evans    Procedure: Procedure(s):  Phacoemulsification with standard intraocular lens implant       Diagnosis: Cataract [H26.9]  Diagnosis Additional Information: No value filed.    Anesthesia Type:   MAC     Note:    Oropharynx: oropharynx clear of all foreign objects and spontaneously breathing  Level of Consciousness: drowsy  Oxygen Supplementation: room air    Independent Airway: airway patency satisfactory and stable  Dentition: dentition unchanged  Vital Signs Stable: post-procedure vital signs reviewed and stable  Report to RN Given: handoff report given  Patient transferred to: Phase II    Handoff Report: Identifed the Patient, Identified the Reponsible Provider, Reviewed the pertinent medical history, Discussed the surgical course, Reviewed Intra-OP anesthesia mangement and issues during anesthesia, Set expectations for post-procedure period and Allowed opportunity for questions and acknowledgement of understanding      Vitals:  Vitals Value Taken Time   BP     Temp     Pulse     Resp     SpO2 97 % 11/16/23 1043   Vitals shown include unfiled device data.    Electronically Signed By: KALEB Powell CRNA  November 16, 2023  10:45 AM

## 2023-11-16 NOTE — OP NOTE
[facility]  Ophthalmology Operative Note    PREOPERATIVE DIAGNOSIS: Cataract, Right eye.     POSTOPERATIVE DIAGNOSIS: Cataract, Right eye.     OPERATION: Cataract extraction with placement of posterior chamber intraocular lens in the Right eye.     ANESTHESIA: MAC combined with topical     INDICATIONS FOR PROCEDURE: Ag Evans was seen in the [facility] for decreased visual acuity in the Right eye. The patient was found to have a visually significant cataract in the Right eye. The risks, benefits, alternatives and goals of cataract extraction were discussed with the patient, and after adequate discussion the patient understood and agreed to these, and a signed informed consent was obtained prior to the procedure.     DESCRIPTION OF PROCEDURE: After proper patient identification, topical anesthesia was applied to the Right eye. The patient was brought to the operating room and the Right eye was prepped and draped in the usual sterile fashion for intraocular surgery. A lid speculum was placed in the Right eye. A paracentesis was then created and the anterior chamber was filled with 1% non-preserved lidocaine followed by Endocoat. A clear corneal incision was then created temporally using a 2.4mm keratome. A continuous curvilinear capsulorrhexis was then created using a cystotome and Utrata forceps. Hydrodissection was carried out with BSS on a cannula and the lens rotated freely within the capsular bag. Phacoemulsification was then carried out using the divide and conquer technique. Residual cortical material was removed using the I&A handpiece. The capsular bag was then filled with Healon and a ZCB00 20.5 diopter intraocular lens was then injected into the capsular bag. The lens showed good centration and stability. Residual viscoelastic was removed using the I&A handpiece. The wound was then hydrated and the anterior chamber reformed. Intracameral Moxifloxacin was  then injected into the anterior chamber. The wounds were then checked and found to be sealed. Topical Prednisolone drops were placed in the patient's Right eye followed by a Schultz shield over the top of this. The patient tolerated the procedure well without complications and was told to follow up in the clinic in the next postoperative day.     Implant Name Type Inv. Item Serial No.  Lot No. LRB No. Used Action   EYE IMP IOL HANSEL PCL TECNIS ZCB00 20.5 - [id number] Lens/Eye Implant EYE IMP IOL HANSEL PCL TECNIS ZCB00 20.5 [id number] [employer]  Right 1 Implanted        Edwin Lewis MD

## 2023-11-16 NOTE — ANESTHESIA POSTPROCEDURE EVALUATION
Patient: Ag Evans    Procedure: Procedure(s):  Phacoemulsification with standard intraocular lens implant       Anesthesia Type:  MAC    Note:  Disposition: Outpatient   Postop Pain Control: Uneventful            Sign Out: Well controlled pain   PONV: No   Neuro/Psych: Uneventful            Sign Out: Acceptable/Baseline neuro status   Airway/Respiratory: Uneventful            Sign Out: Acceptable/Baseline resp. status   CV/Hemodynamics: Uneventful            Sign Out: Acceptable CV status   Other NRE: NONE   DID A NON-ROUTINE EVENT OCCUR? No    Event details/Postop Comments:  Pt was happy with anesthesia care.  No complications.  I will follow up with the pt if needed.       Last vitals:  Vitals Value Taken Time   /73 11/16/23 1050   Temp     Pulse 54 11/16/23 1050   Resp 18 11/16/23 1050   SpO2 96 % 11/16/23 1055   Vitals shown include unfiled device data.    Electronically Signed By: KALEB Powell CRNA  November 16, 2023  2:55 PM

## 2023-11-16 NOTE — INTERVAL H&P NOTE
"I have reviewed the surgical (or preoperative) H&P that is linked to this encounter, and examined the patient. There are no significant changes    Clinical Conditions Present on Arrival:  Clinically Significant Risk Factors Present on Admission                  # Overweight: Estimated body mass index is 29.29 kg/m  as calculated from the following:    Height as of 10/24/23: 1.797 m (5' 10.75\").    Weight as of 10/24/23: 94.6 kg (208 lb 8 oz).       "
no

## 2023-12-16 ENCOUNTER — HEALTH MAINTENANCE LETTER (OUTPATIENT)
Age: 73
End: 2023-12-16

## 2024-02-24 ENCOUNTER — HEALTH MAINTENANCE LETTER (OUTPATIENT)
Age: 74
End: 2024-02-24

## 2024-05-03 ENCOUNTER — LAB (OUTPATIENT)
Dept: LAB | Facility: OTHER | Age: 74
End: 2024-05-03
Payer: COMMERCIAL

## 2024-05-03 DIAGNOSIS — C88.00 WALDENSTROM'S MACROGLOBULINEMIA: ICD-10-CM

## 2024-05-03 DIAGNOSIS — E78.5 HYPERLIPIDEMIA LDL GOAL <130: Primary | ICD-10-CM

## 2024-05-03 LAB
BASOPHILS # BLD AUTO: 0 10E3/UL (ref 0–0.2)
BASOPHILS NFR BLD AUTO: 1 %
CHOLEST SERPL-MCNC: 169 MG/DL
EOSINOPHIL # BLD AUTO: 0.1 10E3/UL (ref 0–0.7)
EOSINOPHIL NFR BLD AUTO: 2 %
ERYTHROCYTE [DISTWIDTH] IN BLOOD BY AUTOMATED COUNT: 12.9 % (ref 10–15)
FASTING STATUS PATIENT QL REPORTED: NO
HCT VFR BLD AUTO: 45 % (ref 40–53)
HDLC SERPL-MCNC: 61 MG/DL
HGB BLD-MCNC: 15.1 G/DL (ref 13.3–17.7)
IMM GRANULOCYTES # BLD: 0 10E3/UL
IMM GRANULOCYTES NFR BLD: 0 %
LDH SERPL L TO P-CCNC: 215 U/L (ref 0–250)
LDLC SERPL CALC-MCNC: 93 MG/DL
LYMPHOCYTES # BLD AUTO: 1.4 10E3/UL (ref 0.8–5.3)
LYMPHOCYTES NFR BLD AUTO: 18 %
MCH RBC QN AUTO: 32.8 PG (ref 26.5–33)
MCHC RBC AUTO-ENTMCNC: 33.6 G/DL (ref 31.5–36.5)
MCV RBC AUTO: 98 FL (ref 78–100)
MONOCYTES # BLD AUTO: 1 10E3/UL (ref 0–1.3)
MONOCYTES NFR BLD AUTO: 13 %
NEUTROPHILS # BLD AUTO: 5.2 10E3/UL (ref 1.6–8.3)
NEUTROPHILS NFR BLD AUTO: 68 %
NONHDLC SERPL-MCNC: 108 MG/DL
PLATELET # BLD AUTO: 209 10E3/UL (ref 150–450)
RBC # BLD AUTO: 4.6 10E6/UL (ref 4.4–5.9)
TRIGL SERPL-MCNC: 75 MG/DL
WBC # BLD AUTO: 7.7 10E3/UL (ref 4–11)

## 2024-05-03 PROCEDURE — 83615 LACTATE (LD) (LDH) ENZYME: CPT

## 2024-05-03 PROCEDURE — 36415 COLL VENOUS BLD VENIPUNCTURE: CPT

## 2024-05-03 PROCEDURE — 80061 LIPID PANEL: CPT

## 2024-05-03 PROCEDURE — 82784 ASSAY IGA/IGD/IGG/IGM EACH: CPT

## 2024-05-03 PROCEDURE — 85025 COMPLETE CBC W/AUTO DIFF WBC: CPT

## 2024-05-06 LAB — IGM SERPL-MCNC: 198 MG/DL (ref 35–242)

## 2024-05-09 ENCOUNTER — VIRTUAL VISIT (OUTPATIENT)
Dept: ONCOLOGY | Facility: CLINIC | Age: 74
End: 2024-05-09
Attending: INTERNAL MEDICINE
Payer: COMMERCIAL

## 2024-05-09 DIAGNOSIS — G62.9 PERIPHERAL POLYNEUROPATHY: ICD-10-CM

## 2024-05-09 DIAGNOSIS — C88.00 WALDENSTROM'S MACROGLOBULINEMIA: Primary | ICD-10-CM

## 2024-05-09 PROCEDURE — 99214 OFFICE O/P EST MOD 30 MIN: CPT | Mod: 95 | Performed by: INTERNAL MEDICINE

## 2024-05-09 ASSESSMENT — PAIN SCALES - GENERAL: PAINLEVEL: NO PAIN (0)

## 2024-05-09 NOTE — LETTER
2024         RE: Ag Evans  [id number]  [place] [address]        Dear Colleague,    Thank you for referring your patient, Ag Evans, to the [facility]. Please see a copy of my visit note below.    [facility] Hematology / Oncology  Progress Note  Name: Ag Evans  :  1950    MRN:  [id number]    --------------------    Assessment / Plan:  Waldenstrom / Lymphoplasmacytic Lymphoma:  # 2021 - Oct 2021 Bendamustine / Rituximab x 4 cycles; CR.    Continue observation; clinically INOCENCIO.  Counts stable; chemistries stable; IgM WNL and continues to improve.  Neuropathy remains stable; continue gabapentin 300 mg TID; monitor OA of hands.  Reviewed he can discuss lower dose or less frequent Lipitor dosing (myalgias) w/ PCP and cardiologist.  RTC 12 months w/ labs (CBC, CMP, LDH, IgM).    Manuel Perez MD    --------------------    Interval History:  Ag returns for follow up of Waldenstroms accompanied by his wife.  All in all, he remains quite well.  No major health concerns.  His neuropathy is stable and well controlled with gabapentin.  Starting to develop some NEL involving hands; using naproxen as needed here and there.  No sweats.  No adenopathy.  No recurrrent or severe infections.    --------------------    Physical Exam:  Video visit.    Labs / Imaging / Path:  We reviewed CBC, LDH, IgM.    Video Visit:  Ag is a 73 year old male who is being evaluated via a billable video visit.  }    Video start time:  3:36 PM  Video end time:  3:52 PM  Provider location: [facility]  Patient location: Home  Mode of transmission:  [employer] / [employer].        Again, thank you for allowing me to participate in the care of your patient.        Sincerely,        Manuel Perez MD

## 2024-05-09 NOTE — PROGRESS NOTES
[facility] Hematology / Oncology  Progress Note  Name: Ag Evans  :  1950    MRN:  [id number]    --------------------    Assessment / Plan:  Waldenstrom / Lymphoplasmacytic Lymphoma:  # 2021 - Oct 2021 Bendamustine / Rituximab x 4 cycles; CR.    Continue observation; clinically INOCENCIO.  Counts stable; chemistries stable; IgM WNL and continues to improve.  Neuropathy remains stable; continue gabapentin 300 mg TID; monitor OA of hands.  Reviewed he can discuss lower dose or less frequent Lipitor dosing (myalgias) w/ PCP and cardiologist.  RTC 12 months w/ labs (CBC, CMP, LDH, IgM).    Manuel Perez MD    --------------------    Interval History:  Ag returns for follow up of Waldenstroms accompanied by his wife.  All in all, he remains quite well.  No major health concerns.  His neuropathy is stable and well controlled with gabapentin.  Starting to develop some NEL involving hands; using naproxen as needed here and there.  No sweats.  No adenopathy.  No recurrrent or severe infections.    --------------------    Physical Exam:  Video visit.    Labs / Imaging / Path:  We reviewed CBC, LDH, IgM.    Video Visit:  Ag is a 73 year old male who is being evaluated via a billable video visit.  }    Video start time:  3:36 PM  Video end time:  3:52 PM  Provider location: [facility]  Patient location: Home  Mode of transmission:  [employer] / [employer].

## 2024-05-09 NOTE — LETTER
2024         RE: Ag Evans  [address]  [facility] [address]        Dear Colleague,    Thank you for referring your patient, Ag Evans, to the [facility] [place]. Please see a copy of my visit note below.    [facility] Hematology / Oncology  Progress Note  Name: Ag Evans  :  1950    MRN:  [id number]    --------------------    Assessment / Plan:  Waldenstrom / Lymphoplasmacytic Lymphoma:  # 2021 - Oct 2021 Bendamustine / Rituximab x 4 cycles; CR.    Continue observation; clinically INOCENCIO.  Counts stable; chemistries stable; IgM WNL and continues to improve.  Neuropathy remains stable; continue gabapentin 300 mg TID; monitor OA of hands.  Reviewed he can discuss lower dose or less frequent Lipitor dosing (myalgias) w/ PCP and cardiologist.  RTC 12 months w/ labs (CBC, CMP, LDH, IgM).    Manuel Perez MD    --------------------    Interval History:  Ag returns for follow up of Waldenstroms accompanied by his wife.  All in all, he remains quite well.  No major health concerns.  His neuropathy is stable and well controlled with gabapentin.  Starting to develop some NEL involving hands; using naproxen as needed here and there.  No sweats.  No adenopathy.  No recurrrent or severe infections.    --------------------    Physical Exam:  Video visit.    Labs / Imaging / Path:  We reviewed CBC, LDH, IgM.    Video Visit:  Ag is a 73 year old male who is being evaluated via a billable video visit.  }    Video start time:  3:36 PM  Video end time:  3:52 PM  Provider location: [facility]  Patient location: Home  Mode of transmission:  [employer] / [employer].        Again, thank you for allowing me to participate in the care of your patient.        Sincerely,        Manuel Perez MD

## 2024-05-17 NOTE — TELEPHONE ENCOUNTER
----- Message from Jose Antonio MD sent at 4/20/2021  4:16 PM CDT -----  Your protein levels are off and can be a cause of neuropathy. I would like you to see an oncology to work this up further.   We will make a referral for you.    Needs oncology consult-order placed     Pt unable to keep NC on. Pt's oxygen saturation dropping to mid 80's. Pt placed on non-re breather. Dr. Davila notified.

## 2024-07-19 ENCOUNTER — PATIENT OUTREACH (OUTPATIENT)
Dept: CARE COORDINATION | Facility: CLINIC | Age: 74
End: 2024-07-19
Payer: COMMERCIAL

## 2024-08-01 ENCOUNTER — ANCILLARY PROCEDURE (OUTPATIENT)
Dept: GENERAL RADIOLOGY | Facility: CLINIC | Age: 74
End: 2024-08-01
Attending: ORTHOPAEDIC SURGERY
Payer: COMMERCIAL

## 2024-08-01 ENCOUNTER — OFFICE VISIT (OUTPATIENT)
Dept: ORTHOPEDICS | Facility: CLINIC | Age: 74
End: 2024-08-01
Payer: COMMERCIAL

## 2024-08-01 ENCOUNTER — TELEPHONE (OUTPATIENT)
Dept: ORTHOPEDICS | Facility: CLINIC | Age: 74
End: 2024-08-01

## 2024-08-01 VITALS — SYSTOLIC BLOOD PRESSURE: 158 MMHG | DIASTOLIC BLOOD PRESSURE: 89 MMHG

## 2024-08-01 DIAGNOSIS — Z96.649 PROSTHETIC WEAR FOLLOWING TOTAL HIP ARTHROPLASTY, SUBSEQUENT ENCOUNTER: Primary | ICD-10-CM

## 2024-08-01 DIAGNOSIS — T84.069D PROSTHETIC WEAR FOLLOWING TOTAL HIP ARTHROPLASTY, SUBSEQUENT ENCOUNTER: Primary | ICD-10-CM

## 2024-08-01 DIAGNOSIS — Z96.649 PROSTHETIC WEAR FOLLOWING TOTAL HIP ARTHROPLASTY, SUBSEQUENT ENCOUNTER: ICD-10-CM

## 2024-08-01 DIAGNOSIS — T84.069D PROSTHETIC WEAR FOLLOWING TOTAL HIP ARTHROPLASTY, SUBSEQUENT ENCOUNTER: ICD-10-CM

## 2024-08-01 PROCEDURE — 99214 OFFICE O/P EST MOD 30 MIN: CPT | Performed by: ORTHOPAEDIC SURGERY

## 2024-08-01 PROCEDURE — 73502 X-RAY EXAM HIP UNI 2-3 VIEWS: CPT | Mod: TC | Performed by: RADIOLOGY

## 2024-08-01 NOTE — LETTER
8/1/2024      Ag Evans  [address]  [facility] [address]      Dear Colleague,    Thank you for referring your patient, Ag Evans, to the [facility]. Please see a copy of my visit note below.    Ag Evans is a 73 year old male who is seen in follow up for left knee clicking and pain.  He is also seen for follow-up of right hip revision on 4/19/2022 by Dr. Joy.  At that time he had a large cyst superior to the acetabulum and required exchange of the femoral head and liner, removal of acetabular screws, and bone grafting of the acetabular cyst.   He was last seen by Dr. Joy in October 2022.  At that time he was to come back in 6 to 12 months for evaluation of the hip.   He is also under treatment for malignant lymphoma and follows with a oncologist for this.  He does report a dull pain in the right buttock for the past week or 2.  He describes a dull pain rated 1 out of 10.  He is quite active with exercises, golf, pickleball.    He now has pain with climbing stairs with the left knee.   He has pain primarily at the front of the knee and a clicking in the knee.  It has occurred sporadically in the past.  He is concerned about the clicking and mild pain.      X-rays of the pelvis and lateral both hips were obtained today.  Images were reviewed with the patient. He has equal leg lengths.  The left hip appears solid with no sign of loosening or wear.  The right hip has had bone grafting of the acetabulum with removal of the screws.  The bone graft appears to be consolidating quite nicely.  There is no other signs of the hip being loose.    Past Medical History:   Diagnosis Date     DJD (degenerative joint disease), lumbar      Malignant lymphoma, lymphoplasmacytic (H) 7/1/2021     Sinus bradycardia        Past Surgical History:   Procedure Laterality Date     APPENDECTOMY       ARTHROPLASTY REVISION HIP Right 4/19/2022    Procedure: Revision Right Total Hip Arthroplasty;  Surgeon:  Baljit Joy MD;  Location: [facility]     BIOPSY  mass right side     BONE MARROW BIOPSY, BONE SPECIMEN, NEEDLE/TROCAR N/A 06/10/2021    Procedure: BIOPSY, BONE MARROW;  Surgeon: Josephine Santamaria MD;  Location: [facility]     COLONOSCOPY  01/01/2010    benign with hyperplastic polyps     COLONOSCOPY N/A 9/25/2023    Procedure: Colonoscopy;  Surgeon: Axel Ceron MD;  Location: [facility]     PHACOEMULSIFICATION WITH STANDARD INTRAOCULAR LENS IMPLANT Left 11/2/2023    Procedure: Phacoemulsification with standard intraocular lens implant, Left;  Surgeon: Odilon Lewis MD;  [facility]     PHACOEMULSIFICATION WITH STANDARD INTRAOCULAR LENS IMPLANT Right 11/16/2023    Procedure: Phacoemulsification with standard intraocular lens implant, Right;  Surgeon: Odilon Lewis MD;  Location: [facility]     AK STOMACH SURGERY PROCEDURE UNLISTED  Appendix    1971     AK TOTAL HIP ARTHROPLASTY Left 04/02/2012    Dr Roderick Santos     AK TOTAL HIP ARTHROPLASTY Right 11/15/2004    Dr. Jose Martinez     SURGICAL HISTORY OF -       Removal of a benign soft tissue mass right abdominal wall     TONSILLECTOMY         Family History   Problem Relation Age of Onset     Other Cancer Mother         breast spread to lungs     Breast Cancer Mother         spread to lungs     Prostate Cancer Father      Other Cancer Father         stomach     Other Cancer Sister         Pancreatic cancer     Breast Cancer Sister         Breast cancer     Osteoporosis Sister         from cancer drug?     Other Cancer Sister         breast     Other Cancer Sister         pancreatic     Anesthesia Reaction No family hx of      Deep Vein Thrombosis (DVT) No family hx of        Social History     Socioeconomic History     Marital status:      Spouse name: Not on file     Number of children: Not on file     Years of education: Not on file     Highest education level: Not on file   Occupational History     Not on file   Tobacco Use      Smoking status: Never     Smokeless tobacco: Never   Vaping Use     Vaping status: Never Used   Substance and Sexual Activity     Alcohol use: Yes     Comment: intermittent with food     Drug use: No     Sexual activity: Yes     Partners: Female     Birth control/protection: None   Other Topics Concern     Parent/sibling w/ CABG, MI or angioplasty before 65F 55M? No   Social History Narrative     Not on file     Social Determinants of Health     Financial Resource Strain: Low Risk  (10/17/2023)    Financial Resource Strain      Within the past 12 months, have you or your family members you live with been unable to get utilities (heat, electricity) when it was really needed?: No   Food Insecurity: Low Risk  (10/17/2023)    Food Insecurity      Within the past 12 months, did you worry that your food would run out before you got money to buy more?: No      Within the past 12 months, did the food you bought just not last and you didn t have money to get more?: No   Transportation Needs: Low Risk  (10/17/2023)    Transportation Needs      Within the past 12 months, has lack of transportation kept you from medical appointments, getting your medicines, non-medical meetings or appointments, work, or from getting things that you need?: No   Physical Activity: Not on file   Stress: Not on file   Social Connections: Not on file   Interpersonal Safety: Low Risk  (10/24/2023)    Interpersonal Safety      Do you feel physically and emotionally safe where you currently live?: Yes      Within the past 12 months, have you been hit, slapped, kicked or otherwise physically hurt by someone?: No      Within the past 12 months, have you been humiliated or emotionally abused in other ways by your partner or ex-partner?: No   Housing Stability: Low Risk  (10/17/2023)    Housing Stability      Do you have housing? : Yes      Are you worried about losing your housing?: No       Current Outpatient Medications   Medication Sig Dispense Refill      amoxicillin (AMOXIL) 500 MG capsule Take 4 pills an hour before dentist (Patient not taking: Reported on 5/9/2024) 8 capsule 1     atorvastatin (LIPITOR) 20 MG tablet TAKE 1 TABLET (20MG) BY MOUTH ONCE DAILY (Patient taking differently: TAKE 1 TABLET (20MG) BY MOUTH every other day) 90 tablet 3     docusate sodium (COLACE) 250 MG capsule Take 250 mg by mouth daily as needed for constipation       gabapentin (NEURONTIN) 300 MG capsule 300mg AM and noon, 600mg at night. 360 capsule 3     lactobacillus rhamnosus, GG, (CULTURELL) capsule        Melatonin 10-10 MG TBCR Take 1 tablet by mouth At Bedtime Brand name: Sleep 3       Multiple Vitamins-Minerals (MULTIVITAMIN PO) Take 1 tablet by mouth every morning       PREVIDENT 5000 BOOSTER PLUS 1.1 % PSTE dental paste USE THIS TOOTHPASTE IN PLACE OF YOUR REGULAR TOOTHPASTE BEFORE BEDTIME. BRUSH NORMAL, THEN SWISH WITH THE REMAINING FOAM FOR AT LEAST 30 SECONDS. SPIT OUT EXCESS AND THEN AVOID RINSING.       Turmeric (QC TUMERIC COMPLEX PO) Take 1 tablet by mouth daily         Allergies   Allergen Reactions     Aluminum Rash       REVIEW OF SYSTEMS:  CONSTITUTIONAL:  NEGATIVE for fever, chills, change in weight, not feeling tired  SKIN:  NEGATIVE for worrisome rashes, no skin lumps, no skin ulcers and no non-healing wounds  EYES:  NEGATIVE for vision changes or irritation.  ENT/MOUTH:  NEGATIVE.  No hearing loss, no hoarseness, no difficulty swallowing.  RESP:  NEGATIVE. No cough or shortness of breath.  CV:  NEGATIVE for chest pain, palpitations or peripheral edema  GI:  NEGATIVE for nausea, abdominal pain, heartburn, or change in bowel habits  :  Negative. No dysuria, no hematuria  MUSCULOSKELETAL:  See HPI above  NEURO:  NEGATIVE . No headaches, no dizziness,  no numbness  ENDOCRINE:  NEGATIVE for temperature intolerance, skin/hair changes  HEME/ALLERGY/IMMUNE:  NEGATIVE for bleeding problems  PSYCHIATRIC:  NEGATIVE. no anxiety, no depression.     Exam:  Vitals: BP  (!) 158/89   BMI= There is no height or weight on file to calculate BMI.  Constitutional:  healthy, alert and no distress  Neuro: Alert and Oriented x 3, Sensation grossly WNL.  Psych: Affect normal   Respiratory: Breathing not labored.  Cardiovascular: normal peripheral pulses  Lymph: no adenopathy  Skin: No rashes,worrisome lesions or skin problems  He has some tenderness in the right buttock consistent with sciatica.  No tenderness on the left.  He has about 50 degrees external rotation of each hip.  On the right his internal rotation is about 15 degrees, on the left it is about 20 to 30 degrees.  He walks without a limp.  He has good flexion  of his knees.  Has about 3 degrees contracture of each knee.  He has patellofemoral crepitation on the left more than on the right.  He has limited mobility of his left patella side to side.  Better mobility on the right.  He has no ligamentous laxity of MCL, LCL, or cruciates.  He has a trace effusion on both knees.  Sensation, motor and circulation are intact.    Assessment:  1. Left total hip arthroplasty in good position with no sign of loosening or wear.  2. Right total hip arthroplasty is in good position and the acetabular graft appears to be incorporating well.  3.  Left knee osteoarthritis with presence of pseudogout also.    Plan: For the knee we could consider anti-inflammatory or steroid injection.  He has naproxen and wishes to stay with that for now.  For the hips I am not sure if he needs further CT evaluation of the pelvis.  I have asked him to message Dr. Joy and see if the x-ray alone is satisfactory or if CT is needed.    Again, thank you for allowing me to participate in the care of your patient.        Sincerely,        Adam Riggs MD

## 2024-08-01 NOTE — PROGRESS NOTES
Ag Evans is a 73 year old male who is seen in follow up for left knee clicking and pain.  He is also seen for follow-up of right hip revision on 4/19/2022 by Dr. Joy.  At that time he had a large cyst superior to the acetabulum and required exchange of the femoral head and liner, removal of acetabular screws, and bone grafting of the acetabular cyst.   He was last seen by Dr. Joy in October 2022.  At that time he was to come back in 6 to 12 months for evaluation of the hip.   He is also under treatment for malignant lymphoma and follows with a oncologist for this.  He does report a dull pain in the right buttock for the past week or 2.  He describes a dull pain rated 1 out of 10.  He is quite active with exercises, golf, pickleball.    He now has pain with climbing stairs with the left knee.   He has pain primarily at the front of the knee and a clicking in the knee.  It has occurred sporadically in the past.  He is concerned about the clicking and mild pain.      X-rays of the pelvis and lateral both hips were obtained today.  Images were reviewed with the patient. He has equal leg lengths.  The left hip appears solid with no sign of loosening or wear.  The right hip has had bone grafting of the acetabulum with removal of the screws.  The bone graft appears to be consolidating quite nicely.  There is no other signs of the hip being loose.    Past Medical History:   Diagnosis Date    DJD (degenerative joint disease), lumbar     Malignant lymphoma, lymphoplasmacytic (H) 7/1/2021    Sinus bradycardia        Past Surgical History:   Procedure Laterality Date    APPENDECTOMY      ARTHROPLASTY REVISION HIP Right 4/19/2022    Procedure: Revision Right Total Hip Arthroplasty;  Surgeon: Baljit Joy MD;  [facility]    BIOPSY  mass right side    BONE MARROW BIOPSY, BONE SPECIMEN, NEEDLE/TROCAR N/A 06/10/2021    Procedure: BIOPSY, BONE MARROW;  Surgeon: Josephine Santamaria MD;  Location: [facility]     COLONOSCOPY  01/01/2010    benign with hyperplastic polyps    COLONOSCOPY N/A 9/25/2023    Procedure: Colonoscopy;  Surgeon: Axel Ceron MD;  [facility]    PHACOEMULSIFICATION WITH STANDARD INTRAOCULAR LENS IMPLANT Left 11/2/2023    Procedure: Phacoemulsification with standard intraocular lens implant, Left;  Surgeon: Odilon Lewis MD;  [facility]    PHACOEMULSIFICATION WITH STANDARD INTRAOCULAR LENS IMPLANT Right 11/16/2023    Procedure: Phacoemulsification with standard intraocular lens implant, Right;  Surgeon: Odilon Lewis MD;  [facility]: [facility]    IL STOMACH SURGERY PROCEDURE UNLISTED  Appendix    1971    IL TOTAL HIP ARTHROPLASTY Left 04/02/2012    Dr Roderick Santos    IL TOTAL HIP ARTHROPLASTY Right 11/15/2004    Dr. Jose Martinez    SURGICAL HISTORY OF -       Removal of a benign soft tissue mass right abdominal wall    TONSILLECTOMY         Family History   Problem Relation Age of Onset    Other Cancer Mother         breast spread to lungs    Breast Cancer Mother         spread to lungs    Prostate Cancer Father     Other Cancer Father         stomach    Other Cancer Sister         Pancreatic cancer    Breast Cancer Sister         Breast cancer    Osteoporosis Sister         from cancer drug?    Other Cancer Sister         breast    Other Cancer Sister         pancreatic    Anesthesia Reaction No family hx of     Deep Vein Thrombosis (DVT) No family hx of        Social History     Socioeconomic History    Marital status:      Spouse name: Not on file    Number of children: Not on file    Years of education: Not on file    Highest education level: Not on file   Occupational History    Not on file   Tobacco Use    Smoking status: Never    Smokeless tobacco: Never   Vaping Use    Vaping status: Never Used   Substance and Sexual Activity    Alcohol use: Yes     Comment: intermittent with food    Drug use: No    Sexual activity: Yes     Partners: Female     Birth  control/protection: None   Other Topics Concern    Parent/sibling w/ CABG, MI or angioplasty before 65F 55M? No   Social History Narrative    Not on file     Social Determinants of Health     Financial Resource Strain: Low Risk  (10/17/2023)    Financial Resource Strain     Within the past 12 months, have you or your family members you live with been unable to get utilities (heat, electricity) when it was really needed?: No   Food Insecurity: Low Risk  (10/17/2023)    Food Insecurity     Within the past 12 months, did you worry that your food would run out before you got money to buy more?: No     Within the past 12 months, did the food you bought just not last and you didn t have money to get more?: No   Transportation Needs: Low Risk  (10/17/2023)    Transportation Needs     Within the past 12 months, has lack of transportation kept you from medical appointments, getting your medicines, non-medical meetings or appointments, work, or from getting things that you need?: No   Physical Activity: Not on file   Stress: Not on file   Social Connections: Not on file   Interpersonal Safety: Low Risk  (10/24/2023)    Interpersonal Safety     Do you feel physically and emotionally safe where you currently live?: Yes     Within the past 12 months, have you been hit, slapped, kicked or otherwise physically hurt by someone?: No     Within the past 12 months, have you been humiliated or emotionally abused in other ways by your partner or ex-partner?: No   Housing Stability: Low Risk  (10/17/2023)    Housing Stability     Do you have housing? : Yes     Are you worried about losing your housing?: No       Current Outpatient Medications   Medication Sig Dispense Refill    amoxicillin (AMOXIL) 500 MG capsule Take 4 pills an hour before dentist (Patient not taking: Reported on 5/9/2024) 8 capsule 1    atorvastatin (LIPITOR) 20 MG tablet TAKE 1 TABLET (20MG) BY MOUTH ONCE DAILY (Patient taking differently: TAKE 1 TABLET (20MG) BY  MOUTH every other day) 90 tablet 3    docusate sodium (COLACE) 250 MG capsule Take 250 mg by mouth daily as needed for constipation      gabapentin (NEURONTIN) 300 MG capsule 300mg AM and noon, 600mg at night. 360 capsule 3    lactobacillus rhamnosus, GG, (CULTURELL) capsule       Melatonin 10-10 MG TBCR Take 1 tablet by mouth At Bedtime Brand name: Sleep 3      Multiple Vitamins-Minerals (MULTIVITAMIN PO) Take 1 tablet by mouth every morning      PREVIDENT 5000 BOOSTER PLUS 1.1 % PSTE dental paste USE THIS TOOTHPASTE IN PLACE OF YOUR REGULAR TOOTHPASTE BEFORE BEDTIME. BRUSH NORMAL, THEN SWISH WITH THE REMAINING FOAM FOR AT LEAST 30 SECONDS. SPIT OUT EXCESS AND THEN AVOID RINSING.      Turmeric (QC TUMERIC COMPLEX PO) Take 1 tablet by mouth daily         Allergies   Allergen Reactions    Aluminum Rash       REVIEW OF SYSTEMS:  CONSTITUTIONAL:  NEGATIVE for fever, chills, change in weight, not feeling tired  SKIN:  NEGATIVE for worrisome rashes, no skin lumps, no skin ulcers and no non-healing wounds  EYES:  NEGATIVE for vision changes or irritation.  ENT/MOUTH:  NEGATIVE.  No hearing loss, no hoarseness, no difficulty swallowing.  RESP:  NEGATIVE. No cough or shortness of breath.  CV:  NEGATIVE for chest pain, palpitations or peripheral edema  GI:  NEGATIVE for nausea, abdominal pain, heartburn, or change in bowel habits  :  Negative. No dysuria, no hematuria  MUSCULOSKELETAL:  See HPI above  NEURO:  NEGATIVE . No headaches, no dizziness,  no numbness  ENDOCRINE:  NEGATIVE for temperature intolerance, skin/hair changes  HEME/ALLERGY/IMMUNE:  NEGATIVE for bleeding problems  PSYCHIATRIC:  NEGATIVE. no anxiety, no depression.     Exam:  Vitals: BP (!) 158/89   BMI= There is no height or weight on file to calculate BMI.  Constitutional:  healthy, alert and no distress  Neuro: Alert and Oriented x 3, Sensation grossly WNL.  Psych: Affect normal   Respiratory: Breathing not labored.  Cardiovascular: normal peripheral  pulses  Lymph: no adenopathy  Skin: No rashes,worrisome lesions or skin problems  He has some tenderness in the right buttock consistent with sciatica.  No tenderness on the left.  He has about 50 degrees external rotation of each hip.  On the right his internal rotation is about 15 degrees, on the left it is about 20 to 30 degrees.  He walks without a limp.  He has good flexion  of his knees.  Has about 3 degrees contracture of each knee.  He has patellofemoral crepitation on the left more than on the right.  He has limited mobility of his left patella side to side.  Better mobility on the right.  He has no ligamentous laxity of MCL, LCL, or cruciates.  He has a trace effusion on both knees.  Sensation, motor and circulation are intact.    Assessment:  1. Left total hip arthroplasty in good position with no sign of loosening or wear.  2. Right total hip arthroplasty is in good position and the acetabular graft appears to be incorporating well.  3.  Left knee osteoarthritis with presence of pseudogout also.    Plan: For the knee we could consider anti-inflammatory or steroid injection.  He has naproxen and wishes to stay with that for now.  For the hips I am not sure if he needs further CT evaluation of the pelvis.  I have asked him to message Dr. Joy and see if the x-ray alone is satisfactory or if CT is needed.

## 2024-08-01 NOTE — TELEPHONE ENCOUNTER
Other: Patient say Dr. Riggs today and had xrays of the hip. He was told to ask if Dr. Joy would like him to have farther imaging done as he completed a hip surgery for the patient in April of 2022. Please call him back.       Could we send this information to you in [facility] or would you prefer to receive a phone call?:   Patient would prefer a phone call   Okay to leave a detailed message?: No at Cell number on file:    Telephone Information:   Mobile [phone]    Or     [phone]

## 2024-08-12 NOTE — TELEPHONE ENCOUNTER
- A call was placed to the patient.     - He stated he has no pain. He would be willing to do CT scan. I let him know I would check in with Dr. Joy about this and send him a MyChart with Dr. Joy's decision.     - Patient verbalized understanding of plan and all questions were answered. Call back number to clinic was given and patient was told to call if they had an further questions.

## 2024-08-13 NOTE — TELEPHONE ENCOUNTER
- A call was placed to the patient.     - I let him know we could do a virtual visit same day after MRI on 8/19 at 4:40pm. He agreed this worked for him. Appointment was booked.     - Patient verbalized understanding of plan and all questions were answered. Call back number to clinic was given and patient was told to call if they had an further questions.

## 2024-08-14 ASSESSMENT — HOOS JR: HOOS JR TOTAL INTERVAL SCORE: 100

## 2024-08-15 NOTE — PROGRESS NOTES
[facility]  Orthopaedic Surgery, Joint Replacement Consultation  by Baljit Joy M.D.    Ag Evans MRN# [id number]   Age: 71 year old YOB: 1950     Requesting physician: No ref. provider found  Jose Antonio       Background history:  DX:  Status post bilateral total hip arthroplasty  History of lymphoma  Unexpected positive culture right revision ERIC, C acnes x2.    TREATMENTS:  2005, Right, ERIC, Dr. Jose العلي, [facility]   2011, Left ERIC, Dr. Macario, [facility]   4/19/2022, Revision R ERIC (Rufino) , bearing exchange to hiwall poly liner and 36mm ceramic head, curettage and allograft packing cavitary periacetabular defect. [facility] C Acnes 2/5 cultures , PCN G x 6 weeks > Amoxicillin 500 TID x 6 mo      Seen today 2 years s/p revision for bearing wear.   Doing well.  Walking normally, plays pickleball without problem.  No pain.  Not taking any antibx.  No wound issues or fever.    EXAM: gait normal . No limp.    XRAYS:  Increased sclerosis in cavitary defect.      CT scan:  Some cavitary defect remains :      IMP:  Stable, excellent function.    PLAN:  Advised pt to be careful of any impact activities given CT appearance.  Return for repeat CT scan in 1 year or sooner as needed.      MD Heidi Sabillon Family Professor  Oncology and Adult Reconstructive Surgery  Dept Orthopaedic Surgery, [facility]  [phone] office, [phone] pager  [email or web address]      Virtual-Visit Details    Type of service:  Video Visit  Visit total duration (including visit time, pre and post visit work time as documented above on the same day of service): 20  min  Video start time: 1600  Video end time:  1620  Originating Location (pt. Location): Home  Distant Location (provider location):  [facility]TH[facility]   Platform used for Virtual Visit: [employer]

## 2024-08-19 ENCOUNTER — VIRTUAL VISIT (OUTPATIENT)
Dept: ORTHOPEDICS | Facility: CLINIC | Age: 74
End: 2024-08-19
Payer: COMMERCIAL

## 2024-08-19 ENCOUNTER — HOSPITAL ENCOUNTER (OUTPATIENT)
Dept: CT IMAGING | Facility: CLINIC | Age: 74
Discharge: HOME | End: 2024-08-19
Attending: ORTHOPAEDIC SURGERY | Admitting: ORTHOPAEDIC SURGERY
Payer: COMMERCIAL

## 2024-08-19 DIAGNOSIS — Z96.649 PROSTHETIC WEAR FOLLOWING TOTAL HIP ARTHROPLASTY, SUBSEQUENT ENCOUNTER: Primary | ICD-10-CM

## 2024-08-19 DIAGNOSIS — T84.069D PROSTHETIC WEAR FOLLOWING TOTAL HIP ARTHROPLASTY, SUBSEQUENT ENCOUNTER: Primary | ICD-10-CM

## 2024-08-19 DIAGNOSIS — Z96.649 PROSTHETIC WEAR FOLLOWING TOTAL HIP ARTHROPLASTY, SUBSEQUENT ENCOUNTER: ICD-10-CM

## 2024-08-19 DIAGNOSIS — T84.069D PROSTHETIC WEAR FOLLOWING TOTAL HIP ARTHROPLASTY, SUBSEQUENT ENCOUNTER: ICD-10-CM

## 2024-08-19 PROCEDURE — 73700 CT LOWER EXTREMITY W/O DYE: CPT | Mod: RT

## 2024-08-19 PROCEDURE — 99213 OFFICE O/P EST LOW 20 MIN: CPT | Mod: 95 | Performed by: ORTHOPAEDIC SURGERY

## 2024-08-19 NOTE — LETTER
8/19/2024      Ag Evans  [address]  [place] [address]      Dear Colleague,    Thank you for referring your patient, Ag Evans, to the [facility] [place]. Please see a copy of my visit note below.        [facility]  Orthopaedic Surgery, Joint Replacement Consultation  by Baljit Joy M.D.    Ag Evans MRN# [id number]   Age: 71 year old YOB: 1950     Requesting physician: No ref. provider found  Jose Antonio       Background history:  DX:  Status post bilateral total hip arthroplasty  History of lymphoma  Unexpected positive culture right revision ERIC, C acnes x2.    TREATMENTS:  2005, Right, ERIC, Dr. Jose العلي, [facility]   2011, Left ERIC, Dr. Macario, [facility]   4/19/2022, Revision R ERIC (Rufino) , bearing exchange to hiwall poly liner and 36mm ceramic head, curettage and allograft packing cavitary periacetabular defect. [facility] C Acnes 2/5 cultures , PCN G x 6 weeks > Amoxicillin 500 TID x 6 mo      Seen today 2 years s/p revision for bearing wear.   Doing well.  Walking normally, plays pickleball without problem.  No pain.  Not taking any antibx.  No wound issues or fever.    EXAM: gait normal . No limp.    XRAYS:  Increased sclerosis in cavitary defect.      CT scan:  Some cavitary defect remains :      IMP:  Stable, excellent function.    PLAN:  Advised pt to be careful of any impact activities given CT appearance.  Return for repeat CT scan in 1 year or sooner as needed.      MD Heidi Sabillon Family Professor  Oncology and Adult Reconstructive Surgery  Dept Orthopaedic Surgery, [facility]  [phone] office, [phone] pager  [email or web address]      Virtual-Visit Details    Type of service:  Video Visit  Visit total duration (including visit time, pre and post visit work time as documented above on the same day of service): 20  min  Video start time: 1600  Video end time:  1620  Originating Location (pt.  Location): Home  Distant Location (provider location):  [facility] ORTHOPEDIC [facility]   Platform used for Virtual Visit: [employer]         Again, thank you for allowing me to participate in the care of your patient.        Sincerely,        Baljit Joy MD

## 2024-09-23 NOTE — PHARMACY-ADMISSION MEDICATION HISTORY
Admission Medication History Completed by Pharmacy    See [facility] Admission Navigator for allergy information, preferred outpatient pharmacy, prior to admission medications and immunization status.     Medication History Sources:     Patient    Medication fill history    Changes made to PTA medication list (reason):    Added: None    Deleted: Duplicate melatonin    Changed: Melatonin (sleep 3) 1 tablet po HS prn >> 1 tablet po HS    Multivitamins-minerals sig >> 1 tablet po daily    Vit D sig >> 1 tablet po daily    Additional Information:    Cephalexin: 500 mg po 4 times a day for toenail infection. 1st dose was take on 4/16 am and it was last taken 4/18 pm.     Patient could not recall the strength of vit D.    Prior to Admission medications    Medication Sig Last Dose Taking? Auth Provider   acetaminophen (TYLENOL) 325 MG tablet Take 325-650 mg by mouth every 6 hours as needed for mild pain 4/19/2022 at Unknown time Yes Reported, Patient   atorvastatin (LIPITOR) 20 MG tablet Take 1 tablet (20 mg) by mouth daily  Patient taking differently: Take 20 mg by mouth every morning 4/18/2022 at Unknown time Yes Derek Stanford MD   cephALEXin (KEFLEX) 500 MG capsule Take 1 capsule (500 mg) by mouth 4 times daily for 5 days  Patient taking differently: Take 500 mg by mouth 4 times daily For toenail infection, started on 4/16/22 am and stopped on 4/18/22 pm 4/18/2022 at Unknown time Yes Kurtis Cole MD   docusate sodium (COLACE) 250 MG capsule Take 250 mg by mouth every morning Past Month at Unknown time Yes Reported, Patient   gabapentin (NEURONTIN) 300 MG capsule Take 1 capsule (300 mg) by mouth 3 times daily 4/18/2022 at Unknown time Yes Sharad Ruiz MD   ibuprofen (ADVIL/MOTRIN) 200 MG tablet Take 200 mg by mouth every 4 hours as needed for mild pain More than a month at Unknown time Yes Reported, Patient   Melatonin 10-10 MG TBCR Take 1 tablet by mouth At Bedtime Brand name: Sleep 3 4/18/2022 at  [facility] APPOINTMENT CONFIRMATION      Date: 09/25/24    Time: 1530    Provider name: Dr. Elizabeth Snyder     Location: Neurology Locations: [place]: [address] [address]    Zoom link sent (if applicable): N/A    Will patient be in [place] for the virtual visit? N/A    Is patient currently in a facility? No    Alternative contact information: none    Appointment confirmed: Yes    \"Please ensure you bring the medication bottles, including the dates and times you take each medication.\"  New Patients: \"Please bring any outside records or images.\"    \"We do have free parking available in the main hospital parking lot. However, parking can be difficult to find so we ask that you arrive 40 minutes prior to your appointment.\"       Unknown time Yes Reported, Patient   Multiple Vitamins-Minerals (MULTIVITAMIN PO) Take 1 tablet by mouth every morning Past Month at Unknown time Yes Reported, Patient   SF 5000 PLUS 1.1 % CREA USE TOOTH PASTE TWICE DAILY AS DIRECTED. NOTHING BY MOUTH FOR 30 MINUTES AFTER USE 4/19/2022 at Unknown time Yes Reported, Patient   VITAMIN D (CHOLECALCIFEROL) PO Take 1 tablet by mouth daily Past Month at Unknown time Yes Reported, Patient       Date completed: 04/19/22    Medication history completed by: Jules Curry RPH

## 2024-09-26 DIAGNOSIS — G62.9 NEUROPATHY: ICD-10-CM

## 2024-09-26 DIAGNOSIS — I51.5 CARDIAC CALCIFICATION (H): ICD-10-CM

## 2024-09-26 RX ORDER — GABAPENTIN 300 MG/1
CAPSULE ORAL
Qty: 360 CAPSULE | Refills: 0 | Status: SHIPPED | OUTPATIENT
Start: 2024-09-26

## 2024-09-27 RX ORDER — ATORVASTATIN CALCIUM 20 MG/1
TABLET, FILM COATED ORAL
Qty: 90 TABLET | Refills: 0 | Status: SHIPPED | OUTPATIENT
Start: 2024-09-27

## 2024-12-24 DIAGNOSIS — G62.9 NEUROPATHY: ICD-10-CM

## 2024-12-24 RX ORDER — GABAPENTIN 300 MG/1
CAPSULE ORAL
Qty: 360 CAPSULE | Refills: 0 | Status: SHIPPED | OUTPATIENT
Start: 2024-12-24

## 2025-03-09 ENCOUNTER — HEALTH MAINTENANCE LETTER (OUTPATIENT)
Age: 75
End: 2025-03-09

## 2025-03-31 ENCOUNTER — MYC REFILL (OUTPATIENT)
Dept: INTERNAL MEDICINE | Facility: CLINIC | Age: 75
End: 2025-03-31
Payer: COMMERCIAL

## 2025-03-31 DIAGNOSIS — I51.5 CARDIAC CALCIFICATION (H): ICD-10-CM

## 2025-03-31 DIAGNOSIS — G62.9 NEUROPATHY: ICD-10-CM

## 2025-03-31 RX ORDER — GABAPENTIN 300 MG/1
CAPSULE ORAL
Qty: 360 CAPSULE | Refills: 0 | Status: SHIPPED | OUTPATIENT
Start: 2025-03-31

## 2025-03-31 RX ORDER — ATORVASTATIN CALCIUM 20 MG/1
TABLET, FILM COATED ORAL
Qty: 45 TABLET | Refills: 0 | Status: SHIPPED | OUTPATIENT
Start: 2025-03-31

## 2025-04-01 RX ORDER — ATORVASTATIN CALCIUM 20 MG/1
TABLET, FILM COATED ORAL
Qty: 45 TABLET | Refills: 0 | OUTPATIENT
Start: 2025-04-01

## 2025-04-01 RX ORDER — GABAPENTIN 300 MG/1
CAPSULE ORAL
Qty: 360 CAPSULE | Refills: 0 | OUTPATIENT
Start: 2025-04-01

## 2025-04-03 ENCOUNTER — MYC MEDICAL ADVICE (OUTPATIENT)
Dept: INTERNAL MEDICINE | Facility: CLINIC | Age: 75
End: 2025-04-03
Payer: COMMERCIAL

## 2025-05-01 ENCOUNTER — LAB (OUTPATIENT)
Dept: LAB | Facility: OTHER | Age: 75
End: 2025-05-01
Payer: COMMERCIAL

## 2025-05-01 DIAGNOSIS — C88.00 WALDENSTROM'S MACROGLOBULINEMIA (H): ICD-10-CM

## 2025-05-01 LAB
ALBUMIN SERPL BCG-MCNC: 4.6 G/DL (ref 3.5–5.2)
ALP SERPL-CCNC: 66 U/L (ref 40–150)
ALT SERPL W P-5'-P-CCNC: 30 U/L (ref 0–70)
ANION GAP SERPL CALCULATED.3IONS-SCNC: 11 MMOL/L (ref 7–15)
AST SERPL W P-5'-P-CCNC: 26 U/L (ref 0–45)
BASOPHILS # BLD AUTO: 0.1 10E3/UL (ref 0–0.2)
BASOPHILS NFR BLD AUTO: 1 %
BILIRUB SERPL-MCNC: 0.6 MG/DL
BUN SERPL-MCNC: 16.5 MG/DL (ref 8–23)
CALCIUM SERPL-MCNC: 9.3 MG/DL (ref 8.8–10.4)
CHLORIDE SERPL-SCNC: 105 MMOL/L (ref 98–107)
CREAT SERPL-MCNC: 0.96 MG/DL (ref 0.67–1.17)
EGFRCR SERPLBLD CKD-EPI 2021: 82 ML/MIN/1.73M2
EOSINOPHIL # BLD AUTO: 0.1 10E3/UL (ref 0–0.7)
EOSINOPHIL NFR BLD AUTO: 2 %
ERYTHROCYTE [DISTWIDTH] IN BLOOD BY AUTOMATED COUNT: 12.5 % (ref 10–15)
GLUCOSE SERPL-MCNC: 93 MG/DL (ref 70–99)
HCO3 SERPL-SCNC: 26 MMOL/L (ref 22–29)
HCT VFR BLD AUTO: 42.4 % (ref 40–53)
HGB BLD-MCNC: 14.6 G/DL (ref 13.3–17.7)
IMM GRANULOCYTES # BLD: 0 10E3/UL
IMM GRANULOCYTES NFR BLD: 0 %
LDH SERPL L TO P-CCNC: 189 U/L (ref 0–250)
LYMPHOCYTES # BLD AUTO: 1.1 10E3/UL (ref 0.8–5.3)
LYMPHOCYTES NFR BLD AUTO: 19 %
MCH RBC QN AUTO: 33.6 PG (ref 26.5–33)
MCHC RBC AUTO-ENTMCNC: 34.4 G/DL (ref 31.5–36.5)
MCV RBC AUTO: 98 FL (ref 78–100)
MONOCYTES # BLD AUTO: 0.7 10E3/UL (ref 0–1.3)
MONOCYTES NFR BLD AUTO: 12 %
NEUTROPHILS # BLD AUTO: 3.9 10E3/UL (ref 1.6–8.3)
NEUTROPHILS NFR BLD AUTO: 67 %
PLATELET # BLD AUTO: 199 10E3/UL (ref 150–450)
POTASSIUM SERPL-SCNC: 4.3 MMOL/L (ref 3.4–5.3)
PROT SERPL-MCNC: 7.2 G/DL (ref 6.4–8.3)
RBC # BLD AUTO: 4.34 10E6/UL (ref 4.4–5.9)
SODIUM SERPL-SCNC: 142 MMOL/L (ref 135–145)
WBC # BLD AUTO: 5.8 10E3/UL (ref 4–11)

## 2025-05-08 ENCOUNTER — VIRTUAL VISIT (OUTPATIENT)
Dept: ONCOLOGY | Facility: CLINIC | Age: 75
End: 2025-05-08
Attending: INTERNAL MEDICINE
Payer: COMMERCIAL

## 2025-05-08 DIAGNOSIS — G62.9 PERIPHERAL POLYNEUROPATHY: ICD-10-CM

## 2025-05-08 DIAGNOSIS — C88.00 WALDENSTROM'S MACROGLOBULINEMIA (H): Primary | ICD-10-CM

## 2025-05-08 NOTE — Clinical Note
"2025      Ag Evans  [address]  [place] [address]      Dear Colleague,    Thank you for referring your patient, Ag Evans, to the [facility] [place]. Please see a copy of my visit note below.    [facility] Hematology / Oncology  Progress Note  Name: Ag Evans  :  1950  MRN:  [id number]    --------------------    Subjective:  He returns for follow-up of Waldenström's macroglobulinemia accompanied by his wife via video visit.  ***.    --------------------    Objective:  Video visit.    We reviewed CBC, CMP, IgM, LDH.    --------------------    Assessment / Plan:  Waldenstrom / Lymphoplasmacytic Lymphoma:  # 2021 - Oct 2021 Bendamustine / Rituximab x 4 cycles; CR.    Continue observation; clinically INOCENCIO.  Counts stable; chemistries stable; IgM WNL and continues to improve.  Neuropathy remains stable; continue gabapentin 300 mg TID; monitor OA of hands.    There are no Patient Instructions on file for this visit.    Video Visit:  Ag is a 74 year old male who is being evaluated via a billable video visit.  }    Video start time: {video visit start/end time for provider to select:989466}  Video end time: {video visit start/end time for provider to select:423410}  Provider location: {Blank::o:\"[facility]\",\"[facility]\"[facility]\"[facility]\",\"Off-site\"}  Patient location: {Blank::o:\"Home\",\"[facility]\",\"[facility]\"}  Mode of transmission: {Blank::o:\"[facility] Portal / Amwell\",\"Doximity\",\"Google Duo\",\"FaceTime\"}.      [facility] Hematology / Oncology  Progress Note  Name: Ag Evans  :  1950  MRN:  [id number]    --------------------    Subjective:  He returns for follow-up of Waldenström's macroglobulinemia accompanied by his wife via video visit.  Since our last visit, Ag has been well from from an overall health standpoint.    --------------------    Objective:  Video visit.    We reviewed CBC, CMP, IgM, " "LDH.    --------------------    Assessment / Plan:  Waldenstrom / Lymphoplasmacytic Lymphoma:  # Jul 2021 Presented   # Jul 2021 - Oct 2021 Bendamustine / Rituximab x 4 cycles; CR.    Continue observation; clinically INOCENCIO.  Counts stable; chemistries stable; IgM WNL and continues to improve.  Neuropathy remains stable; continue gabapentin 300 mg TID; monitor OA of hands.    There are no Patient Instructions on file for this visit.    Video Visit:  Ag is a 74 year old male who is being evaluated via a billable video visit.  }    Video start time: {video visit start/end time for provider to select:163532}  Video end time: {video visit start/end time for provider to select:574797}  Provider location: {Blank:19[address]:o:\"[facility]\",\"[facility]\",\"[facility]\",\"[facility]\",\"Off-site\"}  Patient location: {Blank:19[address]:o:\"Home\",\"[facility]\",\"[facility]\"}  Mode of transmission: {Blank:19197:o:\"[facility] Portal / [employer]\",\"Doximity\",\"[employer] Duo\",\"FaceTime\"}.        Again, thank you for allowing me to participate in the care of your patient.        Sincerely,        Manuel Perez MD    Electronically signed"

## 2025-05-08 NOTE — PROGRESS NOTES
[facility] Hematology / Oncology  Progress Note  Name: Ag Evans  :  1950  MRN:  [id number]    --------------------    Subjective:  He returns for follow-up of Waldenström's macroglobulinemia accompanied by his wife via video visit.  Since our last visit, Ag has been well from from an overall health standpoint.  Overall, neuropathy has remained stable and without worsening.  No unexplained fevers, chills or sweats.  No symptomatic or progressive adenopathy  No recurrent or severe infections.  Active playing [employer].    --------------------    Objective:  Video visit.    We reviewed CBC, CMP, IgM, LDH.    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  # 2021 - Oct 2021 Bendamustine / Rituximab x 4 cycles; CR.    Continue observation; clinically INOCENCIO.  Counts stable; chemistries stable; IgM w/ slight rise; LDH normal.  Neuropathy remains stable; continue gabapentin.  Immunizations UTD.    Patient Instructions   BJT 12 months w/ labs 5-7 days prior (CBC, CMP, IgM, LDH).    Manuel Perez MD.    Video Visit:  Ag is a 74 year old male who is being evaluated via a billable video visit.  }    Video start time: 3:40 PM  Video end time: 3:55 PM  Provider location: Critical access hospital  Patient location: [facility]  Mode of transmission:  [employer] Mobile App / [employer].

## 2025-05-08 NOTE — Clinical Note
"2025      Ag Evans  [address]  [place] [address]      Dear Colleague,    Thank you for referring your patient, Ag Evans, to the [facility]ER C[facility] [place]. Please see a copy of my visit note below.    [facility] Hematology / Oncology  Progress Note  Name: Ag Evans  :  1950  MRN:  [id number]    --------------------    Subjective:  He returns for follow-up of Waldenström's macroglobulinemia accompanied by his wife via video visit.  ***.    --------------------    Objective:  Video visit.    We reviewed CBC, CMP, IgM, LDH.    --------------------    Assessment / Plan:  Waldenstrom / Lymphoplasmacytic Lymphoma:  # 2021 - Oct 2021 Bendamustine / Rituximab x 4 cycles; CR.    Continue observation; clinically INOCENCIO.  Counts stable; chemistries stable; IgM WNL and continues to improve.  Neuropathy remains stable; continue gabapentin 300 mg TID; monitor OA of hands.    There are no Patient Instructions on file for this visit.    Video Visit:  Ag is a 74 year old male who is being evaluated via a billable video visit.  }    Video start time: {video visit start/end time for provider to select:088021}  Video end time: {video visit start/end time for provider to select:028028}  Provider location: {Blank::o:\"[facility]\",\"[facility]\",\"[facility]\"[facility]\",\"Off-site\"}  Patient location: {Blank::o:\"Home\",\"[facility]\",\"[facility]\"}  Mode of transmission: {Blank::o:\"[facility] Portal / Amwell\",\"Doximity\",\"Google Duo\",\"FaceTime\"}.      [facility] Hematology / Oncology  Progress Note  Name: Ag Evans  :  1950  MRN:  [id number]    --------------------    Subjective:  He returns for follow-up of Waldenström's macroglobulinemia accompanied by his wife via video visit.  Since our last visit, Ag has been well from from an overall health standpoint.    --------------------    Objective:  Video visit.    We reviewed CBC, CMP, IgM, " "LDH.    --------------------    Assessment / Plan:  Waldenstrom / Lymphoplasmacytic Lymphoma:  # Jul 2021 Presented   # Jul 2021 - Oct 2021 Bendamustine / Rituximab x 4 cycles; CR.    Continue observation; clinically INOCENCIO.  Counts stable; chemistries stable; IgM WNL and continues to improve.  Neuropathy remains stable; continue gabapentin 300 mg TID; monitor OA of hands.    There are no Patient Instructions on file for this visit.    Video Visit:  Ag is a 74 year old male who is being evaluated via a billable video visit.  }    Video start time: {video visit start/end time for provider to select:134397}  Video end time: {video visit start/end time for provider to select:594515}  Provider location: {Blank:19197:o:\"[facility]\",\"[facility]\",\"[facility]\",\"[facility]\",\"Off-site\"}  Patient location: {Blank:19197:o:\"Home\",\"[facility]\",\"[facility]\"}  Mode of transmission: {Blank:19197:o:\"[facility] Portal / [employer]\",\"Doximity\",\""[employer] Duo\",\"FaceTime\"}.        Again, thank you for allowing me to participate in the care of your patient.        Sincerely,        Manuel Perez MD    Electronically signed"

## 2025-05-09 ENCOUNTER — RESULTS FOLLOW-UP (OUTPATIENT)
Dept: ONCOLOGY | Facility: CLINIC | Age: 75
End: 2025-05-09

## 2025-05-19 DIAGNOSIS — I51.5 CARDIAC CALCIFICATION (H): ICD-10-CM

## 2025-05-19 RX ORDER — ATORVASTATIN CALCIUM 20 MG/1
20 TABLET, FILM COATED ORAL EVERY OTHER DAY
Qty: 45 TABLET | Refills: 0 | Status: SHIPPED | OUTPATIENT
Start: 2025-05-19

## 2025-06-02 DIAGNOSIS — G62.9 NEUROPATHY: ICD-10-CM

## 2025-06-02 DIAGNOSIS — I51.5 CARDIAC CALCIFICATION (H): ICD-10-CM

## 2025-06-02 RX ORDER — ATORVASTATIN CALCIUM 20 MG/1
20 TABLET, FILM COATED ORAL EVERY OTHER DAY
Qty: 45 TABLET | Refills: 0 | Status: SHIPPED | OUTPATIENT
Start: 2025-06-02

## 2025-06-02 RX ORDER — ATORVASTATIN CALCIUM 20 MG/1
20 TABLET, FILM COATED ORAL EVERY OTHER DAY
Qty: 45 TABLET | Refills: 0 | OUTPATIENT
Start: 2025-06-02

## 2025-06-02 RX ORDER — GABAPENTIN 300 MG/1
CAPSULE ORAL
Qty: 360 CAPSULE | Refills: 0 | Status: SHIPPED | OUTPATIENT
Start: 2025-06-02

## 2025-06-04 DIAGNOSIS — I51.5 CARDIAC CALCIFICATION (H): ICD-10-CM

## 2025-06-04 RX ORDER — ATORVASTATIN CALCIUM 20 MG/1
20 TABLET, FILM COATED ORAL EVERY OTHER DAY
Qty: 45 TABLET | Refills: 0 | OUTPATIENT
Start: 2025-06-04

## 2025-07-16 ENCOUNTER — OFFICE VISIT (OUTPATIENT)
Dept: FAMILY MEDICINE | Facility: OTHER | Age: 75
End: 2025-07-16
Payer: COMMERCIAL

## 2025-07-16 VITALS
RESPIRATION RATE: 17 BRPM | DIASTOLIC BLOOD PRESSURE: 76 MMHG | SYSTOLIC BLOOD PRESSURE: 152 MMHG | OXYGEN SATURATION: 98 % | HEART RATE: 44 BPM | WEIGHT: 187 LBS | TEMPERATURE: 98.3 F | HEIGHT: 70 IN | BODY MASS INDEX: 26.77 KG/M2

## 2025-07-16 DIAGNOSIS — L82.1 SEBORRHEIC KERATOSES: Primary | ICD-10-CM

## 2025-07-16 DIAGNOSIS — R00.1 BRADYCARDIA: ICD-10-CM

## 2025-07-16 PROCEDURE — 3077F SYST BP >= 140 MM HG: CPT

## 2025-07-16 PROCEDURE — 99213 OFFICE O/P EST LOW 20 MIN: CPT

## 2025-07-16 PROCEDURE — 3078F DIAST BP <80 MM HG: CPT

## 2025-07-16 PROCEDURE — 1125F AMNT PAIN NOTED PAIN PRSNT: CPT

## 2025-07-16 SDOH — HEALTH STABILITY: PHYSICAL HEALTH: ON AVERAGE, HOW MANY DAYS PER WEEK DO YOU ENGAGE IN MODERATE TO STRENUOUS EXERCISE (LIKE A BRISK WALK)?: 7 DAYS

## 2025-07-16 ASSESSMENT — SOCIAL DETERMINANTS OF HEALTH (SDOH): HOW OFTEN DO YOU GET TOGETHER WITH FRIENDS OR RELATIVES?: MORE THAN THREE TIMES A WEEK

## 2025-07-16 ASSESSMENT — PAIN SCALES - GENERAL: PAINLEVEL_OUTOF10: MILD PAIN (3)

## 2025-08-26 ENCOUNTER — VIRTUAL VISIT (OUTPATIENT)
Dept: INTERNAL MEDICINE | Facility: CLINIC | Age: 75
End: 2025-08-26
Payer: COMMERCIAL

## 2025-08-26 DIAGNOSIS — G62.9 NEUROPATHY: ICD-10-CM

## 2025-08-26 DIAGNOSIS — I51.5 CARDIAC CALCIFICATION (H): ICD-10-CM

## 2025-08-26 PROCEDURE — 98006 SYNCH AUDIO-VIDEO EST MOD 30: CPT | Performed by: INTERNAL MEDICINE

## 2025-08-26 PROCEDURE — 1126F AMNT PAIN NOTED NONE PRSNT: CPT | Mod: 95 | Performed by: INTERNAL MEDICINE

## 2025-08-26 RX ORDER — ATORVASTATIN CALCIUM 20 MG/1
20 TABLET, FILM COATED ORAL EVERY OTHER DAY
Qty: 45 TABLET | Refills: 3 | Status: SHIPPED | OUTPATIENT
Start: 2025-08-26

## 2025-08-26 RX ORDER — GABAPENTIN 300 MG/1
CAPSULE ORAL
Qty: 360 CAPSULE | Refills: 3 | Status: SHIPPED | OUTPATIENT
Start: 2025-08-26

## 2025-08-31 ENCOUNTER — APPOINTMENT (OUTPATIENT)
Dept: CT IMAGING | Facility: CLINIC | Age: 75
End: 2025-08-31
Attending: EMERGENCY MEDICINE
Payer: COMMERCIAL

## 2025-08-31 ENCOUNTER — HOSPITAL ENCOUNTER (INPATIENT)
Facility: CLINIC | Age: 75
LOS: 2 days | Discharge: HOME | End: 2025-09-02
Attending: EMERGENCY MEDICINE
Payer: COMMERCIAL

## 2025-08-31 DIAGNOSIS — I63.9 ACUTE CVA (CEREBROVASCULAR ACCIDENT) (H): Primary | ICD-10-CM

## 2025-08-31 DIAGNOSIS — I63.9 ACUTE CVA (CEREBROVASCULAR ACCIDENT) (H): ICD-10-CM

## 2025-08-31 DIAGNOSIS — R52 PAIN: ICD-10-CM

## 2025-08-31 DIAGNOSIS — I48.0 PAF (PAROXYSMAL ATRIAL FIBRILLATION) (H): ICD-10-CM

## 2025-08-31 PROBLEM — I48.92 NEW ONSET ATRIAL FLUTTER (H): Status: ACTIVE | Noted: 2025-08-31

## 2025-08-31 LAB
ALBUMIN SERPL BCG-MCNC: 4.7 G/DL (ref 3.5–5.2)
ALBUMIN UR-MCNC: NEGATIVE MG/DL
ALP SERPL-CCNC: 71 U/L (ref 40–150)
ALT SERPL W P-5'-P-CCNC: 18 U/L (ref 0–70)
ANION GAP SERPL CALCULATED.3IONS-SCNC: 13 MMOL/L (ref 7–15)
APPEARANCE UR: CLEAR
APTT PPP: 27 SECONDS (ref 22–38)
AST SERPL W P-5'-P-CCNC: 18 U/L (ref 0–45)
ATRIAL RATE - MUSE: 344 BPM
BASOPHILS # BLD AUTO: 0.06 10E3/UL (ref 0–0.2)
BASOPHILS NFR BLD AUTO: 0.7 %
BILIRUB SERPL-MCNC: 0.6 MG/DL
BILIRUB UR QL STRIP: NEGATIVE
BUN SERPL-MCNC: 19.2 MG/DL (ref 8–23)
CALCIUM SERPL-MCNC: 9.6 MG/DL (ref 8.8–10.4)
CHLORIDE SERPL-SCNC: 103 MMOL/L (ref 98–107)
CHOLEST SERPL-MCNC: 181 MG/DL
COLOR UR AUTO: ABNORMAL
CREAT SERPL-MCNC: 0.86 MG/DL (ref 0.67–1.17)
DIASTOLIC BLOOD PRESSURE - MUSE: NORMAL MMHG
EGFRCR SERPLBLD CKD-EPI 2021: 90 ML/MIN/1.73M2
EOSINOPHIL # BLD AUTO: 0.06 10E3/UL (ref 0–0.7)
EOSINOPHIL NFR BLD AUTO: 0.7 %
ERYTHROCYTE [DISTWIDTH] IN BLOOD BY AUTOMATED COUNT: 12.7 % (ref 10–15)
EST. AVERAGE GLUCOSE BLD GHB EST-MCNC: 105 MG/DL
GLUCOSE BLDC GLUCOMTR-MCNC: 119 MG/DL (ref 70–99)
GLUCOSE BLDC GLUCOMTR-MCNC: 146 MG/DL (ref 70–99)
GLUCOSE BLDC GLUCOMTR-MCNC: 91 MG/DL (ref 70–99)
GLUCOSE SERPL-MCNC: 91 MG/DL (ref 70–99)
GLUCOSE UR STRIP-MCNC: NEGATIVE MG/DL
HBA1C MFR BLD: 5.3 %
HCO3 SERPL-SCNC: 25 MMOL/L (ref 22–29)
HCT VFR BLD AUTO: 45.2 % (ref 40–53)
HDLC SERPL-MCNC: 58 MG/DL
HGB BLD-MCNC: 15.6 G/DL (ref 13.3–17.7)
HGB UR QL STRIP: NEGATIVE
HYALINE CASTS: 2 /LPF
IMM GRANULOCYTES # BLD: <0.03 10E3/UL
IMM GRANULOCYTES NFR BLD: 0.2 %
INR PPP: 1.04 (ref 0.85–1.15)
INTERPRETATION ECG - MUSE: NORMAL
KETONES UR STRIP-MCNC: ABNORMAL MG/DL
LDLC SERPL CALC-MCNC: 109 MG/DL
LEUKOCYTE ESTERASE UR QL STRIP: NEGATIVE
LYMPHOCYTES # BLD AUTO: 1.13 10E3/UL (ref 0.8–5.3)
LYMPHOCYTES NFR BLD AUTO: 13.5 %
MCH RBC QN AUTO: 33.2 PG (ref 26.5–33)
MCHC RBC AUTO-ENTMCNC: 34.5 G/DL (ref 31.5–36.5)
MCV RBC AUTO: 96.2 FL (ref 78–100)
MONOCYTES # BLD AUTO: 0.67 10E3/UL (ref 0–1.3)
MONOCYTES NFR BLD AUTO: 8 %
NEUTROPHILS # BLD AUTO: 6.42 10E3/UL (ref 1.6–8.3)
NEUTROPHILS NFR BLD AUTO: 76.9 %
NITRATE UR QL: NEGATIVE
NONHDLC SERPL-MCNC: 123 MG/DL
NRBC # BLD AUTO: <0.03 10E3/UL
NRBC BLD AUTO-RTO: 0 /100
P AXIS - MUSE: NORMAL DEGREES
PH UR STRIP: 7 [PH] (ref 5–7)
PLATELET # BLD AUTO: 218 10E3/UL (ref 150–450)
POTASSIUM SERPL-SCNC: 4.4 MMOL/L (ref 3.4–5.3)
PR INTERVAL - MUSE: NORMAL MS
PROT SERPL-MCNC: 7.9 G/DL (ref 6.4–8.3)
PROTHROMBIN TIME: 13.7 SECONDS (ref 11.8–14.8)
QRS DURATION - MUSE: 90 MS
QT - MUSE: 384 MS
QTC - MUSE: 420 MS
R AXIS - MUSE: -27 DEGREES
RBC # BLD AUTO: 4.7 10E6/UL (ref 4.4–5.9)
RBC URINE: 0 /HPF
SODIUM SERPL-SCNC: 141 MMOL/L (ref 135–145)
SP GR UR STRIP: 1.01 (ref 1–1.03)
SYSTOLIC BLOOD PRESSURE - MUSE: NORMAL MMHG
T AXIS - MUSE: 61 DEGREES
TRIGL SERPL-MCNC: 68 MG/DL
TROPONIN T SERPL HS-MCNC: 14 NG/L
UROBILINOGEN UR STRIP-MCNC: NORMAL MG/DL
VENTRICULAR RATE- MUSE: 72 BPM
WBC # BLD AUTO: 8.36 10E3/UL (ref 4–11)
WBC URINE: 1 /HPF

## 2025-08-31 PROCEDURE — 250N000013 HC RX MED GY IP 250 OP 250 PS 637: Performed by: EMERGENCY MEDICINE

## 2025-08-31 PROCEDURE — 80053 COMPREHEN METABOLIC PANEL: CPT | Performed by: EMERGENCY MEDICINE

## 2025-08-31 PROCEDURE — 85610 PROTHROMBIN TIME: CPT | Performed by: EMERGENCY MEDICINE

## 2025-08-31 PROCEDURE — 85025 COMPLETE CBC W/AUTO DIFF WBC: CPT | Performed by: EMERGENCY MEDICINE

## 2025-08-31 PROCEDURE — 99207 PR NO BILLABLE SERVICE THIS VISIT: CPT | Performed by: PHYSICIAN ASSISTANT

## 2025-08-31 PROCEDURE — 36415 COLL VENOUS BLD VENIPUNCTURE: CPT | Performed by: EMERGENCY MEDICINE

## 2025-08-31 PROCEDURE — 70450 CT HEAD/BRAIN W/O DYE: CPT

## 2025-08-31 PROCEDURE — 83718 ASSAY OF LIPOPROTEIN: CPT | Performed by: INTERNAL MEDICINE

## 2025-08-31 PROCEDURE — 99223 1ST HOSP IP/OBS HIGH 75: CPT | Performed by: INTERNAL MEDICINE

## 2025-08-31 PROCEDURE — 99207 PR NO BILLABLE SERVICE THIS VISIT: CPT | Performed by: STUDENT IN AN ORGANIZED HEALTH CARE EDUCATION/TRAINING PROGRAM

## 2025-08-31 PROCEDURE — 93005 ELECTROCARDIOGRAM TRACING: CPT

## 2025-08-31 PROCEDURE — 120N000001 HC R&B MED SURG/OB

## 2025-08-31 PROCEDURE — 82962 GLUCOSE BLOOD TEST: CPT

## 2025-08-31 PROCEDURE — 250N000013 HC RX MED GY IP 250 OP 250 PS 637: Performed by: INTERNAL MEDICINE

## 2025-08-31 PROCEDURE — 93010 ELECTROCARDIOGRAM REPORT: CPT | Performed by: EMERGENCY MEDICINE

## 2025-08-31 PROCEDURE — 99285 EMERGENCY DEPT VISIT HI MDM: CPT | Mod: 25 | Performed by: EMERGENCY MEDICINE

## 2025-08-31 PROCEDURE — 85730 THROMBOPLASTIN TIME PARTIAL: CPT | Performed by: EMERGENCY MEDICINE

## 2025-08-31 PROCEDURE — 255N000002 HC RX 255 OP 636: Performed by: EMERGENCY MEDICINE

## 2025-08-31 PROCEDURE — 99285 EMERGENCY DEPT VISIT HI MDM: CPT | Performed by: EMERGENCY MEDICINE

## 2025-08-31 PROCEDURE — 84484 ASSAY OF TROPONIN QUANT: CPT | Performed by: EMERGENCY MEDICINE

## 2025-08-31 PROCEDURE — 81001 URINALYSIS AUTO W/SCOPE: CPT | Performed by: EMERGENCY MEDICINE

## 2025-08-31 PROCEDURE — 250N000009 HC RX 250: Performed by: EMERGENCY MEDICINE

## 2025-08-31 PROCEDURE — 70498 CT ANGIOGRAPHY NECK: CPT

## 2025-08-31 PROCEDURE — 83036 HEMOGLOBIN GLYCOSYLATED A1C: CPT | Performed by: INTERNAL MEDICINE

## 2025-08-31 RX ORDER — AMOXICILLIN 250 MG
1 CAPSULE ORAL 2 TIMES DAILY PRN
Status: DISCONTINUED | OUTPATIENT
Start: 2025-08-31 | End: 2025-09-02 | Stop reason: HOSPADM

## 2025-08-31 RX ORDER — GABAPENTIN 300 MG/1
300 CAPSULE ORAL 2 TIMES DAILY
Status: DISCONTINUED | OUTPATIENT
Start: 2025-09-01 | End: 2025-09-02 | Stop reason: HOSPADM

## 2025-08-31 RX ORDER — GABAPENTIN 300 MG/1
600 CAPSULE ORAL AT BEDTIME
Status: DISCONTINUED | OUTPATIENT
Start: 2025-08-31 | End: 2025-09-02 | Stop reason: HOSPADM

## 2025-08-31 RX ORDER — SENNOSIDES 8.6 MG
325 CAPSULE ORAL ONCE
Status: COMPLETED | OUTPATIENT
Start: 2025-08-31 | End: 2025-08-31

## 2025-08-31 RX ORDER — CALCIUM CARBONATE 500 MG/1
1000 TABLET, CHEWABLE ORAL 4 TIMES DAILY PRN
Status: DISCONTINUED | OUTPATIENT
Start: 2025-08-31 | End: 2025-09-02 | Stop reason: HOSPADM

## 2025-08-31 RX ORDER — GABAPENTIN 300 MG/1
300 CAPSULE ORAL 3 TIMES DAILY
Status: DISCONTINUED | OUTPATIENT
Start: 2025-08-31 | End: 2025-08-31

## 2025-08-31 RX ORDER — ONDANSETRON 4 MG/1
4 TABLET, ORALLY DISINTEGRATING ORAL EVERY 6 HOURS PRN
Status: DISCONTINUED | OUTPATIENT
Start: 2025-08-31 | End: 2025-09-02 | Stop reason: HOSPADM

## 2025-08-31 RX ORDER — ACETAMINOPHEN 650 MG/1
650 SUPPOSITORY RECTAL EVERY 4 HOURS PRN
Status: DISCONTINUED | OUTPATIENT
Start: 2025-08-31 | End: 2025-09-02 | Stop reason: HOSPADM

## 2025-08-31 RX ORDER — ATORVASTATIN CALCIUM 10 MG/1
20 TABLET, FILM COATED ORAL EVERY OTHER DAY
Status: DISCONTINUED | OUTPATIENT
Start: 2025-09-01 | End: 2025-09-01

## 2025-08-31 RX ORDER — AMOXICILLIN 250 MG
2 CAPSULE ORAL 2 TIMES DAILY PRN
Status: DISCONTINUED | OUTPATIENT
Start: 2025-08-31 | End: 2025-09-02 | Stop reason: HOSPADM

## 2025-08-31 RX ORDER — ONDANSETRON 2 MG/ML
4 INJECTION INTRAMUSCULAR; INTRAVENOUS EVERY 6 HOURS PRN
Status: DISCONTINUED | OUTPATIENT
Start: 2025-08-31 | End: 2025-09-02 | Stop reason: HOSPADM

## 2025-08-31 RX ORDER — LIDOCAINE 40 MG/G
CREAM TOPICAL
Status: DISCONTINUED | OUTPATIENT
Start: 2025-08-31 | End: 2025-09-02 | Stop reason: HOSPADM

## 2025-08-31 RX ORDER — ACETAMINOPHEN 325 MG/1
650 TABLET ORAL EVERY 4 HOURS PRN
Status: DISCONTINUED | OUTPATIENT
Start: 2025-08-31 | End: 2025-09-02 | Stop reason: HOSPADM

## 2025-08-31 RX ADMIN — IOHEXOL 71 ML: 350 INJECTION, SOLUTION INTRAVENOUS at 11:15

## 2025-08-31 RX ADMIN — MELATONIN 3 MG: 3 TAB ORAL at 22:51

## 2025-08-31 RX ADMIN — ASPIRIN 325 MG: 325 TABLET ORAL at 13:42

## 2025-08-31 RX ADMIN — GABAPENTIN 600 MG: 300 CAPSULE ORAL at 22:51

## 2025-08-31 RX ADMIN — SODIUM CHLORIDE 100 ML: 9 INJECTION, SOLUTION INTRAVENOUS at 11:15

## 2025-08-31 ASSESSMENT — ACTIVITIES OF DAILY LIVING (ADL)
WERE_AUXILIARY_AIDS_OFFERED?: YES
ADLS_ACUITY_SCORE: 33
DIFFICULTY_COMMUNICATING: NO
USE_OF_HEARING_ASSISTIVE_DEVICES: BILATERAL HEARING AIDS
WEAR_GLASSES_OR_BLIND: YES
ADLS_ACUITY_SCORE: 51
ADLS_ACUITY_SCORE: 53
ADLS_ACUITY_SCORE: 53
EQUIPMENT_CURRENTLY_USED_AT_HOME: GRAB BAR, TUB/SHOWER
FALL_HISTORY_WITHIN_LAST_SIX_MONTHS: YES
HEARING_MANAGEMENT: HEARING AIDS
WALKING_OR_CLIMBING_STAIRS_DIFFICULTY: NO
ADLS_ACUITY_SCORE: 33
ADLS_ACUITY_SCORE: 33
ADLS_ACUITY_SCORE: 51
ADLS_ACUITY_SCORE: 33
ADLS_ACUITY_SCORE: 33
TOILETING_ISSUES: NO
CHANGE_IN_FUNCTIONAL_STATUS_SINCE_ONSET_OF_CURRENT_ILLNESS/INJURY: YES
DESCRIBE_HEARING_LOSS: BILATERAL HEARING LOSS
VISION_MANAGEMENT: GLASSES
THE_FOLLOWING_AIDS_WERE_PROVIDED;: PATIENT DECLINED OFFER OF AUXILIARY AIDS
ADLS_ACUITY_SCORE: 33
DIFFICULTY_EATING/SWALLOWING: NO
ADLS_ACUITY_SCORE: 33
ADLS_ACUITY_SCORE: 51
ADLS_ACUITY_SCORE: 33
NUMBER_OF_TIMES_PATIENT_HAS_FALLEN_WITHIN_LAST_SIX_MONTHS: 3
CONCENTRATING,_REMEMBERING_OR_MAKING_DECISIONS_DIFFICULTY: NO
DRESSING/BATHING_DIFFICULTY: NO
HEARING_DIFFICULTY_OR_DEAF: YES
PATIENT'S_PREFERRED_MEANS_OF_COMMUNICATION: ENGLISH SPEAKER WITH HEARING LOSS, NO SPEECH PROBLEMS.
DOING_ERRANDS_INDEPENDENTLY_DIFFICULTY: NO

## 2025-08-31 ASSESSMENT — COLUMBIA-SUICIDE SEVERITY RATING SCALE - C-SSRS
2. HAVE YOU ACTUALLY HAD ANY THOUGHTS OF KILLING YOURSELF IN THE PAST MONTH?: NO
1. IN THE PAST MONTH, HAVE YOU WISHED YOU WERE DEAD OR WISHED YOU COULD GO TO SLEEP AND NOT WAKE UP?: NO
6. HAVE YOU EVER DONE ANYTHING, STARTED TO DO ANYTHING, OR PREPARED TO DO ANYTHING TO END YOUR LIFE?: NO

## 2025-09-01 ENCOUNTER — APPOINTMENT (OUTPATIENT)
Dept: CT IMAGING | Facility: CLINIC | Age: 75
End: 2025-09-01
Attending: PHYSICIAN ASSISTANT
Payer: COMMERCIAL

## 2025-09-01 PROBLEM — T45.1X5A CHEMOTHERAPY-INDUCED NEUROPATHY: Status: ACTIVE | Noted: 2023-10-24

## 2025-09-01 PROBLEM — I48.92 NEW ONSET ATRIAL FLUTTER (H): Status: RESOLVED | Noted: 2025-08-31 | Resolved: 2025-09-01

## 2025-09-01 PROBLEM — I48.0 PAF (PAROXYSMAL ATRIAL FIBRILLATION) (H): Status: ACTIVE | Noted: 2025-09-01

## 2025-09-01 PROBLEM — G62.0 CHEMOTHERAPY-INDUCED NEUROPATHY: Status: ACTIVE | Noted: 2023-10-24

## 2025-09-01 LAB
ANION GAP SERPL CALCULATED.3IONS-SCNC: 11 MMOL/L (ref 7–15)
BUN SERPL-MCNC: 19.8 MG/DL (ref 8–23)
CALCIUM SERPL-MCNC: 9 MG/DL (ref 8.8–10.4)
CHLORIDE SERPL-SCNC: 106 MMOL/L (ref 98–107)
CREAT SERPL-MCNC: 0.81 MG/DL (ref 0.67–1.17)
EGFRCR SERPLBLD CKD-EPI 2021: >90 ML/MIN/1.73M2
ERYTHROCYTE [DISTWIDTH] IN BLOOD BY AUTOMATED COUNT: 12.8 % (ref 10–15)
GLUCOSE BLDC GLUCOMTR-MCNC: 101 MG/DL (ref 70–99)
GLUCOSE BLDC GLUCOMTR-MCNC: 143 MG/DL (ref 70–99)
GLUCOSE BLDC GLUCOMTR-MCNC: 84 MG/DL (ref 70–99)
GLUCOSE BLDC GLUCOMTR-MCNC: 94 MG/DL (ref 70–99)
GLUCOSE SERPL-MCNC: 107 MG/DL (ref 70–99)
HCO3 SERPL-SCNC: 22 MMOL/L (ref 22–29)
HCT VFR BLD AUTO: 40 % (ref 40–53)
HGB BLD-MCNC: 14 G/DL (ref 13.3–17.7)
MCH RBC QN AUTO: 33 PG (ref 26.5–33)
MCHC RBC AUTO-ENTMCNC: 35 G/DL (ref 31.5–36.5)
MCV RBC AUTO: 94.3 FL (ref 78–100)
PLATELET # BLD AUTO: 191 10E3/UL (ref 150–450)
POTASSIUM SERPL-SCNC: 4.3 MMOL/L (ref 3.4–5.3)
RBC # BLD AUTO: 4.24 10E6/UL (ref 4.4–5.9)
SODIUM SERPL-SCNC: 139 MMOL/L (ref 135–145)
WBC # BLD AUTO: 7.62 10E3/UL (ref 4–11)

## 2025-09-01 PROCEDURE — 250N000013 HC RX MED GY IP 250 OP 250 PS 637: Performed by: PHYSICIAN ASSISTANT

## 2025-09-01 PROCEDURE — 70498 CT ANGIOGRAPHY NECK: CPT

## 2025-09-01 PROCEDURE — 250N000013 HC RX MED GY IP 250 OP 250 PS 637: Performed by: INTERNAL MEDICINE

## 2025-09-01 PROCEDURE — 70450 CT HEAD/BRAIN W/O DYE: CPT

## 2025-09-01 PROCEDURE — 97165 OT EVAL LOW COMPLEX 30 MIN: CPT | Mod: GO | Performed by: OCCUPATIONAL THERAPIST

## 2025-09-01 PROCEDURE — G0427 INPT/ED TELECONSULT70: HCPCS | Mod: G0 | Performed by: PHYSICIAN ASSISTANT

## 2025-09-01 PROCEDURE — 99207 PR NO BILLABLE SERVICE THIS VISIT: CPT | Performed by: STUDENT IN AN ORGANIZED HEALTH CARE EDUCATION/TRAINING PROGRAM

## 2025-09-01 PROCEDURE — 85018 HEMOGLOBIN: CPT | Performed by: INTERNAL MEDICINE

## 2025-09-01 PROCEDURE — 99233 SBSQ HOSP IP/OBS HIGH 50: CPT | Performed by: INTERNAL MEDICINE

## 2025-09-01 PROCEDURE — 250N000009 HC RX 250: Performed by: PHYSICIAN ASSISTANT

## 2025-09-01 PROCEDURE — 82435 ASSAY OF BLOOD CHLORIDE: CPT | Performed by: INTERNAL MEDICINE

## 2025-09-01 PROCEDURE — 36415 COLL VENOUS BLD VENIPUNCTURE: CPT | Performed by: INTERNAL MEDICINE

## 2025-09-01 PROCEDURE — 255N000002 HC RX 255 OP 636: Performed by: PHYSICIAN ASSISTANT

## 2025-09-01 PROCEDURE — 120N000001 HC R&B MED SURG/OB

## 2025-09-01 PROCEDURE — 97161 PT EVAL LOW COMPLEX 20 MIN: CPT | Mod: GP | Performed by: PHYSICAL THERAPIST

## 2025-09-01 RX ORDER — ASPIRIN 81 MG/1
81 TABLET ORAL DAILY
Status: DISCONTINUED | OUTPATIENT
Start: 2025-09-01 | End: 2025-09-02 | Stop reason: HOSPADM

## 2025-09-01 RX ORDER — ATORVASTATIN CALCIUM 40 MG/1
40 TABLET, FILM COATED ORAL DAILY
Status: DISCONTINUED | OUTPATIENT
Start: 2025-09-02 | End: 2025-09-02 | Stop reason: HOSPADM

## 2025-09-01 RX ORDER — GABAPENTIN 300 MG/1
600 CAPSULE ORAL AT BEDTIME
COMMUNITY

## 2025-09-01 RX ORDER — ATORVASTATIN CALCIUM 10 MG/1
20 TABLET, FILM COATED ORAL DAILY
Status: DISCONTINUED | OUTPATIENT
Start: 2025-09-01 | End: 2025-09-01

## 2025-09-01 RX ORDER — ATORVASTATIN CALCIUM 40 MG/1
40 TABLET, FILM COATED ORAL EVERY OTHER DAY
Status: DISCONTINUED | OUTPATIENT
Start: 2025-09-01 | End: 2025-09-01

## 2025-09-01 RX ORDER — GABAPENTIN 300 MG/1
300 CAPSULE ORAL 2 TIMES DAILY
COMMUNITY

## 2025-09-01 RX ADMIN — ATORVASTATIN CALCIUM 20 MG: 10 TABLET, FILM COATED ORAL at 08:30

## 2025-09-01 RX ADMIN — GABAPENTIN 300 MG: 300 CAPSULE ORAL at 11:56

## 2025-09-01 RX ADMIN — IOHEXOL 71 ML: 350 INJECTION, SOLUTION INTRAVENOUS at 12:22

## 2025-09-01 RX ADMIN — SODIUM CHLORIDE 100 ML: 9 INJECTION, SOLUTION INTRAVENOUS at 12:21

## 2025-09-01 RX ADMIN — ASPIRIN 81 MG: 81 TABLET, COATED ORAL at 08:30

## 2025-09-01 RX ADMIN — GABAPENTIN 600 MG: 300 CAPSULE ORAL at 20:45

## 2025-09-01 RX ADMIN — GABAPENTIN 300 MG: 300 CAPSULE ORAL at 08:30

## 2025-09-01 ASSESSMENT — ACTIVITIES OF DAILY LIVING (ADL)
ADLS_ACUITY_SCORE: 33
ADLS_ACUITY_SCORE: 35
ADLS_ACUITY_SCORE: 38
ADLS_ACUITY_SCORE: 33
ADLS_ACUITY_SCORE: 43
ADLS_ACUITY_SCORE: 38
ADLS_ACUITY_SCORE: 43
ADLS_ACUITY_SCORE: 33
ADLS_ACUITY_SCORE: 38
ADLS_ACUITY_SCORE: 38
ADLS_ACUITY_SCORE: 43
ADLS_ACUITY_SCORE: 33
ADLS_ACUITY_SCORE: 38
ADLS_ACUITY_SCORE: 43
ADLS_ACUITY_SCORE: 35
ADLS_ACUITY_SCORE: 33
ADLS_ACUITY_SCORE: 35
ADLS_ACUITY_SCORE: 43
PREVIOUS_RESPONSIBILITIES: MEAL PREP;HOUSEKEEPING;LAUNDRY;SHOPPING;MEDICATION MANAGEMENT;FINANCES;DRIVING
ADLS_ACUITY_SCORE: 38

## 2025-09-02 ENCOUNTER — APPOINTMENT (OUTPATIENT)
Dept: MRI IMAGING | Facility: CLINIC | Age: 75
End: 2025-09-02
Attending: PHYSICIAN ASSISTANT
Payer: COMMERCIAL

## 2025-09-02 VITALS
DIASTOLIC BLOOD PRESSURE: 71 MMHG | BODY MASS INDEX: 25.49 KG/M2 | RESPIRATION RATE: 18 BRPM | TEMPERATURE: 98 F | HEART RATE: 47 BPM | OXYGEN SATURATION: 98 % | HEIGHT: 71 IN | WEIGHT: 182.1 LBS | SYSTOLIC BLOOD PRESSURE: 131 MMHG

## 2025-09-02 LAB
ANION GAP SERPL CALCULATED.3IONS-SCNC: 10 MMOL/L (ref 7–15)
BUN SERPL-MCNC: 18.3 MG/DL (ref 8–23)
CALCIUM SERPL-MCNC: 9.3 MG/DL (ref 8.8–10.4)
CHLORIDE SERPL-SCNC: 105 MMOL/L (ref 98–107)
CREAT SERPL-MCNC: 0.85 MG/DL (ref 0.67–1.17)
EGFRCR SERPLBLD CKD-EPI 2021: 90 ML/MIN/1.73M2
GLUCOSE BLDC GLUCOMTR-MCNC: 104 MG/DL (ref 70–99)
GLUCOSE BLDC GLUCOMTR-MCNC: 118 MG/DL (ref 70–99)
GLUCOSE SERPL-MCNC: 90 MG/DL (ref 70–99)
HCO3 SERPL-SCNC: 26 MMOL/L (ref 22–29)
LVEF ECHO: NORMAL
POTASSIUM SERPL-SCNC: 4.3 MMOL/L (ref 3.4–5.3)
SODIUM SERPL-SCNC: 141 MMOL/L (ref 135–145)

## 2025-09-02 PROCEDURE — 80048 BASIC METABOLIC PNL TOTAL CA: CPT | Performed by: INTERNAL MEDICINE

## 2025-09-02 PROCEDURE — 92610 EVALUATE SWALLOWING FUNCTION: CPT | Mod: GN

## 2025-09-02 PROCEDURE — 36415 COLL VENOUS BLD VENIPUNCTURE: CPT | Performed by: INTERNAL MEDICINE

## 2025-09-02 PROCEDURE — 250N000013 HC RX MED GY IP 250 OP 250 PS 637: Performed by: INTERNAL MEDICINE

## 2025-09-02 PROCEDURE — 250N000013 HC RX MED GY IP 250 OP 250 PS 637: Performed by: PHYSICIAN ASSISTANT

## 2025-09-02 PROCEDURE — 255N000002 HC RX 255 OP 636: Performed by: PHYSICIAN ASSISTANT

## 2025-09-02 PROCEDURE — 97530 THERAPEUTIC ACTIVITIES: CPT | Mod: GP | Performed by: PHYSICAL THERAPIST

## 2025-09-02 PROCEDURE — A9585 GADOBUTROL INJECTION: HCPCS | Performed by: PHYSICIAN ASSISTANT

## 2025-09-02 PROCEDURE — 70553 MRI BRAIN STEM W/O & W/DYE: CPT

## 2025-09-02 RX ORDER — GADOBUTROL 604.72 MG/ML
8.5 INJECTION INTRAVENOUS ONCE
Status: COMPLETED | OUTPATIENT
Start: 2025-09-02 | End: 2025-09-02

## 2025-09-02 RX ORDER — ATORVASTATIN CALCIUM 40 MG/1
40 TABLET, FILM COATED ORAL DAILY
Qty: 30 TABLET | Refills: 1 | Status: SHIPPED | OUTPATIENT
Start: 2025-09-03

## 2025-09-02 RX ORDER — ACETAMINOPHEN 325 MG/1
650 TABLET ORAL EVERY 4 HOURS PRN
Status: SHIPPED
Start: 2025-09-02

## 2025-09-02 RX ADMIN — GABAPENTIN 300 MG: 300 CAPSULE ORAL at 11:52

## 2025-09-02 RX ADMIN — ATORVASTATIN CALCIUM 40 MG: 40 TABLET, FILM COATED ORAL at 09:02

## 2025-09-02 RX ADMIN — GADOBUTROL 8.5 ML: 604.72 INJECTION INTRAVENOUS at 08:10

## 2025-09-02 RX ADMIN — ASPIRIN 81 MG: 81 TABLET, COATED ORAL at 09:02

## 2025-09-02 RX ADMIN — GABAPENTIN 300 MG: 300 CAPSULE ORAL at 09:02

## 2025-09-02 ASSESSMENT — ACTIVITIES OF DAILY LIVING (ADL)
ADLS_ACUITY_SCORE: 38
ADLS_ACUITY_SCORE: 37
ADLS_ACUITY_SCORE: 38
ADLS_ACUITY_SCORE: 37
ADLS_ACUITY_SCORE: 37
ADLS_ACUITY_SCORE: 38
ADLS_ACUITY_SCORE: 37
ADLS_ACUITY_SCORE: 38
ADLS_ACUITY_SCORE: 37
ADLS_ACUITY_SCORE: 38
ADLS_ACUITY_SCORE: 38

## 2025-09-03 ENCOUNTER — THERAPY VISIT (OUTPATIENT)
Dept: PHYSICAL THERAPY | Facility: CLINIC | Age: 75
End: 2025-09-03
Attending: INTERNAL MEDICINE
Payer: COMMERCIAL

## 2025-09-03 ENCOUNTER — PATIENT OUTREACH (OUTPATIENT)
Dept: CARE COORDINATION | Facility: CLINIC | Age: 75
End: 2025-09-03
Payer: COMMERCIAL

## 2025-09-03 DIAGNOSIS — I48.0 PAF (PAROXYSMAL ATRIAL FIBRILLATION) (H): ICD-10-CM

## 2025-09-03 DIAGNOSIS — I63.9 ACUTE CVA (CEREBROVASCULAR ACCIDENT) (H): ICD-10-CM

## 2025-09-03 PROCEDURE — 97530 THERAPEUTIC ACTIVITIES: CPT | Mod: GP | Performed by: PHYSICAL THERAPIST

## 2025-09-03 PROCEDURE — 97161 PT EVAL LOW COMPLEX 20 MIN: CPT | Mod: GP | Performed by: PHYSICAL THERAPIST

## 2025-09-03 PROCEDURE — 97112 NEUROMUSCULAR REEDUCATION: CPT | Mod: GP | Performed by: PHYSICAL THERAPIST

## 2025-09-04 ENCOUNTER — PATIENT OUTREACH (OUTPATIENT)
Dept: CARE COORDINATION | Facility: CLINIC | Age: 75
End: 2025-09-04

## (undated) DEVICE — SU VICRYL 2-0 CT-1 27" J339H

## (undated) DEVICE — SOL WATER IRRIG 1000ML BOTTLE 07139-09

## (undated) DEVICE — DRSG TEGADERM ABSORBENT 8"X7.5" 90805

## (undated) DEVICE — TUBING SUCTION 6"X3/16" N56A

## (undated) DEVICE — GLOVE BIOGEL PI SZ 7.5 40875

## (undated) DEVICE — SOL NACL 0.9% IRRIG 3000ML BAG 2B7477

## (undated) DEVICE — DRAPE MAYO STAND 23X54 8337

## (undated) DEVICE — BLADE KNIFE SURG 10 371110

## (undated) DEVICE — POSITIONER ABDUCTION PILLOW FOAM MED FP-ABDUCTM

## (undated) DEVICE — SU VICRYL 1 CT-1 36" J347H

## (undated) DEVICE — GOWN XLG DISP 9545

## (undated) DEVICE — LINEN GOWN OVERSIZE 5408

## (undated) DEVICE — PREP SKIN SCRUB TRAY 4461A

## (undated) DEVICE — GLOVE PROTEXIS POWDER FREE 8.5 ORTHOPEDIC 2D73ET85

## (undated) DEVICE — SPECIMEN CONTAINER 5OZ STERILE 2600SA

## (undated) DEVICE — SU NDL MAYO 1/2 CIRCLE TROCAR 1826-4D

## (undated) DEVICE — SU VICRYL 0 CTX 36" J370H

## (undated) DEVICE — GLOVE PROTEXIS BLUE W/NEU-THERA 8.5  2D73EB85

## (undated) DEVICE — PREP DURAPREP 26ML APL 8630

## (undated) DEVICE — PACK TOTAL HIP W/POUCH RIVERSIDE LATEX FREE

## (undated) DEVICE — SU SILK 2-0 SH 30" K833H

## (undated) DEVICE — KIT CULTURE TRANSPORT SYS A.C.T. II DUAL ANEROBE R124022

## (undated) DEVICE — SPONGE LAP 18X18" X8435

## (undated) DEVICE — SU ETHIBOND 1 CTX 30" X865H

## (undated) DEVICE — SU PDS II 3-0 PS-1 18" Z683G

## (undated) DEVICE — SOL NACL 0.9% IRRIG 1000ML BOTTLE 2F7124

## (undated) DEVICE — GLOVE PROTEXIS BLUE W/NEU-THERA 7.5  2D73EB75

## (undated) DEVICE — LINEN BACK PACK 5440

## (undated) DEVICE — SU VICRYL 2-0 CT-1 27" UND J259H

## (undated) DEVICE — PREP DURAPREP REMOVER 4OZ 8611

## (undated) DEVICE — DRSG TEGADERM ALGINATE AG 4X5" 90303

## (undated) DEVICE — SUCTION MANIFOLD NEPTUNE 2 SYS 4 PORT 0702-020-000

## (undated) DEVICE — SOL WATER IRRIG 1000ML BOTTLE 2F7114

## (undated) DEVICE — Device

## (undated) DEVICE — SOL NACL 0.9% IRRIG 1000ML BOTTLE 07138-09

## (undated) DEVICE — ADH SKIN CLOSURE PREMIERPRO EXOFIN 1.0ML 3470

## (undated) DEVICE — SPECIMEN CONTAINER 60MLW/10% FORMALIN 59601

## (undated) DEVICE — STRAP STIRRUP W/SLIP 30187-030

## (undated) DEVICE — LINEN MAYO STAND COVER OVERSIZE PACK 5458

## (undated) DEVICE — SU VICRYL 0 CT-1 27" J340H

## (undated) DEVICE — DRAPE STOCKINETTE 8" 8586

## (undated) DEVICE — SU VICRYL 0 CT 36" J358H

## (undated) DEVICE — STRAP KNEE/BODY 31143004

## (undated) DEVICE — PEN MARKING SKIN

## (undated) DEVICE — TOOL DISSECT MIDAS MR8 9CM BALL 4MM DIA MR8-9BA40

## (undated) DEVICE — SU ETHIBOND 1 CTX CR 8/18" CX30D

## (undated) DEVICE — SUCTION FRAZIER 12FR W/HANDLE K73

## (undated) DEVICE — LINEN TOWEL PACK X5 5464

## (undated) DEVICE — ESU GROUND PAD UNIVERSAL W/O CORD

## (undated) DEVICE — SYR 05ML LL W/O NDL

## (undated) DEVICE — KIT ENDO TURNOVER/PROCEDURE CARRY-ON 101822

## (undated) DEVICE — GLOVE PROTEXIS W/NEU-THERA 7.0  2D73TE70

## (undated) DEVICE — PREP POVIDONE-IODINE 7.5% SCRUB 4OZ BOTTLE MDS093945

## (undated) DEVICE — DRSG KERLIX 4 1/2"X4YDS ROLL 6730

## (undated) DEVICE — SU MONOCRYL 3-0 PS-2 27" Y427H

## (undated) DEVICE — GOWN IMPERVIOUS SPECIALTY XLG/XLONG 32474

## (undated) DEVICE — SU VICRYL 2-0 CT 36" J957H

## (undated) RX ORDER — EPHEDRINE SULFATE 50 MG/ML
INJECTION, SOLUTION INTRAMUSCULAR; INTRAVENOUS; SUBCUTANEOUS
Status: DISPENSED
Start: 2022-04-19

## (undated) RX ORDER — HYDROMORPHONE HYDROCHLORIDE 1 MG/ML
INJECTION, SOLUTION INTRAMUSCULAR; INTRAVENOUS; SUBCUTANEOUS
Status: DISPENSED
Start: 2022-04-19

## (undated) RX ORDER — GLYCOPYRROLATE 0.2 MG/ML
INJECTION INTRAMUSCULAR; INTRAVENOUS
Status: DISPENSED
Start: 2022-04-19

## (undated) RX ORDER — CEFAZOLIN SODIUM/WATER 2 G/20 ML
SYRINGE (ML) INTRAVENOUS
Status: DISPENSED
Start: 2022-04-19

## (undated) RX ORDER — ROCURONIUM BROMIDE 50 MG/5 ML
SYRINGE (ML) INTRAVENOUS
Status: DISPENSED
Start: 2022-04-19

## (undated) RX ORDER — CELECOXIB 200 MG/1
CAPSULE ORAL
Status: DISPENSED
Start: 2022-04-19

## (undated) RX ORDER — LIDOCAINE HYDROCHLORIDE 20 MG/ML
INJECTION, SOLUTION EPIDURAL; INFILTRATION; INTRACAUDAL; PERINEURAL
Status: DISPENSED
Start: 2022-04-19

## (undated) RX ORDER — FENTANYL CITRATE 50 UG/ML
INJECTION, SOLUTION INTRAMUSCULAR; INTRAVENOUS
Status: DISPENSED
Start: 2023-11-16

## (undated) RX ORDER — TRANEXAMIC ACID 650 MG/1
TABLET ORAL
Status: DISPENSED
Start: 2022-04-19

## (undated) RX ORDER — FENTANYL CITRATE 50 UG/ML
INJECTION, SOLUTION INTRAMUSCULAR; INTRAVENOUS
Status: DISPENSED
Start: 2022-04-19

## (undated) RX ORDER — ACETAMINOPHEN 325 MG/1
TABLET ORAL
Status: DISPENSED
Start: 2022-04-19

## (undated) RX ORDER — ONDANSETRON 2 MG/ML
INJECTION INTRAMUSCULAR; INTRAVENOUS
Status: DISPENSED
Start: 2022-04-19

## (undated) RX ORDER — PROPOFOL 10 MG/ML
INJECTION, EMULSION INTRAVENOUS
Status: DISPENSED
Start: 2022-04-19

## (undated) RX ORDER — FENTANYL CITRATE-0.9 % NACL/PF 10 MCG/ML
PLASTIC BAG, INJECTION (ML) INTRAVENOUS
Status: DISPENSED
Start: 2022-04-19

## (undated) RX ORDER — GABAPENTIN 300 MG/1
CAPSULE ORAL
Status: DISPENSED
Start: 2022-04-19